# Patient Record
Sex: FEMALE | Race: BLACK OR AFRICAN AMERICAN | NOT HISPANIC OR LATINO | Employment: UNEMPLOYED | URBAN - METROPOLITAN AREA
[De-identification: names, ages, dates, MRNs, and addresses within clinical notes are randomized per-mention and may not be internally consistent; named-entity substitution may affect disease eponyms.]

---

## 2017-01-16 ENCOUNTER — OFFICE VISIT (OUTPATIENT)
Dept: SPORTS MEDICINE | Facility: CLINIC | Age: 47
End: 2017-01-16
Payer: MEDICAID

## 2017-01-16 VITALS
BODY MASS INDEX: 34.83 KG/M2 | WEIGHT: 204 LBS | DIASTOLIC BLOOD PRESSURE: 98 MMHG | HEART RATE: 103 BPM | SYSTOLIC BLOOD PRESSURE: 170 MMHG | HEIGHT: 64 IN

## 2017-01-16 DIAGNOSIS — M17.11 PRIMARY OSTEOARTHRITIS OF RIGHT KNEE: Primary | ICD-10-CM

## 2017-01-16 PROCEDURE — 99213 OFFICE O/P EST LOW 20 MIN: CPT | Mod: PBBFAC,PO | Performed by: PHYSICIAN ASSISTANT

## 2017-01-16 PROCEDURE — 99999 PR PBB SHADOW E&M-EST. PATIENT-LVL III: CPT | Mod: PBBFAC,,, | Performed by: PHYSICIAN ASSISTANT

## 2017-01-16 PROCEDURE — 99214 OFFICE O/P EST MOD 30 MIN: CPT | Mod: S$PBB,,, | Performed by: PHYSICIAN ASSISTANT

## 2017-01-16 NOTE — LETTER
Patient: Brionna Jones   YOB: 1970   Clinic Number: [id number]   Today's Date: January 16, 2017        Certificate to Return to Work     Brionna was seen by Angie Dominguez PA-C on 1/16/2017.      If you have any questions or concerns, please feel free to contact the office at [phone].    Thank you.    Angie Dominguez PA-C        Signature: __________________________________________________

## 2017-01-16 NOTE — PROGRESS NOTES
Subjective:          Chief Complaint: Brionna Jones is a 46 y.o. female who had concerns including Follow-up of the Right Knee.    Knee Pain    Associated symptoms include stiffness. Pertinent negatives include no fever, itching or numbness.   Pain   Associated symptoms include joint swelling. Pertinent negatives include no abdominal pain, chest pain, chills, congestion, coughing, fever, headaches, myalgias, nausea, numbness, rash, sore throat or vomiting.     Patient presents to clinic today for follow up right knee. She had a right knee Synvisc One injection on 10/7/2016 which she reports provided no relief of pain. She has tried PT 1-2x a week for 6 weeks.  She states that she does not feel like she is getting any better.She has had right knee pain x  2 years. She states that pain never quite went away post-operatively. Pain at its worst 8/10.  Pain is located in the knee joint and above the knee and it radiates to her feet. Pain is worse when lying down, standing up from a seated position, and walking up and down stairs. She denies mechanical symptoms. States that her knee occasionally gives out on her. She did her PT after surgery and never returned to her full functioning level after surgery. She has had steroid injections without improvement in her knee pain.     Surgery Date: 11/11/2014      Surgeon(s) and Role:  * Matt Smith MD - Primary     Pre-op Diagnosis: Unspecified internal derangement of knee [717.9]  Tear of medial cartilage or meniscus of knee, current [836.0]     Post-op Diagnosis: Same     Procedure(s) (LRB):  ARTHROSCOPY-KNEE (Right)  MENISCECTOMY (Right)  REMOVAL-FOREIGN BODY / LOOSE BODY (Right)    Review of Systems   Constitution: Negative. Negative for chills, fever, weight gain and weight loss.   HENT: Negative for congestion, headaches and sore throat.    Eyes: Negative for blurred vision and double vision.   Cardiovascular: Negative for chest pain, leg swelling and palpitations.    Respiratory: Negative for cough and shortness of breath.    Hematologic/Lymphatic: Does not bruise/bleed easily.   Skin: Negative for itching, poor wound healing and rash.   Musculoskeletal: Positive for joint pain, joint swelling and stiffness. Negative for back pain, muscle weakness and myalgias.   Gastrointestinal: Negative for abdominal pain, constipation, diarrhea, nausea and vomiting.   Genitourinary: Negative.  Negative for frequency and hematuria.   Neurological: Negative for dizziness, numbness, paresthesias and sensory change.   Psychiatric/Behavioral: Negative for altered mental status and depression. The patient is not nervous/anxious.    Allergic/Immunologic: Negative for hives.       Pain Related Questions  Over the past 3 days, what was your average pain during activity? (I.e. running, jogging, walking, climbing stairs, getting dressed, ect.): 10  Over the past 3 days, what was your highest pain level?: 10  Over the past 3 days, what was your lowest pain level? : 8    Other  How many nights a week are you awakened by your affected body part?: 7  Was the patient's HEIGHT measured or patient reported?: Patient Reported  Was the patient's WEIGHT measured or patient reported?: Measured      Objective:        General: Brionna is well-developed, well-nourished, appears stated age, in no acute distress, alert and oriented to time, place and person.     General    Vitals reviewed.  Constitutional: She is oriented to person, place, and time. She appears well-developed and well-nourished. No distress.   HENT:   Head: Normocephalic and atraumatic.   Eyes: EOM are normal.   Neck: Normal range of motion.   Cardiovascular: Normal rate and regular rhythm.    Pulmonary/Chest: Effort normal. No respiratory distress.   Neurological: She is alert and oriented to person, place, and time. She has normal reflexes. No cranial nerve deficit. Coordination normal.   Psychiatric: She has a normal mood and affect. Her behavior  is normal. Judgment and thought content normal.     General Musculoskeletal Exam   Gait: normal       Right Knee Exam     Inspection   Erythema: absent  Scars: absent  Swelling: absent  Effusion: effusion  Deformity: deformity  Bruising: absent    Tenderness   The patient is tender to palpation of the medial joint line.    Range of Motion   Extension:  0 normal   Flexion:  130 normal     Tests   Meniscus   Zeenat:  Medial - negative Lateral - negative  Ligament Examination Lachman: normal (-1 to 2mm) PCL-Posterior Drawer: normal (0 to 2mm)     MCL - Valgus: normal (0 to 2mm)  LCL - Varus: normalPivot Shift: normal (Equal)Reverse Pivot Shift: normal (Equal)Dial Test at 30 degrees: normal (< 5 degrees)Dial Test at 90 degrees: normal (< 5 degrees)  Posterolateral Corner: unstable (>15 degrees difference)  Patella   Passive Patellar Tilt: neutral  Patellar Glide (quadrants): Lateral - 1   Medial - 2  Patellar Grind: positive    Other   Sensation: normal    Left Knee Exam     Inspection   Erythema: absent  Scars: absent  Swelling: absent  Effusion: absent  Deformity: deformity  Bruising: absent    Tenderness   The patient is experiencing no tenderness.         Range of Motion   Extension:  0 normal   Flexion:  130 normal     Tests   Meniscus   Zeenat:  Medial - negative Lateral - negative  Stability Lachman: normal (-1 to 2mm) PCL-Posterior Drawer: normal (0 to 2mm)  MCL - Valgus: normal (0 to 2mm)  LCL - Varus: normal (0 to 2mm)Pivot Shift: normal (Equal)Reverse Pivot Shift: normal (Equal)Dial Test at 30 degrees: normal (< 5 degrees)Dial Test at 90 degrees: normal (< 5 degrees)  Posterolateral Corner: unstable (>15 degrees difference)  Patella   Passive Patellar Tilt: neutral  Patellar Glide (Quadrants): Lateral - 1 Medial - 2  Patellar Grind: negative    Other   Sensation: normal    Muscle Strength   Right Lower Extremity   Hip Abduction: 5/5   Quadriceps:  5/5   Hamstrin/5   Left Lower Extremity   Hip  Abduction: 5/5   Quadriceps:  5/5   Hamstrin/5     Reflexes     Left Side  Quadriceps:  2+  Achilles:  2+    Right Side   Quadriceps:  2+  Achilles:  2+    Vascular Exam     Right Pulses  Dorsalis Pedis:      2+  Posterior Tibial:      2+        Left Pulses  Dorsalis Pedis:      2+  Posterior Tibial:      2+        RADIOGRAPHS:  4 views:    Right: There is mild-moderate DJD.  No fracture dislocation bone destruction or OCD seen.    Left: There is mild-moderate DJD.  No fracture dislocation bone destruction or OCD seen.        Assessment:       Encounter Diagnosis   Name Primary?    Primary osteoarthritis of right knee Yes          Plan:       1.  Continue Mobic 15 mg.      2.  placed for Synvisc One injection    3. Continue 3D sleeve sleeve    4. IKDC, SF-12 and KOOS was filled out today in clinic.     RTC in 3 weeks with Dr. Matt Smith to discuss treatment options. Patient will fill out IKDC, SF-12 and KOOS on return and bilateral knee series on return.                            Patient questionnaires may have been collected.

## 2017-01-16 NOTE — MR AVS SNAPSHOT
[facility]  [address]  [facility] [address]  Phone: [phone]                  Brionna Jones   2017 8:30 AM   Appointment    Description:  Female : 1970   Provider:  Angie Dominguez PA-C   Department:  [facility]                To Do List           Goals (5 Years of Data)     None      [facility] On [facility]     [facility] On Call Nurse Care Line -  Assistance  Registered nurses in the [facility] On Call Center provide clinical advisement, health education, appointment booking, and other advisory services.  Call for this free service at [phone].             Medications           Message regarding Medications     Verify the changes and/or additions to your medication regime listed below are the same as discussed with your clinician today.  If any of these changes or additions are incorrect, please notify your healthcare provider.             Verify that the below list of medications is an accurate representation of the medications you are currently taking.  If none reported, the list may be blank. If incorrect, please contact your healthcare provider. Carry this list with you in case of emergency.           Current Medications     aspirin (ECOTRIN) 325 MG EC tablet Take 1 tablet (325 mg total) by mouth once daily.    celecoxib (CELEBREX) 200 MG capsule Take 1 capsule (200 mg total) by mouth 2 (two) times daily.    cetirizine (ZYRTEC) 10 MG tablet Take 10 mg by mouth once daily.    meloxicam (MOBIC) 15 MG tablet Take 1 tablet (15 mg total) by mouth once daily.    nadolol (CORGARD) 40 MG tablet Take 1 tablet (40 mg total) by mouth once daily.    oxycodone-acetaminophen (PERCOCET) 5-325 mg per tablet Take 1 tablet by mouth every 6 (six) hours as needed for Pain.    trazodone (DESYREL) 50 MG tablet Take 1 tablet (50 mg total) by mouth every evening.    zolpidem (AMBIEN) 5 MG Tab Take 1 tablet (5 mg total) by mouth nightly as needed.           Clinical  Reference Information           Allergies as of 1/16/2017     Codeine      Immunizations Administered on Date of Encounter - 1/16/2017     None      Smoking Cessation     If you would like to quit smoking:   You may be eligible for free services if you are a [place] resident and started smoking cigarettes before September 1, 1988.  Call the [employer]oking [employer]essation [employer] ([employer]) toll free at (014) 319-1715 or (012) 859-1568.   Call 5-634-QUIT-NOW if you do not meet the above criteria.

## 2017-01-17 ENCOUNTER — TELEPHONE (OUTPATIENT)
Dept: SPORTS MEDICINE | Facility: CLINIC | Age: 47
End: 2017-01-17

## 2017-01-17 NOTE — TELEPHONE ENCOUNTER
----- Message from Aurea Chua sent at 1/16/2017  2:07 PM CST -----  Contact: PT  Excuse to return to work, was incorrect.     Please call: [phone]

## 2017-02-06 ENCOUNTER — HOSPITAL ENCOUNTER (OUTPATIENT)
Dept: RADIOLOGY | Facility: HOSPITAL | Age: 47
Discharge: HOME | End: 2017-02-06
Attending: ORTHOPAEDIC SURGERY
Payer: MEDICAID

## 2017-02-06 ENCOUNTER — OFFICE VISIT (OUTPATIENT)
Dept: SPORTS MEDICINE | Facility: CLINIC | Age: 47
End: 2017-02-06
Payer: MEDICAID

## 2017-02-06 VITALS
HEART RATE: 92 BPM | HEIGHT: 64 IN | SYSTOLIC BLOOD PRESSURE: 173 MMHG | BODY MASS INDEX: 34.83 KG/M2 | DIASTOLIC BLOOD PRESSURE: 99 MMHG | WEIGHT: 204 LBS

## 2017-02-06 DIAGNOSIS — M23.91 INTERNAL DERANGEMENT OF RIGHT KNEE: ICD-10-CM

## 2017-02-06 DIAGNOSIS — E66.01 MORBID OBESITY DUE TO EXCESS CALORIES: ICD-10-CM

## 2017-02-06 DIAGNOSIS — M17.11 PRIMARY OSTEOARTHRITIS OF RIGHT KNEE: ICD-10-CM

## 2017-02-06 DIAGNOSIS — M25.561 RIGHT KNEE PAIN, UNSPECIFIED CHRONICITY: Primary | ICD-10-CM

## 2017-02-06 DIAGNOSIS — M25.561 RIGHT KNEE PAIN, UNSPECIFIED CHRONICITY: ICD-10-CM

## 2017-02-06 PROCEDURE — 73564 X-RAY EXAM KNEE 4 OR MORE: CPT | Mod: TC,50,PO

## 2017-02-06 PROCEDURE — 99214 OFFICE O/P EST MOD 30 MIN: CPT | Mod: S$PBB,,, | Performed by: ORTHOPAEDIC SURGERY

## 2017-02-06 PROCEDURE — 73564 X-RAY EXAM KNEE 4 OR MORE: CPT | Mod: 26,50,, | Performed by: RADIOLOGY

## 2017-02-06 PROCEDURE — 99999 PR PBB SHADOW E&M-EST. PATIENT-LVL III: CPT | Mod: PBBFAC,,, | Performed by: ORTHOPAEDIC SURGERY

## 2017-02-06 NOTE — MR AVS SNAPSHOT
[facility]  [address]  [facility] [address]  Phone: [phone]                  Brionna Jones   2017 11:00 AM   Office Visit    Description:  Female : 1970   Provider:  Matt Smith MD   Department:  [facility]           Reason for Visit     Right Knee - Pain           Diagnoses this Visit        Comments    Right knee pain, unspecified chronicity    -  Primary     Internal derangement of right knee         Primary osteoarthritis of right knee         Morbid obesity due to excess calories                To Do List           Goals (5 Years of Data)     None      Follow-Up and Disposition     Return in about 2 weeks (around 2017), or RTC in 2-3 weeks with Dr. Matt Smith for MRI results review. Patient will fill out IKDC, SF-12 and , for RTC in 2-3 weeks with Dr. Matt Smith for MRI results review. Patient will fill out IKDC, SF-12 and .      [facility] On Call     [facility] On Call Nurse Care Line -  Assistance  Registered nurses in the [facility] On Call Center provide clinical advisement, health education, appointment booking, and other advisory services.  Call for this free service at [phone].             Medications           Message regarding Medications     Verify the changes and/or additions to your medication regime listed below are the same as discussed with your clinician today.  If any of these changes or additions are incorrect, please notify your healthcare provider.             Verify that the below list of medications is an accurate representation of the medications you are currently taking.  If none reported, the list may be blank. If incorrect, please contact your healthcare provider. Carry this list with you in case of emergency.           Current Medications     celecoxib (CELEBREX) 200 MG capsule Take 1 capsule (200 mg total) by mouth 2 (two) times daily.    cetirizine (ZYRTEC) 10 MG tablet Take 10 mg by mouth once  "daily.    meloxicam (MOBIC) 15 MG tablet Take 1 tablet (15 mg total) by mouth once daily.    oxycodone-acetaminophen (PERCOCET) 5-325 mg per tablet Take 1 tablet by mouth every 6 (six) hours as needed for Pain.    zolpidem (AMBIEN) 5 MG Tab Take 1 tablet (5 mg total) by mouth nightly as needed.    aspirin (ECOTRIN) 325 MG EC tablet Take 1 tablet (325 mg total) by mouth once daily.    nadolol (CORGARD) 40 MG tablet Take 1 tablet (40 mg total) by mouth once daily.    trazodone (DESYREL) 50 MG tablet Take 1 tablet (50 mg total) by mouth every evening.           Clinical Reference Information           Your Vitals Were     BP Pulse Height Weight Last Period BMI    173/99 92 5' 4" (1.626 m) 92.5 kg (204 lb) 01/30/2017 35.02 kg/m2      Blood Pressure          Most Recent Value    BP  (!)  173/99      Allergies as of 2/6/2017     Codeine      Immunizations Administered on Date of Encounter - 2/6/2017     None      Orders Placed During Today's Visit     Future Labs/Procedures Expected by Expires    MRI Lower Extremity Joint WO Cont Right  2/6/2017 2/6/2018    X-ray Knee Ortho Bilateral with Flexion  2/6/2017 2/6/2018      Smoking Cessation     If you would like to quit smoking:   You may be eligible for free services if you are a [place] resident and started smoking cigarettes before September 1, 1988.  Call the Smoking Cessation Trust ([facility]) toll free at ([phone] or (722) 550-0303.   Call 1-800-QUIT-NOW if you do not meet the above criteria.            Language Assistance Services     ATTENTION: Language assistance services are available, free of charge. Please call [phone].      ATENCIÓN: Si habla español, tiene a valdez disposición servicios gratuitos de asistencia lingüística. Llame al [phone].     CHÚ Ý: N?u b?n nói Ti?ng Vi?t, có các d?ch v? h? tr? ngôn ng? mi?n phí dành cho b?n. G?i s? [phone].         [facility] complies with applicable Federal civil rights laws and does not " discriminate on the basis of race, color, national origin, age, disability, or sex.

## 2017-02-06 NOTE — PROGRESS NOTES
Subjective:          Chief Complaint: Brionna Jones is a 46 y.o. female who had concerns including Pain of the Right Knee.    HPI Comments: Patient presents to clinic today for follow up right knee. She had a right knee Synvisc One injection on 10/7/2016 which she reports provided no relief of pain. She has tried PT 1-2x a week for 6 weeks.  She states that she does not feel like she is getting any better.She has had right knee pain x  2 years. She states that pain never quite went away post-operatively. Pain at its worst 8/10.  Pain is located in the knee joint and above the knee and it radiates to her feet. Pain is worse when lying down, standing up from a seated position, and walking up and down stairs. She denies mechanical symptoms. States that her knee occasionally gives out on her. She did her PT after surgery and never returned to her full functioning level after surgery. She has had steroid injections without improvement in her knee pain.     Surgery Date: 11/11/2014      Surgeon(s) and Role:  * Matt Smith MD - Primary     Pre-op Diagnosis: Unspecified internal derangement of knee (717.9)  Tear of medial cartilage or meniscus of knee, current (836.0)     Post-op Diagnosis: Same     Procedure(s) (LRB):  ARTHROSCOPY-KNEE (Right)  MENISCECTOMY (Right)  REMOVAL-FOREIGN BODY / LOOSE BODY (Right)    Pain   Associated symptoms include joint swelling. Pertinent negatives include no abdominal pain, chest pain, chills, congestion, coughing, fever, headaches, myalgias, nausea, numbness, rash, sore throat or vomiting.   Knee Pain    Associated symptoms include stiffness. Pertinent negatives include no fever, itching or numbness.         Review of Systems   Constitution: Negative. Negative for chills, fever, weight gain and weight loss.   HENT: Negative for congestion, headaches and sore throat.    Eyes: Negative for blurred vision and double vision.   Cardiovascular: Negative for chest pain, leg swelling and  palpitations.   Respiratory: Negative for cough and shortness of breath.    Hematologic/Lymphatic: Does not bruise/bleed easily.   Skin: Negative for itching, poor wound healing and rash.   Musculoskeletal: Positive for joint pain, joint swelling and stiffness. Negative for back pain, muscle weakness and myalgias.   Gastrointestinal: Negative for abdominal pain, constipation, diarrhea, nausea and vomiting.   Genitourinary: Negative.  Negative for frequency and hematuria.   Neurological: Negative for dizziness, numbness, paresthesias and sensory change.   Psychiatric/Behavioral: Negative for altered mental status and depression. The patient is not nervous/anxious.    Allergic/Immunologic: Negative for hives.       Pain Related Questions  Over the past 3 days, what was your average pain during activity? (I.e. running, jogging, walking, climbing stairs, getting dressed, ect.): 10  Over the past 3 days, what was your highest pain level?: 10  Over the past 3 days, what was your lowest pain level? : 10    Other  How many nights a week are you awakened by your affected body part?: 4  Was the patient's HEIGHT measured or patient reported?: Patient Reported  Was the patient's WEIGHT measured or patient reported?: Patient Reported      Objective:        General: Brionna is well-developed, well-nourished, appears stated age, in no acute distress, alert and oriented to time, place and person.     General    Vitals reviewed.  Constitutional: She is oriented to person, place, and time. She appears well-developed and well-nourished. No distress.   HENT:   Head: Normocephalic and atraumatic.   Eyes: EOM are normal.   Neck: Normal range of motion.   Cardiovascular: Normal rate and regular rhythm.    Pulmonary/Chest: Effort normal. No respiratory distress.   Neurological: She is alert and oriented to person, place, and time. She has normal reflexes. No cranial nerve deficit. Coordination normal.   Psychiatric: She has a normal mood and  affect. Her behavior is normal. Judgment and thought content normal.     General Musculoskeletal Exam   Gait: normal       Right Knee Exam     Inspection   Erythema: absent  Scars: absent  Swelling: absent  Effusion: effusion  Deformity: deformity  Bruising: absent    Tenderness   The patient is tender to palpation of the medial joint line.    Range of Motion   Extension:  0 normal   Flexion:  130 normal     Tests   Meniscus   Zeenat:  Medial - negative Lateral - negative  Ligament Examination Lachman: normal (-1 to 2mm) PCL-Posterior Drawer: normal (0 to 2mm)     MCL - Valgus: normal (0 to 2mm)  LCL - Varus: normalPivot Shift: normal (Equal)Reverse Pivot Shift: normal (Equal)Dial Test at 30 degrees: normal (< 5 degrees)Dial Test at 90 degrees: normal (< 5 degrees)  Posterolateral Corner: unstable (>15 degrees difference)  Patella   Passive Patellar Tilt: neutral  Patellar Glide (quadrants): Lateral - 1   Medial - 2  Patellar Grind: positive    Other   Sensation: normal    Left Knee Exam     Inspection   Erythema: absent  Scars: absent  Swelling: absent  Effusion: absent  Deformity: deformity  Bruising: absent    Tenderness   The patient is experiencing no tenderness.         Range of Motion   Extension:  0 normal   Flexion:  130 normal     Tests   Meniscus   Zeenat:  Medial - negative Lateral - negative  Stability Lachman: normal (-1 to 2mm) PCL-Posterior Drawer: normal (0 to 2mm)  MCL - Valgus: normal (0 to 2mm)  LCL - Varus: normal (0 to 2mm)Pivot Shift: normal (Equal)Reverse Pivot Shift: normal (Equal)Dial Test at 30 degrees: normal (< 5 degrees)Dial Test at 90 degrees: normal (< 5 degrees)  Posterolateral Corner: unstable (>15 degrees difference)  Patella   Passive Patellar Tilt: neutral  Patellar Glide (Quadrants): Lateral - 1 Medial - 2  Patellar Grind: negative    Other   Sensation: normal    Muscle Strength   Right Lower Extremity   Hip Abduction: 5/5   Quadriceps:  5/5   Hamstrin/5   Left Lower  Extremity   Hip Abduction: 5/5   Quadriceps:  5/5   Hamstrin/5     Reflexes     Left Side  Quadriceps:  2+  Achilles:  2+    Right Side   Quadriceps:  2+  Achilles:  2+    Vascular Exam     Right Pulses  Dorsalis Pedis:      2+  Posterior Tibial:      2+        Left Pulses  Dorsalis Pedis:      2+  Posterior Tibial:      2+                Assessment:       Encounter Diagnosis   Name Primary?    Right knee pain, unspecified chronicity Yes          Plan:       1.  Continue Mobic 15 mg.      2.  placed for Synvisc One injection    3. Continue 3D sleeve sleeve    4. IKDC, SF-12 and KOOS was filled out today in clinic.     RTC in 3 weeks with Dr. Matt Smith to discuss treatment options. Patient will fill out IKDC, SF-12 and KOOS on return and bilateral knee series on return.                            Patient questionnaires may have been collected.

## 2017-02-06 NOTE — PROGRESS NOTES
Subjective:          Chief Complaint: Brionna Jones is a 46 y.o. female who had concerns including Pain of the Right Knee.    Pain   Associated symptoms include joint swelling. Pertinent negatives include no abdominal pain, chest pain, chills, congestion, coughing, fever, headaches, myalgias, nausea, numbness, rash, sore throat or vomiting.   Knee Pain    Associated symptoms include stiffness. Pertinent negatives include no fever, itching or numbness.     Patient presents to clinic today for follow up right knee. She had a right knee Synvisc One injection on 10/7/2016 which she reports provided no relief of pain. She has tried PT 1-2x a week for 6 weeks.  She states that she does not feel like she is getting any better.She has had right knee pain x  2 years. She states that pain never quite went away post-operatively. Pain at its worst 8/10.  Pain is located in the knee joint and above the knee and it radiates to her feet. Pain is worse when lying down, standing up from a seated position, and walking up and down stairs. She denies mechanical symptoms. States that her knee occasionally gives out on her. She did her PT after surgery and never returned to her full functioning level after surgery. She has had steroid injections without improvement in her knee pain.     Surgery Date: 11/11/2014      Surgeon(s) and Role:  * Matt Smith MD - Primary     Pre-op Diagnosis: Unspecified internal derangement of knee [717.9]  Tear of medial cartilage or meniscus of knee, current [836.0]     Post-op Diagnosis: Same     Procedure(s) (LRB):  ARTHROSCOPY-KNEE (Right)  MENISCECTOMY (Right)  REMOVAL-FOREIGN BODY / LOOSE BODY (Right)    There was an area of 1 cm x 1 cm area on the lateral tibial plateau, which   showed grade III and grade IV chondral defect.     Review of Systems   Constitution: Negative. Negative for chills, fever, weight gain and weight loss.   HENT: Negative for congestion, headaches and sore throat.    Eyes:  Negative for blurred vision and double vision.   Cardiovascular: Negative for chest pain, leg swelling and palpitations.   Respiratory: Negative for cough and shortness of breath.    Hematologic/Lymphatic: Does not bruise/bleed easily.   Skin: Negative for itching, poor wound healing and rash.   Musculoskeletal: Positive for joint pain, joint swelling and stiffness. Negative for back pain, muscle weakness and myalgias.   Gastrointestinal: Negative for abdominal pain, constipation, diarrhea, nausea and vomiting.   Genitourinary: Negative.  Negative for frequency and hematuria.   Neurological: Negative for dizziness, numbness, paresthesias and sensory change.   Psychiatric/Behavioral: Negative for altered mental status and depression. The patient is not nervous/anxious.    Allergic/Immunologic: Negative for hives.       Pain Related Questions  Over the past 3 days, what was your average pain during activity? (I.e. running, jogging, walking, climbing stairs, getting dressed, ect.): 10  Over the past 3 days, what was your highest pain level?: 10  Over the past 3 days, what was your lowest pain level? : 10    Other  How many nights a week are you awakened by your affected body part?: 4  Was the patient's HEIGHT measured or patient reported?: Patient Reported  Was the patient's WEIGHT measured or patient reported?: Patient Reported      Objective:        General: Brionna is well-developed, well-nourished, appears stated age, in no acute distress, alert and oriented to time, place and person.     General    Vitals reviewed.  Constitutional: She is oriented to person, place, and time. She appears well-developed and well-nourished. No distress.   HENT:   Head: Normocephalic and atraumatic.   Mouth/Throat: No oropharyngeal exudate.   Eyes: EOM are normal. Right eye exhibits no discharge. Left eye exhibits no discharge.   Neck: Normal range of motion.   Cardiovascular: Normal rate and regular rhythm.    Pulmonary/Chest: Effort  normal and breath sounds normal. No respiratory distress.   Neurological: She is alert and oriented to person, place, and time. She has normal reflexes. No cranial nerve deficit. Coordination normal.   Psychiatric: She has a normal mood and affect. Her behavior is normal. Judgment and thought content normal.     General Musculoskeletal Exam   Gait: normal       Right Knee Exam     Inspection   Erythema: absent  Scars: present  Swelling: absent  Effusion: effusion  Deformity: deformity  Bruising: absent    Tenderness   The patient is tender to palpation of the patella.    Range of Motion   Extension:  -5 normal   Flexion:  140 normal     Tests   Meniscus   Zeenat:  Medial - negative Lateral - negative  Ligament Examination Lachman: normal (-1 to 2mm) PCL-Posterior Drawer: normal (0 to 2mm)     MCL - Valgus: normal (0 to 2mm)  LCL - Varus: normalPivot Shift: normal (Equal)Reverse Pivot Shift: normal (Equal)Dial Test at 30 degrees: normal (< 5 degrees)Dial Test at 90 degrees: normal (< 5 degrees)  Posterior Sag Test: negative  Posterolateral Corner: unstable (>15 degrees difference)  Patella   Patellar Apprehension: negative  Passive Patellar Tilt: neutral  Patellar Tracking: normal  Patellar Glide (quadrants): Lateral - 1   Medial - 2  Q-Angle at 90 degrees: normal  Patellar Grind: positive  J-Sign: none    Other   Meniscal Cyst: absent  Popliteal (Baker's) Cyst: absent  Sensation: normal    Left Knee Exam     Inspection   Erythema: absent  Scars: absent  Swelling: absent  Effusion: absent  Deformity: deformity  Bruising: absent    Tenderness   The patient is experiencing no tenderness.         Range of Motion   Extension:  -5 normal   Flexion:  140 normal     Tests   Meniscus   Zeenat:  Medial - negative Lateral - negative  Stability Lachman: normal (-1 to 2mm) PCL-Posterior Drawer: normal (0 to 2mm)  MCL - Valgus: normal (0 to 2mm)  LCL - Varus: normal (0 to 2mm)Pivot Shift: normal (Equal)Reverse Pivot Shift:  normal (Equal)Dial Test at 30 degrees: normal (< 5 degrees)Dial Test at 90 degrees: normal (< 5 degrees)  Posterior Sag Test: negative  Posterolateral Corner: unstable (>15 degrees difference)  Patella   Patellar Apprehension: negative  Passive Patellar Tilt: neutral  Patellar Tracking: normal  Patellar Glide (Quadrants): Lateral - 1 Medial - 2  Q-Angle at 90 degrees: normal  Patellar Grind: positive  J-Sign: J sign absent    Other   Meniscal Cyst: absent  Popliteal (Baker's) Cyst: absent  Sensation: normal    Right Hip Exam     Tests   Aimee: negative  Left Hip Exam     Tests   Aimee: negative          Muscle Strength   Right Lower Extremity   Hip Abduction: 5/5   Quadriceps:  5/5   Hamstrin/5   Left Lower Extremity   Hip Abduction: 5/5   Quadriceps:  5/5   Hamstrin/5     Reflexes     Left Side  Quadriceps:  2+  Achilles:  2+    Right Side   Quadriceps:  2+  Achilles:  2+    Vascular Exam     Right Pulses  Dorsalis Pedis:      2+  Posterior Tibial:      2+        Left Pulses  Dorsalis Pedis:      2+  Posterior Tibial:      2+        RADIOGRAPHS:  4 views:    Right: There is mild-moderate DJD.  No fracture dislocation bone destruction or OCD seen.    Left: There is mild-moderate DJD.  No fracture dislocation bone destruction or OCD seen.    Right knee flexion views demonstrate narrowing lateral in comparison to the left side    MRI:  3/13/14     Narrative   HISTORY: Rule out ligamentous versus cartilaginous injury    COMPARISON: MRI knee 08 (report only)    FINDINGS: There is a synovial sheath ganglion cyst involving the ACL.  This cyst measures 1.9 cm in greatest dimension.  The ACL is otherwise intact and unremarkable.  The PCL, MCL, and LCL are intact and unremarkable.  There is a horizontal undersurface   tear of the posterior horn of the medial meniscus.  The anterior aspect of the medial meniscus is unremarkable.  The lateral meniscus is unremarkable.    There is thinning of the cartilage of the  patellofemoral compartment.  There is a full thickness defect involving the cartilage superficial to the lateral femoral condyle.  This cartilage defect is associated with a 5-mm focus of edema involving the   lateral femoral condyle consistent with a osteochondral defect.    The osseous structures demonstrate red marrow conversion that may be secondary to cigarette smoking.  There is a mild joint effusion.  The quadriceps tendon and patellar ligament are unremarkable.      There is lateral subluxation of the patella.  There is thinning of the patellar articular cartilage.  There is a 1.8-cm Baker's cyst.   Impression       #1. Horizontal undersurface tear of the posterior horn of the medial meniscus.    #2. Full thickness defect of the cartilage superficial to the lateral condyle of the distal right femur with associated small osteochondral defect.    #3. Lateral subluxation of the patella.  This can be seen with patellar tracking disorders.    #4. Synovial sheath ganglion cyst involving the ACL.    #5. Thinning of the patellar articular cartilage.    #6. 1.8-cm Baker's cyst.              Assessment:       Encounter Diagnoses   Name Primary?    Right knee pain, unspecified chronicity Yes    Internal derangement of right knee     Primary osteoarthritis of right knee     Morbid obesity due to excess calories           Plan:       1.  IKDC, SF-12 and KOOS was filled out today in clinic.     RTC in 2-3 weeks with Dr. Matt Smith for MRI results review. Patient will fill out IKDC, SF-12 and KOOS on return.    2. MRI to evaluate the lateral compartment and the patellofemoral region of the right knee    3. Possible WAVE implant at PF region vs. ConforMIS iDUO implant                Patient questionnaires may have been collected.

## 2017-02-15 ENCOUNTER — HOSPITAL ENCOUNTER (OUTPATIENT)
Dept: RADIOLOGY | Facility: HOSPITAL | Age: 47
Discharge: HOME | End: 2017-02-15
Attending: ORTHOPAEDIC SURGERY
Payer: MEDICAID

## 2017-02-15 DIAGNOSIS — M23.91 INTERNAL DERANGEMENT OF RIGHT KNEE: ICD-10-CM

## 2017-02-15 DIAGNOSIS — E66.01 MORBID OBESITY DUE TO EXCESS CALORIES: ICD-10-CM

## 2017-02-15 DIAGNOSIS — M25.561 RIGHT KNEE PAIN, UNSPECIFIED CHRONICITY: ICD-10-CM

## 2017-02-15 DIAGNOSIS — M17.11 PRIMARY OSTEOARTHRITIS OF RIGHT KNEE: ICD-10-CM

## 2017-02-15 PROCEDURE — 73721 MRI JNT OF LWR EXTRE W/O DYE: CPT | Mod: TC,RT

## 2017-02-15 PROCEDURE — 73721 MRI JNT OF LWR EXTRE W/O DYE: CPT | Mod: 26,RT,, | Performed by: RADIOLOGY

## 2017-02-17 DIAGNOSIS — M12.561 TRAUMATIC ARTHRITIS OF RIGHT KNEE: Primary | ICD-10-CM

## 2017-02-22 ENCOUNTER — OFFICE VISIT (OUTPATIENT)
Dept: SPORTS MEDICINE | Facility: CLINIC | Age: 47
End: 2017-02-22
Payer: MEDICAID

## 2017-02-22 VITALS
HEIGHT: 64 IN | SYSTOLIC BLOOD PRESSURE: 147 MMHG | WEIGHT: 204 LBS | HEART RATE: 90 BPM | BODY MASS INDEX: 34.83 KG/M2 | DIASTOLIC BLOOD PRESSURE: 94 MMHG

## 2017-02-22 DIAGNOSIS — M23.91 INTERNAL DERANGEMENT OF RIGHT KNEE: ICD-10-CM

## 2017-02-22 DIAGNOSIS — M94.9 CHONDRAL LESION: Primary | ICD-10-CM

## 2017-02-22 DIAGNOSIS — M17.11 PRIMARY OSTEOARTHRITIS OF RIGHT KNEE: ICD-10-CM

## 2017-02-22 DIAGNOSIS — M25.561 PAIN IN JOINT OF RIGHT KNEE: ICD-10-CM

## 2017-02-22 PROCEDURE — 99214 OFFICE O/P EST MOD 30 MIN: CPT | Mod: S$PBB,,, | Performed by: ORTHOPAEDIC SURGERY

## 2017-02-22 PROCEDURE — 99999 PR PBB SHADOW E&M-EST. PATIENT-LVL IV: CPT | Mod: PBBFAC,,, | Performed by: ORTHOPAEDIC SURGERY

## 2017-02-22 PROCEDURE — 99214 OFFICE O/P EST MOD 30 MIN: CPT | Mod: PBBFAC,PO | Performed by: ORTHOPAEDIC SURGERY

## 2017-02-22 RX ORDER — MELOXICAM 15 MG/1
15 TABLET ORAL DAILY
Qty: 30 TABLET | Refills: 2 | Status: SHIPPED | OUTPATIENT
Start: 2017-02-22 | End: 2017-07-21 | Stop reason: HOSPADM

## 2017-02-22 NOTE — LETTER
February 22, 2017        No Recipients             [facility]  [address]  [facility] [address]  Phone: [phone]   Patient: Brionna Jones   MR Number: [id number]   YOB: 1970   Date of Visit: 2/22/2017       Dear Dr. Solis Recipients:    Thank you for referring Brionna Jones to me for evaluation. Below are the relevant portions of my assessment and plan of care.       Encounter Diagnoses   Name Primary?    Chondral lesion Yes    Primary osteoarthritis of right knee     Internal derangement of right knee            1.  IKDC, SF-12 and KOOS was filled out today in clinic.     RTC in 4 weeks with Mid-level provider for preop. Patient will not fill out IKDC, SF-12 and KOOS on return.    2. We reviewed with Brionna today, the pathology and natural history of her diagnosis. We have discussed a variety of treatment options including medications, physical therapy and other alternative treatments. I also explained the indications, risks and benefits of surgery. After discussion, Brionna decided to proceed with surgery. The decision was made to go forward with    1. Right knee ConforMIS iTotal    The details of the surgical procedure were explained, including the location of probable incisions and a description of likely hardware and/or grafts to be used.  The patient understands the likely convalescence after surgery.  Also, we have thoroughly discussed the risks, benefits and alternatives to surgery, including, but not limited to, the risk of infection, joint stiffness, blood clot (including DVT and/or pulmonary embolus), neurologic and vascular injury.  It was explained that, if tissue has been repaired or reconstructed, there is a chance of failure, which may require further management.    All of the patient's questions were answered and informed consent was obtained. The patient will contact us if they have any questions or concerns in the interim.    3. Rehab-[facility]     4.  Medical clearance: Cardiologist-Delvin Castro, PMD-Dr. Houston    5. Referral to pain clinic-Dr. Andrew Peterson for leg pain      If you have questions, please do not hesitate to call me. I look forward to following Brionna along with you.    Sincerely,      Matt Smith MD           CC  No Recipients

## 2017-02-22 NOTE — LETTER
February 22, 2017        No Recipients             [facility]  [address]  [facility] [address]  Phone: [phone]   Patient: Brionna Jones   MR Number: [id number]   YOB: 1970   Date of Visit: 2/22/2017       Dear Dr. Solis Recipients:    Thank you for referring Brionna Jones to me for evaluation. Below are the relevant portions of my assessment and plan of care.       Encounter Diagnoses   Name Primary?    Chondral lesion Yes    Primary osteoarthritis of right knee     Internal derangement of right knee            1.  IKDC, SF-12 and KOOS was filled out today in clinic.     RTC in 4 weeks with Mid-level provider for preop. Patient will not fill out IKDC, SF-12 and KOOS on return.    2. We reviewed with Brionna today, the pathology and natural history of her diagnosis. We have discussed a variety of treatment options including medications, physical therapy and other alternative treatments. I also explained the indications, risks and benefits of surgery. After discussion, Brionna decided to proceed with surgery. The decision was made to go forward with    1. Right knee ConforMIS iTotal    The details of the surgical procedure were explained, including the location of probable incisions and a description of likely hardware and/or grafts to be used.  The patient understands the likely convalescence after surgery.  Also, we have thoroughly discussed the risks, benefits and alternatives to surgery, including, but not limited to, the risk of infection, joint stiffness, blood clot (including DVT and/or pulmonary embolus), neurologic and vascular injury.  It was explained that, if tissue has been repaired or reconstructed, there is a chance of failure, which may require further management.    All of the patient's questions were answered and informed consent was obtained. The patient will contact us if they have any questions or concerns in the interim.    3. Rehab-[facility]     4.  Medical clearance: Cardiologist-Delvin Castro, PMD-Dr. Houston    5. Referral to pain clinic-Dr. Andrew Peterson for leg pain      If you have questions, please do not hesitate to call me. I look forward to following Brionna along with you.    Sincerely,      Matt Smith MD           CC  No Recipients

## 2017-02-22 NOTE — PATIENT INSTRUCTIONS
Total Knee Replacement  During total knee replacement surgery, your damaged knee joint is replaced with an artificial joint, called a prosthesis. This surgery almost always reduces joint pain and improves your quality of life.     The parts of the prosthesis are secured to the bones of the knee. Together they form the new joint.   Before your surgery  You will most likely arrive at the hospital on the morning of the surgery. Be sure to follow all of your doctors instructions on preparing for surgery:  · Stop eating or drinking 10 hours before surgery.  · If you take a daily medicine, ask if you should still take it the morning of surgery.  · At the hospital, your temperature, pulse, breathing, and blood pressure will be checked.  · An IV (intravenous) line will be started to provide fluids and medicines needed during surgery.  The surgical procedure  When the surgical team is ready, youll be taken to the operating room. There youll be given anesthesia to help you sleep through surgery, or to make you numb from the waist down. Then an incision is made on the front or side of your knee. Any damaged bone is cleaned away, and the new joint components are put into place. The incision is closed with surgical staples or stitches.  After your surgery  After surgery, youll be sent to the recovery room. When you are fully awake, youll be moved to your room. The nurses will give you medicines to ease your pain. You may have a catheter (small tube) in your bladder. A continuous passive motion machine may be used on your knee to keep it from getting stiff. A sequential compression machine may be used to prevent blood clots by gently squeezing then releasing your leg. You may be given medicine to prevent blood clots. Soon, healthcare providers will help you get up and moving.  When to call your doctor  Once at home, call your doctor if you have any of the symptoms below:  · An increase in knee pain  · Pain or swelling in  the calf or leg  · Unusual redness, heat, or drainage at the incision site  · Fever of 100.4°F (38°C) or higher  · Shaking chills  · Trouble breathing or chest pain (call 911)   Date Last Reviewed: 9/20/2015 © 2000-2016 [employer]. [address] [place] [address]. All rights reserved. This information is not intended as a substitute for professional medical care. Always follow your healthcare professional's instructions.        Conditioning Before Knee Replacement  Conditioning your body BEFORE knee replacement can help speed your recovery. Daily exercise helps strengthen muscles that support the knee joint. Aerobic activity (exercise that raises your heart rate) can improve fitness. It can also help you reach or maintain a healthy weight, reducing stress on your knee.  Before beginning any exercise program, talk with your healthcare provider.   Low-impact exercise  Try exercise that doesnt put too much weight on your knee, such as riding a stationary bike or swimming. This helps build strength in your knee with little stress on the joint. Start slowly. Then try to do a little more each day. Stop any exercise that causes increasing pain. You can also ask your healthcare provider or physical therapist about ways to manage pain during exercise.  Pool exercise     Walk in a level area of the pool.      Exercising in a pool is a gentle way to exercise muscles. It can improve balance and coordination. A physical therapist may work with you in a pool therapy program. You can also try pool walking on your own.  waist-deep to chest-deep water with your arms out to the sides. Slowly walk forward. To avoid overdoing, ask your physical therapist or surgeon for guidelines on how long to exercise. He or she can also give you tips on pool safety.  Call your healthcare provider   Contact your healthcare provider if:   · You have questions about the exercises.  · There is increased pain or  swelling in your knee after exercise.  · You need help learning to use your walker or crutches.   Date Last Reviewed: 10/1/2015  © 8138-5154 [employer]. [address] [place] [address]. All rights reserved. This information is not intended as a substitute for professional medical care. Always follow your healthcare professional's instructions.        Exercises Before Knee Replacement: Help with Walker or Crutch Use  Doing exercises before your knee replacement can help speed your recovery. These exercises build upper body strength. This can help you when youre using a walker or crutches to get around after surgery. Your physical therapist (PT) or surgeon may advise you to use weights to make the exercises more effective. Follow your healthcare providers instructions.        Stop any exercise that causes sharp or increased knee pain, dizziness, shortness of breath, or chest pain.         Bicep curls    · Sit up straight. Keep your elbow close to your body and your wrist straight.  · Bend your arm, moving your hand up to your shoulder, then lower slowly.  · Do a set with each arm.           Tricep curls    · Sit, leaning forward from the waist.  · Bend your elbow so that your forearm is parallel to the floor. Then straighten your elbow as you extend your arm behind you.  · Do a set with each arm.        Seated press-ups    · Sit in a sturdy chair with armrests.  · With palms flat on the armrests, press down to lift your buttocks from the chair. Hold for 3 to 5 seconds.  · Bend your elbows to slowly ease back down.  Date Last Reviewed: 10/1/2015  © 5587-0470 [employer]. [address] [place] [address]. All rights reserved. This information is not intended as a substitute for professional medical care. Always follow your healthcare professional's instructions.        Pre-Knee Replacement: Ankle Pumps, Quad Sets, Leg Raises  Doing these exercises BEFORE your knee  replacement can help speed your recovery. Ask your physical therapist (PT) whether you should exercise one or both legs. Unless youre told otherwise, start by doing each exercise five to 10 times (5 to 10 reps) twice a day (2 sets). As you get stronger, slowly increase the number of reps and sets.  Stop any exercise that causes sharp or increased knee pain, dizziness, shortness of breath, or chest pain.   Ankle pumps    Ankle pumps can help prevent circulation problems, such as blood clots. Do ankle pumps by pointing and flexing your feet.  Quadriceps sets    · Lie on your back in bed, legs straight.  · Tighten the muscle at the front of the thigh as you press the back of your knee down toward the bed. Hold for a few seconds. Then relax the leg.  Straight leg raises    · Lie in bed. Bend one leg. Keep your other leg straight on the bed.  · Lift your straight leg as high as you comfortably can, but not higher than 12 inches. Hold for a few seconds. Then slowly lower the leg.  Date Last Reviewed: 10/1/2015  © 6977-8711 [employer]. [address] [place] [address]. All rights reserved. This information is not intended as a substitute for professional medical care. Always follow your healthcare professional's instructions.        Understanding Knee Replacement  The knee is a hingelike joint, formed where the thighbone (femur), shinbone (tibia), and kneecap (patella) meet. It is supported by muscles, tendons, and ligaments and lined with cushioning cartilage. Over time, cartilage can wear away. As it does, the knee becomes stiff and painful. A knee prosthesis (artificial joint) can replace the painful joint and restore movement.    A healthy knee  A healthy knee joint bends easily. Cartilage, a smooth tissue, covers the ends of the thighbone and shinbone and the underside of the kneecap. Healthy cartilage absorbs stress and allows the bones to glide freely over each other. Joint fluid lubricates  the cartilage surfaces, making movement even easier.       A problem knee  A problem knee is stiff or painful. Cartilage cracks or wears away due to usage, inflammation, or injury. Worn, roughened cartilage no longer allows the joint to glide freely, so it feels stiff and painful. As more cartilage wears away, exposed bones rub together when the knee bends, causing pain. With time, bone surfaces also become rough, making pain worse.       A knee prosthesis  A knee prosthesis lets your knee bend easily again. The roughened ends of the thighbone and shinbone and the underside of the kneecap are replaced with metal and strong plastic components. With new smooth surfaces, the bones can once again glide freely jwithout arthritic pain. A knee prosthesis does have limitations. But it can let you walk and move with greater comfort.  Date Last Reviewed: 9/20/2015  © 3379-1055 [employer]. [address] [place] [address], [place] [id number]. All rights reserved. This information is not intended as a substitute for professional medical care. Always follow your healthcare professional's instructions.

## 2017-02-22 NOTE — LETTER
February 22, 2017        Vishal Castro MD  [address]  [facility] [address]             [facility]  [address]  [facility] [address]  Phone: [phone]   Patient: Brionna Jones   MR Number: [id number]   YOB: 1970   Date of Visit: 2/22/2017       Dear Dr. Castro:    Thank you for referring Brionna Jones to me for evaluation. Below are the relevant portions of my assessment and plan of care.       Encounter Diagnoses   Name Primary?    Chondral lesion Yes    Primary osteoarthritis of right knee     Internal derangement of right knee            1.  IKDC, SF-12 and KOOS was filled out today in clinic.     RTC in 4 weeks with Mid-level provider for preop. Patient will not fill out IKDC, SF-12 and KOOS on return.    2. We reviewed with Brionna today, the pathology and natural history of her diagnosis. We have discussed a variety of treatment options including medications, physical therapy and other alternative treatments. I also explained the indications, risks and benefits of surgery. After discussion, Brionna decided to proceed with surgery. The decision was made to go forward with    1. Right knee ConforMIS iTotal    The details of the surgical procedure were explained, including the location of probable incisions and a description of likely hardware and/or grafts to be used.  The patient understands the likely convalescence after surgery.  Also, we have thoroughly discussed the risks, benefits and alternatives to surgery, including, but not limited to, the risk of infection, joint stiffness, blood clot (including DVT and/or pulmonary embolus), neurologic and vascular injury.  It was explained that, if tissue has been repaired or reconstructed, there is a chance of failure, which may require further management.    All of the patient's questions were answered and informed consent was obtained. The patient will contact us if they have any questions or concerns in the  interim.    3. Rehab-[facility] [place]     4. Medical clearance: Cardiologist-Delvin Castro, PMD-Dr. Houston    5. Referral to pain clinic-Dr. Andrew Peterson for leg pain      If you have questions, please do not hesitate to call me. I look forward to following Brionna along with you.    Sincerely,      Matt Smith MD           CC  No Recipients

## 2017-02-22 NOTE — PROGRESS NOTES
Subjective:          Chief Complaint: Brionna Jones is a 46 y.o. female who had concerns including Follow-up of the Right Knee.    Pain   Associated symptoms include joint swelling. Pertinent negatives include no abdominal pain, chest pain, chills, congestion, coughing, fever, headaches, myalgias, nausea, numbness, rash, sore throat or vomiting.   Knee Pain    Associated symptoms include stiffness. Pertinent negatives include no fever, itching or numbness.     Patient presents to clinic today for follow up right knee. She had a right knee Synvisc One injection on 10/7/2016 which she reports provided no relief of pain. She has tried PT 1-2x a week for 6 weeks.  She states that she does not feel like she is getting any better.She has had right knee pain x  2 years. She states that pain never quite went away post-operatively. Pain at its worst 8/10.  Pain is located in the knee joint and above the knee and it radiates to her feet. Pain is worse when lying down, standing up from a seated position, and walking up and down stairs. She denies mechanical symptoms. States that her knee occasionally gives out on her. She did her PT after surgery and never returned to her full functioning level after surgery. She has had steroid injections without improvement in her knee pain.     Surgery Date: 11/11/2014      Surgeon(s) and Role:  * Matt Smith MD - Primary     Pre-op Diagnosis: Unspecified internal derangement of knee [717.9]  Tear of medial cartilage or meniscus of knee, current [836.0]     Post-op Diagnosis: Same     Procedure(s) (LRB):  ARTHROSCOPY-KNEE (Right)  MENISCECTOMY (Right)  REMOVAL-FOREIGN BODY / LOOSE BODY (Right)    There was an area of 1 cm x 1 cm area on the lateral tibial plateau, which   showed grade III and grade IV chondral defect.     Review of Systems   Constitution: Negative. Negative for chills, fever, weight gain and weight loss.   HENT: Negative for congestion, headaches and sore throat.     Eyes: Negative for blurred vision and double vision.   Cardiovascular: Negative for chest pain, leg swelling and palpitations.   Respiratory: Negative for cough and shortness of breath.    Hematologic/Lymphatic: Does not bruise/bleed easily.   Skin: Negative for itching, poor wound healing and rash.   Musculoskeletal: Positive for joint pain, joint swelling and stiffness. Negative for back pain, muscle weakness and myalgias.   Gastrointestinal: Negative for abdominal pain, constipation, diarrhea, nausea and vomiting.   Genitourinary: Negative.  Negative for frequency and hematuria.   Neurological: Negative for dizziness, numbness, paresthesias and sensory change.   Psychiatric/Behavioral: Negative for altered mental status and depression. The patient is not nervous/anxious.    Allergic/Immunologic: Negative for hives.       Pain Related Questions  Over the past 3 days, what was your average pain during activity? (I.e. running, jogging, walking, climbing stairs, getting dressed, ect.): 10  Over the past 3 days, what was your highest pain level?: 10  Over the past 3 days, what was your lowest pain level? : 9    Other  How many nights a week are you awakened by your affected body part?: 7  Was the patient's HEIGHT measured or patient reported?: Patient Reported  Was the patient's WEIGHT measured or patient reported?: Measured      Objective:        General: Brionna is well-developed, well-nourished, appears stated age, in no acute distress, alert and oriented to time, place and person.     General    Vitals reviewed.  Constitutional: She is oriented to person, place, and time. She appears well-developed and well-nourished. No distress.   HENT:   Head: Normocephalic and atraumatic.   Mouth/Throat: No oropharyngeal exudate.   Eyes: EOM are normal. Right eye exhibits no discharge. Left eye exhibits no discharge.   Neck: Normal range of motion.   Cardiovascular: Normal rate and regular rhythm.    Pulmonary/Chest: Effort  normal and breath sounds normal. No respiratory distress.   Neurological: She is alert and oriented to person, place, and time. She has normal reflexes. No cranial nerve deficit. Coordination normal.   Psychiatric: She has a normal mood and affect. Her behavior is normal. Judgment and thought content normal.     General Musculoskeletal Exam   Gait: normal       Right Knee Exam     Inspection   Erythema: absent  Scars: present  Swelling: absent  Effusion: effusion  Deformity: deformity  Bruising: absent    Tenderness   The patient is tender to palpation of the patella, lateral joint line and medial joint line.    Range of Motion   Extension:  0 normal   Flexion:  140 normal     Tests   Meniscus   Zeenat:  Medial - negative Lateral - negative  Ligament Examination Lachman: normal (-1 to 2mm) PCL-Posterior Drawer: normal (0 to 2mm)     MCL - Valgus: normal (0 to 2mm)  LCL - Varus: normalPivot Shift: normal (Equal)Reverse Pivot Shift: normal (Equal)Dial Test at 30 degrees: normal (< 5 degrees)Dial Test at 90 degrees: normal (< 5 degrees)  Posterior Sag Test: negative  Posterolateral Corner: unstable (>15 degrees difference)  Patella   Patellar Apprehension: negative  Passive Patellar Tilt: neutral  Patellar Tracking: normal  Patellar Glide (quadrants): Lateral - 1   Medial - 2  Q-Angle at 90 degrees: normal  Patellar Grind: positive  J-Sign: none    Other   Meniscal Cyst: absent  Popliteal (Baker's) Cyst: absent  Sensation: normal    Left Knee Exam     Inspection   Erythema: absent  Scars: absent  Swelling: absent  Effusion: absent  Deformity: deformity  Bruising: absent    Tenderness   The patient is experiencing no tenderness.         Range of Motion   Extension:  -5 normal   Flexion:  140 normal     Tests   Meniscus   Zeenat:  Medial - negative Lateral - negative  Stability Lachman: normal (-1 to 2mm) PCL-Posterior Drawer: normal (0 to 2mm)  MCL - Valgus: normal (0 to 2mm)  LCL - Varus: normal (0 to 2mm)Pivot  Shift: normal (Equal)Reverse Pivot Shift: normal (Equal)Dial Test at 30 degrees: normal (< 5 degrees)Dial Test at 90 degrees: normal (< 5 degrees)  Posterior Sag Test: negative  Posterolateral Corner: unstable (>15 degrees difference)  Patella   Patellar Apprehension: negative  Passive Patellar Tilt: neutral  Patellar Tracking: normal  Patellar Glide (Quadrants): Lateral - 1 Medial - 2  Q-Angle at 90 degrees: normal  Patellar Grind: positive  J-Sign: J sign absent    Other   Meniscal Cyst: absent  Popliteal (Baker's) Cyst: absent  Sensation: normal    Right Hip Exam     Tests   Aimee: negative  Left Hip Exam     Tests   Aimee: negative          Muscle Strength   Right Lower Extremity   Hip Abduction: 5/5   Quadriceps:  5/5   Hamstrin/5   Left Lower Extremity   Hip Abduction: 5/5   Quadriceps:  5/5   Hamstrin/5     Reflexes     Left Side  Quadriceps:  2+  Achilles:  2+    Right Side   Quadriceps:  2+  Achilles:  2+    Vascular Exam     Right Pulses  Dorsalis Pedis:      2+  Posterior Tibial:      2+        Left Pulses  Dorsalis Pedis:      2+  Posterior Tibial:      2+        RADIOGRAPHS:  4 views:    Right: There is mild-moderate DJD.  No fracture dislocation bone destruction or OCD seen.    Left: There is mild-moderate DJD.  No fracture dislocation bone destruction or OCD seen.    Right knee flexion views demonstrate narrowing lateral in comparison to the left side    MRI:  3/13/14     Narrative   HISTORY: Rule out ligamentous versus cartilaginous injury    COMPARISON: MRI knee 08 (report only)    FINDINGS: There is a synovial sheath ganglion cyst involving the ACL.  This cyst measures 1.9 cm in greatest dimension.  The ACL is otherwise intact and unremarkable.  The PCL, MCL, and LCL are intact and unremarkable.  There is a horizontal undersurface   tear of the posterior horn of the medial meniscus.  The anterior aspect of the medial meniscus is unremarkable.  The lateral meniscus is  unremarkable.    There is thinning of the cartilage of the patellofemoral compartment.  There is a full thickness defect involving the cartilage superficial to the lateral femoral condyle.  This cartilage defect is associated with a 5-mm focus of edema involving the   lateral femoral condyle consistent with a osteochondral defect.    The osseous structures demonstrate red marrow conversion that may be secondary to cigarette smoking.  There is a mild joint effusion.  The quadriceps tendon and patellar ligament are unremarkable.      There is lateral subluxation of the patella.  There is thinning of the patellar articular cartilage.  There is a 1.8-cm Baker's cyst.   Impression       #1. Horizontal undersurface tear of the posterior horn of the medial meniscus.    #2. Full thickness defect of the cartilage superficial to the lateral condyle of the distal right femur with associated small osteochondral defect.    #3. Lateral subluxation of the patella.  This can be seen with patellar tracking disorders.    #4. Synovial sheath ganglion cyst involving the ACL.    #5. Thinning of the patellar articular cartilage.    #6. 1.8-cm Baker's cyst.              Assessment:       Encounter Diagnoses   Name Primary?    Chondral lesion Yes    Primary osteoarthritis of right knee     Internal derangement of right knee           Plan:       1.  IKDC, SF-12 and KOOS was filled out today in clinic.     RTC in 4 weeks with Mid-level provider for preop. Patient will not fill out IKDC, SF-12 and KOOS on return.    2. We reviewed with Brionna today, the pathology and natural history of her diagnosis. We have discussed a variety of treatment options including medications, physical therapy and other alternative treatments. I also explained the indications, risks and benefits of surgery. After discussion, Brionna decided to proceed with surgery. The decision was made to go forward with    1. Right knee ConforMIS iTotal    The details of the  surgical procedure were explained, including the location of probable incisions and a description of likely hardware and/or grafts to be used.  The patient understands the likely convalescence after surgery.  Also, we have thoroughly discussed the risks, benefits and alternatives to surgery, including, but not limited to, the risk of infection, joint stiffness, blood clot (including DVT and/or pulmonary embolus), neurologic and vascular injury.  It was explained that, if tissue has been repaired or reconstructed, there is a chance of failure, which may require further management.    All of the patient's questions were answered and informed consent was obtained. The patient will contact us if they have any questions or concerns in the interim.    3. Rehab-[facility]     4. Medical clearance: Cardiologist-Delvin Castro, PMD-Dr. Houston    5. Referral to pain clinic-Dr. Andrew Peterson for leg pain              Patient questionnaires may have been collected.

## 2017-02-22 NOTE — MR AVS SNAPSHOT
[facility]  [address]  [facility] [address]  Phone: [phone]                  Brionna Jones   2017 9:00 AM   Office Visit    Description:  Female : 1970   Provider:  Matt Smith MD   Department:  [facility]           Reason for Visit     Right Knee - Follow-up           Diagnoses this Visit        Comments    Chondral lesion    -  Primary     Primary osteoarthritis of right knee         Internal derangement of right knee                To Do List           Goals (5 Years of Data)     None      Follow-Up and Disposition     Return in about 4 weeks (around 3/22/2017), or RTC in 4 weeks with Mid-level provider for preop. Patient will not fill out IKDC, SF-12 and KOOS on , for RTC in 4 weeks with Mid-level provider for preop. Patient will not fill out IKDC, SF-12 and KOOS on .      [facility]     [facility] On Call Nurse Care Line -  Assistance  Registered nurses in the [facility] On Call Center provide clinical advisement, health education, appointment booking, and other advisory services.  Call for this free service at [phone].             Medications           Message regarding Medications     Verify the changes and/or additions to your medication regime listed below are the same as discussed with your clinician today.  If any of these changes or additions are incorrect, please notify your healthcare provider.             Verify that the below list of medications is an accurate representation of the medications you are currently taking.  If none reported, the list may be blank. If incorrect, please contact your healthcare provider. Carry this list with you in case of emergency.           Current Medications     celecoxib (CELEBREX) 200 MG capsule Take 1 capsule (200 mg total) by mouth 2 (two) times daily.    cetirizine (ZYRTEC) 10 MG tablet Take 10 mg by mouth once daily.    meloxicam (MOBIC) 15 MG tablet Take 1 tablet (15 mg total)  "by mouth once daily.    aspirin (ECOTRIN) 325 MG EC tablet Take 1 tablet (325 mg total) by mouth once daily.    nadolol (CORGARD) 40 MG tablet Take 1 tablet (40 mg total) by mouth once daily.    oxycodone-acetaminophen (PERCOCET) 5-325 mg per tablet Take 1 tablet by mouth every 6 (six) hours as needed for Pain.    trazodone (DESYREL) 50 MG tablet Take 1 tablet (50 mg total) by mouth every evening.    zolpidem (AMBIEN) 5 MG Tab Take 1 tablet (5 mg total) by mouth nightly as needed.           Clinical Reference Information           Your Vitals Were     BP Pulse Height Weight Last Period BMI    147/94 90 5' 4" (1.626 m) 92.5 kg (204 lb) 01/30/2017 35.02 kg/m2      Blood Pressure          Most Recent Value    BP  (!)  147/94      Allergies as of 2/22/2017     Codeine      Immunizations Administered on Date of Encounter - 2/22/2017     None      Orders Placed During Today's Visit      Normal Orders This Visit    Ambulatory Referral to Pain Clinic     Ambulatory Referral to Physical/Occupational Therapy     Future Labs/Procedures Expected by Expires    CT Lower Extremity Without Cont Right  2/22/2017 2/22/2018      Instructions      Total Knee Replacement  During total knee replacement surgery, your damaged knee joint is replaced with an artificial joint, called a prosthesis. This surgery almost always reduces joint pain and improves your quality of life.     The parts of the prosthesis are secured to the bones of the knee. Together they form the new joint.   Before your surgery  You will most likely arrive at the hospital on the morning of the surgery. Be sure to follow all of your doctors instructions on preparing for surgery:  · Stop eating or drinking 10 hours before surgery.  · If you take a daily medicine, ask if you should still take it the morning of surgery.  · At the hospital, your temperature, pulse, breathing, and blood pressure will be checked.  · An IV (intravenous) line will be started to provide fluids " and medicines needed during surgery.  The surgical procedure  When the surgical team is ready, youll be taken to the operating room. There youll be given anesthesia to help you sleep through surgery, or to make you numb from the waist down. Then an incision is made on the front or side of your knee. Any damaged bone is cleaned away, and the new joint components are put into place. The incision is closed with surgical staples or stitches.  After your surgery  After surgery, youll be sent to the recovery room. When you are fully awake, youll be moved to your room. The nurses will give you medicines to ease your pain. You may have a catheter (small tube) in your bladder. A continuous passive motion machine may be used on your knee to keep it from getting stiff. A sequential compression machine may be used to prevent blood clots by gently squeezing then releasing your leg. You may be given medicine to prevent blood clots. Soon, healthcare providers will help you get up and moving.  When to call your doctor  Once at home, call your doctor if you have any of the symptoms below:  · An increase in knee pain  · Pain or swelling in the calf or leg  · Unusual redness, heat, or drainage at the incision site  · Fever of 100.4°F (38°C) or higher  · Shaking chills  · Trouble breathing or chest pain (call 911)   Date Last Reviewed: 9/20/2015 © 2000-2016 [employer]. [address] [place] [address]. All rights reserved. This information is not intended as a substitute for professional medical care. Always follow your healthcare professional's instructions.        Conditioning Before Knee Replacement  Conditioning your body BEFORE knee replacement can help speed your recovery. Daily exercise helps strengthen muscles that support the knee joint. Aerobic activity (exercise that raises your heart rate) can improve fitness. It can also help you reach or maintain a healthy weight, reducing stress on your  knee.  Before beginning any exercise program, talk with your healthcare provider.   Low-impact exercise  Try exercise that doesnt put too much weight on your knee, such as riding a stationary bike or swimming. This helps build strength in your knee with little stress on the joint. Start slowly. Then try to do a little more each day. Stop any exercise that causes increasing pain. You can also ask your healthcare provider or physical therapist about ways to manage pain during exercise.  Pool exercise     Walk in a level area of the pool.      Exercising in a pool is a gentle way to exercise muscles. It can improve balance and coordination. A physical therapist may work with you in a pool therapy program. You can also try pool walking on your own.  waist-deep to chest-deep water with your arms out to the sides. Slowly walk forward. To avoid overdoing, ask your physical therapist or surgeon for guidelines on how long to exercise. He or she can also give you tips on pool safety.  Call your healthcare provider   Contact your healthcare provider if:   · You have questions about the exercises.  · There is increased pain or swelling in your knee after exercise.  · You need help learning to use your walker or crutches.   Date Last Reviewed: 10/1/2015  © 4947-4867 [employer]. [address] [place] [address], [place] [address]. All rights reserved. This information is not intended as a substitute for professional medical care. Always follow your healthcare professional's instructions.        Exercises Before Knee Replacement: Help with Walker or Crutch Use  Doing exercises before your knee replacement can help speed your recovery. These exercises build upper body strength. This can help you when youre using a walker or crutches to get around after surgery. Your physical therapist (PT) or surgeon may advise you to use weights to make the exercises more effective. Follow your healthcare providers  instructions.        Stop any exercise that causes sharp or increased knee pain, dizziness, shortness of breath, or chest pain.         Bicep curls    · Sit up straight. Keep your elbow close to your body and your wrist straight.  · Bend your arm, moving your hand up to your shoulder, then lower slowly.  · Do a set with each arm.           Tricep curls    · Sit, leaning forward from the waist.  · Bend your elbow so that your forearm is parallel to the floor. Then straighten your elbow as you extend your arm behind you.  · Do a set with each arm.        Seated press-ups    · Sit in a sturdy chair with armrests.  · With palms flat on the armrests, press down to lift your buttocks from the chair. Hold for 3 to 5 seconds.  · Bend your elbows to slowly ease back down.  Date Last Reviewed: 10/1/2015  © 7366-8351 [employer]. [address] [place] [address]. All rights reserved. This information is not intended as a substitute for professional medical care. Always follow your healthcare professional's instructions.        Pre-Knee Replacement: Ankle Pumps, Quad Sets, Leg Raises  Doing these exercises BEFORE your knee replacement can help speed your recovery. Ask your physical therapist (PT) whether you should exercise one or both legs. Unless youre told otherwise, start by doing each exercise five to 10 times (5 to 10 reps) twice a day (2 sets). As you get stronger, slowly increase the number of reps and sets.  Stop any exercise that causes sharp or increased knee pain, dizziness, shortness of breath, or chest pain.   Ankle pumps    Ankle pumps can help prevent circulation problems, such as blood clots. Do ankle pumps by pointing and flexing your feet.  Quadriceps sets    · Lie on your back in bed, legs straight.  · Tighten the muscle at the front of the thigh as you press the back of your knee down toward the bed. Hold for a few seconds. Then relax the leg.  Straight leg raises    · Lie in bed.  Bend one leg. Keep your other leg straight on the bed.  · Lift your straight leg as high as you comfortably can, but not higher than 12 inches. Hold for a few seconds. Then slowly lower the leg.  Date Last Reviewed: 10/1/2015  © 9844-9651 [employer]. [address] [place] [address]. All rights reserved. This information is not intended as a substitute for professional medical care. Always follow your healthcare professional's instructions.        Understanding Knee Replacement  The knee is a hingelike joint, formed where the thighbone (femur), shinbone (tibia), and kneecap (patella) meet. It is supported by muscles, tendons, and ligaments and lined with cushioning cartilage. Over time, cartilage can wear away. As it does, the knee becomes stiff and painful. A knee prosthesis (artificial joint) can replace the painful joint and restore movement.    A healthy knee  A healthy knee joint bends easily. Cartilage, a smooth tissue, covers the ends of the thighbone and shinbone and the underside of the kneecap. Healthy cartilage absorbs stress and allows the bones to glide freely over each other. Joint fluid lubricates the cartilage surfaces, making movement even easier.       A problem knee  A problem knee is stiff or painful. Cartilage cracks or wears away due to usage, inflammation, or injury. Worn, roughened cartilage no longer allows the joint to glide freely, so it feels stiff and painful. As more cartilage wears away, exposed bones rub together when the knee bends, causing pain. With time, bone surfaces also become rough, making pain worse.       A knee prosthesis  A knee prosthesis lets your knee bend easily again. The roughened ends of the thighbone and shinbone and the underside of the kneecap are replaced with metal and strong plastic components. With new smooth surfaces, the bones can once again glide freely jwithout arthritic pain. A knee prosthesis does have limitations. But it can let  you walk and move with greater comfort.  Date Last Reviewed: 9/20/2015  © 5069-1728 [employer]. [address] [place] [address], [place] [address]. All rights reserved. This information is not intended as a substitute for professional medical care. Always follow your healthcare professional's instructions.             Smoking Cessation     If you would like to quit smoking:   You may be eligible for free services if you are a [place] resident and started smoking cigarettes before September 1, 1988.  Call the [employer]oking [employer]essation [employer] (SCT) toll free at (168) 075-7642 or (166) 561-5413.   Call 2-164-QUIT-NOW if you do not meet the above criteria.            Language Assistance Services     ATTENTION: Language assistance services are available, free of charge. Please call [phone].      ATENCIÓN: Si habla español, tiene a valdez disposición servicios gratuitos de asistencia lingüística. Llame al [phone].     CHÚ Ý: N?u b?n nói Ti?ng Vi?t, có các d?ch v? h? tr? ngôn ng? mi?n phí dành cho b?n. G?i s? [phone].         [facility] complies with applicable Federal civil rights laws and does not discriminate on the basis of race, color, national origin, age, disability, or sex.

## 2017-04-24 ENCOUNTER — TELEPHONE (OUTPATIENT)
Dept: SPORTS MEDICINE | Facility: CLINIC | Age: 47
End: 2017-04-24

## 2017-04-24 NOTE — TELEPHONE ENCOUNTER
----- Message from Matt Smith MD sent at 4/23/2017  8:32 AM CDT -----  Perfect; need to examine her and check her again. So pull me into the visit with you to see whether pain at lateral and patellofemoral or global

## 2017-05-15 ENCOUNTER — OFFICE VISIT (OUTPATIENT)
Dept: SPORTS MEDICINE | Facility: CLINIC | Age: 47
End: 2017-05-15
Payer: MEDICAID

## 2017-05-15 VITALS
WEIGHT: 205 LBS | HEART RATE: 91 BPM | SYSTOLIC BLOOD PRESSURE: 153 MMHG | DIASTOLIC BLOOD PRESSURE: 103 MMHG | HEIGHT: 64 IN | BODY MASS INDEX: 35 KG/M2

## 2017-05-15 DIAGNOSIS — M17.11 PRIMARY OSTEOARTHRITIS OF RIGHT KNEE: Primary | ICD-10-CM

## 2017-05-15 DIAGNOSIS — E66.9 OBESITY (BMI 35.0-39.9 WITHOUT COMORBIDITY): ICD-10-CM

## 2017-05-15 DIAGNOSIS — M17.11 PRIMARY LOCALIZED OSTEOARTHROSIS, LOWER LEG, RIGHT: Primary | ICD-10-CM

## 2017-05-15 PROCEDURE — 99214 OFFICE O/P EST MOD 30 MIN: CPT | Mod: PBBFAC,PO | Performed by: ORTHOPAEDIC SURGERY

## 2017-05-15 PROCEDURE — 99999 PR PBB SHADOW E&M-EST. PATIENT-LVL IV: CPT | Mod: PBBFAC,,, | Performed by: ORTHOPAEDIC SURGERY

## 2017-05-15 PROCEDURE — 99214 OFFICE O/P EST MOD 30 MIN: CPT | Mod: S$PBB,,, | Performed by: ORTHOPAEDIC SURGERY

## 2017-05-15 NOTE — MR AVS SNAPSHOT
[facility]  [address]  [facility] [address]  Phone: [phone]                  Brionna Jones   5/15/2017 9:00 AM   Office Visit    Description:  Female : 1970   Provider:  Matt Smith MD   Department:  [facility]           Reason for Visit     Right Knee - Follow-up           Diagnoses this Visit        Comments    Primary osteoarthritis of right knee    -  Primary     Obesity (BMI 35.0-39.9 without comorbidity)                To Do List           Goals (5 Years of Data)     None      Follow-Up and Disposition     Return in about 6 weeks (around 2017), or RTC in 6 weeks with Mid-level provider for preoperative history and physical. Patient will not fill , for RTC in 6 weeks with Mid-level provider for preoperative history and physical. Patient will not fill .      [facility] On Call     [facility] On Call Nurse Care Line -  Assistance  Unless otherwise directed by your provider, please contact [facility], our nurse care line that is available for  assistance.     Registered nurses in the [facility] On Call Center provide: appointment scheduling, clinical advisement, health education, and other advisory services.  Call: [phone] (toll free)               Medications           Message regarding Medications     Verify the changes and/or additions to your medication regime listed below are the same as discussed with your clinician today.  If any of these changes or additions are incorrect, please notify your healthcare provider.             Verify that the below list of medications is an accurate representation of the medications you are currently taking.  If none reported, the list may be blank. If incorrect, please contact your healthcare provider. Carry this list with you in case of emergency.           Current Medications     aspirin (ECOTRIN) 325 MG EC tablet Take 1 tablet (325 mg total) by mouth once daily.    cetirizine (ZYRTEC)  "10 MG tablet Take 10 mg by mouth once daily.    meloxicam (MOBIC) 15 MG tablet Take 1 tablet (15 mg total) by mouth once daily.    zolpidem (AMBIEN) 5 MG Tab Take 1 tablet (5 mg total) by mouth nightly as needed.    celecoxib (CELEBREX) 200 MG capsule Take 1 capsule (200 mg total) by mouth 2 (two) times daily.    nadolol (CORGARD) 40 MG tablet Take 1 tablet (40 mg total) by mouth once daily.    oxycodone-acetaminophen (PERCOCET) 5-325 mg per tablet Take 1 tablet by mouth every 6 (six) hours as needed for Pain.    trazodone (DESYREL) 50 MG tablet Take 1 tablet (50 mg total) by mouth every evening.           Clinical Reference Information           Your Vitals Were     BP Pulse Height Weight Last Period BMI    153/103 91 5' 4" (1.626 m) 93 kg (205 lb) 04/24/2017 35.19 kg/m2      Blood Pressure          Most Recent Value    BP  (!)  153/103      Allergies as of 5/15/2017     Codeine      Immunizations Administered on Date of Encounter - 5/15/2017     None      Orders Placed During Today's Visit      Normal Orders This Visit    Ambulatory Referral to Physical/Occupational Therapy       Instructions      After Knee Replacement: Right After Surgery  Your healthcare team will monitor your progress after your surgery. You may receive general anesthesia (being put to sleep), an epidural nerve block in your spinal canal (numb from the waist down), or a femoral nerve block in your leg (entire leg is numb). Sometimes the femoral nerve block is added to another anesthesia for better pain control. Your team will use support equipment to help you recover. Be sure to let them know how you feel and how well your pain is controlled. You may also receive medicines, such as antibiotics and blood thinners.    Support equipment  Special tubes and machines help you recover after surgery. They may include:  · An intravenous (IV) line to provide needed fluids and medicines.  · A catheter tube to help drain your bladder.  · A drainage tube " in your leg to release excess fluid and reduce swelling.  · An ice machine or ice pack to reduce inflammation. The ice machine tube carries cold water to the knee joint.  · A sequential compression machine (SCM) to prevent blood clots by gently squeezing and then releasing your foot or calf.  · A continual passive motion machine (CPM) to increase flexibility by gently moving your knee.  Managing pain  When pain is controlled, youll walk sooner and recover faster. So be honest about how much pain you feel. And dont be afraid to ask for pain medicine when you need it. Your nurse may give you IV or oral pain medicine. Or, you may have a patient-controlled analgesia (PCA) machine. This machine lets you push a button to give yourself a measured dose of pain medicine. Tell your nurse if the medicines dont reduce pain or if you suddenly feel worse.  Date Last Reviewed: 10/1/2015  © 9914-0733 [employer]. [address] [place] [address]. All rights reserved. This information is not intended as a substitute for professional medical care. Always follow your healthcare professional's instructions.        Home Safety After Joint Surgery  If your movement is limited during recovery, ask a family member or friend to help prepare your living space. This helps make it safer and more comfortable while you heal. Use the tips below as a guide.    Home safety tips  · Stock up on toiletries, foods that are easy to prepare, and other items youll need during recovery.  · Store foods and other supplies between waist and shoulder level. This makes it easier to reach things without straining.  · Buy or borrow a portable telephone so you can keep it within easy reach.  · Ask your doctor whether you need to limit using stairs. If you do, and you normally sleep upstairs, prepare a bedroom on the main living level.  · Make sure rooms are well-lit.  · Keep items you use often in easy reach.  · Move electrical cords out  of the way so they dont trip you.  · Remove throw rugs to prevent slipping or tripping.  · Watch for pets or small objects on the floor.  · Add firm pillows to a low chair to help make getting up easier.  Ask friends and family to help out by checking in with you regularly. They may also help by running simple errands or doing small jobs around the house as you recover.   Date Last Reviewed: 8/28/2015 © 2000-2016 [employer]. [address] [place] [address]. All rights reserved. This information is not intended as a substitute for professional medical care. Always follow your healthcare professional's instructions.        Total Knee Replacement  During total knee replacement surgery, your damaged knee joint is replaced with an artificial joint, called a prosthesis. This surgery almost always reduces joint pain and improves your quality of life.     The parts of the prosthesis are secured to the bones of the knee. Together they form the new joint.   Before your surgery  You will most likely arrive at the hospital on the morning of the surgery. Be sure to follow all of your doctors instructions on preparing for surgery:  · Stop eating or drinking 10 hours before surgery.  · If you take a daily medicine, ask if you should still take it the morning of surgery.  · At the hospital, your temperature, pulse, breathing, and blood pressure will be checked.  · An IV (intravenous) line will be started to provide fluids and medicines needed during surgery.  The surgical procedure  When the surgical team is ready, youll be taken to the operating room. There youll be given anesthesia to help you sleep through surgery, or to make you numb from the waist down. Then an incision is made on the front or side of your knee. Any damaged bone is cleaned away, and the new joint components are put into place. The incision is closed with surgical staples or stitches.  After your surgery  After surgery, youll be  sent to the recovery room. When you are fully awake, youll be moved to your room. The nurses will give you medicines to ease your pain. You may have a catheter (small tube) in your bladder. A continuous passive motion machine may be used on your knee to keep it from getting stiff. A sequential compression machine may be used to prevent blood clots by gently squeezing then releasing your leg. You may be given medicine to prevent blood clots. Soon, healthcare providers will help you get up and moving.  When to call your doctor  Once at home, call your doctor if you have any of the symptoms below:  · An increase in knee pain  · Pain or swelling in the calf or leg  · Unusual redness, heat, or drainage at the incision site  · Fever of 100.4°F (38°C) or higher  · Shaking chills  · Trouble breathing or chest pain (call 911)   Date Last Reviewed: 9/20/2015  © 6601-5614 [employer]. [address] [place] [address]. All rights reserved. This information is not intended as a substitute for professional medical care. Always follow your healthcare professional's instructions.        Understanding Knee Replacement  The knee is a hingelike joint, formed where the thighbone (femur), shinbone (tibia), and kneecap (patella) meet. It is supported by muscles, tendons, and ligaments and lined with cushioning cartilage. Over time, cartilage can wear away. As it does, the knee becomes stiff and painful. A knee prosthesis (artificial joint) can replace the painful joint and restore movement.    A healthy knee  A healthy knee joint bends easily. Cartilage, a smooth tissue, covers the ends of the thighbone and shinbone and the underside of the kneecap. Healthy cartilage absorbs stress and allows the bones to glide freely over each other. Joint fluid lubricates the cartilage surfaces, making movement even easier.       A problem knee  A problem knee is stiff or painful. Cartilage cracks or wears away due to usage,  inflammation, or injury. Worn, roughened cartilage no longer allows the joint to glide freely, so it feels stiff and painful. As more cartilage wears away, exposed bones rub together when the knee bends, causing pain. With time, bone surfaces also become rough, making pain worse.       A knee prosthesis  A knee prosthesis lets your knee bend easily again. The roughened ends of the thighbone and shinbone and the underside of the kneecap are replaced with metal and strong plastic components. With new smooth surfaces, the bones can once again glide freely jwithout arthritic pain. A knee prosthesis does have limitations. But it can let you walk and move with greater comfort.  Date Last Reviewed: 9/20/2015  © 2350-2248 [employer]. [address] [place] [address], [place] [address]. All rights reserved. This information is not intended as a substitute for professional medical care. Always follow your healthcare professional's instructions.             Smoking Cessation     If you would like to quit smoking:   You may be eligible for free services if you are a [place] resident and started smoking cigarettes before September 1, 1988.  Call the Smoking Cessation Trust ([facility]) toll free at (682) 950-0265 or (728) [phone].   Call 1-800-QUIT-NOW if you do not meet the above criteria.   Contact us via email: [email or web address]   View our website for more information: [email or web address]        Language Assistance Services     ATTENTION: Language assistance services are available, free of charge. Please call [phone].      ATENCIÓN: Si habla español, tiene a valdez disposición servicios gratuitos de asistencia lingüística. Llame al [phone].     CHÚ Ý: N?u b?n nói Ti?ng Vi?t, có các d?ch v? h? tr? ngôn ng? mi?n phí dành cho b?n. G?i s? [phone].         [facility] complies with applicable Federal civil rights laws and does not discriminate on the basis of race, color, national  origin, age, disability, or sex.

## 2017-05-15 NOTE — PROGRESS NOTES
Subjective:          Chief Complaint: Brionna Jones is a 47 y.o. female who had concerns including Follow-up of the Right Knee.    HPI Comments: Patient is here for a follow up for her right knee. She is having a lot of pain. She is wearing the 3D knee sleeve but don't think that it helps much. She has pain with walking and any kind of activity.     Surgery Date: 11/11/2014      Surgeon(s) and Role:  * Matt Smith MD - Primary     Pre-op Diagnosis: Unspecified internal derangement of knee (717.9)  Tear of medial cartilage or meniscus of knee, current (836.0)     Post-op Diagnosis: Same     Procedure(s) (LRB):  ARTHROSCOPY-KNEE (Right)  MENISCECTOMY (Right)  REMOVAL-FOREIGN BODY / LOOSE BODY (Right)    There was an area of 1 cm x 1 cm area on the lateral tibial plateau, which   showed grade III and grade IV chondral defect.     Pain   Associated symptoms include joint swelling. Pertinent negatives include no abdominal pain, chest pain, chills, congestion, coughing, fever, headaches, myalgias, nausea, numbness, rash, sore throat or vomiting.   Knee Pain    Associated symptoms include stiffness. Pertinent negatives include no fever, itching or numbness.         Review of Systems   Constitution: Negative. Negative for chills, fever, weight gain and weight loss.   HENT: Negative for congestion, headaches and sore throat.    Eyes: Negative for blurred vision and double vision.   Cardiovascular: Negative for chest pain, leg swelling and palpitations.   Respiratory: Negative for cough and shortness of breath.    Hematologic/Lymphatic: Does not bruise/bleed easily.   Skin: Negative for itching, poor wound healing and rash.   Musculoskeletal: Positive for joint pain, joint swelling and stiffness. Negative for back pain, muscle weakness and myalgias.   Gastrointestinal: Negative for abdominal pain, constipation, diarrhea, nausea and vomiting.   Genitourinary: Negative.  Negative for frequency and hematuria.    Neurological: Negative for dizziness, numbness, paresthesias and sensory change.   Psychiatric/Behavioral: Negative for altered mental status and depression. The patient is not nervous/anxious.    Allergic/Immunologic: Negative for hives.       Pain Related Questions  Over the past 3 days, what was your average pain during activity? (I.e. running, jogging, walking, climbing stairs, getting dressed, ect.): 10  Over the past 3 days, what was your highest pain level?: 10  Over the past 3 days, what was your lowest pain level? : 9    Other  How many nights a week are you awakened by your affected body part?: 7  Was the patient's HEIGHT measured or patient reported?: Patient Reported  Was the patient's WEIGHT measured or patient reported?: Measured      Objective:        General: Brionna is well-developed, well-nourished, appears stated age, in no acute distress, alert and oriented to time, place and person.     General    Vitals reviewed.  Constitutional: She is oriented to person, place, and time. She appears well-developed and well-nourished. No distress.   HENT:   Head: Normocephalic and atraumatic.   Mouth/Throat: No oropharyngeal exudate.   Eyes: EOM are normal. Right eye exhibits no discharge. Left eye exhibits no discharge.   Neck: Normal range of motion.   Cardiovascular: Normal rate and regular rhythm.    Pulmonary/Chest: Effort normal and breath sounds normal. No respiratory distress.   Neurological: She is alert and oriented to person, place, and time. She has normal reflexes. No cranial nerve deficit. Coordination normal.   Psychiatric: She has a normal mood and affect. Her behavior is normal. Judgment and thought content normal.     General Musculoskeletal Exam   Gait: normal       Right Knee Exam     Inspection   Erythema: absent  Scars: present  Swelling: absent  Effusion: effusion  Deformity: deformity  Bruising: absent    Tenderness   The patient is tender to palpation of the patella, lateral joint line  and medial joint line.    Range of Motion   Extension:  0 normal   Flexion:  140 normal     Tests   Meniscus   Zeenat:  Medial - negative Lateral - negative  Ligament Examination Lachman: normal (-1 to 2mm) PCL-Posterior Drawer: normal (0 to 2mm)     MCL - Valgus: normal (0 to 2mm)  LCL - Varus: normalPivot Shift: normal (Equal)Reverse Pivot Shift: normal (Equal)Dial Test at 30 degrees: normal (< 5 degrees)Dial Test at 90 degrees: normal (< 5 degrees)  Posterior Sag Test: negative  Posterolateral Corner: unstable (>15 degrees difference)  Patella   Patellar Apprehension: negative  Passive Patellar Tilt: neutral  Patellar Tracking: normal  Patellar Glide (quadrants): Lateral - 1   Medial - 2  Q-Angle at 90 degrees: normal  Patellar Grind: positive  J-Sign: none    Other   Meniscal Cyst: absent  Popliteal (Baker's) Cyst: absent  Sensation: normal    Left Knee Exam     Inspection   Erythema: absent  Scars: absent  Swelling: absent  Effusion: absent  Deformity: deformity  Bruising: absent    Tenderness   The patient is experiencing no tenderness.         Range of Motion   Extension:  -5 normal   Flexion:  140 normal     Tests   Meniscus   Zeenat:  Medial - negative Lateral - negative  Stability Lachman: normal (-1 to 2mm) PCL-Posterior Drawer: normal (0 to 2mm)  MCL - Valgus: normal (0 to 2mm)  LCL - Varus: normal (0 to 2mm)Pivot Shift: normal (Equal)Reverse Pivot Shift: normal (Equal)Dial Test at 30 degrees: normal (< 5 degrees)Dial Test at 90 degrees: normal (< 5 degrees)  Posterior Sag Test: negative  Posterolateral Corner: unstable (>15 degrees difference)  Patella   Patellar Apprehension: negative  Passive Patellar Tilt: neutral  Patellar Tracking: normal  Patellar Glide (Quadrants): Lateral - 1 Medial - 2  Q-Angle at 90 degrees: normal  Patellar Grind: positive  J-Sign: J sign absent    Other   Meniscal Cyst: absent  Popliteal (Baker's) Cyst: absent  Sensation: normal    Right Hip Exam     Tests   Aimee:  negative  Left Hip Exam     Tests   Aimee: negative          Muscle Strength   Right Lower Extremity   Hip Abduction: 5/5   Quadriceps:  5/5   Hamstrin/5   Left Lower Extremity   Hip Abduction: 5/5   Quadriceps:  5/5   Hamstrin/5     Reflexes     Left Side  Quadriceps:  2+  Achilles:  2+    Right Side   Quadriceps:  2+  Achilles:  2+    Vascular Exam     Right Pulses  Dorsalis Pedis:      2+  Posterior Tibial:      2+        Left Pulses  Dorsalis Pedis:      2+  Posterior Tibial:      2+        Radiographs 2017:   Standing AP knees, standing knees in flexion, lateral view of both knees and sunrise view of both patella.  The maybe some mild narrowing of the lateral aspect of the femoral tibial compartment seen on the standing flexion view.  Otherwise joint spaces are fairly well-maintained.  Osteophytes noted.   Impression    See above       MRI: right knee:  MRI of the right  knee without contrast.    Technique: The following sequences were obtained: Localizer; coronal PD FS and T2 FS; sagittal T1, PD FS, T2 FS; axial T2 FS.  T2 mapping was obtained.    Comparison:  2017    Findings:     Menisci:  Free edge fraying of the body segment of the medial meniscus.  The lateral meniscus is intact.     Ligaments:  ACL, PCL, MCL, and LCL complex are intact.    Tendons:  Extensor mechanism is maintained.    Cartilage:   Patellofemoral: Fibrillation of articular cartilage along the inferior patella and trochlea without full-thickness defect or subchondral edema.  Medial tibiofemoral: Full-thickness fissuring of the articular cartilage at the posterior weightbearing lateral femoral condyle with mild subchondral edema.  Lateral tibiofemoral: Articular cartilage is maintained.    Bone: No fracture or marrow replacing process.    Miscellaneous: There is no joint effusion.  Mild lateral subluxation of the patella.   Impression     1.  Free edge fraying of the body of the medial meniscus.  2.  Full-thickness  fissuring of the articular cartilage of the posterior weightbearing lateral femoral condyle with mild subchondral edema.  3.  Fibrillation of articular cartilage along the inferior patella and trochlea without full-thickness defect or subchondral edema.               Assessment:       Encounter Diagnoses   Name Primary?    Primary osteoarthritis of right knee Yes    Obesity (BMI 35.0-39.9 without comorbidity)           Plan:       1.  IKDC, SF-12 and KOOS was filled out today in clinic.     RTC in 6 weeks with Mid-level provider for preoperative history and physical. Patient will not fill out IKDC, SF-12 and KOOS on return.    2. We reviewed with Brionna today, the pathology and natural history of her diagnosis. We have discussed a variety of treatment options including medications, physical therapy and other alternative treatments. I also explained the indications, risks and benefits of surgery. After discussion, Brionna decided to proceed with surgery. The decision was made to go forward with    1. Right knee ConforMIS iTotal    The details of the surgical procedure were explained, including the location of probable incisions and a description of likely hardware and/or grafts to be used.  The patient understands the likely convalescence after surgery.  Also, we have thoroughly discussed the risks, benefits and alternatives to surgery, including, but not limited to, the risk of infection, joint stiffness, blood clot (including DVT and/or pulmonary embolus), neurologic and vascular injury.  It was explained that, if tissue has been repaired or reconstructed, there is a chance of failure, which may require further management.    All of the patient's questions were answered and informed consent was obtained. The patient will contact us if they have any questions or concerns in the interim.    3. Rehab-[facility]     4. Medical clearance: Cardiologist-Delvin Castro, PMD-Dr. Houston    5. Referral to pain clinic-Dr. Morales  Nicholas for leg pain    6. 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

## 2017-05-15 NOTE — LETTER
May 15, 2017        Michael Shook MD  [address]  [facility] [address]             [facility]  [address]  [facility] [address]  Phone: [phone]   Patient: Brionna Jones   MR Number: [id number]   YOB: 1970   Date of Visit: 5/15/2017       Dear Dr. Shook:    Thank you for referring Brionna Jones to me for evaluation. Below are the relevant portions of my assessment and plan of care. Her blood pressure was high today of note.       Encounter Diagnoses   Name Primary?    Primary osteoarthritis of right knee Yes    Obesity (BMI 35.0-39.9 without comorbidity)            1.  IKDC, SF-12 and KOOS was filled out today in clinic.     RTC in 6 weeks with Mid-level provider for preoperative history and physical. Patient will not fill out IKDC, SF-12 and KOOS on return.    2. We reviewed with Brionna today, the pathology and natural history of her diagnosis. We have discussed a variety of treatment options including medications, physical therapy and other alternative treatments. I also explained the indications, risks and benefits of surgery. After discussion, Brionna decided to proceed with surgery. The decision was made to go forward with    1. Right knee ConforMIS iTotal    The details of the surgical procedure were explained, including the location of probable incisions and a description of likely hardware and/or grafts to be used.  The patient understands the likely convalescence after surgery.  Also, we have thoroughly discussed the risks, benefits and alternatives to surgery, including, but not limited to, the risk of infection, joint stiffness, blood clot (including DVT and/or pulmonary embolus), neurologic and vascular injury.  It was explained that, if tissue has been repaired or reconstructed, there is a chance of failure, which may require further management.    All of the patient's questions were answered and informed consent was obtained. The patient  will contact us if they have any questions or concerns in the interim.    3. Rehab-[facility] [place]     4. Medical clearance: Cardiologist-Delvin Castro, PMD-Dr. Houston    5. Referral to pain clinic-Dr. Andrew Peterson for leg pain    6. 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 I look forward to following Brionna along with you.    Sincerely,      MD PRABHU Ambriz MD Patty Ann Clofer

## 2017-05-15 NOTE — PATIENT INSTRUCTIONS
After Knee Replacement: Right After Surgery  Your healthcare team will monitor your progress after your surgery. You may receive general anesthesia (being put to sleep), an epidural nerve block in your spinal canal (numb from the waist down), or a femoral nerve block in your leg (entire leg is numb). Sometimes the femoral nerve block is added to another anesthesia for better pain control. Your team will use support equipment to help you recover. Be sure to let them know how you feel and how well your pain is controlled. You may also receive medicines, such as antibiotics and blood thinners.    Support equipment  Special tubes and machines help you recover after surgery. They may include:  · An intravenous (IV) line to provide needed fluids and medicines.  · A catheter tube to help drain your bladder.  · A drainage tube in your leg to release excess fluid and reduce swelling.  · An ice machine or ice pack to reduce inflammation. The ice machine tube carries cold water to the knee joint.  · A sequential compression machine (SCM) to prevent blood clots by gently squeezing and then releasing your foot or calf.  · A continual passive motion machine (CPM) to increase flexibility by gently moving your knee.  Managing pain  When pain is controlled, youll walk sooner and recover faster. So be honest about how much pain you feel. And dont be afraid to ask for pain medicine when you need it. Your nurse may give you IV or oral pain medicine. Or, you may have a patient-controlled analgesia (PCA) machine. This machine lets you push a button to give yourself a measured dose of pain medicine. Tell your nurse if the medicines dont reduce pain or if you suddenly feel worse.  Date Last Reviewed: 10/1/2015  © 7399-2110 [employer]. [address] [place] [address]. All rights reserved. This information is not intended as a substitute for professional medical care. Always follow your healthcare professional's  instructions.        Home Safety After Joint Surgery  If your movement is limited during recovery, ask a family member or friend to help prepare your living space. This helps make it safer and more comfortable while you heal. Use the tips below as a guide.    Home safety tips  · Stock up on toiletries, foods that are easy to prepare, and other items youll need during recovery.  · Store foods and other supplies between waist and shoulder level. This makes it easier to reach things without straining.  · Buy or borrow a portable telephone so you can keep it within easy reach.  · Ask your doctor whether you need to limit using stairs. If you do, and you normally sleep upstairs, prepare a bedroom on the main living level.  · Make sure rooms are well-lit.  · Keep items you use often in easy reach.  · Move electrical cords out of the way so they dont trip you.  · Remove throw rugs to prevent slipping or tripping.  · Watch for pets or small objects on the floor.  · Add firm pillows to a low chair to help make getting up easier.  Ask friends and family to help out by checking in with you regularly. They may also help by running simple errands or doing small jobs around the house as you recover.   Date Last Reviewed: 8/28/2015  © 5316-8401 [employer]. [address] [place] [address], [place] [address]. All rights reserved. This information is not intended as a substitute for professional medical care. Always follow your healthcare professional's instructions.        Total Knee Replacement  During total knee replacement surgery, your damaged knee joint is replaced with an artificial joint, called a prosthesis. This surgery almost always reduces joint pain and improves your quality of life.     The parts of the prosthesis are secured to the bones of the knee. Together they form the new joint.   Before your surgery  You will most likely arrive at the hospital on the morning of the surgery. Be sure to follow all of your  doctors instructions on preparing for surgery:  · Stop eating or drinking 10 hours before surgery.  · If you take a daily medicine, ask if you should still take it the morning of surgery.  · At the hospital, your temperature, pulse, breathing, and blood pressure will be checked.  · An IV (intravenous) line will be started to provide fluids and medicines needed during surgery.  The surgical procedure  When the surgical team is ready, youll be taken to the operating room. There youll be given anesthesia to help you sleep through surgery, or to make you numb from the waist down. Then an incision is made on the front or side of your knee. Any damaged bone is cleaned away, and the new joint components are put into place. The incision is closed with surgical staples or stitches.  After your surgery  After surgery, youll be sent to the recovery room. When you are fully awake, youll be moved to your room. The nurses will give you medicines to ease your pain. You may have a catheter (small tube) in your bladder. A continuous passive motion machine may be used on your knee to keep it from getting stiff. A sequential compression machine may be used to prevent blood clots by gently squeezing then releasing your leg. You may be given medicine to prevent blood clots. Soon, healthcare providers will help you get up and moving.  When to call your doctor  Once at home, call your doctor if you have any of the symptoms below:  · An increase in knee pain  · Pain or swelling in the calf or leg  · Unusual redness, heat, or drainage at the incision site  · Fever of 100.4°F (38°C) or higher  · Shaking chills  · Trouble breathing or chest pain (call 911)   Date Last Reviewed: 9/20/2015  © 2237-8649 [employer]. [address] [place] [address], [place] [address]. All rights reserved. This information is not intended as a substitute for professional medical care. Always follow your healthcare professional's  instructions.        Understanding Knee Replacement  The knee is a hingelike joint, formed where the thighbone (femur), shinbone (tibia), and kneecap (patella) meet. It is supported by muscles, tendons, and ligaments and lined with cushioning cartilage. Over time, cartilage can wear away. As it does, the knee becomes stiff and painful. A knee prosthesis (artificial joint) can replace the painful joint and restore movement.    A healthy knee  A healthy knee joint bends easily. Cartilage, a smooth tissue, covers the ends of the thighbone and shinbone and the underside of the kneecap. Healthy cartilage absorbs stress and allows the bones to glide freely over each other. Joint fluid lubricates the cartilage surfaces, making movement even easier.       A problem knee  A problem knee is stiff or painful. Cartilage cracks or wears away due to usage, inflammation, or injury. Worn, roughened cartilage no longer allows the joint to glide freely, so it feels stiff and painful. As more cartilage wears away, exposed bones rub together when the knee bends, causing pain. With time, bone surfaces also become rough, making pain worse.       A knee prosthesis  A knee prosthesis lets your knee bend easily again. The roughened ends of the thighbone and shinbone and the underside of the kneecap are replaced with metal and strong plastic components. With new smooth surfaces, the bones can once again glide freely jwithout arthritic pain. A knee prosthesis does have limitations. But it can let you walk and move with greater comfort.  Date Last Reviewed: 9/20/2015  © 3209-2770 [employer]. [address] [place] [address], [place] [address]. All rights reserved. This information is not intended as a substitute for professional medical care. Always follow your healthcare professional's instructions.

## 2017-05-24 ENCOUNTER — TELEPHONE (OUTPATIENT)
Dept: SPORTS MEDICINE | Facility: CLINIC | Age: 47
End: 2017-05-24

## 2017-05-24 NOTE — TELEPHONE ENCOUNTER
----- Message from Adelaida Mcintyre sent at 5/24/2017 12:56 PM CDT -----  Contact: self@home, [phone]  Pt called because the cardiologist she was referred to does not accept Medicaid and she is not sure where to go. Please call and advise    Thank you

## 2017-06-05 ENCOUNTER — TELEPHONE (OUTPATIENT)
Dept: SPORTS MEDICINE | Facility: CLINIC | Age: 47
End: 2017-06-05

## 2017-06-05 NOTE — TELEPHONE ENCOUNTER
----- Message from Torie Heart sent at 6/5/2017  3:51 PM CDT -----  Contact: self   Pt is requesting a call back regarding her upcoming surgery. Please call pt at [phone]

## 2017-06-05 NOTE — TELEPHONE ENCOUNTER
----- Message from Luis Miguel Isabel sent at 6/5/2017 10:26 AM CDT -----  Contact: self/home   Pt was returning your call.

## 2017-06-06 ENCOUNTER — DOCUMENTATION ONLY (OUTPATIENT)
Dept: REHABILITATION | Facility: HOSPITAL | Age: 47
End: 2017-06-06

## 2017-06-06 NOTE — PROGRESS NOTES
Pt didn't attend scheduled Physical Therapy Initial Evaluation for Prehab TKA on 6/5/17 at [facility], with no prior communication with  or PT.  left detailed voicemail for Ms. Jones to reschedule PT Eval.       Sridhar Pereyra, PT

## 2017-06-16 ENCOUNTER — OFFICE VISIT (OUTPATIENT)
Dept: INTERNAL MEDICINE | Facility: CLINIC | Age: 47
End: 2017-06-16
Attending: FAMILY MEDICINE
Payer: MEDICAID

## 2017-06-16 ENCOUNTER — HOSPITAL ENCOUNTER (OUTPATIENT)
Dept: RADIOLOGY | Facility: HOSPITAL | Age: 47
Discharge: HOME | End: 2017-06-16
Attending: ORTHOPAEDIC SURGERY
Payer: MEDICAID

## 2017-06-16 VITALS
SYSTOLIC BLOOD PRESSURE: 161 MMHG | BODY MASS INDEX: 36.17 KG/M2 | HEIGHT: 64 IN | HEART RATE: 90 BPM | WEIGHT: 211.88 LBS | DIASTOLIC BLOOD PRESSURE: 82 MMHG

## 2017-06-16 DIAGNOSIS — K29.50 CHRONIC GASTRITIS, PRESENCE OF BLEEDING UNSPECIFIED, UNSPECIFIED GASTRITIS TYPE: ICD-10-CM

## 2017-06-16 DIAGNOSIS — M17.11 PRIMARY OSTEOARTHRITIS OF RIGHT KNEE: ICD-10-CM

## 2017-06-16 DIAGNOSIS — Z72.0 TOBACCO ABUSE: ICD-10-CM

## 2017-06-16 DIAGNOSIS — I10 ESSENTIAL HYPERTENSION: Primary | ICD-10-CM

## 2017-06-16 DIAGNOSIS — N92.1 MENORRHAGIA WITH IRREGULAR CYCLE: ICD-10-CM

## 2017-06-16 DIAGNOSIS — M23.91 INTERNAL DERANGEMENT OF RIGHT KNEE: ICD-10-CM

## 2017-06-16 DIAGNOSIS — E66.9 OBESITY (BMI 35.0-39.9 WITHOUT COMORBIDITY): ICD-10-CM

## 2017-06-16 DIAGNOSIS — M94.9 CHONDRAL LESION: ICD-10-CM

## 2017-06-16 LAB
BILIRUB UR QL STRIP: NEGATIVE
CLARITY UR REFRACT.AUTO: CLEAR
COLOR UR AUTO: ABNORMAL
GLUCOSE UR QL STRIP: NEGATIVE
HGB UR QL STRIP: ABNORMAL
KETONES UR QL STRIP: NEGATIVE
LEUKOCYTE ESTERASE UR QL STRIP: NEGATIVE
MICROSCOPIC COMMENT: ABNORMAL
NITRITE UR QL STRIP: NEGATIVE
PH UR STRIP: 5 [PH] (ref 5–8)
PROT UR QL STRIP: NEGATIVE
RBC #/AREA URNS AUTO: 80 /HPF (ref 0–4)
SP GR UR STRIP: 1.01 (ref 1–1.03)
SQUAMOUS #/AREA URNS AUTO: 1 /HPF
URN SPEC COLLECT METH UR: ABNORMAL
UROBILINOGEN UR STRIP-ACNC: NEGATIVE EU/DL
WBC #/AREA URNS AUTO: 0 /HPF (ref 0–5)

## 2017-06-16 PROCEDURE — 99999 PR PBB SHADOW E&M-EST. PATIENT-LVL IV: CPT | Mod: PBBFAC,,, | Performed by: INTERNAL MEDICINE

## 2017-06-16 PROCEDURE — 73700 CT LOWER EXTREMITY W/O DYE: CPT | Mod: 26,RT,, | Performed by: RADIOLOGY

## 2017-06-16 PROCEDURE — 99214 OFFICE O/P EST MOD 30 MIN: CPT | Mod: PBBFAC,25 | Performed by: INTERNAL MEDICINE

## 2017-06-16 PROCEDURE — 81001 URINALYSIS AUTO W/SCOPE: CPT

## 2017-06-16 PROCEDURE — 99213 OFFICE O/P EST LOW 20 MIN: CPT | Mod: S$PBB,,, | Performed by: INTERNAL MEDICINE

## 2017-06-16 RX ORDER — AMLODIPINE BESYLATE 5 MG/1
5 TABLET ORAL DAILY
Qty: 90 TABLET | Refills: 3 | Status: SHIPPED | OUTPATIENT
Start: 2017-06-16 | End: 2018-01-10

## 2017-06-16 NOTE — PROGRESS NOTES
Subjective:       Patient ID: Brionna Jones is a 47 y.o. female.    Chief Complaint: Pre-op Exam    HPI   46 yo female with pmh of osteoarthritis, obesity, and tobacco abuse who presents for pre-op evaluation. She is scheduled for a total right knee replacement in July 2017. Patient states that she has generally been in a good state of state other than right knee pain which she rates as 8/10. For pain control, she has been using mobic and advil 3 times a week for the past 2 years. She does note that does have some stomach pain in the morning after she takes either the mobic or advil. She denies any melena or hematochezia. She has tried percocet in the past but it made her itch.  She has a 40 pack year smoking history and a current smoker but is interested in quitting smoking. She is concurrently seeing a cardiology for pre-op clearance and is scheduled to have an echo done next week. She was given information about smoking cessation by her cardiologist and would like to enroll in a smoking cessation class. She previously has seen EP for prolonged QTc in 2014 and was placed on notalol due to remote history of syncope, but she stopped taking the medication a few years ago.      Review of Systems   Constitutional: Negative for chills, fatigue and fever.   HENT: Negative for trouble swallowing.    Eyes: Negative for visual disturbance.   Respiratory: Negative for shortness of breath.    Cardiovascular: Positive for palpitations. Negative for chest pain.   Gastrointestinal: Positive for abdominal pain. Negative for abdominal distention, anal bleeding and blood in stool.   Genitourinary: Positive for vaginal bleeding. Negative for dysuria and hematuria.   Musculoskeletal: Positive for arthralgias. Negative for gait problem and joint swelling.   Neurological: Negative for dizziness, light-headedness and headaches.       Objective:      Physical Exam   Constitutional: She is oriented to person, place, and time. She  appears well-developed and well-nourished.   HENT:   Head: Normocephalic and atraumatic.   Right Ear: External ear normal.   Left Ear: External ear normal.   Eyes: EOM are normal.   Neck: Normal range of motion. Neck supple.   Cardiovascular: Normal rate, regular rhythm, normal heart sounds and intact distal pulses.    Pulmonary/Chest: Effort normal and breath sounds normal.   Abdominal: Soft. Bowel sounds are normal.   Musculoskeletal: Normal range of motion. She exhibits tenderness (right knee). She exhibits no edema.   Neurological: She is alert and oriented to person, place, and time. She has normal reflexes.   Skin: Skin is warm and dry. Capillary refill takes less than 2 seconds.       Assessment:       1. Essential hypertension    2. Primary osteoarthritis of right knee    3. Obesity (BMI 35.0-39.9 without comorbidity)    4. Tobacco abuse    5. Chronic gastritis, presence of bleeding unspecified, unspecified gastritis type    6. Menorrhagia with irregular cycle        Plan:       Brionna was seen today for pre-op exam.    Diagnoses and all orders for this visit:    Essential hypertension  -     CBC auto differential; Future  -     Comprehensive metabolic panel; Future  -     URINALYSIS    Primary osteoarthritis of right knee    Obesity (BMI 35.0-39.9 without comorbidity)  -     CBC auto differential; Future  -     Comprehensive metabolic panel; Future    Tobacco abuse  -     CBC auto differential; Future  -     Comprehensive metabolic panel; Future    Chronic gastritis, presence of bleeding unspecified, unspecified gastritis type    Menorrhagia with irregular cycle  -     Ambulatory Referral to Obstetrics / Gynecology    Other orders  -     amlodipine (NORVASC) 5 MG tablet; Take 1 tablet (5 mg total) by mouth once daily.  -     ranitidine (HEARTBURN RELIEF, RANITIDINE,) 150 MG tablet; Take 1 tablet (150 mg total) by mouth 2 (two) times daily.    Patient's cardiac risk index score is 0 (assuming normal renal  function), so she would be safe to procedure with the knee surgery.    RTC in 4 weeks to check BP and review lab work. Will need to check status of smoking cessation. Referral made to ObGyn for heavy menstrual bleeding.    Plan discussed with Dr. Hook.    Varinder Oliva DO  PGY1 Internal Medicine  Pager: [phone]

## 2017-06-16 NOTE — PATIENT INSTRUCTIONS
Ask Cardiologist about whether need to be taking nadolol, which was prescribed in 2014 for prolonged QT interval

## 2017-06-21 NOTE — PROGRESS NOTES
I have reviewed the notes, assessments, and/or procedures performed by Dr. Oliva, I concur with her/his documentation of Brionna Jones.

## 2017-07-12 ENCOUNTER — OFFICE VISIT (OUTPATIENT)
Dept: INTERNAL MEDICINE | Facility: CLINIC | Age: 47
End: 2017-07-12
Payer: MEDICAID

## 2017-07-12 VITALS
WEIGHT: 209.38 LBS | HEIGHT: 64 IN | DIASTOLIC BLOOD PRESSURE: 80 MMHG | HEART RATE: 84 BPM | SYSTOLIC BLOOD PRESSURE: 130 MMHG | BODY MASS INDEX: 35.74 KG/M2

## 2017-07-12 DIAGNOSIS — I10 ESSENTIAL HYPERTENSION: Primary | ICD-10-CM

## 2017-07-12 DIAGNOSIS — E66.9 OBESITY (BMI 35.0-39.9 WITHOUT COMORBIDITY): ICD-10-CM

## 2017-07-12 DIAGNOSIS — M17.11 OSTEOARTHRITIS OF RIGHT KNEE, UNSPECIFIED OSTEOARTHRITIS TYPE: ICD-10-CM

## 2017-07-12 DIAGNOSIS — N92.1 MENORRHAGIA WITH IRREGULAR CYCLE: ICD-10-CM

## 2017-07-12 DIAGNOSIS — Z72.0 TOBACCO ABUSE: ICD-10-CM

## 2017-07-12 PROCEDURE — 99999 PR PBB SHADOW E&M-EST. PATIENT-LVL III: CPT | Mod: PBBFAC,,, | Performed by: INTERNAL MEDICINE

## 2017-07-12 PROCEDURE — 99214 OFFICE O/P EST MOD 30 MIN: CPT | Mod: S$PBB,,, | Performed by: INTERNAL MEDICINE

## 2017-07-12 PROCEDURE — 99213 OFFICE O/P EST LOW 20 MIN: CPT | Mod: PBBFAC | Performed by: INTERNAL MEDICINE

## 2017-07-12 NOTE — PROGRESS NOTES
Subjective:       Patient ID: Brionna Jones is a 47 y.o. female.    Chief Complaint: Hypertension    HPI   48 yo female here for follow-up for BP.     She last visited me about 1 month ago for pre-op eval for total right knee replacement. Surgery planned for later this month.     She was found to have elevated BP and was started on Norvasc 5 mg Patient says that she has been checking her BP daily with home cuff. She says that she has been averaging between 140-150 systolic. She says that she is a little lightheaded when getting out of bed int he morning but that is her baseline -- no change in symptoms since starting norvasc. She denies any LE edema, CP, or SOB.     Patient says that she is still smoking about 10 cigarettes per day and has not yet participating in the smoking cessation program.     Patient states that that she did not arrange for an ObGyn appt during the last visit with me. She was diagnosed with uterine fibroids 4 years ago but did not have them removed because they were not big enough. Since then, she continues to have heavy and irregular menstrual cycles.    Review of Systems   Constitutional: Negative for chills, fatigue and fever.   HENT: Negative for trouble swallowing.    Eyes: Negative for visual disturbance.   Respiratory: Negative for shortness of breath.    Cardiovascular: Positive for palpitations. Negative for chest pain.   Gastrointestinal: Positive for abdominal pain. Negative for abdominal distention, anal bleeding and blood in stool.   Genitourinary: Positive for vaginal bleeding. Negative for dysuria and hematuria.   Musculoskeletal: Positive for arthralgias. Negative for gait problem and joint swelling.   Neurological: Negative for dizziness, light-headedness and headaches.       Objective:      Physical Exam   Constitutional: She is oriented to person, place, and time. She appears well-developed and well-nourished.   HENT:   Head: Normocephalic and atraumatic.   Right Ear:  External ear normal.   Left Ear: External ear normal.   Eyes: EOM are normal.   Neck: Normal range of motion. Neck supple.   Cardiovascular: Normal rate, regular rhythm, normal heart sounds and intact distal pulses.    Pulmonary/Chest: Effort normal and breath sounds normal.   Abdominal: Soft. Bowel sounds are normal.   Musculoskeletal: Normal range of motion. She exhibits tenderness (right knee). She exhibits no edema.   Neurological: She is alert and oriented to person, place, and time. She has normal reflexes.   Skin: Skin is warm and dry. Capillary refill takes less than 2 seconds.       Assessment:       1. Essential hypertension    2. Osteoarthritis of right knee, unspecified osteoarthritis type    3. Menorrhagia with irregular cycle    4. Tobacco abuse    5. Obesity (BMI 35.0-39.9 without comorbidity)        Plan:       Brionna was seen today for hypertension.    Diagnoses and all orders for this visit:    Essential hypertension    Osteoarthritis of right knee, unspecified osteoarthritis type    Menorrhagia with irregular cycle    Tobacco abuse  -     Ambulatory referral to Smoking Cessation Program    Obesity (BMI 35.0-39.9 without comorbidity)    BP was 130/80 in clinic, so I will keep Norvasc dose (5 mg daily) as is. Since patient did not arrange for ObGyn appt last visit, we will arrange that today. I encouraged patient to try to reduce cigarette use by half prior to knee surgery and to start thinking about quitting smoking all together. I also encouraged patient to try to lose weight by dietary changes and increased exercise as losing weight will improve BP as well.    RTC in 3 months.    Plan discussed with Dr. Bruce.    Varinder Oliva DO  PGY2 Internal Medicine  Pager: [phone]

## 2017-07-18 NOTE — PROGRESS NOTES
Preceptor note    Patient's history and physical discussed, please refer to resident physician's note for specific details. Pt seen and  with resident physician. Medical record reviewed. I agree with resident's assessment and plan. Patient counseled on low sodium diet, reading nutrition labels and grocery shopping tips. Patient also advised to work on reducing daily cigarette consumption in preparation for surgery and re-enforced importance of smoking cessation. Patient to schedule GYN follow up.

## 2017-07-21 ENCOUNTER — HOSPITAL ENCOUNTER (OUTPATIENT)
Dept: PREADMISSION TESTING | Facility: OTHER | Age: 47
Discharge: HOME | End: 2017-07-21
Attending: ORTHOPAEDIC SURGERY
Payer: MEDICAID

## 2017-07-21 ENCOUNTER — OFFICE VISIT (OUTPATIENT)
Dept: SPORTS MEDICINE | Facility: CLINIC | Age: 47
End: 2017-07-21
Payer: MEDICAID

## 2017-07-21 VITALS
HEART RATE: 100 BPM | HEIGHT: 64 IN | BODY MASS INDEX: 35.68 KG/M2 | SYSTOLIC BLOOD PRESSURE: 150 MMHG | DIASTOLIC BLOOD PRESSURE: 93 MMHG | WEIGHT: 209 LBS

## 2017-07-21 VITALS
HEART RATE: 81 BPM | HEIGHT: 64 IN | OXYGEN SATURATION: 98 % | WEIGHT: 209.19 LBS | SYSTOLIC BLOOD PRESSURE: 142 MMHG | DIASTOLIC BLOOD PRESSURE: 80 MMHG | BODY MASS INDEX: 35.71 KG/M2 | TEMPERATURE: 98 F

## 2017-07-21 DIAGNOSIS — M17.11 PRIMARY LOCALIZED OSTEOARTHROSIS, LOWER LEG, RIGHT: Primary | ICD-10-CM

## 2017-07-21 DIAGNOSIS — M17.11 OSTEOARTHRITIS OF RIGHT KNEE, UNSPECIFIED OSTEOARTHRITIS TYPE: ICD-10-CM

## 2017-07-21 DIAGNOSIS — M17.11 PRIMARY OSTEOARTHRITIS OF RIGHT KNEE: ICD-10-CM

## 2017-07-21 DIAGNOSIS — M17.11 OSTEOARTHRITIS OF RIGHT KNEE, UNSPECIFIED OSTEOARTHRITIS TYPE: Primary | ICD-10-CM

## 2017-07-21 PROCEDURE — 99999 PR PBB SHADOW E&M-EST. PATIENT-LVL III: CPT | Mod: PBBFAC,,, | Performed by: PHYSICIAN ASSISTANT

## 2017-07-21 PROCEDURE — 99499 UNLISTED E&M SERVICE: CPT | Mod: S$PBB,,, | Performed by: PHYSICIAN ASSISTANT

## 2017-07-21 RX ORDER — SODIUM CHLORIDE, SODIUM LACTATE, POTASSIUM CHLORIDE, CALCIUM CHLORIDE 600; 310; 30; 20 MG/100ML; MG/100ML; MG/100ML; MG/100ML
INJECTION, SOLUTION INTRAVENOUS CONTINUOUS
Status: CANCELLED | OUTPATIENT
Start: 2017-07-21

## 2017-07-21 RX ORDER — MUPIROCIN 20 MG/G
1 OINTMENT TOPICAL
Status: CANCELLED | OUTPATIENT
Start: 2017-07-21

## 2017-07-21 RX ORDER — PROMETHAZINE HYDROCHLORIDE 25 MG/1
25 TABLET ORAL EVERY 6 HOURS PRN
Qty: 16 TABLET | Refills: 0 | Status: SHIPPED | OUTPATIENT
Start: 2017-07-21 | End: 2019-05-22 | Stop reason: ALTCHOICE

## 2017-07-21 RX ORDER — FAMOTIDINE 20 MG/1
20 TABLET, FILM COATED ORAL
Status: CANCELLED | OUTPATIENT
Start: 2017-07-21 | End: 2017-07-21

## 2017-07-21 RX ORDER — OXYCODONE AND ACETAMINOPHEN 10; 325 MG/1; MG/1
TABLET ORAL
Qty: 60 TABLET | Refills: 0 | Status: SHIPPED | OUTPATIENT
Start: 2017-07-21 | End: 2018-01-11

## 2017-07-21 RX ORDER — MIDAZOLAM HYDROCHLORIDE 5 MG/ML
5 INJECTION INTRAMUSCULAR; INTRAVENOUS
Status: DISCONTINUED | OUTPATIENT
Start: 2017-07-21 | End: 2017-07-22 | Stop reason: HOSPADM

## 2017-07-21 RX ORDER — CELECOXIB 200 MG/1
200 CAPSULE ORAL 2 TIMES DAILY
Qty: 60 CAPSULE | Refills: 0 | Status: SHIPPED | OUTPATIENT
Start: 2017-07-21 | End: 2018-01-11

## 2017-07-21 NOTE — H&P
Brionna Jones  is here for a completion of her perioperative paperwork. she  Is scheduled to undergo      Right knee conforMIS itotal on 7/27/17.      She is a healthy individual and does need clearance for this procedure which she has received from Dr. Oliva her pcp and from Dr. Estelle Quick (cardiology PA).     Risks, indications and benefits of the surgical procedure were discussed with the patient. All questions with regard to surgery, rehab, expected return to functional activities, activities of daily living and recreational endeavors were answered to her satisfaction.    The patient verbalized that he/she does not have any additional clotting, bleeding, or blood disorders, other than what is list in her chart on today's review.     Once no other questions were asked, a brief history and physical exam was then performed.      PHYSICAL EXAM:  GEN: A&Ox3, WD WN NAD  HEENT: WNL  CHEST: CTAB, no W/R/R  HEART: RRR, no M/R/G  ABD: Soft, NT ND, BS x4 QUADS  MS; See Epic  NEURO: CN II-XII intact       The surgical consent was then reviewed with the patient, who agreed with all the contents of the consent form and it was signed. she was then given the [facility] [place] surgery packet to bring with her to [facility] [place] for the anesthesia portion of her perioperative paperwork.   For all physicians except for Dr. Coates, we will email and possibly fax the consent forms and booking sheets to [facility] pre-admit.    PHYSICAL THERAPY:  She was also instructed regarding physical therapy and will begin on  POD3-5. She was given a copy of the original prescription to schedule. Another copy of this prescription [facility] also [facility].    POST OP CARE:instructions were reviewed including care of the wound and dressing after surgery and when she can shower.     PAIN MANAGEMENT: Brionna Jones was also given her pain management regime, which includes the TENS unit given to her by onel along with the  education required for its use. She was also instructed regarding the Polar ice unit that will be in place after surgery and her postoperative pain medications.     PAIN MEDICATION:  Percocet 10/325mg 1 po q 4-6 hours prn pain  Celebrex 200mg 1 tablet po BID with food.  Do not take other NSAIDs with this medication because  they are similar.   Phenergan 25 mg one p.o. q.4-6 hours p.r.n. nausea and vomiting.    DVT prophylaxis was discussed with the patient today including risk factors for developing DVTs and history of DVTs. The patient was asked if any specific recommendations were given from the doctor/s that did pre-operative surgical clearance.     This along with the Modified Caprini risk assessment model for VTE in general surgical patients was used to determine the patient's DVT risk.     From: Aly WELCH, Joey DA, Katherine ARNOLD, et al. Prevention of VTE in nonorthopedic surgical patients: antithrombotic therapy and prevention of thrombosis, 9th ed: American College of Chest Physicians evidence-based clinical practical guidelines. Chest 2012; 141:e227S. Copyright © 2012. Reproduced with permission from the American College of Chest Physicians.    The below listed DVT prophylaxis regimen along with bilateral ABRAN compression stockings will be used post-op. Length of treatment has been determined to be 10-42 days post-op by the above noted Caprini assessment model.     The patient was instructed to buy and take:  Aspirin 325mg QD x 6 weeks for DVT prophylaxis starting on the evening after surgery.  Patient will also use bilateral TEDs on lower extremities, SCDs during surgery, and early ambulation post-op. If the patient was previously taking 81mg baby aspirin, they were told to not take it will using the above stated aspirin and to restart the 81mg aspirin after completion of the aspirin dose.      Patient was also told to buy over the counter Prilosec medication and take it once daily for GI protection as long as  they are taking NSAIDs or Aspirin.     Published data in Nilesh SOMMER, et al. J Arthroplasty. Oct; 31(10):2237-40, 2016; showed that aspirin use as prophylaxis during revision total joint arthroplasty was more effective than warfarin in preventing symptomatic venous thromboembolic events and was associated with lower complications.  Patients in the study were also treated with intermittent pneumatic compression devices. Compression stockings would be our method of mechanical prophylaxis, which has been shown to be similar to pneumatic compression in the systematic review, Misbah RJ, et al. Lisbeth Surg. Feb; 239(2): 162-171, 2004.     Results showed a significantly higher incidence of symptomatic venous thromboembolic events among patients in the warfarin group vs. the aspirin group (1.75% vs. 0.56%). Researchers also noted a bleeding event rate of 1.5% among patients who received warfarin compared with a rate of 0.4% among patients who received aspirin.  Patient denies history of seizures.     As there were no other questions to be asked, she was given my business card along with Matt Smith MD business card if she has any questions or concerns prior to surgery or in the postop period.

## 2017-07-21 NOTE — DISCHARGE INSTRUCTIONS
PRE-ADMIT TESTING -  [phone]    [address]  [facility]        OUTPATIENT SURGERY UNIT - [phone]    Your surgery has been scheduled at [facility]. We are pleased to have the opportunity to serve you. For Further Information please call [phone].    On the day of surgery please report to the Information Desk on the 1st floor.    · CONTACT YOUR PHYSICIAN'S OFFICE THE DAY PRIOR TO YOUR SURGERY TO OBTAIN YOUR ARRIVAL TIME.     · The evening before surgery do not eat anything after 9 p.m. ( this includes hard candy, chewing gum and mints).  You may only have GATORADE, POWERADE AND WATER  from 9 p.m. until you leave your home.   DO NOT DRINK ANY LIQUIDS ON THE WAY TO THE HOSPITAL.      SPECIAL MEDICATION INSTRUCTIONS: TAKE medications checked off by the Anesthesiologist on your Medication List.    Angiogram Patients: Take medications as instructed by your physician, including aspirin.     Surgery Patients:    If you take ASPIRIN - Your PHYSICIAN/SURGEON will need to inform you IF/OR when you need to stop taking aspirin prior to your surgery.     Do Not take any medications containing IBUPROFEN.  Do Not Wear any make-up or dark nail polish   (especially eye make-up) to surgery. If you come to surgery with makeup on you will be required to remove the makeup or nail polish.    Do not shave your surgical area at least 5 days prior to your surgery. The surgical prep will be performed at the hospital according to Infection Control regulations.    Leave all valuables at home.   Do Not wear any jewelry or watches, including any metal in body piercings.  Contact Lens must be removed before surgery. Either do not wear the contact lens or bring a case and solution for storage.  Please bring a container for eyeglasses or dentures as required.  Bring any paperwork your physician has provided, such as consent forms,  history and physicals, doctor's orders, etc.   Bring comfortable clothes  that are loose fitting to wear upon discharge. Take into consideration the type of surgery being performed.  Maintain your diet as advised per your physician the day prior to surgery.      Adequate rest the night before surgery is advised.   Park in the Parking lot behind the hospital or in the [facility] across the street from the parking lot. Parking is complimentary.  If you will be discharged the same day as your procedure, please arrange for a responsible adult to drive you home or to accompany you if traveling by taxi.   YOU WILL NOT BE PERMITTED TO DRIVE OR TO LEAVE THE HOSPITAL ALONE AFTER SURGERY.   It is strongly recommended that you arrange for someone to remain with you for the first 24 hrs following your surgery.       Thank you for your cooperation.  The Staff of [facility].        Bathing Instructions                                                                 Please shower the evening before and morning of your procedure with    ANTIBACTERIAL SOAP. ( DIAL, etc )  Concentrate on the surgical area   for at least 3 minutes and rinse completely. Dry off as usual.   Do not use any deodorant, powder, body lotions, perfume, after shave or    cologne.

## 2017-08-01 ENCOUNTER — DOCUMENTATION ONLY (OUTPATIENT)
Dept: REHABILITATION | Facility: HOSPITAL | Age: 47
End: 2017-08-01

## 2017-08-01 NOTE — PROGRESS NOTES
Pt didn't attend scheduled Physical Therapy Initial Evaluation on 8/1/17 at [facility], with no prior communication with  or PT. Therapist left detailed voicemail for Ms. Jones to reschedule PT Eval. This is patient's 2nd no show for PT evaluation.      Sridhar Pereyra, PT  08/01/2017

## 2017-11-21 ENCOUNTER — TELEPHONE (OUTPATIENT)
Dept: SPORTS MEDICINE | Facility: CLINIC | Age: 47
End: 2017-11-21

## 2017-11-30 ENCOUNTER — HOSPITAL ENCOUNTER (OUTPATIENT)
Dept: RADIOLOGY | Facility: HOSPITAL | Age: 47
Discharge: HOME | End: 2017-11-30
Attending: PHYSICIAN ASSISTANT
Payer: MEDICAID

## 2017-11-30 ENCOUNTER — OFFICE VISIT (OUTPATIENT)
Dept: SPORTS MEDICINE | Facility: CLINIC | Age: 47
End: 2017-11-30
Payer: MEDICAID

## 2017-11-30 VITALS
WEIGHT: 209 LBS | HEART RATE: 94 BPM | HEIGHT: 64 IN | SYSTOLIC BLOOD PRESSURE: 145 MMHG | DIASTOLIC BLOOD PRESSURE: 100 MMHG | BODY MASS INDEX: 35.68 KG/M2

## 2017-11-30 DIAGNOSIS — M25.561 RIGHT KNEE PAIN, UNSPECIFIED CHRONICITY: ICD-10-CM

## 2017-11-30 DIAGNOSIS — M25.561 RIGHT KNEE PAIN, UNSPECIFIED CHRONICITY: Primary | ICD-10-CM

## 2017-11-30 PROCEDURE — 99999 PR PBB SHADOW E&M-EST. PATIENT-LVL III: CPT | Mod: PBBFAC,,, | Performed by: PHYSICIAN ASSISTANT

## 2017-11-30 PROCEDURE — 73564 X-RAY EXAM KNEE 4 OR MORE: CPT | Mod: TC,50,PO

## 2017-11-30 PROCEDURE — 73564 X-RAY EXAM KNEE 4 OR MORE: CPT | Mod: 26,59,RT, | Performed by: RADIOLOGY

## 2017-11-30 PROCEDURE — 73564 X-RAY EXAM KNEE 4 OR MORE: CPT | Mod: 26,LT,, | Performed by: RADIOLOGY

## 2017-11-30 PROCEDURE — 99499 UNLISTED E&M SERVICE: CPT | Mod: S$PBB,,, | Performed by: PHYSICIAN ASSISTANT

## 2017-11-30 PROCEDURE — 99213 OFFICE O/P EST LOW 20 MIN: CPT | Mod: PBBFAC,25,PO | Performed by: PHYSICIAN ASSISTANT

## 2017-11-30 NOTE — PROGRESS NOTES
Patient was not evaluated today. Radiographs of bilateral knees were taken. She states that she came in to have her social security paperwork filled out, not to have her surgery re-scheduled. It was told to her by myself and Maria C that Dr. Smith has to evaluate her in clinic in order to fill out social security paperwork. She understood and will return to clinic on 12/6/2017 at 2 pm.

## 2017-12-06 ENCOUNTER — OFFICE VISIT (OUTPATIENT)
Dept: SPORTS MEDICINE | Facility: CLINIC | Age: 47
End: 2017-12-06
Payer: MEDICAID

## 2017-12-06 VITALS — SYSTOLIC BLOOD PRESSURE: 146 MMHG | DIASTOLIC BLOOD PRESSURE: 89 MMHG | HEART RATE: 89 BPM

## 2017-12-06 DIAGNOSIS — M23.91 INTERNAL DERANGEMENT OF RIGHT KNEE: ICD-10-CM

## 2017-12-06 DIAGNOSIS — M22.2X1 PATELLOFEMORAL STRESS SYNDROME OF RIGHT KNEE: ICD-10-CM

## 2017-12-06 DIAGNOSIS — M25.561 PAIN IN JOINT OF RIGHT KNEE: ICD-10-CM

## 2017-12-06 DIAGNOSIS — M17.11 PRIMARY OSTEOARTHRITIS OF RIGHT KNEE: Primary | ICD-10-CM

## 2017-12-06 PROCEDURE — 99214 OFFICE O/P EST MOD 30 MIN: CPT | Mod: 57,S$PBB,, | Performed by: ORTHOPAEDIC SURGERY

## 2017-12-06 PROCEDURE — 99213 OFFICE O/P EST LOW 20 MIN: CPT | Mod: PBBFAC,PO | Performed by: ORTHOPAEDIC SURGERY

## 2017-12-06 PROCEDURE — 99999 PR PBB SHADOW E&M-EST. PATIENT-LVL III: CPT | Mod: PBBFAC,,, | Performed by: ORTHOPAEDIC SURGERY

## 2017-12-06 NOTE — PROGRESS NOTES
Subjective:          Chief Complaint: Brionna Jones is a 47 y.o. female who had concerns including Pain of the Right Knee.    Patient is here for a follow up for her right knee. She is having a lot of pain. She is wearing the 3D knee sleeve but don't think that it helps much. She has pain with walking and any kind of activity.     Surgery Date: 11/11/2014      Surgeon(s) and Role:  * Matt Smith MD - Primary     Pre-op Diagnosis: Unspecified internal derangement of knee (717.9)  Tear of medial cartilage or meniscus of knee, current (836.0)     Post-op Diagnosis: Same     Procedure(s) (LRB):  ARTHROSCOPY-KNEE (Right)  MENISCECTOMY (Right)  REMOVAL-FOREIGN BODY / LOOSE BODY (Right)    There was an area of 1 cm x 1 cm area on the lateral tibial plateau, which   showed grade III and grade IV chondral defect.         Pain   Associated symptoms include joint swelling. Pertinent negatives include no abdominal pain, chest pain, chills, congestion, coughing, fever, headaches, myalgias, nausea, numbness, rash, sore throat or vomiting.   Knee Pain    Associated symptoms include stiffness. Pertinent negatives include no fever, itching or numbness.         Review of Systems   Constitution: Negative. Negative for chills, fever, weight gain and weight loss.   HENT: Negative for congestion and sore throat.    Eyes: Negative for blurred vision and double vision.   Cardiovascular: Negative for chest pain, leg swelling and palpitations.   Respiratory: Negative for cough and shortness of breath.    Hematologic/Lymphatic: Does not bruise/bleed easily.   Skin: Negative for itching, poor wound healing and rash.   Musculoskeletal: Positive for joint pain, joint swelling and stiffness. Negative for back pain, muscle weakness and myalgias.   Gastrointestinal: Negative for abdominal pain, constipation, diarrhea, nausea and vomiting.   Genitourinary: Negative.  Negative for frequency and hematuria.   Neurological: Negative for  dizziness, headaches, numbness, paresthesias and sensory change.   Psychiatric/Behavioral: Negative for altered mental status and depression. The patient is not nervous/anxious.    Allergic/Immunologic: Negative for hives.       Pain Related Questions  Over the past 3 days, what was your average pain during activity? (I.e. running, jogging, walking, climbing stairs, getting dressed, ect.): 10  Over the past 3 days, what was your highest pain level?: 10  Over the past 3 days, what was your lowest pain level? : 9    Other  How many nights a week are you awakened by your affected body part?: 7  Was the patient's HEIGHT measured or patient reported?: Patient Reported  Was the patient's WEIGHT measured or patient reported?: Patient Reported      Objective:        General: Brionna is well-developed, well-nourished, appears stated age, in no acute distress, alert and oriented to time, place and person.     General    Vitals reviewed.  Constitutional: She is oriented to person, place, and time. She appears well-developed and well-nourished. No distress.   HENT:   Head: Normocephalic and atraumatic.   Mouth/Throat: No oropharyngeal exudate.   Eyes: EOM are normal. Right eye exhibits no discharge. Left eye exhibits no discharge.   Neck: Normal range of motion.   Cardiovascular: Normal rate and regular rhythm.    Pulmonary/Chest: Effort normal and breath sounds normal. No respiratory distress.   Neurological: She is alert and oriented to person, place, and time. She has normal reflexes. No cranial nerve deficit. Coordination normal.   Psychiatric: She has a normal mood and affect. Her behavior is normal. Judgment and thought content normal.     General Musculoskeletal Exam   Gait: normal       Right Knee Exam     Inspection   Erythema: absent  Scars: present  Swelling: absent  Effusion: effusion  Deformity: deformity  Bruising: absent    Tenderness   The patient is tender to palpation of the patella, lateral joint line and  medial joint line.    Crepitus   The patient has crepitus of the patella.    Range of Motion   Extension:  -5 normal   Flexion:  130 normal     Tests   Meniscus   Zeenat:  Medial - negative Lateral - negative  Ligament Examination Lachman: normal (-1 to 2mm) PCL-Posterior Drawer: normal (0 to 2mm)     MCL - Valgus: normal (0 to 2mm)  LCL - Varus: normalPivot Shift: normal (Equal)Reverse Pivot Shift: normal (Equal)Dial Test at 30 degrees: normal (< 5 degrees)Dial Test at 90 degrees: normal (< 5 degrees)  Posterior Sag Test: negative  Posterolateral Corner: unstable (>15 degrees difference)  Patella   Patellar Apprehension: negative  Passive Patellar Tilt: neutral  Patellar Tracking: normal  Patellar Glide (quadrants): Lateral - 1   Medial - 2  Q-Angle at 90 degrees: normal  Patellar Grind: positive  J-Sign: none    Other   Meniscal Cyst: absent  Popliteal (Baker's) Cyst: absent  Sensation: normal    Left Knee Exam     Inspection   Erythema: absent  Scars: absent  Swelling: absent  Effusion: absent  Deformity: deformity  Bruising: absent    Tenderness   The patient is experiencing no tenderness.         Range of Motion   Extension:  -5 normal   Flexion:  130 normal     Tests   Meniscus   Zeenat:  Medial - negative Lateral - negative  Stability Lachman: normal (-1 to 2mm) PCL-Posterior Drawer: normal (0 to 2mm)  MCL - Valgus: normal (0 to 2mm)  LCL - Varus: normal (0 to 2mm)Pivot Shift: normal (Equal)Reverse Pivot Shift: normal (Equal)Dial Test at 30 degrees: normal (< 5 degrees)Dial Test at 90 degrees: normal (< 5 degrees)  Posterior Sag Test: negative  Posterolateral Corner: unstable (>15 degrees difference)  Patella   Patellar Apprehension: negative  Passive Patellar Tilt: neutral  Patellar Tracking: normal  Patellar Glide (Quadrants): Lateral - 1 Medial - 2  Q-Angle at 90 degrees: normal  Patellar Grind: positive  J-Sign: J sign absent    Other   Meniscal Cyst: absent  Popliteal (Baker's) Cyst:  absent  Sensation: normal    Right Hip Exam     Tests   Aimee: negative  Left Hip Exam     Tests   Aimee: negative          Muscle Strength   Right Lower Extremity   Hip Abduction: 5/5   Quadriceps:  5/5   Hamstrin/5   Left Lower Extremity   Hip Abduction: 5/5   Quadriceps:  5/5   Hamstrin/5     Reflexes     Left Side  Quadriceps:  2+  Achilles:  2+    Right Side   Quadriceps:  2+  Achilles:  2+    Vascular Exam     Right Pulses  Dorsalis Pedis:      2+  Posterior Tibial:      2+        Left Pulses  Dorsalis Pedis:      2+  Posterior Tibial:      2+          RADIOGRAPHS REVIEWED   Degenerative changes are noted of the knees bilaterally, similar to the previous exam.  No acute displaced fracture or dislocation of the knees.  No large knee joint effusions.      Assessment:       Encounter Diagnoses   Name Primary?    Primary osteoarthritis of right knee Yes    Patellofemoral stress syndrome of right knee     Pain in joint of right knee     Internal derangement of right knee           Plan:       1. IKDC, SF-12 and KOOS was filled out today in clinic.     RTC in 6 weeks with Mid-level provider for pre-operative history and physical. Patient will not fill out IKDC, SF-12 and KOOS on return.      2. We reviewed with Brionna today, the pathology and natural history of her diagnosis. We have discussed a variety of treatment options including medications, physical therapy and other alternative treatments. I also explained the indications, risks and benefits of surgery. After discussion, Brionna decided to proceed with surgery. The decision was made to go forward with   1. Right knee total knee replacement (Conformis iTotal)    The details of the surgical procedure were explained, including the location of probable incisions and a description of likely hardware and/or grafts to be used.  The patient understands the likely convalescence after surgery.  Also, we have thoroughly discussed the risks, benefits and  alternatives to surgery, including, but not limited to, the risk of infection, joint stiffness, blood clot (including DVT and/or pulmonary embolus), neurologic and vascular injury.  It was explained that, if tissue has been repaired or reconstructed, there is a chance of failure, which may require further management.      All of the patient's questions were answered and informed consent was obtained. The patient will contact us if they have any questions or concerns in the interim.    3. Physical therapy - [facility] [place] - prehab                Patient questionnaires may have been collected.

## 2017-12-07 DIAGNOSIS — M17.11 PRIMARY LOCALIZED OSTEOARTHROSIS OF RIGHT LOWER LEG: Primary | ICD-10-CM

## 2017-12-15 ENCOUNTER — CLINICAL SUPPORT (OUTPATIENT)
Dept: REHABILITATION | Facility: HOSPITAL | Age: 47
End: 2017-12-15
Attending: ORTHOPAEDIC SURGERY
Payer: MEDICAID

## 2017-12-15 DIAGNOSIS — M17.11 PRIMARY OSTEOARTHRITIS OF RIGHT KNEE: ICD-10-CM

## 2017-12-15 DIAGNOSIS — M25.60 JOINT STIFFNESS: ICD-10-CM

## 2017-12-15 DIAGNOSIS — R29.898 RIGHT LEG WEAKNESS: ICD-10-CM

## 2017-12-15 DIAGNOSIS — M23.91 INTERNAL DERANGEMENT OF RIGHT KNEE: ICD-10-CM

## 2017-12-15 DIAGNOSIS — M25.561 RIGHT KNEE PAIN, UNSPECIFIED CHRONICITY: ICD-10-CM

## 2017-12-15 DIAGNOSIS — R26.2 DIFFICULTY WALKING: Primary | ICD-10-CM

## 2017-12-15 DIAGNOSIS — M22.2X1 PATELLOFEMORAL STRESS SYNDROME OF RIGHT KNEE: ICD-10-CM

## 2017-12-15 PROCEDURE — 97110 THERAPEUTIC EXERCISES: CPT | Mod: PN

## 2017-12-15 PROCEDURE — 97161 PT EVAL LOW COMPLEX 20 MIN: CPT | Mod: PN
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
## 2017-12-20 ENCOUNTER — CLINICAL SUPPORT (OUTPATIENT)
Dept: REHABILITATION | Facility: HOSPITAL | Age: 47
End: 2017-12-20
Attending: ORTHOPAEDIC SURGERY
Payer: MEDICAID

## 2017-12-20 DIAGNOSIS — R26.2 DIFFICULTY WALKING: Primary | ICD-10-CM

## 2017-12-20 DIAGNOSIS — M25.561 RIGHT KNEE PAIN, UNSPECIFIED CHRONICITY: ICD-10-CM

## 2017-12-20 DIAGNOSIS — M25.60 JOINT STIFFNESS: ICD-10-CM

## 2017-12-20 DIAGNOSIS — M17.11 PRIMARY OSTEOARTHRITIS OF RIGHT KNEE: ICD-10-CM

## 2017-12-20 DIAGNOSIS — R29.898 RIGHT LEG WEAKNESS: ICD-10-CM

## 2017-12-20 PROCEDURE — 97110 THERAPEUTIC EXERCISES: CPT | Mod: PN

## 2017-12-20 NOTE — PROGRESS NOTES
Physical Therapy Daily Note     Name: Brionna Jones  [facility] Number: [id number]  Diagnosis:   Encounter Diagnoses   Name Primary?    Difficulty walking Yes    Right knee pain, unspecified chronicity     Right leg weakness     Primary osteoarthritis of right knee      Physician: Matt Smith MD  Precautions: TKA scheduled for 1/23/18  Visit #: 2 of 24  PTA Visit #: 1    Time In: 1405  Time Out: 1450  Total Treatment Time: 45 minutes (1:1 with PTA duration of treatment session)     Subjective     Pt reports: Brionna states her knee hurts today and she knows she won't feel better until she has surgery. Patient reports compliance with HEP so far. Patient states she continues to get muscle spasms in her thigh so she is unsure how long she will be able to exercise.   Pain Scale: Brionna rates pain on a scale of 0-10 to be 9 currently.    Objective     Brionna received individual therapeutic exercises to develop strength, endurance, ROM, flexibility, posture and core stabilization for 45 minutes including:  -Nu step x 7 minutes   -Standing gastroc stretch 20 sec x 3   -Supine hip flexor stretch with strap x 2 minutes x 2   -Quad set 3x10, 3 sec hold  -SLR 3 x 5 (difficult due to spasms)  -SAQ 3x10, 3 sec hold   -Heel slides AA x 2 minutes  -Supine hip adduction ball squeeze x 2 minutes  -Supine hip abduction (GTB) x 2 minutes   -Seated LAQ 2x10, 3 sec hold     Written Home Exercises Provided: Reviewed HEP with patient.   Pt demo good understanding of the education provided. Brionna demonstrated good return demonstration of activities.     Education provided re:Brionna verbalized good understanding of education provided.   No spiritual or educational barriers to learning provided    Assessment     Brionna tolerated treatment well today. Patient demonstrates good quad activation but cues needed to maintain TKE with straight leg raises in order to avoid quad lag. Patient requires  cueing to perform exercises with slow, controlled motion in order to achieve proper muscle activation without cramping. Patient able to complete all exercises without complaints of increased right knee pain.   This is a 47 y.o. female referred to outpatient physical therapy and presents with a medical diagnosis of right knee pain and demonstrates limitations as described in the problem list. Pt prognosis is Good. Pt will continue to benefit from skilled outpatient physical therapy to address the deficits listed in the problem list, provide pt/family education and to maximize pt's level of independence in the home and community environment.     Goals as follows:     Short Term Goals (4 Weeks):      1.Pt to increase strength by a 1/2 grade of muscles test to allow for improvement in functional activities such as performing chores.  2.Pt to improve range of motion by 25% to allow for improved functional mobility to allow for improvement in IADLs.   3.Pt to report compliance with HEP and demonstrate proper exercise technique to PT to show competence with self management of condition.  4.Decrease pain by 25% during functional activities.     Long Term Goals (12 Weeks):      1. Increase ROM to allow improved joint biomechanics during functional activities.   2.Increase trunk and lower extremity strength to within normal limits during functional activities.   3. Independent with home exercise program.   4. Full return to functional activities with manageable complaints.  5. Patient to demonstrate improved posture and body mechanics.  6. Decrease pain by 75% during functional activities  Plan     Continue with established Plan of Care towards PT goals.    Therapist: Nani Callejas, PTA  12/19/2017

## 2017-12-27 ENCOUNTER — TELEPHONE (OUTPATIENT)
Dept: INTERNAL MEDICINE | Facility: CLINIC | Age: 47
End: 2017-12-27

## 2017-12-28 ENCOUNTER — CLINICAL SUPPORT (OUTPATIENT)
Dept: REHABILITATION | Facility: HOSPITAL | Age: 47
End: 2017-12-28
Attending: ORTHOPAEDIC SURGERY
Payer: MEDICAID

## 2017-12-28 DIAGNOSIS — R26.2 DIFFICULTY WALKING: Primary | ICD-10-CM

## 2017-12-28 DIAGNOSIS — M17.11 PRIMARY OSTEOARTHRITIS OF RIGHT KNEE: ICD-10-CM

## 2017-12-28 DIAGNOSIS — R29.898 RIGHT LEG WEAKNESS: ICD-10-CM

## 2017-12-28 DIAGNOSIS — M25.561 RIGHT KNEE PAIN, UNSPECIFIED CHRONICITY: ICD-10-CM

## 2017-12-28 PROCEDURE — 97110 THERAPEUTIC EXERCISES: CPT | Mod: PN

## 2017-12-28 NOTE — PROGRESS NOTES
Physical Therapy Daily Note     Name: Brionna Jones  Clinic Number: [id number]  Diagnosis:   Encounter Diagnoses   Name Primary?    Difficulty walking Yes    Right knee pain, unspecified chronicity     Right leg weakness     Primary osteoarthritis of right knee      Physician: Matt Smith MD  Precautions: TKA scheduled for 1/23/18  Visit #: 3 of 24      Time In: 2:30  Time Out: 3:30  Total Treatment Time: 45 minutes     Subjective     Pt reports improved understanding and compliance with home program. C/o 8/10 pain today.     Objective     Brionna received individual therapeutic exercises to develop strength, endurance, ROM, flexibility, posture and core stabilization for 45 minutes including:  -Nu step x 7 minutes   -Standing gastroc stretch 20 sec x 3   -Supine hip flexor stretch with strap x 2 minutes x 2   -Quad set 3x10, 3 sec hold  -SLR 3 x 5 (difficult due to spasms)  -SAQ 3x10, 3 sec hold   -Heel slides AA x 2 minutes  -Supine hip adduction ball squeeze x 2 minutes  -Supine hip abduction (GTB) x 2 minutes   -Seated LAQ 2x10, 3 sec hold   -shuttle squat 1 strap 3 x 10    Written Home Exercises Provided: Reviewed HEP with patient.   Pt demo good understanding of the education provided. Brionna demonstrated good return demonstration of activities.     Education provided re:Brionna verbalized good understanding of education provided.   No spiritual or educational barriers to learning provided    Assessment     Continued difficulty with [place] strengthening due to increased c/o cramping. Good response to progression of CKC therex. This is a 47 y.o. female referred to outpatient physical therapy and presents with a medical diagnosis of right knee pain and demonstrates limitations as described in the problem list. Pt prognosis is Good. Pt will continue to benefit from skilled outpatient physical therapy to address the deficits listed in the problem list, provide  pt/family education and to maximize pt's level of independence in the home and community environment.     Goals as follows:     Short Term Goals (4 Weeks):      1.Pt to increase strength by a 1/2 grade of muscles test to allow for improvement in functional activities such as performing chores.  2.Pt to improve range of motion by 25% to allow for improved functional mobility to allow for improvement in IADLs.   3.Pt to report compliance with HEP and demonstrate proper exercise technique to PT to show competence with self management of condition.  4.Decrease pain by 25% during functional activities.     Long Term Goals (12 Weeks):      1. Increase ROM to allow improved joint biomechanics during functional activities.   2.Increase trunk and lower extremity strength to within normal limits during functional activities.   3. Independent with home exercise program.   4. Full return to functional activities with manageable complaints.  5. Patient to demonstrate improved posture and body mechanics.  6. Decrease pain by 75% during functional activities      Plan     Continue with established Plan of Care towards PT goals.    Therapist: Yves Joshi, PT  12/28/2017

## 2018-01-10 ENCOUNTER — OFFICE VISIT (OUTPATIENT)
Dept: INTERNAL MEDICINE | Facility: CLINIC | Age: 48
End: 2018-01-10
Attending: FAMILY MEDICINE
Payer: MEDICAID

## 2018-01-10 VITALS
HEIGHT: 64 IN | SYSTOLIC BLOOD PRESSURE: 137 MMHG | WEIGHT: 211.38 LBS | BODY MASS INDEX: 36.09 KG/M2 | DIASTOLIC BLOOD PRESSURE: 86 MMHG | TEMPERATURE: 98 F

## 2018-01-10 DIAGNOSIS — R07.9 CHEST PAIN, UNSPECIFIED TYPE: ICD-10-CM

## 2018-01-10 DIAGNOSIS — I10 HYPERTENSION, ESSENTIAL: ICD-10-CM

## 2018-01-10 DIAGNOSIS — F17.200 SMOKER: ICD-10-CM

## 2018-01-10 DIAGNOSIS — M17.11 PRIMARY OSTEOARTHRITIS OF RIGHT KNEE: ICD-10-CM

## 2018-01-10 DIAGNOSIS — Z01.818 PREOPERATIVE CLEARANCE: Primary | ICD-10-CM

## 2018-01-10 PROBLEM — M25.561 RIGHT KNEE PAIN: Status: RESOLVED | Noted: 2017-02-06 | Resolved: 2018-01-10

## 2018-01-10 PROBLEM — M25.60 JOINT STIFFNESS: Status: RESOLVED | Noted: 2017-12-15 | Resolved: 2018-01-10

## 2018-01-10 PROCEDURE — 99213 OFFICE O/P EST LOW 20 MIN: CPT | Mod: PBBFAC | Performed by: FAMILY MEDICINE

## 2018-01-10 PROCEDURE — 99999 PR PBB SHADOW E&M-EST. PATIENT-LVL III: CPT | Mod: PBBFAC,,, | Performed by: FAMILY MEDICINE

## 2018-01-10 PROCEDURE — 99214 OFFICE O/P EST MOD 30 MIN: CPT | Mod: S$PBB,,, | Performed by: FAMILY MEDICINE

## 2018-01-10 RX ORDER — AMLODIPINE BESYLATE 10 MG/1
10 TABLET ORAL DAILY
COMMUNITY
End: 2019-10-04 | Stop reason: SDUPTHER

## 2018-01-10 NOTE — PROGRESS NOTES
Subjective:       Patient ID: Brionna Jones is a 47 y.o. female.    Chief Complaint: Pre-op Exam    patient referred for a preoperative clearance. No acute complaints today. Refer to EPIC history, meds, allergies and problem list.        Review of Systems   Constitutional: Negative for chills, fatigue and fever.   HENT: Negative for congestion and trouble swallowing.    Eyes: Negative for redness.   Respiratory: Negative for cough, chest tightness and shortness of breath.    Cardiovascular: Positive for chest pain. Negative for palpitations and leg swelling.   Gastrointestinal: Negative for abdominal pain and blood in stool.   Genitourinary: Negative for hematuria and menstrual problem.   Musculoskeletal: Positive for arthralgias, back pain and gait problem. Negative for joint swelling, myalgias and neck pain.   Skin: Negative for color change and rash.   Neurological: Negative for tremors, speech difficulty, weakness, numbness and headaches.   Hematological: Negative for adenopathy. Does not bruise/bleed easily.   Psychiatric/Behavioral: Negative for behavioral problems, confusion and sleep disturbance. The patient is not nervous/anxious.        Objective:      Physical Exam   Constitutional: She is oriented to person, place, and time. She appears well-developed and well-nourished.   Eyes: No scleral icterus.   Neck: Normal range of motion. Neck supple.   Cardiovascular: Normal rate, normal heart sounds and intact distal pulses.  Exam reveals no gallop and no friction rub.    No murmur heard.  Pulmonary/Chest: Effort normal and breath sounds normal. No respiratory distress. She has no wheezes. She has no rales.   Abdominal: Soft. Bowel sounds are normal. She exhibits no abdominal bruit.   Musculoskeletal: She exhibits no edema.   Neurological: She is alert and oriented to person, place, and time. She displays no tremor. No cranial nerve deficit. Coordination and gait normal.   Skin: Skin is warm and dry. No rash  noted. She is not diaphoretic. No erythema.   Psychiatric: She has a normal mood and affect. Her behavior is normal. Judgment and thought content normal.   Nursing note and vitals reviewed.      Assessment:       1. Preoperative clearance    2. Hypertension, essential    3. Chest pain, unspecified type    4. Primary osteoarthritis of right knee    5. Smoker        Plan:   Brionna was seen today for pre-op exam.    Diagnoses and all orders for this visit:    Preoperative clearance  -     Cancel: EKG 12-lead; Future  -     Pharmacological stress echo; Future  -     EKG 12-LEAD - [place]; Future    Hypertension, essential  -     Cancel: EKG 12-lead; Future    Chest pain, unspecified type  -     Pharmacological stress echo; Future  -     EKG 12-LEAD - [place]; Future    Primary osteoarthritis of right knee    Smoker      See meds, orders, follow up, routing and instructions sections of encounter.  A 47-year-old established female patient, preoperative clearance.  She did state   she had some chest pain.  It sounds atypical, nonexertional.  She was evaluated   out of systems.  She states she had some sort of an EKG type test.    I think the likelihood of cardiac abnormality as well.  She does have the   inability to walk secondary to her knee DJD.   I would like to get a dobutamine   echocardiogram given her nonspecific symptoms and after that we can offer   clearance.      STEVE/WINDY  dd: 01/10/2018 14:45:16 (CST)  td: 01/11/2018 00:22:00 (CST)  Doc ID   #[id number]  Job ID #[id number]    CC:     addendum - dobutamine echo non ischemic    See meds, orders, follow up and instructions sections of encounter. Clear for surgery and cc to referring physician.

## 2018-01-11 ENCOUNTER — HOSPITAL ENCOUNTER (OUTPATIENT)
Dept: CARDIOLOGY | Facility: CLINIC | Age: 48
Discharge: HOME | End: 2018-01-11
Attending: FAMILY MEDICINE
Payer: MEDICAID

## 2018-01-11 DIAGNOSIS — R07.9 CHEST PAIN, UNSPECIFIED TYPE: ICD-10-CM

## 2018-01-11 DIAGNOSIS — Z01.818 PREOPERATIVE CLEARANCE: ICD-10-CM

## 2018-01-11 LAB
DIASTOLIC DYSFUNCTION: NO
RETIRED EF AND QEF - SEE NOTES: 63 (ref 55–65)

## 2018-01-11 PROCEDURE — 93005 ELECTROCARDIOGRAM TRACING: CPT | Mod: PBBFAC | Performed by: INTERNAL MEDICINE

## 2018-01-11 PROCEDURE — 93010 ELECTROCARDIOGRAM REPORT: CPT | Mod: S$PBB,,, | Performed by: INTERNAL MEDICINE

## 2018-01-11 PROCEDURE — 93351 STRESS TTE COMPLETE: CPT | Mod: 26,S$PBB,, | Performed by: INTERNAL MEDICINE

## 2018-01-11 PROCEDURE — 93321 DOPPLER ECHO F-UP/LMTD STD: CPT | Mod: 26,S$PBB,, | Performed by: INTERNAL MEDICINE

## 2018-01-18 ENCOUNTER — OFFICE VISIT (OUTPATIENT)
Dept: SPORTS MEDICINE | Facility: CLINIC | Age: 48
End: 2018-01-18
Payer: MEDICAID

## 2018-01-18 ENCOUNTER — TELEPHONE (OUTPATIENT)
Dept: SPORTS MEDICINE | Facility: CLINIC | Age: 48
End: 2018-01-18

## 2018-01-18 ENCOUNTER — HOSPITAL ENCOUNTER (OUTPATIENT)
Dept: PREADMISSION TESTING | Facility: OTHER | Age: 48
Discharge: HOME | End: 2018-01-18
Attending: ORTHOPAEDIC SURGERY
Payer: MEDICAID

## 2018-01-18 ENCOUNTER — ANESTHESIA EVENT (OUTPATIENT)
Dept: SURGERY | Facility: OTHER | Age: 48
DRG: 470 | End: 2018-01-18
Payer: MEDICAID

## 2018-01-18 VITALS
OXYGEN SATURATION: 98 % | HEART RATE: 85 BPM | TEMPERATURE: 98 F | BODY MASS INDEX: 36.02 KG/M2 | DIASTOLIC BLOOD PRESSURE: 89 MMHG | WEIGHT: 211 LBS | HEIGHT: 64 IN | SYSTOLIC BLOOD PRESSURE: 141 MMHG

## 2018-01-18 VITALS
HEIGHT: 64 IN | WEIGHT: 211 LBS | BODY MASS INDEX: 36.02 KG/M2 | DIASTOLIC BLOOD PRESSURE: 106 MMHG | SYSTOLIC BLOOD PRESSURE: 152 MMHG | HEART RATE: 84 BPM

## 2018-01-18 DIAGNOSIS — M12.561 TRAUMATIC ARTHRITIS OF RIGHT KNEE: Primary | ICD-10-CM

## 2018-01-18 DIAGNOSIS — M12.561 TRAUMATIC ARTHRITIS OF RIGHT KNEE: ICD-10-CM

## 2018-01-18 LAB
ABO + RH BLD: NORMAL
BLD GP AB SCN CELLS X3 SERPL QL: NORMAL

## 2018-01-18 PROCEDURE — 99999 PR PBB SHADOW E&M-EST. PATIENT-LVL III: CPT | Mod: PBBFAC,,, | Performed by: PHYSICIAN ASSISTANT

## 2018-01-18 PROCEDURE — 99499 UNLISTED E&M SERVICE: CPT | Mod: S$PBB,,, | Performed by: PHYSICIAN ASSISTANT

## 2018-01-18 PROCEDURE — 86850 RBC ANTIBODY SCREEN: CPT

## 2018-01-18 PROCEDURE — 36415 COLL VENOUS BLD VENIPUNCTURE: CPT

## 2018-01-18 PROCEDURE — 99213 OFFICE O/P EST LOW 20 MIN: CPT | Mod: PBBFAC,PO | Performed by: PHYSICIAN ASSISTANT

## 2018-01-18 RX ORDER — LIDOCAINE HYDROCHLORIDE 10 MG/ML
0.5 INJECTION, SOLUTION EPIDURAL; INFILTRATION; INTRACAUDAL; PERINEURAL ONCE
Status: CANCELLED | OUTPATIENT
Start: 2018-01-18 | End: 2018-01-18

## 2018-01-18 RX ORDER — OXYCODONE AND ACETAMINOPHEN 10; 325 MG/1; MG/1
1 TABLET ORAL EVERY 6 HOURS PRN
Qty: 60 TABLET | Refills: 0 | Status: SHIPPED | OUTPATIENT
Start: 2018-01-18 | End: 2018-02-05 | Stop reason: SDUPTHER

## 2018-01-18 RX ORDER — FAMOTIDINE 20 MG/1
20 TABLET, FILM COATED ORAL
Status: CANCELLED | OUTPATIENT
Start: 2018-01-18 | End: 2018-01-18

## 2018-01-18 RX ORDER — SODIUM CHLORIDE, SODIUM LACTATE, POTASSIUM CHLORIDE, CALCIUM CHLORIDE 600; 310; 30; 20 MG/100ML; MG/100ML; MG/100ML; MG/100ML
INJECTION, SOLUTION INTRAVENOUS CONTINUOUS
Status: CANCELLED | OUTPATIENT
Start: 2018-01-18

## 2018-01-18 RX ORDER — MIDAZOLAM HYDROCHLORIDE 5 MG/ML
4 INJECTION INTRAMUSCULAR; INTRAVENOUS
Status: CANCELLED | OUTPATIENT
Start: 2018-01-18

## 2018-01-18 RX ORDER — ASPIRIN 325 MG
325 TABLET, DELAYED RELEASE (ENTERIC COATED) ORAL DAILY
Qty: 42 TABLET | Refills: 0 | Status: SHIPPED | OUTPATIENT
Start: 2018-01-18 | End: 2018-03-05 | Stop reason: SDUPTHER

## 2018-01-18 RX ORDER — PROMETHAZINE HYDROCHLORIDE 25 MG/1
25 TABLET ORAL EVERY 4 HOURS
Qty: 30 TABLET | Refills: 0 | Status: SHIPPED | OUTPATIENT
Start: 2018-01-18 | End: 2019-05-22 | Stop reason: ALTCHOICE

## 2018-01-18 RX ORDER — MUPIROCIN 20 MG/G
OINTMENT TOPICAL
Status: CANCELLED | OUTPATIENT
Start: 2018-01-18

## 2018-01-18 RX ORDER — OXYCODONE HYDROCHLORIDE 5 MG/1
5 TABLET ORAL ONCE AS NEEDED
Status: CANCELLED | OUTPATIENT
Start: 2018-01-18 | End: 2018-01-18

## 2018-01-18 NOTE — ANESTHESIA PREPROCEDURE EVALUATION
01/18/2018  Brionna Jones is a 47 y.o., female.    Anesthesia Evaluation    I have reviewed the Patient Summary Reports.    I have reviewed the Nursing Notes.   I have reviewed the Medications.     Review of Systems  Anesthesia Hx:  No problems with previous Anesthesia  Denies Family Hx of Anesthesia complications.   Denies Personal Hx of Anesthesia complications.   Social:  Smoker, Social Alcohol Use    Hematology/Oncology:  Hematology Normal   Oncology Normal     EENT/Dental:EENT/Dental Normal   Cardiovascular:   Exercise tolerance: good Hypertension, well controlled    Pulmonary:  Pulmonary Normal    Renal/:  Renal/ Normal     Hepatic/GI:   GERD, well controlled    Musculoskeletal:   Arthritis     Neurological:  Neurology Normal    Endocrine:  Endocrine Normal    Psych:   anxiety depression          Physical Exam  General:  Obesity    Airway/Jaw/Neck:  Airway Findings: Mouth Opening: Normal Tongue: Normal  General Airway Assessment: Adult  Mallampati: II  TM Distance: Normal, at least 6 cm  Jaw/Neck Findings:  Neck ROM: Normal ROM      Dental:  Dental Findings: In tact, upper front capsVery loose left upper molar, told needs extraction before surgery             Anesthesia Plan  Type of Anesthesia, risks & benefits discussed:  Anesthesia Type:  general  Patient's Preference:   Intra-op Monitoring Plan: standard ASA monitors  Intra-op Monitoring Plan Comments:   Post Op Pain Control Plan: peripheral nerve block  Post Op Pain Control Plan Comments:   Induction:   IV  Beta Blocker:         Informed Consent: Patient understands risks and agrees with Anesthesia plan.  Questions answered. Anesthesia consent signed with patient.  ASA Score: 3     Day of Surgery Review of History & Physical:    H&P update referred to the surgeon.     Anesthesia Plan Notes: Dr. Shook cleared.        Ready For Surgery From  Anesthesia Perspective.

## 2018-01-18 NOTE — DISCHARGE INSTRUCTIONS
PRE-ADMIT TESTING -  [phone]    [address]  [facility]          Your surgery has been scheduled at [facility]. We are pleased to have the opportunity to serve you. For Further Information please call [phone].    On the day of surgery please report to the Information Desk on the 1st floor of [facility].    · CONTACT YOUR PHYSICIAN'S OFFICE THE DAY PRIOR TO YOUR SURGERY TO OBTAIN YOUR ARRIVAL TIME.     · The evening before surgery do not eat anything after 9 p.m. ( this includes hard candy, chewing gum and mints).  You may only have GATORADE, POWERADE AND WATER  from 9 p.m. until you leave your home.   DO NOT DRINK ANY LIQUIDS ON THE WAY TO THE HOSPITAL.      SPECIAL MEDICATION INSTRUCTIONS: TAKE medications checked off by the Anesthesiologist on your Medication List.         Surgery Patients:  If you take ASPIRIN - Your PHYSICIAN/SURGEON will need to inform you IF/OR when you need to stop taking aspirin prior to your surgery.     Do Not take any medications containing IBUPROFEN.  Plain Tylenol is OK to take.    Do Not Wear any make-up or dark nail polish   (especially eye make-up) to surgery. If you come to surgery with makeup on you will be required to remove the makeup or nail polish.    Do not shave your surgical area at least 5 days prior to your surgery. The surgical prep will be performed at the hospital according to Infection Control regulations.    Leave all valuables at home.   Do Not wear any jewelry or watches, including any metal in body piercings.  Contact Lens must be removed before surgery. Either do not wear the contact lens or bring a case and solution for storage.  Please bring a container for eyeglasses or dentures as required.  Bring any paperwork your physician has provided, such as consent forms,  history and physicals, doctor's orders, etc.   Bring comfortable clothes that are loose fitting to wear upon discharge. Take into consideration  the type of surgery being performed.  Maintain your diet as advised per your physician the day prior to surgery.      Adequate rest the night before surgery is advised.   Park in the Parking lot behind the hospital or in the [facility] across the street from the parking lot. Parking is complimentary.  If you will be discharged the same day as your procedure, please arrange for a responsible adult to drive you home or to accompany you if traveling by taxi.   YOU WILL NOT BE PERMITTED TO DRIVE OR TO LEAVE THE HOSPITAL ALONE AFTER SURGERY.   It is strongly recommended that you arrange for someone to remain with you for the first 24 hrs following your surgery.       Thank you for your cooperation.  The Staff of [facility].        Bathing Instructions                                                                 Please shower the evening before and morning of your procedure with    ANTIBACTERIAL SOAP. ( DIAL, etc )  Concentrate on the surgical area   for at least 3 minutes and rinse completely. Dry off as usual.   Do not use any deodorant, powder, body lotions, perfume, after shave or    cologne.

## 2018-01-18 NOTE — TELEPHONE ENCOUNTER
----- Message from Kisha Martinez sent at 1/18/2018  3:16 PM CST -----  Contact: Self  Pt is calling to speak with the Staff Nurse regarding something confidential.    She can be reached at [phone].    Thank you.

## 2018-01-18 NOTE — TELEPHONE ENCOUNTER
Patient did not  paperwork today. She would like the paperwork faxed to her tomorrow at Fax: [phone]

## 2018-01-18 NOTE — H&P
Brionna Jones  is here for a completion of her perioperative paperwork. she  Is scheduled to undergo 1. Right knee total knee replacement (Conformis iTotal) on 1/23/2018.  She is a healthy individual and does need clearance for this procedure. Pending clearance by Dr. Shook.    Risks, indications and benefits of the surgical procedure were discussed with the patient. All questions with regard to surgery, rehab, expected return to functional activities, activities of daily living and recreational endeavors were answered to her satisfaction.    Once no other questions were asked, a brief history and physical exam was then performed.      PHYSICAL EXAM:  GEN: A&Ox3, WD WN NAD  HEENT: WNL  CHEST: CTAB, no W/R/R  HEART: RRR, no M/R/G  ABD: Soft, NT ND, BS x4 QUADS  MS; See Epic  NEURO: CN II-XII intact       The surgical consent was then reviewed with the patient, who agreed with all the contents of the consent form and it was signed. she was then given the [facility] surgery packet to bring with her to [facility] for the anesthesia portion of her perioperative paperwork.     PHYSICAL THERAPY:  She was also instructed regarding physical therapy and will begin on  POD #1-3 at [facility].    POST OP CARE:instructions were reviewed including care of the wound and dressing after surgery and when she can shower.     PAIN MANAGEMENT: Brionna Jones was also given her pain management regime, which includes the TENS unit given to her by Tray Domínguez along with the education required for its use. She was also instructed regarding the Polar ice unit that will be in place after surgery and her postoperative pain medications.     PAIN MEDICATION:  Percocet 10/325mg 1 po q 4-6 hours prn pain  Phenergan 25 mg one p.o. q.4-6 hours p.r.n. nausea and vomiting.  ASA 325mg once a day x 6 weeks    After surgery. approximately 2 months, she will be having pain controlled by pain management, Dr. Peterson.    As there were no other  questions to be asked, she was given my business card along with Matt Smith MD business card if she has any questions or concerns prior to surgery or in the postop period.

## 2018-01-19 ENCOUNTER — TELEPHONE (OUTPATIENT)
Dept: SPORTS MEDICINE | Facility: CLINIC | Age: 48
End: 2018-01-19

## 2018-01-19 NOTE — TELEPHONE ENCOUNTER
----- Message from Ponce Dixon sent at 1/19/2018 11:43 AM CST -----  Contact: self  Patient ask for a call on home phone in regards to faxing a form to the  however have not received it as of yet.  fax  please attention to Ms Nila Stone fax  Attention to Ms Marvin

## 2018-01-19 NOTE — TELEPHONE ENCOUNTER
The patient was contacted regarding their call to the office earlier and a voice mail was left to call the office back at their convenience.  Re: faxed paperwork

## 2018-01-22 ENCOUNTER — TELEPHONE (OUTPATIENT)
Dept: SPORTS MEDICINE | Facility: CLINIC | Age: 48
End: 2018-01-22

## 2018-01-23 ENCOUNTER — HOSPITAL ENCOUNTER (INPATIENT)
Facility: OTHER | Age: 48
LOS: 1 days | Discharge: HOME | DRG: 470 | End: 2018-01-24
Attending: ORTHOPAEDIC SURGERY | Admitting: ORTHOPAEDIC SURGERY
Payer: MEDICAID

## 2018-01-23 ENCOUNTER — SURGERY (OUTPATIENT)
Age: 48
End: 2018-01-23

## 2018-01-23 ENCOUNTER — ANESTHESIA (OUTPATIENT)
Dept: SURGERY | Facility: OTHER | Age: 48
DRG: 470 | End: 2018-01-23
Payer: MEDICAID

## 2018-01-23 DIAGNOSIS — M12.561 TRAUMATIC ARTHRITIS OF RIGHT KNEE: ICD-10-CM

## 2018-01-23 DIAGNOSIS — M17.11 PRIMARY OSTEOARTHRITIS OF RIGHT KNEE: Primary | ICD-10-CM

## 2018-01-23 LAB
B-HCG UR QL: NEGATIVE
CTP QC/QA: YES
HCT VFR BLD AUTO: 37.3 %
HGB BLD-MCNC: 12.3 G/DL

## 2018-01-23 PROCEDURE — 63600175 PHARM REV CODE 636 W HCPCS: Performed by: SPECIALIST

## 2018-01-23 PROCEDURE — C1713 ANCHOR/SCREW BN/BN,TIS/BN: HCPCS | Performed by: ORTHOPAEDIC SURGERY

## 2018-01-23 PROCEDURE — 85014 HEMATOCRIT: CPT

## 2018-01-23 PROCEDURE — 37000008 HC ANESTHESIA 1ST 15 MINUTES: Performed by: ORTHOPAEDIC SURGERY

## 2018-01-23 PROCEDURE — 97161 PT EVAL LOW COMPLEX 20 MIN: CPT

## 2018-01-23 PROCEDURE — 27447 TOTAL KNEE ARTHROPLASTY: CPT | Mod: 62,RT,, | Performed by: ORTHOPAEDIC SURGERY

## 2018-01-23 PROCEDURE — 25000003 PHARM REV CODE 250: Performed by: ORTHOPAEDIC SURGERY

## 2018-01-23 PROCEDURE — 63600175 PHARM REV CODE 636 W HCPCS: Performed by: NURSE ANESTHETIST, CERTIFIED REGISTERED

## 2018-01-23 PROCEDURE — 25000003 PHARM REV CODE 250: Performed by: SPECIALIST

## 2018-01-23 PROCEDURE — 71000039 HC RECOVERY, EACH ADD'L HOUR: Performed by: ORTHOPAEDIC SURGERY

## 2018-01-23 PROCEDURE — 27201423 OPTIME MED/SURG SUP & DEVICES STERILE SUPPLY: Performed by: ORTHOPAEDIC SURGERY

## 2018-01-23 PROCEDURE — 63600175 PHARM REV CODE 636 W HCPCS: Performed by: ANESTHESIOLOGY

## 2018-01-23 PROCEDURE — 25000003 PHARM REV CODE 250: Performed by: PHYSICIAN ASSISTANT

## 2018-01-23 PROCEDURE — C9290 INJ, BUPIVACAINE LIPOSOME: HCPCS | Performed by: ORTHOPAEDIC SURGERY

## 2018-01-23 PROCEDURE — 0SRC0J9 REPLACEMENT OF RIGHT KNEE JOINT WITH SYNTHETIC SUBSTITUTE, CEMENTED, OPEN APPROACH: ICD-10-PCS | Performed by: ORTHOPAEDIC SURGERY

## 2018-01-23 PROCEDURE — 85018 HEMOGLOBIN: CPT

## 2018-01-23 PROCEDURE — 63600175 PHARM REV CODE 636 W HCPCS: Performed by: PHYSICIAN ASSISTANT

## 2018-01-23 PROCEDURE — 25000003 PHARM REV CODE 250: Performed by: ANESTHESIOLOGY

## 2018-01-23 PROCEDURE — 63600175 PHARM REV CODE 636 W HCPCS: Performed by: ORTHOPAEDIC SURGERY

## 2018-01-23 PROCEDURE — C1776 JOINT DEVICE (IMPLANTABLE): HCPCS | Performed by: ORTHOPAEDIC SURGERY

## 2018-01-23 PROCEDURE — 11000001 HC ACUTE MED/SURG PRIVATE ROOM

## 2018-01-23 PROCEDURE — 36000711: Performed by: ORTHOPAEDIC SURGERY

## 2018-01-23 PROCEDURE — C1729 CATH, DRAINAGE: HCPCS | Performed by: ORTHOPAEDIC SURGERY

## 2018-01-23 PROCEDURE — 25000003 PHARM REV CODE 250: Performed by: NURSE ANESTHETIST, CERTIFIED REGISTERED

## 2018-01-23 PROCEDURE — 97116 GAIT TRAINING THERAPY: CPT

## 2018-01-23 PROCEDURE — 36000710: Performed by: ORTHOPAEDIC SURGERY

## 2018-01-23 PROCEDURE — 97530 THERAPEUTIC ACTIVITIES: CPT

## 2018-01-23 PROCEDURE — 37000009 HC ANESTHESIA EA ADD 15 MINS: Performed by: ORTHOPAEDIC SURGERY

## 2018-01-23 PROCEDURE — C9290 INJ, BUPIVACAINE LIPOSOME: HCPCS | Performed by: PHYSICIAN ASSISTANT

## 2018-01-23 PROCEDURE — 71000033 HC RECOVERY, INTIAL HOUR: Performed by: ORTHOPAEDIC SURGERY

## 2018-01-23 PROCEDURE — 81025 URINE PREGNANCY TEST: CPT | Performed by: PHYSICIAN ASSISTANT

## 2018-01-23 PROCEDURE — 97110 THERAPEUTIC EXERCISES: CPT

## 2018-01-23 DEVICE — ANCHOR 4.5 HELIX ADV BR 3 SUT: Type: IMPLANTABLE DEVICE | Site: KNEE | Status: FUNCTIONAL

## 2018-01-23 DEVICE — CEMENT BONE IMPLANT: Type: IMPLANTABLE DEVICE | Site: KNEE | Status: FUNCTIONAL

## 2018-01-23 DEVICE — IMPLANTABLE DEVICE: Type: IMPLANTABLE DEVICE | Site: KNEE | Status: FUNCTIONAL

## 2018-01-23 DEVICE — ALLOPATCH HD THCK 4 X 8: Type: IMPLANTABLE DEVICE | Site: KNEE | Status: FUNCTIONAL

## 2018-01-23 RX ORDER — OXYCODONE HYDROCHLORIDE 5 MG/1
5 TABLET ORAL
Status: DISCONTINUED | OUTPATIENT
Start: 2018-01-23 | End: 2018-01-23 | Stop reason: HOSPADM

## 2018-01-23 RX ORDER — LIDOCAINE HYDROCHLORIDE 10 MG/ML
0.5 INJECTION, SOLUTION EPIDURAL; INFILTRATION; INTRACAUDAL; PERINEURAL ONCE
Status: DISCONTINUED | OUTPATIENT
Start: 2018-01-23 | End: 2018-01-23 | Stop reason: HOSPADM

## 2018-01-23 RX ORDER — OXYCODONE AND ACETAMINOPHEN 10; 325 MG/1; MG/1
1 TABLET ORAL EVERY 6 HOURS PRN
Status: DISCONTINUED | OUTPATIENT
Start: 2018-01-23 | End: 2018-01-24 | Stop reason: HOSPADM

## 2018-01-23 RX ORDER — ONDANSETRON 2 MG/ML
4 INJECTION INTRAMUSCULAR; INTRAVENOUS DAILY PRN
Status: DISCONTINUED | OUTPATIENT
Start: 2018-01-23 | End: 2018-01-23 | Stop reason: HOSPADM

## 2018-01-23 RX ORDER — MIDAZOLAM HYDROCHLORIDE 1 MG/ML
2 INJECTION INTRAMUSCULAR; INTRAVENOUS ONCE
Status: COMPLETED | OUTPATIENT
Start: 2018-01-23 | End: 2018-01-23

## 2018-01-23 RX ORDER — MIDAZOLAM HYDROCHLORIDE 5 MG/ML
4 INJECTION INTRAMUSCULAR; INTRAVENOUS
Status: DISCONTINUED | OUTPATIENT
Start: 2018-01-23 | End: 2018-01-23 | Stop reason: HOSPADM

## 2018-01-23 RX ORDER — ROCURONIUM BROMIDE 10 MG/ML
INJECTION, SOLUTION INTRAVENOUS
Status: DISCONTINUED | OUTPATIENT
Start: 2018-01-23 | End: 2018-01-23

## 2018-01-23 RX ORDER — CEFAZOLIN SODIUM 2 G/50ML
2 SOLUTION INTRAVENOUS
Status: DISCONTINUED | OUTPATIENT
Start: 2018-01-23 | End: 2018-01-23 | Stop reason: HOSPADM

## 2018-01-23 RX ORDER — ASPIRIN 325 MG
325 TABLET, DELAYED RELEASE (ENTERIC COATED) ORAL DAILY
Status: DISCONTINUED | OUTPATIENT
Start: 2018-01-23 | End: 2018-01-24 | Stop reason: HOSPADM

## 2018-01-23 RX ORDER — CEFAZOLIN SODIUM 1 G/50ML
2 SOLUTION INTRAVENOUS
Status: COMPLETED | OUTPATIENT
Start: 2018-01-23 | End: 2018-01-24

## 2018-01-23 RX ORDER — ONDANSETRON 8 MG/1
8 TABLET, ORALLY DISINTEGRATING ORAL EVERY 8 HOURS PRN
Status: DISCONTINUED | OUTPATIENT
Start: 2018-01-23 | End: 2018-01-24 | Stop reason: HOSPADM

## 2018-01-23 RX ORDER — OXYCODONE HYDROCHLORIDE 5 MG/1
5 TABLET ORAL ONCE AS NEEDED
Status: DISCONTINUED | OUTPATIENT
Start: 2018-01-23 | End: 2018-01-23 | Stop reason: HOSPADM

## 2018-01-23 RX ORDER — PROPOFOL 10 MG/ML
VIAL (ML) INTRAVENOUS
Status: DISCONTINUED | OUTPATIENT
Start: 2018-01-23 | End: 2018-01-23

## 2018-01-23 RX ORDER — SODIUM CHLORIDE 0.9 % (FLUSH) 0.9 %
3 SYRINGE (ML) INJECTION
Status: DISCONTINUED | OUTPATIENT
Start: 2018-01-23 | End: 2018-01-24 | Stop reason: HOSPADM

## 2018-01-23 RX ORDER — FAMOTIDINE 20 MG/1
20 TABLET, FILM COATED ORAL
Status: COMPLETED | OUTPATIENT
Start: 2018-01-23 | End: 2018-01-23

## 2018-01-23 RX ORDER — BUPIVACAINE HYDROCHLORIDE AND EPINEPHRINE 5; 5 MG/ML; UG/ML
INJECTION, SOLUTION EPIDURAL; INTRACAUDAL; PERINEURAL
Status: DISCONTINUED | OUTPATIENT
Start: 2018-01-23 | End: 2018-01-23 | Stop reason: HOSPADM

## 2018-01-23 RX ORDER — OXYCODONE AND ACETAMINOPHEN 10; 325 MG/1; MG/1
2 TABLET ORAL EVERY 6 HOURS PRN
Status: DISCONTINUED | OUTPATIENT
Start: 2018-01-23 | End: 2018-01-24 | Stop reason: HOSPADM

## 2018-01-23 RX ORDER — BACITRACIN 50000 [IU]/1
INJECTION, POWDER, FOR SOLUTION INTRAMUSCULAR
Status: DISCONTINUED | OUTPATIENT
Start: 2018-01-23 | End: 2018-01-23 | Stop reason: HOSPADM

## 2018-01-23 RX ORDER — DOCUSATE SODIUM 100 MG/1
100 CAPSULE, LIQUID FILLED ORAL 2 TIMES DAILY
Status: DISCONTINUED | OUTPATIENT
Start: 2018-01-23 | End: 2018-01-24 | Stop reason: HOSPADM

## 2018-01-23 RX ORDER — MEPERIDINE HYDROCHLORIDE 50 MG/ML
12.5 INJECTION INTRAMUSCULAR; INTRAVENOUS; SUBCUTANEOUS ONCE AS NEEDED
Status: DISCONTINUED | OUTPATIENT
Start: 2018-01-23 | End: 2018-01-23 | Stop reason: HOSPADM

## 2018-01-23 RX ORDER — MUPIROCIN 20 MG/G
OINTMENT TOPICAL
Status: DISCONTINUED | OUTPATIENT
Start: 2018-01-23 | End: 2018-01-23 | Stop reason: HOSPADM

## 2018-01-23 RX ORDER — AMLODIPINE BESYLATE 5 MG/1
10 TABLET ORAL DAILY
Status: DISCONTINUED | OUTPATIENT
Start: 2018-01-23 | End: 2018-01-24 | Stop reason: HOSPADM

## 2018-01-23 RX ORDER — ACETAMINOPHEN 10 MG/ML
INJECTION, SOLUTION INTRAVENOUS
Status: DISCONTINUED | OUTPATIENT
Start: 2018-01-23 | End: 2018-01-23

## 2018-01-23 RX ORDER — LIDOCAINE HCL/PF 100 MG/5ML
SYRINGE (ML) INTRAVENOUS
Status: DISCONTINUED | OUTPATIENT
Start: 2018-01-23 | End: 2018-01-23

## 2018-01-23 RX ORDER — ROPIVACAINE HYDROCHLORIDE 5 MG/ML
INJECTION, SOLUTION EPIDURAL; INFILTRATION; PERINEURAL
Status: DISCONTINUED | OUTPATIENT
Start: 2018-01-23 | End: 2018-01-23

## 2018-01-23 RX ORDER — TRANEXAMIC ACID 100 MG/ML
1000 INJECTION, SOLUTION INTRAVENOUS ONCE
Status: COMPLETED | OUTPATIENT
Start: 2018-01-23 | End: 2018-01-23

## 2018-01-23 RX ORDER — HYDROMORPHONE HYDROCHLORIDE 1 MG/ML
0.5 INJECTION, SOLUTION INTRAMUSCULAR; INTRAVENOUS; SUBCUTANEOUS EVERY 6 HOURS PRN
Status: DISCONTINUED | OUTPATIENT
Start: 2018-01-23 | End: 2018-01-24 | Stop reason: HOSPADM

## 2018-01-23 RX ORDER — ONDANSETRON 2 MG/ML
INJECTION INTRAMUSCULAR; INTRAVENOUS
Status: DISCONTINUED | OUTPATIENT
Start: 2018-01-23 | End: 2018-01-23

## 2018-01-23 RX ORDER — OXYCODONE HCL 10 MG/1
10 TABLET, FILM COATED, EXTENDED RELEASE ORAL EVERY 12 HOURS
Status: DISCONTINUED | OUTPATIENT
Start: 2018-01-23 | End: 2018-01-24 | Stop reason: HOSPADM

## 2018-01-23 RX ORDER — FENTANYL CITRATE 50 UG/ML
100 INJECTION, SOLUTION INTRAMUSCULAR; INTRAVENOUS EVERY 5 MIN PRN
Status: COMPLETED | OUTPATIENT
Start: 2018-01-23 | End: 2018-01-23

## 2018-01-23 RX ORDER — HYDROMORPHONE HYDROCHLORIDE 2 MG/ML
0.4 INJECTION, SOLUTION INTRAMUSCULAR; INTRAVENOUS; SUBCUTANEOUS EVERY 5 MIN PRN
Status: DISCONTINUED | OUTPATIENT
Start: 2018-01-23 | End: 2018-01-23 | Stop reason: HOSPADM

## 2018-01-23 RX ORDER — NEOSTIGMINE METHYLSULFATE 1 MG/ML
INJECTION, SOLUTION INTRAVENOUS
Status: DISCONTINUED | OUTPATIENT
Start: 2018-01-23 | End: 2018-01-23

## 2018-01-23 RX ORDER — GLYCOPYRROLATE 0.2 MG/ML
INJECTION INTRAMUSCULAR; INTRAVENOUS
Status: DISCONTINUED | OUTPATIENT
Start: 2018-01-23 | End: 2018-01-23

## 2018-01-23 RX ORDER — SODIUM CHLORIDE 9 MG/ML
INJECTION, SOLUTION INTRAVENOUS ONCE
Status: COMPLETED | OUTPATIENT
Start: 2018-01-23 | End: 2018-01-23

## 2018-01-23 RX ORDER — SODIUM CHLORIDE, SODIUM LACTATE, POTASSIUM CHLORIDE, CALCIUM CHLORIDE 600; 310; 30; 20 MG/100ML; MG/100ML; MG/100ML; MG/100ML
INJECTION, SOLUTION INTRAVENOUS CONTINUOUS
Status: DISCONTINUED | OUTPATIENT
Start: 2018-01-23 | End: 2018-01-23

## 2018-01-23 RX ORDER — DEXAMETHASONE SODIUM PHOSPHATE 4 MG/ML
INJECTION, SOLUTION INTRA-ARTICULAR; INTRALESIONAL; INTRAMUSCULAR; INTRAVENOUS; SOFT TISSUE
Status: DISCONTINUED | OUTPATIENT
Start: 2018-01-23 | End: 2018-01-23

## 2018-01-23 RX ORDER — FENTANYL CITRATE 50 UG/ML
25 INJECTION, SOLUTION INTRAMUSCULAR; INTRAVENOUS EVERY 5 MIN PRN
Status: DISCONTINUED | OUTPATIENT
Start: 2018-01-23 | End: 2018-01-23 | Stop reason: HOSPADM

## 2018-01-23 RX ADMIN — DEXAMETHASONE SODIUM PHOSPHATE 8 MG: 4 INJECTION, SOLUTION INTRAMUSCULAR; INTRAVENOUS at 07:01

## 2018-01-23 RX ADMIN — GLYCOPYRROLATE 0.4 MG: 0.2 INJECTION, SOLUTION INTRAMUSCULAR; INTRAVENOUS at 08:01

## 2018-01-23 RX ADMIN — EPHEDRINE SULFATE 10 MG: 50 INJECTION INTRAMUSCULAR; INTRAVENOUS; SUBCUTANEOUS at 09:01

## 2018-01-23 RX ADMIN — EPHEDRINE SULFATE 15 MG: 50 INJECTION INTRAMUSCULAR; INTRAVENOUS; SUBCUTANEOUS at 08:01

## 2018-01-23 RX ADMIN — CEFAZOLIN SODIUM 2 G: 2 SOLUTION INTRAVENOUS at 07:01

## 2018-01-23 RX ADMIN — AMLODIPINE BESYLATE 10 MG: 5 TABLET ORAL at 03:01

## 2018-01-23 RX ADMIN — ROPIVACAINE HYDROCHLORIDE 30 ML: 5 INJECTION, SOLUTION EPIDURAL; INFILTRATION; PERINEURAL at 06:01

## 2018-01-23 RX ADMIN — FENTANYL CITRATE 50 MCG: 50 INJECTION, SOLUTION INTRAMUSCULAR; INTRAVENOUS at 07:01

## 2018-01-23 RX ADMIN — EPHEDRINE SULFATE 5 MG: 50 INJECTION INTRAMUSCULAR; INTRAVENOUS; SUBCUTANEOUS at 08:01

## 2018-01-23 RX ADMIN — NEOSTIGMINE METHYLSULFATE 3 MG: 1 INJECTION INTRAVENOUS at 08:01

## 2018-01-23 RX ADMIN — TRANEXAMIC ACID 1000 MG: 100 INJECTION, SOLUTION INTRAVENOUS at 07:01

## 2018-01-23 RX ADMIN — BUPIVACAINE 20 ML: 13.3 INJECTION, SUSPENSION, LIPOSOMAL INFILTRATION at 08:01

## 2018-01-23 RX ADMIN — FENTANYL CITRATE 25 MCG: 50 INJECTION, SOLUTION INTRAMUSCULAR; INTRAVENOUS at 10:01

## 2018-01-23 RX ADMIN — ROCURONIUM BROMIDE 35 MG: 10 INJECTION, SOLUTION INTRAVENOUS at 07:01

## 2018-01-23 RX ADMIN — FENTANYL CITRATE 50 MCG: 50 INJECTION, SOLUTION INTRAMUSCULAR; INTRAVENOUS at 09:01

## 2018-01-23 RX ADMIN — OXYCODONE HYDROCHLORIDE 10 MG: 10 TABLET, FILM COATED, EXTENDED RELEASE ORAL at 09:01

## 2018-01-23 RX ADMIN — LIDOCAINE HYDROCHLORIDE 75 MG: 20 INJECTION, SOLUTION INTRAVENOUS at 07:01

## 2018-01-23 RX ADMIN — EPHEDRINE SULFATE 5 MG: 50 INJECTION INTRAMUSCULAR; INTRAVENOUS; SUBCUTANEOUS at 09:01

## 2018-01-23 RX ADMIN — PROPOFOL 50 MG: 10 INJECTION, EMULSION INTRAVENOUS at 07:01

## 2018-01-23 RX ADMIN — ONDANSETRON 4 MG: 2 INJECTION INTRAMUSCULAR; INTRAVENOUS at 07:01

## 2018-01-23 RX ADMIN — SODIUM CHLORIDE, SODIUM LACTATE, POTASSIUM CHLORIDE, AND CALCIUM CHLORIDE: 600; 310; 30; 20 INJECTION, SOLUTION INTRAVENOUS at 06:01

## 2018-01-23 RX ADMIN — FAMOTIDINE 20 MG: 20 TABLET, FILM COATED ORAL at 06:01

## 2018-01-23 RX ADMIN — SODIUM CHLORIDE, SODIUM LACTATE, POTASSIUM CHLORIDE, AND CALCIUM CHLORIDE: 600; 310; 30; 20 INJECTION, SOLUTION INTRAVENOUS at 09:01

## 2018-01-23 RX ADMIN — OXYCODONE HYDROCHLORIDE 5 MG: 5 TABLET ORAL at 10:01

## 2018-01-23 RX ADMIN — DOCUSATE SODIUM 100 MG: 100 CAPSULE, LIQUID FILLED ORAL at 09:01

## 2018-01-23 RX ADMIN — BUPIVACAINE 266 MG: 13.3 INJECTION, SUSPENSION, LIPOSOMAL INFILTRATION at 08:01

## 2018-01-23 RX ADMIN — ROCURONIUM BROMIDE 5 MG: 10 INJECTION, SOLUTION INTRAVENOUS at 07:01

## 2018-01-23 RX ADMIN — PROPOFOL 150 MG: 10 INJECTION, EMULSION INTRAVENOUS at 07:01

## 2018-01-23 RX ADMIN — ACETAMINOPHEN 1000 MG: 10 INJECTION, SOLUTION INTRAVENOUS at 07:01

## 2018-01-23 RX ADMIN — SODIUM CHLORIDE: 0.9 INJECTION, SOLUTION INTRAVENOUS at 03:01

## 2018-01-23 RX ADMIN — FENTANYL CITRATE 100 MCG: 50 INJECTION, SOLUTION INTRAMUSCULAR; INTRAVENOUS at 06:01

## 2018-01-23 RX ADMIN — MUPIROCIN: 20 OINTMENT TOPICAL at 06:01

## 2018-01-23 RX ADMIN — CARBOXYMETHYLCELLULOSE SODIUM 2 DROP: 2.5 SOLUTION/ DROPS OPHTHALMIC at 07:01

## 2018-01-23 RX ADMIN — BUPIVACAINE HYDROCHLORIDE AND EPINEPHRINE BITARTRATE 30 ML: 5; .005 INJECTION, SOLUTION EPIDURAL; INTRACAUDAL; PERINEURAL at 08:01

## 2018-01-23 RX ADMIN — MIDAZOLAM HYDROCHLORIDE 2 MG: 1 INJECTION INTRAMUSCULAR; INTRAVENOUS at 06:01

## 2018-01-23 RX ADMIN — ROPIVACAINE HYDROCHLORIDE: 5 INJECTION, SOLUTION EPIDURAL; INFILTRATION; PERINEURAL at 08:01

## 2018-01-23 RX ADMIN — PROPOFOL 50 MG: 10 INJECTION, EMULSION INTRAVENOUS at 09:01

## 2018-01-23 RX ADMIN — FENTANYL CITRATE 100 MCG: 50 INJECTION, SOLUTION INTRAMUSCULAR; INTRAVENOUS at 07:01

## 2018-01-23 RX ADMIN — CEFAZOLIN SODIUM 2 G: 1 SOLUTION INTRAVENOUS at 03:01

## 2018-01-23 RX ADMIN — MIDAZOLAM HYDROCHLORIDE 4 MG: 5 INJECTION, SOLUTION INTRAMUSCULAR; INTRAVENOUS at 06:01

## 2018-01-23 RX ADMIN — ASPIRIN 325 MG: 325 TABLET, DELAYED RELEASE ORAL at 03:01

## 2018-01-23 RX ADMIN — BACITRACIN 50000 UNITS: 50000 INJECTION, POWDER, FOR SOLUTION INTRAMUSCULAR at 07:01

## 2018-01-23 NOTE — OP NOTE
DATE OF PROCEDURE:  1/23/2018    ATTENDING SURGEON: Surgeon(s) and Role:     * Matt Smith MD - Primary     Co-Surgeon:Adán Linda MD     SECOND ASSISTANT: Angie Dominguez PA-C      Co-Surgeon Duties: Due to the complexity of the case and the need for significant intra-operative decision making co-surgeon duties were medically necessary. The chronicity of the disease process and the level of deformity dictated that co-surgeon duties be undertaken. A separate note will be dictated/reported by Dr. Adán Linda stating the specific co-surgeon activities and roles performed during the surgery.          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    COMPLICATIONS: NONE    URINE OUTPUT: 0 mL    ESTIMATED BLOOD LOSS: less than 100 mL    CONDITION:  Good      INDICATIONS FOR OPERATIVE PROCEDURES:  Brionna Jones is a 47 y.o.  female with history of right knee pain and pathology. He noted significant problems in the area of concern with  problems on activities of daily living and aggressive use of the right leg. As a result of these problems and problems with overall   activity level, the patient was deemed to be an appropriate candidate for operative intervention. There was significant genu varus noted and  based on the patient's age and functional level, the patient was deemed to be an appropriate candidate for use of Conformis Implant products.    IMPLANTS UTILIZED:   ConforMIS tibial femoral implant  ConforMIS total tibial tray  ConforMIS 6 mm medial insert  ConforMIS lateral A insert  ConforMIS 35  mm x 7 mm patellar component  Smartset Gentamicin bone Cement    4 x 8 cm Allopatch HD graft        FINDINGS:     ARTICULAR CARTILAGE LESION(S):  Medial Femoral Condyle: ICRS Grade 4A      Size: 3 x 6 cm  Medial tibial plateau: ICRS Grade 4A      Size: 3.5 x 4.0 cm        Lateral Femoral Condyle: ICRS Grade 0      Size: none  Lateral tibial plateau: ICRS Grade 0      Size: none        Patellar surface: ICRS Grade 4A      Size: 2.5 x 3.0 cm  Trochlear groove: ICRS Grade 4A      Size: 2.5 x 2.5 cm    EXAMINATION UNDER ANESTHESIA:   Extension 0 degrees  Flexion 120 degrees  Lachman Maneuver:  Negative  Anterior Drawer: Negative  Posterior Drawer:  Negative    DESCRIPTION OF PROCEDURE:   The patient was brought into the Operating Room and placed in supine position. Upon application of femoral block in the preoperative holding area, the patient underwentGeneral to stabilize the area. The patient was given the appropriate dose of antibiotics based on body weight. Timeout was utilized to verify the RIGHT LEFT BILATERAL:[id number]} side as the operative side. Both upper extremities were placed in comfortable position. The nonoperative leg was carefully padded along the heel and peroneal nerve regions. Kohler catheter was applied. Operative leg was then prepped and draped in a sterile fashion with ChloraPrep material with a bump under the right hip and popliteal post along the right side of the table. Once prepped and draped in sterile fashion, Coban was placed on the knee. A medial based incision was carried down the skin down to fat and fascia. A medial subvastus approach was performed and the patella was subluxed lateral  Flaps were raised superiorly and inferiorly as well as medially and laterally along the region of concern.      Attention was turned to the distal femur and the # 1 preparation device from ConforMIS was used to create the distal lug holes for the implant and # 2 guide. We then   drilled the distal femoral  region as medially and laterally along the region of concern. We placed a # 2 cutting block, which was applied made the distal   pin holes, which were then removed simultaneously and drilled proximal pin holes and pins to stabilize the distal femoral cutting block. This was left in place and the saw blade used to create the distal femoral cut. Bone was removed. We then assessed our cut with a #3 guide from the ConforMIS set. The cut was found to be excellent. As a result, we turned our attention to the proximal tibia.  ACL structure was resected. The PCL structure was recessed. Medial and lateral meniscus remnants were removed with the Bovie cautery. We then applied the # 4   cutting system directly over the area of concern. We used to currettes to remove the cartilage from the proximal tibia down to the subchondral bone level. Extramedullary alignment abena was used to verify appropriate alignment. The cutting guide was pinned into position and an oscillating saw used, we made the proximal cut. We used the # 3 guide to assess our extension gap and the # 6 guide to assess our flexion gap. There gaps were symmetric flexion and extension gaps with appropriate alingment as well.    With this performed, we then visualized the distal femus once again and placed the # 7 femoral cutting guide into position and pinned this with the appropriate distal femoral rotation. The previously placed pin holes along the distal femur were used to position this cutting guide and the oblique pin holes created and the pins placed to hold the guide in the appropriated rotation. The central pins were removed. Anterior and posterior condylar and chamfer cuts were created as well as the distal lug holes for the femoral component. The # 8/9 femoral guide was applied and the final posterior chamfer cuts were created. We placed the trial femoral and tibial components demonstrating great stability in flexion and extension with varus and valgus load  as well as great flexion without shift of the tibial component.    We re-exposed the proximal tibia, placed the preparation tray for the tibial   Region and pinned this into position. The central tower was applied and the proximal tibia drilled centrally followed by application of the trial keel. We then removed the trial keel.We exposed the patella, sized the patella, demonstrating the 35 mm patellar component would most normalize the patient's anatomy. As a result, we removed approximately 8 mm of bone, drilled 3 peg holes and placed the trial patella fit in excellent fashion. The knee was taken through range of motion demonstrating excellent tracking and stability. As a result, trial components were left in place. An Esmarch was then used to exsanguinate the limb. Tourniquet was inflated. Trial components were removed. We exposed the femoral, proximal tibial plateau and patellar regions. Pulsavac was used to remove all blood elements and the areas dried and prepared for cementing. We then mixed cement and placed cement first at the tibial region and placed the tibial component position and extruded cement was removed. We then turned our attention to the femoral and patellar regions. These dried areas were then cemented with extruded cement removed from the femoral component as well as   applied cement to the patellar component. The we then performed trial reduction with the trial inserts applied. Knee was taken to extension demonstrating excellent stability with no signs of hyperextension remaining. The tourniquet was released and all bleeding sites were cauterized. Final pulsavac lavage was performed with application of 3 liters of fluid through the knee. Trial components were removed and the actual 6 mm medial insert was applied along with the size A lateral insert. Prior to placement of these inserts Exparel mixture was applied to the posterior capsular, medial subvastus region and anterior fatpad regions of  the knee with a 20 gauge spinal needle. Further irrigation performed along the area of concern. A drain was taken out of the lateral aspect of the knee. Following placement of the drain, we then closed the synovial layer and parapatellar tendon region with a series of #1 Vicryl sutures placed in figure-of-eight fashion. A running # 1 stratafix was placed but there was a continued area of retinacular weakness noted medially and as a result a small soft tissue flap was created and the area closed with additional # 1 vicryl sutures. A 4 x 8 cm allopatch was applied as well and sewed down with # 1 vicryl sutures. Additionally, a 4.5 mm triple loaded Healix anchor was applied after tap placement and sutures passed in a modified Krackow fashion. We then closed subcutaneous tissues with   3-0 Vicryl sutures placed in inverted fashion. Skin was closed with running 4-0 Monocryl sutures placed in subcuticular fashion along with application of   Dermabond ointment and tape. We did apply intraarticular Exparel for postoperative pain control. We applied Xeroform gauze, ABD pads, cast padding, sterile electrode   pads proximally and distally, a long-leg ABRAN hose stocking and cooling unit. The patient was allowed to recover from the anesthetic. was removed. The   patient was taken to Recovery Room in stable condition. At the completion case, all instrument and sponge counts were correct. Subcutaneous tissues were closed with 3-0 Vicryl sutures placed in inverted fashion. Skin was closed with running 4-0 Monocryl sutures placed in subcuticular fashion along with application of Dermabond ointment and Dermabond tape. We then applied Xeroform gauze, ABD pads, cast padding, a long-leg ABRAN hose stocking and cooling unit. The patient was allowed to recover from the anesthetic. LMA was removed. The patient was taken to Recovery Room in stable condition. At the completion of the case, all instrument and sponge counts were correct.    NOTE:  I was present and scrubbed for the key portions of the procedure.    PHYSICAL THERAPY:  The patient should begin physical therapy on postoperative   day #3 and will be advanced to outpatient therapy as soon as   Possible following discharge.    Weight bearing:as tolerated  right leg  Range of Motion:Full normal motion symmetric to opposite side    Continuous passive motion (CPM) machine start on   postop day #3 at  10 degrees hyperextension to 120 degrees flexion as tolerated for 6-8 hours per day for 4 weeks.     The patient is to be taken out of the continuous passive motion (CPM)  machine as much as possible and begin active assisted range of motion programs.    Immediate specific exercises should include:extension exercises, quadriceps load with a heel roll, prone hang procedures with 5-10 lbs. ankle weights.    Gait program should include: active extension with a heel-to-toe gait working on maintaining extension    Discharge home  Follow-up as scheduled  Condition Stable  Activity as above

## 2018-01-23 NOTE — PLAN OF CARE
SW met with pt at bedside to complete discharge assessment, Todd, [phone] present.  Pt has rolling walker and needs bedside commode and transfer tub bench. Pt will discharge to [facility] for PT, but no date/time scheduled.     01/23/18 4264   Discharge Assessment   Assessment Type Discharge Planning Assessment   Confirmed/corrected address and phone number on facesheet? Yes   Assessment information obtained from? Patient   Communicated expected length of stay with patient/caregiver no   Prior to hospitilization cognitive status: Alert/Oriented   Prior to hospitalization functional status: Independent   Current cognitive status: Alert/Oriented   Current Functional Status: Assistive Equipment;Needs Assistance   Lives With spouse;child(tia), adult  (23 yo son, Jose Miguel)   Able to Return to Prior Arrangements yes   Is patient able to care for self after discharge? Unable to determine at this time (comments)   Who are your caregiver(s) and their phone number(s)? (Jose Miguel, son)   Patient's perception of discharge disposition admitted as an inpatient   Readmission Within The Last 30 Days no previous admission in last 30 days   Patient currently being followed by outpatient case management? No   Patient currently receives any other outside agency services? No   Equipment Currently Used at Home walker, rolling   Do you have any problems affording any of your prescribed medications? No   Is the patient taking medications as prescribed? yes   Does the patient have transportation home? Yes   Transportation Available family or friend will provide   Does the patient receive services at the [facility]? No   Discharge Plan A Home   Patient/Family In Agreement With Plan yes

## 2018-01-23 NOTE — ANESTHESIA POSTPROCEDURE EVALUATION
"Anesthesia Post Evaluation    Patient: Brionna Jones    Procedure(s) Performed: Procedure(s) (LRB):  REPLACEMENT-KNEE-TOTAL (Right)    Final Anesthesia Type: general  Patient location during evaluation: PACU  Patient participation: Yes- Able to Participate  Level of consciousness: awake and alert  Post-procedure vital signs: reviewed and stable  Pain management: adequate  Airway patency: patent  PONV status at discharge: No PONV  Anesthetic complications: no      Cardiovascular status: blood pressure returned to baseline  Respiratory status: unassisted  Hydration status: euvolemic  Follow-up not needed.        Visit Vitals  BP (!) 92/46   Pulse 95   Temp 36.4 °C (97.6 °F) (Oral)   Resp 18   Ht 5' 4" (1.626 m)   Wt 95.7 kg (211 lb)   LMP  (Approximate)   SpO2 99%   Breastfeeding? No   BMI 36.22 kg/m²       Pain/Radha Score: Pain Assessment Performed: Yes (1/23/2018  9:58 AM)  Presence of Pain: denies (1/23/2018  9:58 AM)  Pain Rating Prior to Med Admin: 6 (1/23/2018 10:46 AM)  Radha Score: 8 (1/23/2018  9:58 AM)      "

## 2018-01-23 NOTE — TRANSFER OF CARE
"Anesthesia Transfer of Care Note    Patient: Brionna Jones    Procedure(s) Performed: Procedure(s) (LRB):  REPLACEMENT-KNEE-TOTAL (Right)    Patient location: PACU    Anesthesia Type: general    Transport from OR: Transported from OR on 2-3 L/min O2 by NC with adequate spontaneous ventilation    Post pain: adequate analgesia    Post assessment: no apparent anesthetic complications    Post vital signs: stable    Level of consciousness: awake, alert and oriented    Nausea/Vomiting: no nausea/vomiting    Complications: none    Transfer of care protocol was followed      Last vitals:   Visit Vitals  /80 (BP Location: Right arm, Patient Position: Lying)   Pulse 80   Temp 36.5 °C (97.7 °F) (Oral)   Resp 18   Ht 5' 4" (1.626 m)   Wt 95.7 kg (211 lb)   LMP  (Approximate)   SpO2 99%   Breastfeeding? No   BMI 36.22 kg/m²     "

## 2018-01-23 NOTE — ANESTHESIA PROCEDURE NOTES
Peripheral    Patient location during procedure: holding area   Block not for primary anesthetic.  Reason for block: at surgeon's request and post-op pain management   Post-op Pain Location: DJD knee  Timeout: 1/23/2018 6:50 AM   Staffing  Anesthesiologist: SANJEEV FLOWERS  Preanesthetic Checklist  Completed: patient identified, site marked, surgical consent, pre-op evaluation, timeout performed, IV checked, risks and benefits discussed and monitors and equipment checked  Peripheral Block  Patient position: supine  Prep: ChloraPrep  Patient monitoring: heart rate, continuous pulse ox and frequent blood pressure checks  Block type: adductor canal  Laterality: right  Injection technique: single shot  Needle  Needle gauge: 22 G  Needle length: 3.5 in  Needle localization: nerve stimulator and ultrasound guidance   -ultrasound image captured on disc.  Assessment  Injection assessment: local visualized surrounding nerve, negative parasthesia and negative aspiration  Paresthesia pain: none  Heart rate change: no  Slow fractionated injection: yes  Medications:  Bolus administered: 30 mL of 0.5 ropivacaine  Epinephrine added: none

## 2018-01-23 NOTE — PROGRESS NOTES
BHAVANI called [facility], spoke to [phone]; pt scheduled for 1/26/18 at 11:00am.

## 2018-01-23 NOTE — H&P (VIEW-ONLY)
Brionna Jones  is here for a completion of her perioperative paperwork. she  Is scheduled to undergo 1. Right knee total knee replacement (Conformis iTotal) on 1/23/2018.  She is a healthy individual and does need clearance for this procedure. Pending clearance by Dr. Shook.    Risks, indications and benefits of the surgical procedure were discussed with the patient. All questions with regard to surgery, rehab, expected return to functional activities, activities of daily living and recreational endeavors were answered to her satisfaction.    Once no other questions were asked, a brief history and physical exam was then performed.      PHYSICAL EXAM:  GEN: A&Ox3, WD WN NAD  HEENT: WNL  CHEST: CTAB, no W/R/R  HEART: RRR, no M/R/G  ABD: Soft, NT ND, BS x4 QUADS  MS; See Epic  NEURO: CN II-XII intact       The surgical consent was then reviewed with the patient, who agreed with all the contents of the consent form and it was signed. she was then given the [facility] surgery packet to bring with her to [facility] for the anesthesia portion of her perioperative paperwork.     PHYSICAL THERAPY:  She was also instructed regarding physical therapy and will begin on  POD #1-3 at [facility].    POST OP CARE:instructions were reviewed including care of the wound and dressing after surgery and when she can shower.     PAIN MANAGEMENT: Brionna Jones was also given her pain management regime, which includes the TENS unit given to her by Tray Domínguez along with the education required for its use. She was also instructed regarding the Polar ice unit that will be in place after surgery and her postoperative pain medications.     PAIN MEDICATION:  Percocet 10/325mg 1 po q 4-6 hours prn pain  Phenergan 25 mg one p.o. q.4-6 hours p.r.n. nausea and vomiting.  ASA 325mg once a day x 6 weeks    After surgery. approximately 2 months, she will be having pain controlled by pain management, Dr. Peterson.    As there were no other  questions to be asked, she was given my business card along with Matt Smith MD business card if she has any questions or concerns prior to surgery or in the postop period.

## 2018-01-23 NOTE — DISCHARGE INSTRUCTIONS
[address], [place]                                                                                          [phone]                   Postoperative Instructions for Knee Surgery                 Your Surgery Included:   Open               Arthroscopic   [] Ligament Repair       [] Diagnostic           [] ACL     [] PCL     [] MCL     [] PLLC      [] Synovectomy / Plica Removal [] Meniscal Cartilage Repair / Transplantation      [] Lysis of Adhesions / Manipulation [] Articular Cartilage Repair      [] Interval Release           [] Microfracture       [] OATS   [] ACI      [] Meniscectomy           [] Osteochondral Allograft      [] Meniscal Cartilage Repair  [] Patellar Realignment       [] Debridement / Chondroplasty         [] Lateral Release   [] Ligament Repair      [] Articular Cartilage Repair          [] Extensor Mechanism             []   Microfracture  []  OATS         []  Cartilage Biopsy [] Tendon Repair          [] Ligament Reconstruction          [] Patella                  [] Quadriceps             []   ACL    []   PCL  [] High Tibial Osteotomy       [] PRP Arthrocentesis  [] Joint Replacement         [] Amniox Arthrocentesis           [] Unicompartmental   [] Patellofemoral                    [x] Total Knee                  Call our office ([phone]) immediately if you experience any of the following:       Excessive bleeding or pus like drainage at the incision site       Uncontrollable pain not relieved by pain medication       Excessive swelling or redness at the incision site       Fever above 101.5 degrees not controlled with Tylenol or Motrin       Shortness of Breath       Any foul odor or blistering from the surgery site    FOR EMERGENCIES: If any unusual problems or difficulties occur, call our office at [phone], or page the  at [phone] who will direct your call appropriately    1.   Pain Management: A cold therapy cuff,  pain medications, local injections, and in some cases, regional anesthesia injections are used to manage your post-operative pain. The decision to use each of these options is based on their risks and benefits.     Medications: You were given one or more of the following medication prescriptions before leaving the hospital. Have the prescriptions filled at a pharmacy on your way home and follow the instructions on the bottles. If you need a refill, please call your pharmacy.      Narcotic Medication (usually Vicodin ES, Lortab, Percocet or Nucynta): Begin taking the medication before your knee starts to hurt. Some patients do not like to take any medication, but if you wait until your pain is severe before taking, you will be very uncomfortable for several hours waiting for the narcotic to work. Always take with food.     Nausea / Vomiting: For this issue, we prescribe Phenergan, use this medication as directed.     Cold Therapy: You may have been sent home with a [facility] cold therapy unit and wrap for your knee. Fill with ice and water to the indicated fill line and use throughout the day for the first two days and then as needed to help relieve pain and control swelling.      Regional Anesthesia Injections (Blocks): You may have been given a regional nerve block either before or after surgery. This may make your entire leg numb for 24-36 hours.                            * Proceed with caution when bearing weight on your leg.     2.   Diet: Eat a bland diet for the first day after surgery. Progress your diet as tolerated. Constipation may occur with Narcotic usage, contact our office if you are experiencing constipation.    3.   Activity: Limit your activity during the first 48 hours, keep your leg elevated with pillows under your heel. After the first 48 hours at home, increase your activity level based on your symptoms.    4.   Dressing Change: Remove the dressing on the 3rd day. It is normal for some blood  to be seen on the dressings. It is also normal for you to see apparent bruising on the skin around your knee when you remove the dressing. If present, leave the steri-strip tape across the incisions. If you are concerned by the drainage or the appearance of your knee, please call one of the numbers listed below.    5.   Showering: You may shower on the 3rd day after surgery with waterproof bandages over small incisions. If you have an incision with Prineo (clear mesh), it does not need to be covered. Do not submerge in any water until after your postoperative appointment in clinic.    6.   Your procedure did not require a post-operative brace.    7.   CPM Device: You may have been sent home with a Continuous Passive Motion (CPM) machine. This device helps relieve pain, improve motion, and heal cartilage. Use the machine for 3 weeks (Settings: Extension 0, Flexion 120). Try to use at night if able. Use 6-8 hours per day. Call the phone number on the device for return instructions.    8.   Weight Bearing: You may have been sent home with crutches. If instructed (see below), use these crutches at all times unless at complete rest.      [] Non-weight bearing for     0 weeks (you may touch your toes to the floor)      [] Partial weight bearing for  0 weeks    [] 25% Body Weight   [] 50% Body Weight      [x] Full weight bearing            [x]  NOW    []  after 0 weeks     9.  Knee Exercises: Begin these exercises the first day after surgery in order to help you regain your motion and strength. You may do the following marked exercises:     [x] Quad Sets - Begin activating your quadriceps muscle by driving your          knee downward into full knee extension while seated on a table or bed   with a towel rolled and propped under your heel     [x] Straight Leg Raise (SLR) - While jaclyn your quadriceps muscle, lift     your fully extended leg to the level of your non-operative knee (as shown)     [x] Heel Slides - With  "the knee straight, slide your heel slowly toward your   buttocks, hold at the endpoint for 10-15 seconds, then slowly straighten     [x] Ankle pumps - With your knee straight, move your ankle in a "pumping"    fashion to activate your calf and leg muscles      10.  Physical Therapy: Physical therapy is an essential component to your recovery from surgery. Your physical therapy will start in 1 days.    FIRST POSTOPERATIVE VISIT: As scheduled.       "

## 2018-01-24 ENCOUNTER — TELEPHONE (OUTPATIENT)
Dept: SPORTS MEDICINE | Facility: CLINIC | Age: 48
End: 2018-01-24

## 2018-01-24 VITALS
OXYGEN SATURATION: 95 % | SYSTOLIC BLOOD PRESSURE: 121 MMHG | WEIGHT: 211 LBS | DIASTOLIC BLOOD PRESSURE: 66 MMHG | HEIGHT: 64 IN | RESPIRATION RATE: 18 BRPM | BODY MASS INDEX: 36.02 KG/M2 | HEART RATE: 68 BPM | TEMPERATURE: 98 F

## 2018-01-24 LAB
HCT VFR BLD AUTO: 34.4 %
HGB BLD-MCNC: 11.1 G/DL

## 2018-01-24 PROCEDURE — 97116 GAIT TRAINING THERAPY: CPT

## 2018-01-24 PROCEDURE — 97535 SELF CARE MNGMENT TRAINING: CPT

## 2018-01-24 PROCEDURE — 36415 COLL VENOUS BLD VENIPUNCTURE: CPT

## 2018-01-24 PROCEDURE — 85014 HEMATOCRIT: CPT

## 2018-01-24 PROCEDURE — 97165 OT EVAL LOW COMPLEX 30 MIN: CPT

## 2018-01-24 PROCEDURE — 97110 THERAPEUTIC EXERCISES: CPT

## 2018-01-24 PROCEDURE — 85018 HEMOGLOBIN: CPT

## 2018-01-24 PROCEDURE — 25000003 PHARM REV CODE 250: Performed by: PHYSICIAN ASSISTANT

## 2018-01-24 PROCEDURE — 63600175 PHARM REV CODE 636 W HCPCS: Performed by: PHYSICIAN ASSISTANT

## 2018-01-24 PROCEDURE — 97530 THERAPEUTIC ACTIVITIES: CPT

## 2018-01-24 RX ADMIN — ASPIRIN 325 MG: 325 TABLET, DELAYED RELEASE ORAL at 08:01

## 2018-01-24 RX ADMIN — DOCUSATE SODIUM 100 MG: 100 CAPSULE, LIQUID FILLED ORAL at 08:01

## 2018-01-24 RX ADMIN — OXYCODONE HYDROCHLORIDE AND ACETAMINOPHEN 1 TABLET: 10; 325 TABLET ORAL at 12:01

## 2018-01-24 RX ADMIN — CEFAZOLIN SODIUM 2 G: 1 SOLUTION INTRAVENOUS at 12:01

## 2018-01-24 RX ADMIN — OXYCODONE HYDROCHLORIDE AND ACETAMINOPHEN 1 TABLET: 10; 325 TABLET ORAL at 06:01

## 2018-01-24 NOTE — PT/OT/SLP PROGRESS
Physical Therapy Treatment    Patient Name:  Brionna Jones   MRN:  [id number]    Recommendations:     Discharge Recommendations:  outpatient PT   Discharge Equipment Recommendations: 3-in-1 commode, bath bench   Barriers to discharge: Decreased caregiver support    Assessment:     Brionna Jones is a 47 y.o. female admitted with a medical diagnosis of Pre-op Diagnosis: Primary localized osteoarthrosis of right lower leg [M17.11] s/p Procedure(s):  REPLACEMENT-KNEE-TOTAL   .  She presents with the following impairments/functional limitations:  weakness, impaired endurance, impaired balance, gait instability, impaired functional mobilty, decreased lower extremity function, decreased ROM, pain .  Progressing toward goals.  Still requiring mod cueing for gait sequence.  Still unable to perform smooth heel toe reciprocal gait     Rehab Prognosis:  good; patient would benefit from acute skilled PT services to address these deficits and reach maximum level of function.      Recent Surgery: Procedure(s) (LRB):  REPLACEMENT-KNEE-TOTAL (Right) Day of Surgery    Plan:     During this hospitalization, patient to be seen BID to address the above listed problems via gait training, therapeutic activities, therapeutic exercises  · Plan of Care Expires:  02/22/18   Plan of Care Reviewed with: patient    Subjective     Communicated with nursing prior to session.  Patient found up in chair upon PT entry to room, agreeable to treatment.      Chief Complaint: hungry  Patient comments/goals: need to go to the bathroom  Pain/Comfort:  · Pain Rating 1: 2/10  · Location - Side 1: Right  · Location - Orientation 1: generalized  · Location 1: knee  · Pain Addressed 1: Pre-medicate for activity, Reposition, Distraction  · Pain Rating Post-Intervention 1: 2/10    Patients cultural, spiritual, Mormon conflicts given the current situation: no    Objective:     Patient found with: hemovac, FCD, SCD, peripheral IV, cryotherapy     General  "Precautions: Standard,     Orthopedic Precautions:RLE weight bearing as tolerated   Braces: N/A     Functional Mobility:  · Bed Mobility:     · Sit to supine: leg lift only  · Transfers:     · Sit to Stand:  contact guard assistance with rolling walker, cues for hand placement  · Gait: pt amb 10' to bathroom with RW and CGA,  Then amb 80' with RW and min a for walker management and  moderate cueing for sequence.  pt taking increased step on R, decreased step on L with step to gait. narrow LUZ, turning toes inward.  verbal and visual cueing.  Picking up walker instead of pushing it.    · Balance: fair with AD for dynamic standing balance      AM-PAC 6 CLICK MOBILITY  Turning over in bed (including adjusting bedclothes, sheets and blankets)?: 3  Sitting down on and standing up from a chair with arms (e.g., wheelchair, bedside commode, etc.): 3  Moving from lying on back to sitting on the side of the bed?: 3  Moving to and from a bed to a chair (including a wheelchair)?: 3  Need to walk in hospital room?: 3  Climbing 3-5 steps with a railing?: 2  Total Score: 17       Therapeutic Activities and Exercises:   assist with gown and transfers on and off BSC.  amb as above with repeated cueing.   TKR ex last session  Placed on CPM -5 to 60     Patient left HOB elevated with all lines intact, call button in reach and nurse notified..    GOALS:    Physical Therapy Goals        Problem: Physical Therapy Goal    Goal Priority Disciplines Outcome Goal Variances Interventions   Physical Therapy Goal     PT/OT, PT Ongoing (interventions implemented as appropriate)     Description:  Goals to be met by: 18     Patient will increase functional independence with mobility by performin. Supine to sit with supervision.   2. Sit to supine with supervision.   3. Sit<>stand transfer with supervision using rolling walker.   4. Gait > 150 feet with SBA using rolling walker.   5. Ascend/descend 4" curb step with CGA and RW           "          Time Tracking:     PT Received On: 01/23/18  PT Start Time: 1720     PT Stop Time: 1800  PT Total Time (min): 40 min     Billable Minutes: Gait Training 16, Therapeutic Activity 12 and Therapeutic Exercise 12    Treatment Type: Treatment  PT/PTA: PT           Palak Godfrey, PT  01/23/2018

## 2018-01-24 NOTE — PROGRESS NOTES
SW delivered rolling walker, bedside commode and transfer tub bench (pt agreed to be billed for tub bench, aware insurance doesn't cover) and pt signed for delivery.

## 2018-01-24 NOTE — PLAN OF CARE
"Problem: Physical Therapy Goal  Goal: Physical Therapy Goal  Goals to be met by: 18     Patient will increase functional independence with mobility by performin. Supine to sit with supervision. MET 18  2. Sit to supine with supervision.   3. Sit<>stand transfer with supervision using rolling walker. MET 18  4. Gait > 150 feet with SBA using rolling walker.  MET 18  5. Ascend/descend 4" curb step with CGA and RW  MET 18  Outcome: Ongoing (interventions implemented as appropriate)    Progressing well towards goals      "

## 2018-01-24 NOTE — PT/OT/SLP PROGRESS
"Physical Therapy Treatment    Patient Name:  Brionna Jones   MRN:  [id number]    Recommendations:     Discharge Recommendations:  outpatient PT   Discharge Equipment Recommendations:  (none already delivered )   Barriers to discharge: None    Assessment:     Brionna Jones is a 47 y.o. female admitted with a medical diagnosis of Traumatic arthritis of right knee.  She presents with the following impairments/functional limitations:  weakness, impaired endurance, gait instability, decreased safety awareness, decreased ROM, decreased lower extremity function patient demo poor safety awareness initially then towards end of session patient improved with safety awareness and required SBA/SPV for functional mobility.     Rehab Prognosis:  good; patient would benefit from acute skilled PT services to address these deficits and reach maximum level of function.      Recent Surgery: Procedure(s) (LRB):  REPLACEMENT-KNEE-TOTAL (Right) 1 Day Post-Op    Plan:     During this hospitalization, patient to be seen BID to address the above listed problems via gait training, therapeutic activities, therapeutic exercises  · Plan of Care Expires:  02/22/18   Plan of Care Reviewed with: patient    Subjective     Communicated with nurse prior to session.  Patient found supine in bed upon PT entry to room, agreeable to treatment.      Chief Complaint: patient stated " I was feeling dizzy and messed up from rhe pain medication.   Patient comments/goals: patients goal was to go home today  Pain/Comfort:  · Pain Rating 1: 0/10  · Pain Rating Post-Intervention 1: 0/10    Patients cultural, spiritual, Mosque conflicts given the current situation: no    Objective:     Patient found with: cryotherapy     General Precautions: Standard,     Orthopedic Precautions:RLE weight bearing as tolerated   Braces: N/A     Functional Mobility:  · Supine to sit with Mod I   · Sit to stand from bed with RW with Supervision  · Stand to sit onto bedside " "chair with SBA required verbal cues for hand placement  · Patient gait trained 210 feet with Rolling walker with SBA/CGA for safety patient required verbal cues for heel strike/toe off to improve ROM patient took standing 1 standing rest break. Patient complained of feeling loaded from pain medication. Patient demo reciprocal gait pattern.       AM-PAC 6 CLICK MOBILITY  Turning over in bed (including adjusting bedclothes, sheets and blankets)?: 3  Sitting down on and standing up from a chair with arms (e.g., wheelchair, bedside commode, etc.): 3  Moving from lying on back to sitting on the side of the bed?: 3  Moving to and from a bed to a chair (including a wheelchair)?: 3  Need to walk in hospital room?: 3  Climbing 3-5 steps with a railing?: 3  Total Score: 18       Therapeutic Activities and Exercises:  Ascend/descend 4" curb step with SBA for safety (performed in bathroom in room)   Patient performed therex X 15 reps AROM/AAROM R LE per TKA protocol     Patient left up in chair with all lines intact, call button in reach and nurse notified..    GOALS:    Physical Therapy Goals        Problem: Physical Therapy Goal    Goal Priority Disciplines Outcome Goal Variances Interventions   Physical Therapy Goal     PT/OT, PT Ongoing (interventions implemented as appropriate)     Description:  Goals to be met by: 18     Patient will increase functional independence with mobility by performin. Supine to sit with supervision. MET 18  2. Sit to supine with supervision.   3. Sit<>stand transfer with supervision using rolling walker. MET 18  4. Gait > 150 feet with SBA using rolling walker.  MET 18  5. Ascend/descend 4" curb step with CGA and RW  MET 18                    Time Tracking:     PT Received On: 18  PT Start Time: 0950     PT Stop Time: 1022  PT Total Time (min): 32 min     Billable Minutes: Gait Training 15 and Therapeutic Exercise 17    Treatment Type: Treatment  PT/PTA: PTA  "    PTA Visit Number: 1     Silvia Chávez, KARINA  01/24/2018
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
## 2018-01-24 NOTE — DISCHARGE SUMMARY
".Discharge Note  Short Stay      SUMMARY     Admit Date: 1/23/2018    Attending Physician: Matt Smith MD     Discharge Physician: Matt Smith MD    Final Diagnosis: same    Disposition: Home or Self Care    Patient Instructions:   Current Discharge Medication List      CONTINUE these medications which have NOT CHANGED    Details   amLODIPine (NORVASC) 10 MG tablet Take 10 mg by mouth once daily.      ranitidine (ZANTAC) 75 MG tablet Take 75 mg by mouth as needed for Heartburn.      aspirin (ECOTRIN) 325 MG EC tablet Take 1 tablet (325 mg total) by mouth once daily.  Qty: 42 tablet, Refills: 0    Associated Diagnoses: Traumatic arthritis of right knee      oxyCODONE-acetaminophen (PERCOCET)  mg per tablet Take 1 tablet by mouth every 6 (six) hours as needed for Pain.  Qty: 60 tablet, Refills: 0    Associated Diagnoses: Traumatic arthritis of right knee      !! promethazine (PHENERGAN) 25 MG tablet Take 1 tablet (25 mg total) by mouth every 6 (six) hours as needed for Nausea.  Qty: 16 tablet, Refills: 0    Associated Diagnoses: Osteoarthritis of right knee, unspecified osteoarthritis type      !! promethazine (PHENERGAN) 25 MG tablet Take 1 tablet (25 mg total) by mouth every 4 (four) hours.  Qty: 30 tablet, Refills: 0    Associated Diagnoses: Traumatic arthritis of right knee       !! - Potential duplicate medications found. Please discuss with provider.            Discharge Procedure Orders (must include Diet, Follow-up, Activity)  COMMODE FOR HOME USE   Order Specific Question Answer Comments   Type: Standard    Height: 5' 4" (1.626 m)    Weight: 95.7 kg (211 lb)    Does patient have medical equipment at home? walker, rolling    Length of need (1-99 months): 99      TRANSFER TUB BENCH FOR HOME USE   Order Specific Question Answer Comments   Type of Transfer Tub Bench: Unpadded    Height: 5' 4" (1.626 m)    Weight: 95.7 kg (211 lb)    Does patient have medical equipment at home? walker, rolling  " "  Length of need (1-99 months): 99      WALKER FOR HOME USE   Order Specific Question Answer Comments   Type of Walker: Adult (5'4"-6'6")    With wheels? Yes    Height: 5' 4" (1.626 m)    Weight: 95.7 kg (211 lb)    Length of need (1-99 months): 99    Does patient have medical equipment at home? walker, rolling    Please check all that apply: Patient's condition impairs ambulation.    Please check all that apply: Patient is unable to safely ambulate without equipment.    Please check all that apply: Patient needs help to get in and out of chair.    Please check all that apply: Walker will be used for gait training.      Ice to affected area     Keep surgical extremity elevated     Shower on day dressing removed (No bath)     Activity as tolerated     Weight bearing restrictions (specify)   Order Comments: As tolerated     Notify your health care provider if you experience any of the following:  temperature >100.4     Notify your health care provider if you experience any of the following:  persistent nausea and vomiting or diarrhea     Notify your health care provider if you experience any of the following:  severe uncontrolled pain     Notify your health care provider if you experience any of the following:  redness, tenderness, or signs of infection (pain, swelling, redness, odor or green/yellow discharge around incision site)     Notify your health care provider if you experience any of the following:  difficulty breathing or increased cough     Remove dressing in 72 hours        F/U Dr Smith 2 weeks  "

## 2018-01-24 NOTE — NURSING
No acute events this shift. NADN, pt denies pain, VSS. IV fluids running at prescribed rate to patent Iv. CPM machine in place and cycling with [facility] care applied. Pt worked with PT/OT today. PT is able to ambulate to and from bathroom all intake and output documented. All safety measures in place. Call bell in reach

## 2018-01-24 NOTE — PLAN OF CARE
[facility] and rolling walker, bedside commode and transfer tub bench.     01/24/18 0911   Final Note   Assessment Type Final Discharge Note   Discharge Disposition Home   What phone number can be called within the next 1-3 days to see how you are doing after discharge? ([phone])   Hospital Follow Up  Appt(s) scheduled? No   Discharge plans and expectations educations in teach back method with documentation complete? No

## 2018-01-24 NOTE — PLAN OF CARE
"Problem: Physical Therapy Goal  Goal: Physical Therapy Goal  Goals to be met by: 18     Patient will increase functional independence with mobility by performin. Supine to sit with supervision.   2. Sit to supine with supervision.   3. Sit<>stand transfer with supervision using rolling walker.   4. Gait > 150 feet with SBA using rolling walker.   5. Ascend/descend 4" curb step with CGA and RW  Outcome: Ongoing (interventions implemented as appropriate)  PT eval.  Pt with history of syncope.  assist to EOB, sat for several minutes before getting up to amb with RW and moderate cueing for sequence. instruct in and perform TKR ex.  Benefit from therapy to return to I functional level.  Anticipate dc tomorrow  REC:  Home with OP-set up for Fri  DME:  3 in 1 and TTB      "

## 2018-01-24 NOTE — PT/OT/SLP DISCHARGE
"Physical Therapy Discharge Summary    Name: Brionna Jones  MRN: [id number]   Principal Problem: Traumatic arthritis of right knee     Patient Discharged from acute Physical Therapy on 2018 .  Please refer to prior PT noted date on 2018  for functional status.     Assessment:     Goals partially met.    Objective:     GOALS:    Physical Therapy Goals        Problem: Physical Therapy Goal    Goal Priority Disciplines Outcome Goal Variances Interventions   Physical Therapy Goal     PT/OT, PT Ongoing (interventions implemented as appropriate)     Description:  Goals to be met by: 18     Patient will increase functional independence with mobility by performin. Supine to sit with supervision. MET 18  2. Sit to supine with supervision.   3. Sit<>stand transfer with supervision using rolling walker. MET 18  4. Gait > 150 feet with SBA using rolling walker.  MET 18  5. Ascend/descend 4" curb step with CGA and RW  MET 18                    Reasons for Discontinuation of Therapy Services  Transfer to alternate level of care.      Plan:     Patient Discharged to: Outpatient Therapy Services.    Caroline Wright, PT  2018   PT of record not available for documentation.   "

## 2018-01-24 NOTE — TELEPHONE ENCOUNTER
----- Message from Shante Myles sent at 1/24/2018 12:43 PM CST -----  Contact: self  Pt called stating that [facility] located at [address] [place] [address] in [place] mentioned to her that in order to fill her prescription for Percocet, it needs to be authorized by Alberto espinosa she's in a lot of pain. Pt would like an immediate call back and could be reached at [phone]
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
## 2018-01-26 ENCOUNTER — CLINICAL SUPPORT (OUTPATIENT)
Dept: REHABILITATION | Facility: HOSPITAL | Age: 48
End: 2018-01-26
Attending: ORTHOPAEDIC SURGERY
Payer: MEDICAID

## 2018-01-26 DIAGNOSIS — R29.898 RIGHT LEG WEAKNESS: ICD-10-CM

## 2018-01-26 DIAGNOSIS — M25.60 JOINT STIFFNESS: ICD-10-CM

## 2018-01-26 DIAGNOSIS — R26.2 DIFFICULTY WALKING: Primary | ICD-10-CM

## 2018-01-26 DIAGNOSIS — M25.561 RIGHT KNEE PAIN, UNSPECIFIED CHRONICITY: ICD-10-CM

## 2018-01-26 PROCEDURE — 97161 PT EVAL LOW COMPLEX 20 MIN: CPT | Mod: PN

## 2018-01-26 PROCEDURE — 97110 THERAPEUTIC EXERCISES: CPT | Mod: PN
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
## 2018-01-27 NOTE — OP NOTE
DATE OF PROCEDURE:  1/23/2018     ATTENDING SURGEON: Surgeon(s) and Role:     * Matt Smith MD - Primary     Co-Surgeon:Adán Linda MD      SECOND ASSISTANT: Angie Dominguez PA-C        Co-Surgeon Duties: Due to the complexity of the case and the need for significant intra-operative decision making co-surgeon duties were medically necessary. The chronicity of the disease process and the level of deformity dictated that co-surgeon duties be undertaken. A separate note will be dictated/reported by Dr. Matt Smith stating the specific co-surgeon activities and roles performed during the surgery.              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     COMPLICATIONS: NONE     URINE OUTPUT: 0 mL     ESTIMATED BLOOD LOSS: less than 100 mL     CONDITION:  Good        INDICATIONS FOR OPERATIVE PROCEDURES:  Brionna Jones is a 47 y.o.  female with history of right knee pain and pathology. He noted significant problems in the area of concern with  problems on activities of daily living and aggressive use of the right leg. As a result of these problems and problems with overall   activity level, the patient was deemed to be an appropriate candidate for operative intervention. There was significant genu varus noted and  based on the patient's age and functional level, the patient was deemed to be an appropriate candidate for use of Conformis Implant products.     IMPLANTS UTILIZED:   ConforMIS tibial femoral implant  ConforMIS total tibial tray  ConforMIS 6 mm medial insert  ConforMIS lateral A  insert  ConforMIS 35 mm x 7 mm patellar component  Smartset Gentamicin bone Cement     4 x 8 cm Allopatch HD graft           FINDINGS:      ARTICULAR CARTILAGE LESION(S):  Medial Femoral Condyle: ICRS Grade 4A                 Size: 3 x 6 cm  Medial tibial plateau: ICRS Grade 4A                 Size: 3.5 x 4.0 cm           Lateral Femoral Condyle: ICRS Grade 0                 Size: none  Lateral tibial plateau: ICRS Grade 0                 Size: none           Patellar surface: ICRS Grade 4A                 Size: 2.5 x 3.0 cm  Trochlear groove: ICRS Grade 4A                 Size: 2.5 x 2.5 cm     EXAMINATION UNDER ANESTHESIA:   Extension 0 degrees  Flexion 120 degrees  Lachman Maneuver:  Negative  Anterior Drawer: Negative  Posterior Drawer:  Negative     DESCRIPTION OF PROCEDURE:   The patient was brought into the Operating Room and placed in supine position. Upon application of femoral block in the preoperative holding area, the patient underwentGeneral to stabilize the area. The patient was given the appropriate dose of antibiotics based on body weight. Timeout was utilized to verify the RIGHT LEFT BILATERAL:[id number]} side as the operative side. Both upper extremities were placed in comfortable position. The nonoperative leg was carefully padded along the heel and peroneal nerve regions. Kohler catheter was applied. Operative leg was then prepped and draped in a sterile fashion with ChloraPrep material with a bump under the right hip and popliteal post along the right side of the table. Once prepped and draped in sterile fashion, Coban was placed on the knee. A medial based incision was carried down the skin down to fat and fascia. A medial subvastus approach was performed and the patella was subluxed lateral  Flaps were raised superiorly and inferiorly as well as medially and laterally along the region of concern.      Attention was turned to the distal femur and the # 1 preparation device from ConforMIS was used to  create the distal lug holes for the implant and # 2 guide. We then   drilled the distal femoral region as medially and laterally along the region of concern. We placed a # 2 cutting block, which was applied made the distal   pin holes, which were then removed simultaneously and drilled proximal pin holes and pins to stabilize the distal femoral cutting block. This was left in place and the saw blade used to create the distal femoral cut. Bone was removed. We then assessed our cut with a #3 guide from the ConforMIS set. The cut was found to be excellent. As a result, we turned our attention to the proximal tibia.  ACL structure was resected. The PCL structure was recessed. Medial and lateral meniscus remnants were removed with the Bovie cautery. We then applied the # 4   cutting system directly over the area of concern. We used to currettes to remove the cartilage from the proximal tibia down to the subchondral bone level. Extramedullary alignment abena was used to verify appropriate alignment. The cutting guide was pinned into position and an oscillating saw used, we made the proximal cut. We used the # 3 guide to assess our extension gap and the # 6 guide to assess our flexion gap. There gaps were symmetric flexion and extension gaps with appropriate alingment as well.     With this performed, we then visualized the distal femus once again and placed the # 7 femoral cutting guide into position and pinned this with the appropriate distal femoral rotation. The previously placed pin holes along the distal femur were used to position this cutting guide and the oblique pin holes created and the pins placed to hold the guide in the appropriated rotation. The central pins were removed. Anterior and posterior condylar and chamfer cuts were created as well as the distal lug holes for the femoral component. The # 8/9 femoral guide was applied and the final posterior chamfer cuts were created. We placed the trial femoral and  tibial components demonstrating great stability in flexion and extension with varus and valgus load as well as great flexion without shift of the tibial component.     We re-exposed the proximal tibia, placed the preparation tray for the tibial   Region and pinned this into position. The central tower was applied and the proximal tibia drilled centrally followed by application of the trial keel. We then removed the trial keel.We exposed the patella, sized the patella, demonstrating the 35 mm patellar component would most normalize the patient's anatomy. As a result, we removed approximately 8 mm of bone, drilled 3 peg holes and placed the trial patella fit in excellent fashion. The knee was taken through range of motion demonstrating excellent tracking and stability. As a result, trial components were left in place. An Esmarch was then used to exsanguinate the limb. Tourniquet was inflated. Trial components were removed. We exposed the femoral, proximal tibial plateau and patellar regions. Pulsavac was used to remove all blood elements and the areas dried and prepared for cementing. We then mixed cement and placed cement first at the tibial region and placed the tibial component position and extruded cement was removed. We then turned our attention to the femoral and patellar regions. These dried areas were then cemented with extruded cement removed from the femoral component as well as   applied cement to the patellar component. The we then performed trial reduction with the trial inserts applied. Knee was taken to extension demonstrating excellent stability with no signs of hyperextension remaining. The tourniquet was released and all bleeding sites were cauterized. Final pulsavac lavage was performed with application of 3 liters of fluid through the knee. Trial components were removed and the actual 6 mm medial insert was applied along with the size A lateral insert. Prior to placement of these inserts Exparel  mixture was applied to the posterior capsular, medial subvastus region and anterior fatpad regions of the knee with a 20 gauge spinal needle. Further irrigation performed along the area of concern. A drain was taken out of the lateral aspect of the knee. Following placement of the drain, we then closed the synovial layer and parapatellar tendon region with a series of #1 Vicryl sutures placed in figure-of-eight fashion. A running # 1 stratafix was placed but there was a continued area of retinacular weakness noted medially and as a result a small soft tissue flap was created and the area closed with additional # 1 vicryl sutures. A 4 x 8 cm allopatch was applied as well and sewed down with # 1 vicryl sutures. Additionally, a 4.5 mm triple loaded Healix anchor was applied after tap placement and sutures passed in a modified Krackow fashion. We then closed subcutaneous tissues with   3-0 Vicryl sutures placed in inverted fashion. Skin was closed with running 4-0 Monocryl sutures placed in subcuticular fashion along with application of   Dermabond ointment and tape. We did apply intraarticular Exparel for postoperative pain control. We applied Xeroform gauze, ABD pads, cast padding, sterile electrode   pads proximally and distally, a long-leg ABRAN hose stocking and cooling unit. The patient was allowed to recover from the anesthetic. was removed. The   patient was taken to Recovery Room in stable condition. At the completion case, all instrument and sponge counts were correct. Subcutaneous tissues were closed with 3-0 Vicryl sutures placed in inverted fashion. Skin was closed with running 4-0 Monocryl sutures placed in subcuticular fashion along with application of Dermabond ointment and Dermabond tape. We then applied Xeroform gauze, ABD pads, cast padding, a long-leg ABRAN hose stocking and cooling unit. The patient was allowed to recover from the anesthetic. LMA was removed. The patient was taken to Recovery Room in  stable condition. At the completion of the case, all instrument and sponge counts were correct.     NOTE: I was present and scrubbed for the key portions of the procedure.     PHYSICAL THERAPY:  The patient should begin physical therapy on postoperative   day #3 and will be advanced to outpatient therapy as soon as   Possible following discharge.     Weight bearing:as tolerated  right leg  Range of Motion:Full normal motion symmetric to opposite side     Continuous passive motion (CPM) machine start on   postop day #3 at  10 degrees hyperextension to 120 degrees flexion as tolerated for 6-8 hours per day for 4 weeks.     The patient is to be taken out of the continuous passive motion (CPM)  machine as much as possible and begin active assisted range of motion programs.     Immediate specific exercises should include:extension exercises, quadriceps load with a heel roll, prone hang procedures with 5-10 lbs. ankle weights.     Gait program should include: active extension with a heel-to-toe gait working on maintaining extension     Discharge home  Follow-up as scheduled  Condition Stable  Activity as above

## 2018-01-29 ENCOUNTER — TELEPHONE (OUTPATIENT)
Dept: SPORTS MEDICINE | Facility: CLINIC | Age: 48
End: 2018-01-29

## 2018-01-29 ENCOUNTER — TELEPHONE (OUTPATIENT)
Dept: REHABILITATION | Facility: HOSPITAL | Age: 48
End: 2018-01-29

## 2018-01-29 NOTE — TELEPHONE ENCOUNTER
----- Message from Martin Ruelas sent at 1/29/2018  1:00 PM CST -----  Contact: Self/[phone]  Pt called in requesting to discuss post-op pain (9/10).  Pt can be reached at [phone].

## 2018-01-29 NOTE — TELEPHONE ENCOUNTER
The patient was contacted regarding their call to the office earlier and a voice mail was left to call the office back at their convenience.  Re: returning patient's call

## 2018-01-30 ENCOUNTER — TELEPHONE (OUTPATIENT)
Dept: SPORTS MEDICINE | Facility: CLINIC | Age: 48
End: 2018-01-30

## 2018-01-30 DIAGNOSIS — Z96.651 S/P TOTAL KNEE REPLACEMENT, RIGHT: Primary | ICD-10-CM

## 2018-01-30 RX ORDER — OXYCODONE AND ACETAMINOPHEN 10; 325 MG/1; MG/1
1 TABLET ORAL EVERY 6 HOURS PRN
Qty: 28 TABLET | Refills: 0 | Status: SHIPPED | OUTPATIENT
Start: 2018-01-30 | End: 2018-02-07

## 2018-01-30 NOTE — TELEPHONE ENCOUNTER
Called patient at 3:25pm . She reports significant swelling s/p right knee replacement on 1/23/2018. Recommended she come by clinic and  a ABRAN hose.   Medicaid only allows a 7 day supply of Percocet 10mg.   She is requesting a refill of her medication. Sent to pharmacy.

## 2018-01-31 ENCOUNTER — CLINICAL SUPPORT (OUTPATIENT)
Dept: REHABILITATION | Facility: HOSPITAL | Age: 48
End: 2018-01-31
Attending: ORTHOPAEDIC SURGERY
Payer: MEDICAID

## 2018-01-31 DIAGNOSIS — M25.60 JOINT STIFFNESS: ICD-10-CM

## 2018-01-31 DIAGNOSIS — R29.898 RIGHT LEG WEAKNESS: ICD-10-CM

## 2018-01-31 DIAGNOSIS — R26.2 DIFFICULTY WALKING: Primary | ICD-10-CM

## 2018-01-31 DIAGNOSIS — Z96.651 S/P TOTAL KNEE REPLACEMENT, RIGHT: Primary | ICD-10-CM

## 2018-01-31 DIAGNOSIS — M25.561 RIGHT KNEE PAIN, UNSPECIFIED CHRONICITY: ICD-10-CM

## 2018-01-31 PROCEDURE — 97110 THERAPEUTIC EXERCISES: CPT | Mod: PN

## 2018-01-31 RX ORDER — CELECOXIB 200 MG/1
200 CAPSULE ORAL 2 TIMES DAILY
Qty: 60 CAPSULE | Refills: 2 | Status: SHIPPED | OUTPATIENT
Start: 2018-01-31 | End: 2019-05-22 | Stop reason: ALTCHOICE

## 2018-01-31 NOTE — PROGRESS NOTES
Name: Brionna Bob [facility] Number: [id number]  Date of Treatment: 01/31/2018   Diagnosis:   Encounter Diagnoses   Name Primary?    Difficulty walking Yes    Right knee pain, unspecified chronicity     Right leg weakness     Joint stiffness        Time in: 1130 (pt 30 minutes late to treatment session)  Time Out: 1100  Total Treatment Time: 30      Subjective:    Brionna reports worsening of symptoms.  Patient reports their pain to be 8/10 on a 0-10 scale with 0 being no pain and 10 being the worst pain imaginable. Pt states she is still trying to get therapy to come to her home instead.     Objective    R knee ROM: 0-10-81    Patient received individual therapy to increase strength, endurance, ROM, flexibility, posture and core stabilization with activities as follows:     Brionna received therapeutic exercises to develop strength, endurance, ROM, flexibility, posture and core stabilization for 30 minutes including:   PROM x10 minutes to the R knee (low load long duration knee flexion)  Quad sets with towel under knee x3 mintues  Quad sets with towel under ankle x3 minutes  SL hip abd 2x10  Seated LAQ 2x10 - Min A from PT      Written Home Exercises Provided: reviewed from IE  Pt demo good understanding of the education provided. Brionna demonstrated good return demonstration of activities.     Assessment:   Ms. Staton tolerated treatment session well this morning. Pt with muscle guarding presenting at end range knee flexion, yet with excellent tolerance to LLLD with self progression in AROM ea repetition. Pt educated on correct positioning of the RLE while propped to avoid hip ER and subsequent R knee flexion, pt verbalized understanding and agreement.Fair quad activation demonstrated requiring tactile cueing for correct activation and reduction of compensation from glut max. Plan to continue improving R knee ROM and strength as tolerated in the following sessions.   Pt will continue to benefit from skilled PT  intervention. Medical Necessity is demonstrated by:  Fall Risk, Unable to participate fully in daily activities, Requires skilled supervision to complete and progress HEP and Weakness.    Patient is making good progress towards established goals.        Plan:  Continue with established Plan of Care towards PT goals.     Dominique Lemons, PT

## 2018-02-05 ENCOUNTER — OFFICE VISIT (OUTPATIENT)
Dept: SPORTS MEDICINE | Facility: CLINIC | Age: 48
End: 2018-02-05
Payer: MEDICAID

## 2018-02-05 ENCOUNTER — HOSPITAL ENCOUNTER (OUTPATIENT)
Dept: RADIOLOGY | Facility: HOSPITAL | Age: 48
Discharge: HOME | End: 2018-02-05
Attending: ORTHOPAEDIC SURGERY
Payer: MEDICAID

## 2018-02-05 VITALS
BODY MASS INDEX: 36.02 KG/M2 | HEART RATE: 97 BPM | WEIGHT: 211 LBS | HEIGHT: 64 IN | DIASTOLIC BLOOD PRESSURE: 89 MMHG | SYSTOLIC BLOOD PRESSURE: 143 MMHG

## 2018-02-05 DIAGNOSIS — Z98.890 S/P KNEE SURGERY: ICD-10-CM

## 2018-02-05 DIAGNOSIS — F17.200 SMOKER: ICD-10-CM

## 2018-02-05 DIAGNOSIS — R29.898 RIGHT LEG WEAKNESS: ICD-10-CM

## 2018-02-05 DIAGNOSIS — R26.2 DIFFICULTY WALKING: ICD-10-CM

## 2018-02-05 DIAGNOSIS — M25.561 CHRONIC PAIN OF RIGHT KNEE: Primary | ICD-10-CM

## 2018-02-05 DIAGNOSIS — M25.60 JOINT STIFFNESS: ICD-10-CM

## 2018-02-05 DIAGNOSIS — G89.29 CHRONIC PAIN OF RIGHT KNEE: ICD-10-CM

## 2018-02-05 DIAGNOSIS — M12.561 TRAUMATIC ARTHRITIS OF RIGHT KNEE: ICD-10-CM

## 2018-02-05 DIAGNOSIS — G89.29 CHRONIC PAIN OF RIGHT KNEE: Primary | ICD-10-CM

## 2018-02-05 DIAGNOSIS — M25.561 CHRONIC PAIN OF RIGHT KNEE: ICD-10-CM

## 2018-02-05 PROCEDURE — 99213 OFFICE O/P EST LOW 20 MIN: CPT | Mod: PBBFAC,25,PO | Performed by: ORTHOPAEDIC SURGERY

## 2018-02-05 PROCEDURE — 99999 PR PBB SHADOW E&M-EST. PATIENT-LVL III: CPT | Mod: PBBFAC,,, | Performed by: ORTHOPAEDIC SURGERY

## 2018-02-05 PROCEDURE — 73560 X-RAY EXAM OF KNEE 1 OR 2: CPT | Mod: TC,FY,PO,RT

## 2018-02-05 PROCEDURE — 73560 X-RAY EXAM OF KNEE 1 OR 2: CPT | Mod: 26,RT,, | Performed by: RADIOLOGY

## 2018-02-05 PROCEDURE — 97110 THERAPEUTIC EXERCISES: CPT | Mod: PBBFAC,PO | Performed by: ORTHOPAEDIC SURGERY

## 2018-02-05 PROCEDURE — 99024 POSTOP FOLLOW-UP VISIT: CPT | Mod: ,,, | Performed by: ORTHOPAEDIC SURGERY

## 2018-02-05 RX ORDER — TRAMADOL HYDROCHLORIDE 50 MG/1
50 TABLET ORAL 2 TIMES DAILY PRN
Qty: 60 TABLET | Refills: 0 | Status: SHIPPED | OUTPATIENT
Start: 2018-02-05 | End: 2018-02-15

## 2018-02-05 RX ORDER — OXYCODONE AND ACETAMINOPHEN 10; 325 MG/1; MG/1
1 TABLET ORAL EVERY 12 HOURS PRN
Qty: 60 TABLET | Refills: 0 | Status: SHIPPED | OUTPATIENT
Start: 2018-02-05 | End: 2018-02-07 | Stop reason: SDUPTHER

## 2018-02-05 NOTE — PROGRESS NOTES
Subjective:          Chief Complaint: Brionna Jones is a 47 y.o. female who had concerns including Post-op Evaluation of the Right Knee.    Patient is here for a follow up for her right knee. She is doing PT on the [facility]. She is stiff and having a lot of pain.     DATE OF PROCEDURE:  1/23/2018     ATTENDING SURGEON: Surgeon(s) and Role:     * Matt Smith MD - Primary     Co-Surgeon:Adán Linda MD      SECOND ASSISTANT: Angie Dominguez PA-C        Co-Surgeon Duties: Due to the complexity of the case and the need for significant intra-operative decision making co-surgeon duties were medically necessary. The chronicity of the disease process and the level of deformity dictated that co-surgeon duties be undertaken. A separate note will be dictated/reported by Dr. Matt Smith stating the specific co-surgeon activities and roles performed during the surgery.        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 and Genu Varum (acquired) M21.169     PROCEDURES PERFORMED:  right Replacement, Knee, Medial and Lateral compartment (Total Knee) 16735    Surgery Date: 11/11/2014      Surgeon(s) and Role:  * Matt Smith MD - Primary     Pre-op Diagnosis: Unspecified internal derangement of knee (717.9)  Tear of medial cartilage or meniscus of knee, current (836.0)     Post-op Diagnosis: Same     Procedure(s) (LRB):  ARTHROSCOPY-KNEE (Right)  MENISCECTOMY (Right)  REMOVAL-FOREIGN BODY / LOOSE BODY (Right)    There was an area of 1 cm x 1 cm area on the lateral tibial plateau, which   showed grade III and grade IV chondral defect.         Pain   Associated symptoms include joint swelling. Pertinent negatives include no abdominal pain, chest pain, chills, congestion, coughing, fever, headaches, myalgias, nausea, numbness, rash, sore throat or vomiting.   Knee Pain    Associated  symptoms include stiffness. Pertinent negatives include no fever, itching or numbness.         Review of Systems   Constitution: Negative. Negative for chills, fever, weight gain and weight loss.   HENT: Negative for congestion and sore throat.    Eyes: Negative for blurred vision and double vision.   Cardiovascular: Negative for chest pain, leg swelling and palpitations.   Respiratory: Negative for cough and shortness of breath.    Hematologic/Lymphatic: Does not bruise/bleed easily.   Skin: Negative for itching, poor wound healing and rash.   Musculoskeletal: Positive for joint pain, joint swelling and stiffness. Negative for back pain, muscle weakness and myalgias.   Gastrointestinal: Negative for abdominal pain, constipation, diarrhea, nausea and vomiting.   Genitourinary: Negative.  Negative for frequency and hematuria.   Neurological: Negative for dizziness, headaches, numbness, paresthesias and sensory change.   Psychiatric/Behavioral: Negative for altered mental status and depression. The patient is not nervous/anxious.    Allergic/Immunologic: Negative for hives.       Pain Related Questions  Over the past 3 days, what was your average pain during activity? (I.e. running, jogging, walking, climbing stairs, getting dressed, ect.): 10  Over the past 3 days, what was your highest pain level?: 10  Over the past 3 days, what was your lowest pain level? : 7    Other  How many nights a week are you awakened by your affected body part?: 7  Was the patient's HEIGHT measured or patient reported?: Patient Reported  Was the patient's WEIGHT measured or patient reported?: Measured      Objective:        General: Brionna is well-developed, well-nourished, appears stated age, in no acute distress, alert and oriented to time, place and person.     General    Vitals reviewed.  Constitutional: She is oriented to person, place, and time. She appears well-developed and well-nourished. No distress.   HENT:   Head: Normocephalic  and atraumatic.   Mouth/Throat: No oropharyngeal exudate.   Eyes: EOM are normal. Right eye exhibits no discharge. Left eye exhibits no discharge.   Neck: Normal range of motion.   Cardiovascular: Normal rate and regular rhythm.    Pulmonary/Chest: Effort normal and breath sounds normal. No respiratory distress.   Neurological: She is alert and oriented to person, place, and time. She has normal reflexes. No cranial nerve deficit. Coordination normal.   Psychiatric: She has a normal mood and affect. Her behavior is normal. Judgment and thought content normal.     General Musculoskeletal Exam   Gait: normal       Right Knee Exam     Inspection   Erythema: absent  Scars: present  Swelling: present  Effusion: effusion  Deformity: deformity  Bruising: absent    Tenderness   The patient is tender to palpation of the patella, lateral joint line and medial joint line.    Crepitus   The patient has crepitus of the patella.    Range of Motion   Extension:  0 normal   Flexion:  90 abnormal     Tests   Meniscus   Zeenat:  Medial - negative Lateral - negative  Ligament Examination Lachman: normal (-1 to 2mm) PCL-Posterior Drawer: normal (0 to 2mm)     MCL - Valgus: normal (0 to 2mm)  LCL - Varus: normalPivot Shift: normal (Equal)Reverse Pivot Shift: normal (Equal)Dial Test at 30 degrees: normal (< 5 degrees)Dial Test at 90 degrees: normal (< 5 degrees)  Posterior Sag Test: negative  Posterolateral Corner: unstable (>15 degrees difference)  Patella   Patellar Apprehension: negative  Passive Patellar Tilt: neutral  Patellar Tracking: normal  Patellar Glide (quadrants): Lateral - 1   Medial - 2  Q-Angle at 90 degrees: normal  Patellar Grind: negative  J-Sign: none    Other   Meniscal Cyst: absent  Popliteal (Baker's) Cyst: absent  Sensation: normal    Left Knee Exam     Inspection   Erythema: absent  Scars: absent  Swelling: absent  Effusion: absent  Deformity: deformity  Bruising: absent    Tenderness   The patient is  experiencing no tenderness.         Range of Motion   Extension:  -5 normal   Flexion:  130 normal     Tests   Meniscus   Zeenat:  Medial - negative Lateral - negative  Stability Lachman: normal (-1 to 2mm) PCL-Posterior Drawer: normal (0 to 2mm)  MCL - Valgus: normal (0 to 2mm)  LCL - Varus: normal (0 to 2mm)Pivot Shift: normal (Equal)Reverse Pivot Shift: normal (Equal)Dial Test at 30 degrees: normal (< 5 degrees)Dial Test at 90 degrees: normal (< 5 degrees)  Posterior Sag Test: negative  Posterolateral Corner: unstable (>15 degrees difference)  Patella   Patellar Apprehension: negative  Passive Patellar Tilt: neutral  Patellar Tracking: normal  Patellar Glide (Quadrants): Lateral - 1 Medial - 2  Q-Angle at 90 degrees: normal  Patellar Grind: positive  J-Sign: J sign absent    Other   Meniscal Cyst: absent  Popliteal (Baker's) Cyst: absent  Sensation: normal    Right Hip Exam     Tests   Aimee: negative  Left Hip Exam     Tests   Aimee: negative          Muscle Strength   Right Lower Extremity   Hip Abduction: 4/5   Quadriceps:  4/5   Hamstrin/5   Left Lower Extremity   Hip Abduction: 5/5   Quadriceps:  5/5   Hamstrin/5     Reflexes     Left Side  Quadriceps:  2+  Achilles:  2+    Right Side   Quadriceps:  2+  Achilles:  2+    Vascular Exam     Right Pulses  Dorsalis Pedis:      2+  Posterior Tibial:      2+        Left Pulses  Dorsalis Pedis:      2+  Posterior Tibial:      2+          RADIOGRAPHS TODAY  Radiographs ordered and reviewed today in clinic of the right knee demonstrates Conformis iTotal knee replacement .               Assessment:       Encounter Diagnoses   Name Primary?    Chronic pain of right knee Yes    Traumatic arthritis of right knee     Difficulty walking     Smoker     S/P knee surgery     Right leg weakness     Joint stiffness           Plan:       1. IKDC, SF-12 and KOOS was not filled out today in clinic.     RTC in 4 weeks with Dr. Matt Smith Patient will fill out IKDC,  SF-12 and KOOS and bilateral knee series on return.    2. Continue PT at [facility]    3. Dynasplint ordered with Sudeep today (flexion)    4. HEP 48130 - Matt Smith MD, instructed and demonstrated a AAROM and AROM HEP. The patient then demonstrated understanding of exercises and proper technique. This program was performed for 15 minutes.     5. Celebrex 200mg BID                   Patient questionnaires may have been collected.

## 2018-02-06 ENCOUNTER — NURSE TRIAGE (OUTPATIENT)
Dept: ADMINISTRATIVE | Facility: CLINIC | Age: 48
End: 2018-02-06

## 2018-02-07 ENCOUNTER — CLINICAL SUPPORT (OUTPATIENT)
Dept: REHABILITATION | Facility: HOSPITAL | Age: 48
End: 2018-02-07
Attending: ORTHOPAEDIC SURGERY
Payer: MEDICAID

## 2018-02-07 DIAGNOSIS — M25.561 RIGHT KNEE PAIN, UNSPECIFIED CHRONICITY: ICD-10-CM

## 2018-02-07 DIAGNOSIS — M25.60 JOINT STIFFNESS: ICD-10-CM

## 2018-02-07 DIAGNOSIS — R26.2 DIFFICULTY WALKING: Primary | ICD-10-CM

## 2018-02-07 DIAGNOSIS — R29.898 RIGHT LEG WEAKNESS: ICD-10-CM

## 2018-02-07 DIAGNOSIS — M12.561 TRAUMATIC ARTHRITIS OF RIGHT KNEE: ICD-10-CM

## 2018-02-07 PROCEDURE — 97110 THERAPEUTIC EXERCISES: CPT | Mod: PN

## 2018-02-07 RX ORDER — OXYCODONE AND ACETAMINOPHEN 10; 325 MG/1; MG/1
1 TABLET ORAL EVERY 8 HOURS PRN
Qty: 21 TABLET | Refills: 0 | Status: ON HOLD | OUTPATIENT
Start: 2018-02-07 | End: 2018-02-14

## 2018-02-07 NOTE — PROGRESS NOTES
Name: Brionna Bob [facility] Number: [id number]  Date of Treatment: 02/07/2018   Diagnosis:   Encounter Diagnoses   Name Primary?    Difficulty walking Yes    Right knee pain, unspecified chronicity     Right leg weakness     Joint stiffness        Time in: 1125 (pt 25 minutes late to treatment session)  Time Out: 1105  Total Treatment Time: 40      Subjective:    Brionna reports worsening of symptoms.  Patient reports their pain to be 9/10 on a 0-10 scale with 0 being no pain and 10 being the worst pain imaginable. Pt states the exercises at home wear her out.     Objective    R knee ROM: 0-10-81    Patient received individual therapy to increase strength, endurance, ROM, flexibility, posture and core stabilization with activities as follows:     Brionna received therapeutic exercises to develop strength, endurance, ROM, flexibility, posture and core stabilization for 40 minutes including:   Bike x6 minutes AAROM  PROM x10 minutes to the R knee (low load long duration knee flexion)  Quad sets with towel under knee x3 mintues  Quad sets with towel under ankle x3 minutes  SL hip abd 2x10  Seated LAQ 2x10   Heel prop x2 minutes  SAQ 2x10    Written Home Exercises Provided: reviewed from IE  Pt demo good understanding of the education provided. Brionna demonstrated good return demonstration of activities.     Assessment:   Ms. Staton tolerated treatment session well this morning despite significant reports of pain. Pt continues to require cueing for decreased muscle guarding to achieve improvements in ROM with fair response. Tactile cueing required for correct quadriceps activation.  Plan to continue improving R knee ROM and strength as tolerated in the following sessions.   Pt will continue to benefit from skilled PT intervention. Medical Necessity is demonstrated by:  Fall Risk, Unable to participate fully in daily activities, Requires skilled supervision to complete and progress HEP and Weakness.    Patient is making  good progress towards established goals.    Plan:  Continue with established Plan of Care towards PT goals.     Dominique Lemons, PT

## 2018-02-07 NOTE — TELEPHONE ENCOUNTER
Reason for Disposition   Caller has NON-URGENT medication question about med that PCP prescribed and triager unable to answer question    Protocols used: ST MEDICATION QUESTION CALL-A-AH    Patient states pharmacy needs prior auth on oxycodone and celebrex. She states she needs it sent over as soon as possible because she has therapy tomorrow and only has two pills left. Advised her that triage nurse would route message to staff regarding her concern. Please contact caller with any further care advice.

## 2018-02-09 ENCOUNTER — CLINICAL SUPPORT (OUTPATIENT)
Dept: REHABILITATION | Facility: HOSPITAL | Age: 48
End: 2018-02-09
Attending: ORTHOPAEDIC SURGERY
Payer: MEDICAID

## 2018-02-09 DIAGNOSIS — R29.898 RIGHT LEG WEAKNESS: ICD-10-CM

## 2018-02-09 DIAGNOSIS — M25.60 JOINT STIFFNESS: ICD-10-CM

## 2018-02-09 DIAGNOSIS — M25.561 RIGHT KNEE PAIN, UNSPECIFIED CHRONICITY: Primary | ICD-10-CM

## 2018-02-09 DIAGNOSIS — R26.2 DIFFICULTY WALKING: ICD-10-CM

## 2018-02-09 PROCEDURE — 97110 THERAPEUTIC EXERCISES: CPT | Mod: PN

## 2018-02-09 NOTE — PROGRESS NOTES
Name: Brionna Bob [facility] Number: [id number]  Date of Treatment: 02/09/2018   Diagnosis:   Encounter Diagnoses   Name Primary?    Right knee pain, unspecified chronicity Yes    Difficulty walking     Joint stiffness     Right leg weakness        Time in: 1107  Time Out: 1205  Total Treatment Time: 58'      Subjective:    Brionna reports maintenance of symptoms with significant pain in her R knee.  Patient reports their pain to be 9/10 on a 0-10 scale with 0 being no pain and 10 being the worst pain imaginable. Pt states she has had a little bit of blood on her ace bandage.     Objective    R knee ROM: 0-10-76    Patient received individual therapy to increase strength, endurance, ROM, flexibility, posture and core stabilization with activities as follows:     Brionna received therapeutic exercises to develop strength, endurance, ROM, flexibility, posture and core stabilization for 40 minutes including:   Bike x8 minutes AAROM  Seated knee flexion AAROM using LLE to assist x3 minutes 10 second holds  PROM x10 minutes to the R knee (low load long duration knee flexion)  Quad sets with towel under knee x3 mintues  Quad sets with towel under ankle x3 minutes  SL hip abd 2x10  Seated LAQ 2x10 - PT assist to achieve TKE  Heel prop x4 minutes  SAQ 2x10    Written Home Exercises Provided: reviewed from IE  Pt demo good understanding of the education provided. Brionna demonstrated good return demonstration of activities.     Assessment:   Ms. Staton tolerated treatment session well this morning despite significant reports of pain. Pt with firm end feel and muscle gaurding at end range knee flexion, with good response to diaphragmatic breathing for muscle relaxation. Pt with poor quad recruitment at end range extension requiring heavy tactile cueing for appropriate activation, would like to attempt NMES in the following sessions for greater improvements.  Plan to continue improving R knee ROM and strength as tolerated in the  following sessions.   Pt will continue to benefit from skilled PT intervention. Medical Necessity is demonstrated by:  Fall Risk, Unable to participate fully in daily activities, Requires skilled supervision to complete and progress HEP and Weakness.    Patient is making good progress towards established goals.    Plan:  Continue with established Plan of Care towards PT goals.     Dominique Lemons, PT

## 2018-02-12 ENCOUNTER — ANESTHESIA EVENT (OUTPATIENT)
Dept: SURGERY | Facility: OTHER | Age: 48
DRG: 464 | End: 2018-02-12
Payer: MEDICAID

## 2018-02-12 ENCOUNTER — ANESTHESIA (OUTPATIENT)
Dept: SURGERY | Facility: OTHER | Age: 48
DRG: 464 | End: 2018-02-12
Payer: MEDICAID

## 2018-02-12 ENCOUNTER — OFFICE VISIT (OUTPATIENT)
Dept: SPORTS MEDICINE | Facility: CLINIC | Age: 48
DRG: 464 | End: 2018-02-12
Payer: MEDICAID

## 2018-02-12 ENCOUNTER — HOSPITAL ENCOUNTER (INPATIENT)
Facility: OTHER | Age: 48
LOS: 2 days | Discharge: HOME HEALTH | DRG: 464 | End: 2018-02-15
Attending: ORTHOPAEDIC SURGERY | Admitting: ORTHOPAEDIC SURGERY
Payer: MEDICAID

## 2018-02-12 VITALS
WEIGHT: 211 LBS | BODY MASS INDEX: 36.02 KG/M2 | HEIGHT: 64 IN | SYSTOLIC BLOOD PRESSURE: 135 MMHG | HEART RATE: 101 BPM | DIASTOLIC BLOOD PRESSURE: 78 MMHG

## 2018-02-12 DIAGNOSIS — M12.561 TRAUMATIC ARTHRITIS OF RIGHT KNEE: ICD-10-CM

## 2018-02-12 DIAGNOSIS — Z96.651 S/P TKR (TOTAL KNEE REPLACEMENT), RIGHT: Primary | ICD-10-CM

## 2018-02-12 DIAGNOSIS — Z96.651 S/P TOTAL KNEE REPLACEMENT, RIGHT: ICD-10-CM

## 2018-02-12 DIAGNOSIS — M00.9 INFECTION OF RIGHT KNEE: ICD-10-CM

## 2018-02-12 DIAGNOSIS — T81.30XA WOUND DEHISCENCE: ICD-10-CM

## 2018-02-12 DIAGNOSIS — Z96.651 S/P TOTAL KNEE REPLACEMENT, RIGHT: Primary | ICD-10-CM

## 2018-02-12 PROBLEM — T84.53XA INFECTION AND INFLAMMATORY REACTION DUE TO INTERNAL RIGHT KNEE PROSTHESIS, INITIAL ENCOUNTER: Status: ACTIVE | Noted: 2018-02-12

## 2018-02-12 LAB
B-HCG UR QL: NEGATIVE
BILIRUB SERPL-MCNC: NORMAL MG/DL
BLOOD URINE, POC: NORMAL
COLOR, POC UA: NORMAL
CTP QC/QA: YES
GLUCOSE UR QL STRIP: NORMAL
GRAM STN SPEC: NORMAL
GRAM STN SPEC: NORMAL
HCT VFR BLD AUTO: 34.8 %
HGB BLD-MCNC: 11.4 G/DL
KETONES UR QL STRIP: NORMAL
LEUKOCYTE ESTERASE URINE, POC: NORMAL
NITRITE, POC UA: NORMAL
PH, POC UA: NORMAL
PROTEIN, POC: NORMAL
SPECIFIC GRAVITY, POC UA: NORMAL
UROBILINOGEN, POC UA: NORMAL

## 2018-02-12 PROCEDURE — 36000705 HC OR TIME LEV I EA ADD 15 MIN: Performed by: ORTHOPAEDIC SURGERY

## 2018-02-12 PROCEDURE — 36415 COLL VENOUS BLD VENIPUNCTURE: CPT

## 2018-02-12 PROCEDURE — 25000003 PHARM REV CODE 250: Performed by: ANESTHESIOLOGY

## 2018-02-12 PROCEDURE — 71000033 HC RECOVERY, INTIAL HOUR: Performed by: ORTHOPAEDIC SURGERY

## 2018-02-12 PROCEDURE — 87077 CULTURE AEROBIC IDENTIFY: CPT | Mod: 59

## 2018-02-12 PROCEDURE — 3E1U38Z IRRIGATION OF JOINTS USING IRRIGATING SUBSTANCE, PERCUTANEOUS APPROACH: ICD-10-PCS | Performed by: ORTHOPAEDIC SURGERY

## 2018-02-12 PROCEDURE — 11000001 HC ACUTE MED/SURG PRIVATE ROOM

## 2018-02-12 PROCEDURE — 87075 CULTR BACTERIA EXCEPT BLOOD: CPT | Mod: 59

## 2018-02-12 PROCEDURE — 81025 URINE PREGNANCY TEST: CPT | Performed by: ORTHOPAEDIC SURGERY

## 2018-02-12 PROCEDURE — 63600175 PHARM REV CODE 636 W HCPCS: Performed by: ANESTHESIOLOGY

## 2018-02-12 PROCEDURE — 37000008 HC ANESTHESIA 1ST 15 MINUTES: Performed by: ORTHOPAEDIC SURGERY

## 2018-02-12 PROCEDURE — 0JBN0ZZ EXCISION OF RIGHT LOWER LEG SUBCUTANEOUS TISSUE AND FASCIA, OPEN APPROACH: ICD-10-PCS | Performed by: ORTHOPAEDIC SURGERY

## 2018-02-12 PROCEDURE — 25000003 PHARM REV CODE 250: Performed by: ORTHOPAEDIC SURGERY

## 2018-02-12 PROCEDURE — 0S9C00Z DRAINAGE OF RIGHT KNEE JOINT WITH DRAINAGE DEVICE, OPEN APPROACH: ICD-10-PCS | Performed by: ORTHOPAEDIC SURGERY

## 2018-02-12 PROCEDURE — 87205 SMEAR GRAM STAIN: CPT

## 2018-02-12 PROCEDURE — 85018 HEMOGLOBIN: CPT

## 2018-02-12 PROCEDURE — 87186 SC STD MICRODIL/AGAR DIL: CPT | Mod: 59

## 2018-02-12 PROCEDURE — 87070 CULTURE OTHR SPECIMN AEROBIC: CPT | Mod: 59

## 2018-02-12 PROCEDURE — 36000704 HC OR TIME LEV I 1ST 15 MIN: Performed by: ORTHOPAEDIC SURGERY

## 2018-02-12 PROCEDURE — 13160 SEC CLSR SURG WND/DEHSN XTN: CPT | Mod: 58,,, | Performed by: ORTHOPAEDIC SURGERY

## 2018-02-12 PROCEDURE — 85014 HEMATOCRIT: CPT

## 2018-02-12 PROCEDURE — 63600175 PHARM REV CODE 636 W HCPCS: Performed by: ORTHOPAEDIC SURGERY

## 2018-02-12 PROCEDURE — 37000009 HC ANESTHESIA EA ADD 15 MINS: Performed by: ORTHOPAEDIC SURGERY

## 2018-02-12 PROCEDURE — 87102 FUNGUS ISOLATION CULTURE: CPT

## 2018-02-12 PROCEDURE — 25000003 PHARM REV CODE 250: Performed by: NURSE ANESTHETIST, CERTIFIED REGISTERED

## 2018-02-12 PROCEDURE — 99214 OFFICE O/P EST MOD 30 MIN: CPT | Mod: PBBFAC,PO | Performed by: PHYSICIAN ASSISTANT

## 2018-02-12 PROCEDURE — 99999 PR PBB SHADOW E&M-EST. PATIENT-LVL IV: CPT | Mod: PBBFAC,,, | Performed by: PHYSICIAN ASSISTANT

## 2018-02-12 PROCEDURE — 87077 CULTURE AEROBIC IDENTIFY: CPT

## 2018-02-12 PROCEDURE — 87116 MYCOBACTERIA CULTURE: CPT

## 2018-02-12 PROCEDURE — 87070 CULTURE OTHR SPECIMN AEROBIC: CPT

## 2018-02-12 PROCEDURE — 87116 MYCOBACTERIA CULTURE: CPT | Mod: 59

## 2018-02-12 PROCEDURE — 87205 SMEAR GRAM STAIN: CPT | Mod: 59

## 2018-02-12 PROCEDURE — 87102 FUNGUS ISOLATION CULTURE: CPT | Mod: 59

## 2018-02-12 PROCEDURE — 87015 SPECIMEN INFECT AGNT CONCNTJ: CPT

## 2018-02-12 PROCEDURE — 87075 CULTR BACTERIA EXCEPT BLOOD: CPT

## 2018-02-12 PROCEDURE — 99024 POSTOP FOLLOW-UP VISIT: CPT | Mod: ,,, | Performed by: PHYSICIAN ASSISTANT

## 2018-02-12 PROCEDURE — 71000039 HC RECOVERY, EACH ADD'L HOUR: Performed by: ORTHOPAEDIC SURGERY

## 2018-02-12 PROCEDURE — 63600175 PHARM REV CODE 636 W HCPCS: Performed by: NURSE ANESTHETIST, CERTIFIED REGISTERED

## 2018-02-12 PROCEDURE — C1729 CATH, DRAINAGE: HCPCS | Performed by: ORTHOPAEDIC SURGERY

## 2018-02-12 RX ORDER — AMLODIPINE BESYLATE 5 MG/1
10 TABLET ORAL DAILY
Status: DISCONTINUED | OUTPATIENT
Start: 2018-02-13 | End: 2018-02-15 | Stop reason: HOSPADM

## 2018-02-12 RX ORDER — ONDANSETRON 2 MG/ML
INJECTION INTRAMUSCULAR; INTRAVENOUS
Status: DISCONTINUED | OUTPATIENT
Start: 2018-02-12 | End: 2018-02-12

## 2018-02-12 RX ORDER — MEPERIDINE HYDROCHLORIDE 50 MG/ML
12.5 INJECTION INTRAMUSCULAR; INTRAVENOUS; SUBCUTANEOUS ONCE AS NEEDED
Status: DISCONTINUED | OUTPATIENT
Start: 2018-02-12 | End: 2018-02-12

## 2018-02-12 RX ORDER — DEXAMETHASONE SODIUM PHOSPHATE 4 MG/ML
INJECTION, SOLUTION INTRA-ARTICULAR; INTRALESIONAL; INTRAMUSCULAR; INTRAVENOUS; SOFT TISSUE
Status: DISCONTINUED | OUTPATIENT
Start: 2018-02-12 | End: 2018-02-12

## 2018-02-12 RX ORDER — FENTANYL CITRATE 50 UG/ML
25 INJECTION, SOLUTION INTRAMUSCULAR; INTRAVENOUS EVERY 5 MIN PRN
Status: COMPLETED | OUTPATIENT
Start: 2018-02-12 | End: 2018-02-12

## 2018-02-12 RX ORDER — FENTANYL CITRATE 50 UG/ML
INJECTION, SOLUTION INTRAMUSCULAR; INTRAVENOUS
Status: DISCONTINUED | OUTPATIENT
Start: 2018-02-12 | End: 2018-02-12

## 2018-02-12 RX ORDER — GLYCOPYRROLATE 0.2 MG/ML
INJECTION INTRAMUSCULAR; INTRAVENOUS
Status: DISCONTINUED | OUTPATIENT
Start: 2018-02-12 | End: 2018-02-12

## 2018-02-12 RX ORDER — ACETAMINOPHEN 10 MG/ML
INJECTION, SOLUTION INTRAVENOUS
Status: DISCONTINUED | OUTPATIENT
Start: 2018-02-12 | End: 2018-02-12

## 2018-02-12 RX ORDER — PROPOFOL 10 MG/ML
VIAL (ML) INTRAVENOUS
Status: DISCONTINUED | OUTPATIENT
Start: 2018-02-12 | End: 2018-02-12

## 2018-02-12 RX ORDER — MUPIROCIN 20 MG/G
1 OINTMENT TOPICAL 2 TIMES DAILY
Status: DISCONTINUED | OUTPATIENT
Start: 2018-02-12 | End: 2018-02-15 | Stop reason: HOSPADM

## 2018-02-12 RX ORDER — OXYCODONE HYDROCHLORIDE 5 MG/1
5 TABLET ORAL
Status: DISCONTINUED | OUTPATIENT
Start: 2018-02-12 | End: 2018-02-12

## 2018-02-12 RX ORDER — MORPHINE SULFATE 10 MG/ML
2 INJECTION INTRAMUSCULAR; INTRAVENOUS; SUBCUTANEOUS
Status: DISCONTINUED | OUTPATIENT
Start: 2018-02-12 | End: 2018-02-15 | Stop reason: HOSPADM

## 2018-02-12 RX ORDER — OXYCODONE HCL 10 MG/1
10 TABLET, FILM COATED, EXTENDED RELEASE ORAL EVERY 12 HOURS
Status: COMPLETED | OUTPATIENT
Start: 2018-02-12 | End: 2018-02-14

## 2018-02-12 RX ORDER — LIDOCAINE HCL/PF 100 MG/5ML
SYRINGE (ML) INTRAVENOUS
Status: DISCONTINUED | OUTPATIENT
Start: 2018-02-12 | End: 2018-02-12

## 2018-02-12 RX ORDER — HYDROMORPHONE HYDROCHLORIDE 2 MG/ML
0.4 INJECTION, SOLUTION INTRAMUSCULAR; INTRAVENOUS; SUBCUTANEOUS EVERY 5 MIN PRN
Status: DISCONTINUED | OUTPATIENT
Start: 2018-02-12 | End: 2018-02-12 | Stop reason: HOSPADM

## 2018-02-12 RX ORDER — ONDANSETRON 2 MG/ML
4 INJECTION INTRAMUSCULAR; INTRAVENOUS DAILY PRN
Status: DISCONTINUED | OUTPATIENT
Start: 2018-02-12 | End: 2018-02-12

## 2018-02-12 RX ORDER — SODIUM CHLORIDE 0.9 % (FLUSH) 0.9 %
3 SYRINGE (ML) INJECTION
Status: DISCONTINUED | OUTPATIENT
Start: 2018-02-12 | End: 2018-02-15 | Stop reason: HOSPADM

## 2018-02-12 RX ORDER — KETOROLAC TROMETHAMINE 30 MG/ML
INJECTION, SOLUTION INTRAMUSCULAR; INTRAVENOUS
Status: DISCONTINUED | OUTPATIENT
Start: 2018-02-12 | End: 2018-02-12

## 2018-02-12 RX ORDER — ASPIRIN 325 MG
325 TABLET, DELAYED RELEASE (ENTERIC COATED) ORAL DAILY
Status: DISCONTINUED | OUTPATIENT
Start: 2018-02-13 | End: 2018-02-15 | Stop reason: HOSPADM

## 2018-02-12 RX ORDER — OXYCODONE HYDROCHLORIDE 5 MG/1
10 TABLET ORAL
Status: DISCONTINUED | OUTPATIENT
Start: 2018-02-12 | End: 2018-02-15 | Stop reason: HOSPADM

## 2018-02-12 RX ORDER — OXYCODONE HYDROCHLORIDE 5 MG/1
5 TABLET ORAL ONCE AS NEEDED
Status: COMPLETED | OUTPATIENT
Start: 2018-02-12 | End: 2018-02-12

## 2018-02-12 RX ORDER — TRANEXAMIC ACID 100 MG/ML
1000 INJECTION, SOLUTION INTRAVENOUS
Status: DISCONTINUED | OUTPATIENT
Start: 2018-02-12 | End: 2018-02-12

## 2018-02-12 RX ORDER — SODIUM CHLORIDE, SODIUM LACTATE, POTASSIUM CHLORIDE, CALCIUM CHLORIDE 600; 310; 30; 20 MG/100ML; MG/100ML; MG/100ML; MG/100ML
INJECTION, SOLUTION INTRAVENOUS CONTINUOUS PRN
Status: DISCONTINUED | OUTPATIENT
Start: 2018-02-12 | End: 2018-02-12

## 2018-02-12 RX ORDER — PROMETHAZINE HYDROCHLORIDE 25 MG/1
25 TABLET ORAL EVERY 4 HOURS
Status: DISCONTINUED | OUTPATIENT
Start: 2018-02-12 | End: 2018-02-15 | Stop reason: HOSPADM

## 2018-02-12 RX ORDER — RAMELTEON 8 MG/1
8 TABLET ORAL NIGHTLY PRN
Status: DISCONTINUED | OUTPATIENT
Start: 2018-02-13 | End: 2018-02-15 | Stop reason: HOSPADM

## 2018-02-12 RX ADMIN — LIDOCAINE HYDROCHLORIDE 100 MG: 20 INJECTION, SOLUTION INTRAVENOUS at 03:02

## 2018-02-12 RX ADMIN — FENTANYL CITRATE 25 MCG: 50 INJECTION, SOLUTION INTRAMUSCULAR; INTRAVENOUS at 06:02

## 2018-02-12 RX ADMIN — PROPOFOL 250 MG: 10 INJECTION, EMULSION INTRAVENOUS at 03:02

## 2018-02-12 RX ADMIN — FENTANYL CITRATE 50 MCG: 50 INJECTION, SOLUTION INTRAMUSCULAR; INTRAVENOUS at 03:02

## 2018-02-12 RX ADMIN — HYDROMORPHONE HYDROCHLORIDE 0.4 MG: 2 INJECTION INTRAMUSCULAR; INTRAVENOUS; SUBCUTANEOUS at 05:02

## 2018-02-12 RX ADMIN — FENTANYL CITRATE 25 MCG: 50 INJECTION, SOLUTION INTRAMUSCULAR; INTRAVENOUS at 05:02

## 2018-02-12 RX ADMIN — OXYCODONE HYDROCHLORIDE 5 MG: 5 TABLET ORAL at 06:02

## 2018-02-12 RX ADMIN — OXYCODONE HYDROCHLORIDE 5 MG: 5 TABLET ORAL at 05:02

## 2018-02-12 RX ADMIN — FENTANYL CITRATE 100 MCG: 50 INJECTION, SOLUTION INTRAMUSCULAR; INTRAVENOUS at 03:02

## 2018-02-12 RX ADMIN — FENTANYL CITRATE 100 MCG: 50 INJECTION, SOLUTION INTRAMUSCULAR; INTRAVENOUS at 04:02

## 2018-02-12 RX ADMIN — MUPIROCIN 1 G: 20 OINTMENT TOPICAL at 08:02

## 2018-02-12 RX ADMIN — SODIUM CHLORIDE, SODIUM LACTATE, POTASSIUM CHLORIDE, AND CALCIUM CHLORIDE: 600; 310; 30; 20 INJECTION, SOLUTION INTRAVENOUS at 04:02

## 2018-02-12 RX ADMIN — VANCOMYCIN HYDROCHLORIDE 1 G: 1 INJECTION, POWDER, LYOPHILIZED, FOR SOLUTION INTRAVENOUS at 03:02

## 2018-02-12 RX ADMIN — SODIUM CHLORIDE, SODIUM LACTATE, POTASSIUM CHLORIDE, AND CALCIUM CHLORIDE: 600; 310; 30; 20 INJECTION, SOLUTION INTRAVENOUS at 03:02

## 2018-02-12 RX ADMIN — OXYCODONE HYDROCHLORIDE 10 MG: 10 TABLET, FILM COATED, EXTENDED RELEASE ORAL at 08:02

## 2018-02-12 RX ADMIN — RAMELTEON 8 MG: 8 TABLET, FILM COATED ORAL at 11:02

## 2018-02-12 RX ADMIN — CARBOXYMETHYLCELLULOSE SODIUM 4 DROP: 2.5 SOLUTION/ DROPS OPHTHALMIC at 03:02

## 2018-02-12 RX ADMIN — DEXTROSE 2 G: 50 INJECTION, SOLUTION INTRAVENOUS at 03:02

## 2018-02-12 RX ADMIN — LIGHT MINERAL OIL, WHITE PETROLATUM 1 TUBE: 150; 850 OINTMENT OPHTHALMIC at 03:02

## 2018-02-12 RX ADMIN — OXYCODONE HYDROCHLORIDE 10 MG: 5 TABLET ORAL at 10:02

## 2018-02-12 RX ADMIN — ONDANSETRON 4 MG: 2 INJECTION INTRAMUSCULAR; INTRAVENOUS at 03:02

## 2018-02-12 RX ADMIN — PROMETHAZINE HYDROCHLORIDE 25 MG: 25 TABLET ORAL at 10:02

## 2018-02-12 RX ADMIN — DEXAMETHASONE SODIUM PHOSPHATE 8 MG: 4 INJECTION, SOLUTION INTRAMUSCULAR; INTRAVENOUS at 03:02

## 2018-02-12 RX ADMIN — GLYCOPYRROLATE 0.2 MG: 0.2 INJECTION, SOLUTION INTRAMUSCULAR; INTRAVENOUS at 03:02

## 2018-02-12 RX ADMIN — ACETAMINOPHEN 1000 MG: 10 INJECTION, SOLUTION INTRAVENOUS at 03:02

## 2018-02-12 RX ADMIN — KETOROLAC TROMETHAMINE 30 MG: 30 INJECTION, SOLUTION INTRAMUSCULAR; INTRAVENOUS at 04:02

## 2018-02-12 NOTE — PROGRESS NOTES
Subjective:          Chief Complaint: Brionna Jones is a 47 y.o. female who had concerns including Post-op Evaluation of the Right Knee.    Patient is here for a follow up for her right knee. She is here today complaining of drainage from her incision. She reports that it began last night and thinks it may be from too many showers . She is doing PT on the [facility]. She is stiff and having a lot of pain.     DATE OF PROCEDURE:  1/23/2018     ATTENDING SURGEON: Surgeon(s) and Role:     * Matt Smith MD - Primary     Co-Surgeon:Adán Linda MD      SECOND ASSISTANT: Angie Dominguez PA-C        Co-Surgeon Duties: Due to the complexity of the case and the need for significant intra-operative decision making co-surgeon duties were medically necessary. The chronicity of the disease process and the level of deformity dictated that co-surgeon duties be undertaken. A separate note will be dictated/reported by Dr. Matt Smith stating the specific co-surgeon activities and roles performed during the surgery.        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 and Genu Varum (acquired) M21.169     PROCEDURES PERFORMED:  right Replacement, Knee, Medial and Lateral compartment (Total Knee) 80468    Surgery Date: 11/11/2014      Surgeon(s) and Role:  * Matt Smith MD - Primary     Pre-op Diagnosis: Unspecified internal derangement of knee (717.9)  Tear of medial cartilage or meniscus of knee, current (836.0)     Post-op Diagnosis: Same     Procedure(s) (LRB):  ARTHROSCOPY-KNEE (Right)  MENISCECTOMY (Right)  REMOVAL-FOREIGN BODY / LOOSE BODY (Right)    There was an area of 1 cm x 1 cm area on the lateral tibial plateau, which   showed grade III and grade IV chondral defect.         Pain   Associated symptoms include joint swelling. Pertinent negatives include no abdominal pain,  chest pain, chills, congestion, coughing, fever, headaches, myalgias, nausea, numbness, rash, sore throat or vomiting.   Knee Pain    Associated symptoms include stiffness. Pertinent negatives include no fever, itching or numbness.         Review of Systems   Constitution: Negative. Negative for chills, fever, weight gain and weight loss.   HENT: Negative for congestion and sore throat.    Eyes: Negative for blurred vision and double vision.   Cardiovascular: Negative for chest pain, leg swelling and palpitations.   Respiratory: Negative for cough and shortness of breath.    Hematologic/Lymphatic: Does not bruise/bleed easily.   Skin: Negative for itching, poor wound healing and rash.   Musculoskeletal: Positive for joint pain, joint swelling and stiffness. Negative for back pain, muscle weakness and myalgias.   Gastrointestinal: Negative for abdominal pain, constipation, diarrhea, nausea and vomiting.   Genitourinary: Negative.  Negative for frequency and hematuria.   Neurological: Negative for dizziness, headaches, numbness, paresthesias and sensory change.   Psychiatric/Behavioral: Negative for altered mental status and depression. The patient is not nervous/anxious.    Allergic/Immunologic: Negative for hives.       Pain Related Questions  Over the past 3 days, what was your average pain during activity? (I.e. running, jogging, walking, climbing stairs, getting dressed, ect.): 10  Over the past 3 days, what was your highest pain level?: 10  Over the past 3 days, what was your lowest pain level? : 8    Other  How many nights a week are you awakened by your affected body part?: 5  Was the patient's HEIGHT measured or patient reported?: Patient Reported  Was the patient's WEIGHT measured or patient reported?: Measured      Objective:        General: Brionna is well-developed, well-nourished, appears stated age, in no acute distress, alert and oriented to time, place and person.     General    Vitals  reviewed.  Constitutional: She is oriented to person, place, and time. She appears well-developed and well-nourished. No distress.   HENT:   Head: Normocephalic and atraumatic.   Mouth/Throat: No oropharyngeal exudate.   Eyes: EOM are normal. Right eye exhibits no discharge. Left eye exhibits no discharge.   Neck: Normal range of motion.   Cardiovascular: Normal rate and regular rhythm.    Pulmonary/Chest: Effort normal and breath sounds normal. No respiratory distress.   Neurological: She is alert and oriented to person, place, and time. She has normal reflexes. No cranial nerve deficit. Coordination normal.   Psychiatric: She has a normal mood and affect. Her behavior is normal. Judgment and thought content normal.     General Musculoskeletal Exam   Gait: normal       Right Knee Exam     Inspection   Erythema: absent  Scars: present  Swelling: present  Effusion: effusion  Deformity: deformity  Bruising: absent    Tenderness   The patient is tender to palpation of the patella, lateral joint line and medial joint line.    Crepitus   The patient has crepitus of the patella.    Range of Motion   Extension:  0 normal   Flexion:  90 abnormal     Tests   Meniscus   Zeenat:  Medial - negative Lateral - negative  Ligament Examination Lachman: normal (-1 to 2mm) PCL-Posterior Drawer: normal (0 to 2mm)     MCL - Valgus: normal (0 to 2mm)  LCL - Varus: normalPivot Shift: normal (Equal)Reverse Pivot Shift: normal (Equal)Dial Test at 30 degrees: normal (< 5 degrees)Dial Test at 90 degrees: normal (< 5 degrees)  Posterior Sag Test: negative  Posterolateral Corner: unstable (>15 degrees difference)  Patella   Patellar Apprehension: negative  Passive Patellar Tilt: neutral  Patellar Tracking: normal  Patellar Glide (quadrants): Lateral - 1   Medial - 2  Q-Angle at 90 degrees: normal  Patellar Grind: negative  J-Sign: none    Other   Meniscal Cyst: absent  Popliteal (Baker's) Cyst: absent  Sensation: normal    Comments:  Incision  dehiscence is mid incision. Serous joint fluid expressed when probed. Slight erythema and warmth throughout knee. NVI.    Left Knee Exam     Inspection   Erythema: absent  Scars: absent  Swelling: absent  Effusion: absent  Deformity: deformity  Bruising: absent    Tenderness   The patient is experiencing no tenderness.         Range of Motion   Extension:  -5 normal   Flexion:  130 normal     Tests   Meniscus   Zeenat:  Medial - negative Lateral - negative  Stability Lachman: normal (-1 to 2mm) PCL-Posterior Drawer: normal (0 to 2mm)  MCL - Valgus: normal (0 to 2mm)  LCL - Varus: normal (0 to 2mm)Pivot Shift: normal (Equal)Reverse Pivot Shift: normal (Equal)Dial Test at 30 degrees: normal (< 5 degrees)Dial Test at 90 degrees: normal (< 5 degrees)  Posterior Sag Test: negative  Posterolateral Corner: unstable (>15 degrees difference)  Patella   Patellar Apprehension: negative  Passive Patellar Tilt: neutral  Patellar Tracking: normal  Patellar Glide (Quadrants): Lateral - 1 Medial - 2  Q-Angle at 90 degrees: normal  Patellar Grind: positive  J-Sign: J sign absent    Other   Meniscal Cyst: absent  Popliteal (Baker's) Cyst: absent  Sensation: normal    Right Hip Exam     Tests   Aimee: negative  Left Hip Exam     Tests   Aimee: negative          Muscle Strength   Right Lower Extremity   Hip Abduction: 4/5   Quadriceps:  4/5   Hamstrin/5   Left Lower Extremity   Hip Abduction: 5/5   Quadriceps:  5/5   Hamstrin/5     Reflexes     Left Side  Quadriceps:  2+  Achilles:  2+    Right Side   Quadriceps:  2+  Achilles:  2+    Vascular Exam     Right Pulses  Dorsalis Pedis:      2+  Posterior Tibial:      2+        Left Pulses  Dorsalis Pedis:      2+  Posterior Tibial:      2+          RADIOGRAPHS TODAY  Radiographs ordered and reviewed today in clinic of the right knee demonstrates Conformis iTotal knee replacement .           Assessment:       Encounter Diagnoses   Name Primary?    S/P total knee replacement,  right Yes    Traumatic arthritis of right knee     Wound dehiscence           Plan:       1. IKDC, SF-12 and KOOS was not filled out today in clinic.     RTC in 1 weeks with Dr. Matt Smith. Patient will fill out IKDC, SF-12 and KOOS.    2. Continue PT at [facility]    3. Cultures taken today from joint fluid mid incision    4. We reviewed with Brionna today, the pathology and natural history of her diagnosis. We have discussed a variety of treatment options including medications, physical therapy and other alternative treatments. I also explained the indications, risks and benefits of surgery. After discussion, Brionna decided to proceed with surgery. The decision was made to go forward with:     1. Right knee incision and drainage      2. Right knee possible polyethylene exchange   3. Right knee possible wound vac application today    Patient has been NPO since 9pm 2/11/2018      The details of the surgical procedure were explained, including the location of probable incisions and a description of likely hardware and/or grafts to be used.  The patient understands the likely convalescence after surgery.  Also, we have thoroughly discussed the risks, benefits and alternatives to surgery, including, but not limited to, the risk of infection, joint stiffness, blood clot (including DVT and/or pulmonary embolus), neurologic and vascular injury.  It was explained that, if tissue has been repaired or reconstructed, there is a chance of failure, which may require further management.      All of the patient's questions were answered and informed consent was obtained. The patient will contact us if they have any questions or concerns in the interim.    5. Celebrex 200mg BID                   Patient questionnaires may have been collected.

## 2018-02-12 NOTE — H&P
Brionna Jones  is here for a completion of her perioperative paperwork. she  Is scheduled to undergo Right knee incision and drainage, Right knee possible polyethylene exchange, right knee possible wound vac application on 2/12/2018. She is a healthy individual and does not need clearance for this procedure.     Risks, indications and benefits of the surgical procedure were discussed with the patient. All questions with regard to surgery, rehab, expected return to functional activities, activities of daily living and recreational endeavors were answered to her satisfaction.    Patient was informed and understands the risks of surgery are greater for patients with a current condition or hx of heart disease, obesity, clotting disorders, recurrent infections, steroid use, current or past smoking, and factors such as sedentary lifestyle and noncompliance with medications, therapy or f/u. The degree of the increased risk is hard to estimate w/ any degree of precision.    Once no other questions were asked, a brief history and physical exam was then performed.      PHYSICAL EXAM:  GEN: A&Ox3, WD WN NAD  HEENT: WNL  CHEST: CTAB, no W/R/R  HEART: RRR, no M/R/G   ABD: Soft, NT ND, BS x4 QUADS  MS: Refer to previous note for detailed MS exam  NEURO: CN II-XII intact       The surgical consent was then reviewed with the patient, who agreed with all the contents of the consent form and it was signed.     PHYSICAL THERAPY:  She was also instructed regarding physical therapy.    POST OP CARE: Instructions were reviewed including care of the wound and dressing after surgery and when she can shower.     PAIN MANAGEMENT: Brionna Jones was instructed regarding the Polar ice unit that will be in place after surgery and her postoperative pain medications.     MEDICATION:  Patient will be given Rx while at hospital.    Patient was instructed to purchase and take Colace to counter possible GI side effects of taking opiates.     DVT  prophylaxis was discussed with the patient today including risk factors for developing DVTs and history of DVTs. The patient was asked if any specific recommendations were given from the doctor/s that did pre-operative surgical clearance.      If the patient was previously taking 81mg baby aspirin, they were told to not take it will using the above stated aspirin and to restart the 81mg aspirin after completion of the aspirin dose.      Patient was also told to buy over the counter Prilosec medication and take it once daily for GI protection as long as they are taking NSAIDs or Aspirin.     The patient was told that narcotic pain medications may make them drowsy and instructions were given to not sign legal documents, drive or operate heavy machinery, cars, or equipment while under the influence of narcotic medications.     Dr. Snow was present in clinic and directly involved in the additional informed consent process with signing of paperwork and pre-op evaluation. The patient had the opportunity to ask Dr. Snow any additional questions and all questions were answered.    As there were no other questions to be asked, she was given my business card along with Dr. Snow's business card if she has any questions or concerns prior to surgery or in the postop period.

## 2018-02-12 NOTE — ANESTHESIA POSTPROCEDURE EVALUATION
"Anesthesia Post Evaluation    Patient: Brionna Jones    Procedure(s) Performed: Procedure(s) (LRB):  INCISION AND DRAINAGE (I & D) (Right)    [facility] Anesthesia Post Op Evaluation    Visit Vitals  BP (!) 151/96 (BP Location: Right arm, Patient Position: Lying)   Pulse (!) 119   Temp 36.3 °C (97.3 °F) (Oral)   Resp 16   Ht 5' 4" (1.626 m)   Wt 95.7 kg (211 lb)   SpO2 99%   BMI 36.22 kg/m²       Pain/Radha Score: Pain Assessment Performed: Yes (2/12/2018  5:17 PM)  Presence of Pain: complains of pain/discomfort (2/12/2018  5:17 PM)  Pain Rating Prior to Med Admin: 10 (2/12/2018  5:25 PM)  Radha Score: 8 (2/12/2018  5:17 PM)  Modified Radha Score: 16 (2/12/2018  5:17 PM)      "

## 2018-02-12 NOTE — H&P
I have reviewed CRISS Dominguez's H&P note on Brionna Bob. Agree with history, exam, assessment and plan.    This is a patient of Dr. Smith. 3 weeks s/p right TKA. Presents today with wound dehiscence and surrounding cellulitic changes. Wound appears to communicate deep. Patient indicated for immediate washout, poly exchange, application of incisional wound vac, and empiric IV abx treatment.     Details of such reviewed at length. Patient understands there are inherent risks which include infection necessitating additonal surgery, failure of salvage attempt resulting in continued or recurrent infection, need for additional surgery to include possible removal of prosthesis and 2 stage revision, stiffness, pain, DVT/PE. In some cases, infection can persist to the point that chronic suppression is required or even amputation in some cases.     Patient understands and wishes to proceed. Dr. Smith has asked me to assist in the case as he is currently out of town. We have spoken with the [employer] rep. There are no replacement poly components apparently available at this time or in the near future this week, A/B, 6/7. Plan for washout without the poly exchange for now.     Patient understands. All questions answered. She wishes to proceed.    Informed Consent:    The details of the surgical procedure were explained, including the location of probable incisions and a description of possible hardware and/or grafts to be used. Alternatives to both operative and non-operative options with associated risks and benefits were discussed. The patient understands the likely convalescence after surgery and, in particular, the expected postop rehab and recovery course. The outlined risks and potential complications of the proposed procedure include but are not limited to: infection, poor wound healing, scarring, deformity, stiffness, swelling, continued or recurrent pain, instability, hardware or prosthetic failure if implanted, symptomatic  hardware requiring removal, weakness, neurovascular injury, numbness, chronic regional pain disorder, tissue nonhealing/irreparability/retear, subsequent contralateral limb injury or pathology, amputation, fracture, blood clot formation, inability to return to previous level of activity, anesthetic or regional block complication up to death, need for additional procedure as indicated intraoperatively, and potential need for further surgery.    The patient was also informed and understands that the risks of surgery are greater for patients with a current condition or history of heart disease, obesity, clotting disorders, recurrent infections, steroid use, current or past smoking, and factors such as sedentary lifestyle and noncompliance with medications, therapy or follow-up. The degree of the increased risk is hard to estimate with any degree of precision. If applicable, smoking cessation was discussed.     All questions were answered. The patient has verbalized understanding of these issues and wishes to proceed with the surgery as discussed.

## 2018-02-12 NOTE — ANESTHESIA POSTPROCEDURE EVALUATION
"Anesthesia Post Evaluation    Patient: Brionna Jones    Procedure(s) Performed: Procedure(s) (LRB):  INCISION AND DRAINAGE (I & D) (Right)    Final Anesthesia Type: general  Patient location during evaluation: PACU  Patient participation: Yes- Able to Participate  Level of consciousness: awake and alert  Post-procedure vital signs: reviewed and stable  Pain management: adequate  Airway patency: patent  PONV status at discharge: No PONV  Anesthetic complications: no      Cardiovascular status: blood pressure returned to baseline  Respiratory status: unassisted, spontaneous ventilation and room air  Hydration status: euvolemic  Follow-up not needed.        Visit Vitals  BP (!) 151/96 (BP Location: Right arm, Patient Position: Lying)   Pulse (!) 119   Temp 36.3 °C (97.3 °F) (Oral)   Resp 16   Ht 5' 4" (1.626 m)   Wt 95.7 kg (211 lb)   SpO2 99%   BMI 36.22 kg/m²       Pain/Radha Score: Pain Assessment Performed: Yes (2/12/2018  5:17 PM)  Presence of Pain: complains of pain/discomfort (2/12/2018  5:17 PM)  Pain Rating Prior to Med Admin: 10 (2/12/2018  5:25 PM)  Radha Score: 8 (2/12/2018  5:17 PM)  Modified Radha Score: 16 (2/12/2018  5:17 PM)      "

## 2018-02-12 NOTE — ANESTHESIA PREPROCEDURE EVALUATION
02/12/2018  Brionna Jones is a 47 y.o., female.    Anesthesia Evaluation    I have reviewed the Patient Summary Reports.    I have reviewed the Nursing Notes.   I have reviewed the Medications.     Review of Systems  Anesthesia Hx:  No problems with previous Anesthesia  History of prior surgery of interest to airway management or planning: Previous anesthesia: General General Jan 23 2018 Dr anderson Total Knee with general anesthesia.  Procedure performed at an [facility]. Denies Family Hx of Anesthesia complications.   Denies Personal Hx of Anesthesia complications.   Social:  Smoker, Social Alcohol Use    Hematology/Oncology:  Hematology Normal   Oncology Normal     EENT/Dental:EENT/Dental Normal   Cardiovascular:   Exercise tolerance: good Hypertension, well controlled Hx of prolonged qt INTERVAL   Pulmonary:  Pulmonary Normal    Renal/:  Renal/ Normal     Hepatic/GI:   GERD, well controlled    Musculoskeletal:   Arthritis     Neurological:  Neurology Normal    Endocrine:  Endocrine Normal    Psych:   anxiety depression          Physical Exam  General:  Morbid Obesity    Airway/Jaw/Neck:  Airway Findings: Mouth Opening: Normal Tongue: Normal  General Airway Assessment: Adult  Mallampati: II  TM Distance: Normal, at least 6 cm      Dental:  Dental Findings: Periodontal disease, Mild, molar caps, upper front caps        Mental Status:  Mental Status Findings:  Cooperative, Alert and Oriented         Anesthesia Plan  Type of Anesthesia, risks & benefits discussed:  Anesthesia Type:  general  Patient's Preference:   Intra-op Monitoring Plan:   Intra-op Monitoring Plan Comments:   Post Op Pain Control Plan: multimodal analgesia  Post Op Pain Control Plan Comments:   Induction:   IV  Beta Blocker:         Informed Consent: Patient understands risks and agrees with Anesthesia plan.  Questions answered.  Anesthesia consent signed with patient.  ASA Score: 3     Day of Surgery Review of History & Physical:    H&P update referred to the surgeon.     Anesthesia Plan Notes: Plan GA for I and D and washout of infected total knee done 3 weeks ago at [facility]        Ready For Surgery From Anesthesia Perspective.

## 2018-02-12 NOTE — TRANSFER OF CARE
"Anesthesia Transfer of Care Note    Patient: Brionna Jones    Procedure(s) Performed: Procedure(s) (LRB):  INCISION AND DRAINAGE (I & D) (Right)    Patient location: PACU    Anesthesia Type: general    Transport from OR: Transported from OR on 2-3 L/min O2 by NC with adequate spontaneous ventilation    Post pain: adequate analgesia    Post assessment: no apparent anesthetic complications and tolerated procedure well    Post vital signs: stable    Level of consciousness: awake    Nausea/Vomiting: no nausea/vomiting    Complications: none    Transfer of care protocol was followed      Last vitals:   Visit Vitals  /77 (BP Location: Right arm, Patient Position: Lying)   Pulse 87   Temp 36.8 °C (98.3 °F) (Oral)   Resp 18   Ht 5' 4" (1.626 m)   Wt 95.7 kg (211 lb)   SpO2 96%   BMI 36.22 kg/m²     "

## 2018-02-12 NOTE — BRIEF OP NOTE
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]    Post-op Diagnosis:  Post-Op Diagnosis Codes:     * S/P TKR (total knee replacement), right [Z96.651]     * Infection of right knee [M00.9]    Procedure: Procedure(s) (LRB):  INCISION AND DRAINAGE (I & D) (Right)    Anesthesia: General    Description of the findings of the procedure: See Dictation    Findings/Key Components: see op note    Estimated Blood Loss: * No values recorded between 2/12/2018  3:36 PM and 2/12/2018  5:18 PM *         Specimens:   Specimen (12h ago through future)    None          Disposition: Patient tolerated the procedure well and was transferred to PACU in stable condition.

## 2018-02-13 LAB
ANION GAP SERPL CALC-SCNC: 9 MMOL/L
BASOPHILS # BLD AUTO: 0.01 K/UL
BASOPHILS NFR BLD: 0.1 %
BUN SERPL-MCNC: 15 MG/DL
CALCIUM SERPL-MCNC: 9 MG/DL
CHLORIDE SERPL-SCNC: 107 MMOL/L
CO2 SERPL-SCNC: 24 MMOL/L
CREAT SERPL-MCNC: 0.8 MG/DL
DIFFERENTIAL METHOD: ABNORMAL
EOSINOPHIL # BLD AUTO: 0 K/UL
EOSINOPHIL NFR BLD: 0.4 %
ERYTHROCYTE [DISTWIDTH] IN BLOOD BY AUTOMATED COUNT: 14.2 %
EST. GFR  (AFRICAN AMERICAN): >60 ML/MIN/1.73 M^2
EST. GFR  (NON AFRICAN AMERICAN): >60 ML/MIN/1.73 M^2
GLUCOSE SERPL-MCNC: 123 MG/DL
GRAM STN SPEC: NORMAL
GRAM STN SPEC: NORMAL
HCT VFR BLD AUTO: 31.4 %
HCT VFR BLD AUTO: 33.2 %
HGB BLD-MCNC: 10.5 G/DL
HGB BLD-MCNC: 9.9 G/DL
LYMPHOCYTES # BLD AUTO: 1.4 K/UL
LYMPHOCYTES NFR BLD: 13.3 %
MAGNESIUM SERPL-MCNC: 1.9 MG/DL
MCH RBC QN AUTO: 25.8 PG
MCHC RBC AUTO-ENTMCNC: 31.5 G/DL
MCV RBC AUTO: 82 FL
MONOCYTES # BLD AUTO: 1.2 K/UL
MONOCYTES NFR BLD: 11.2 %
NEUTROPHILS # BLD AUTO: 7.8 K/UL
NEUTROPHILS NFR BLD: 74.6 %
PHOSPHATE SERPL-MCNC: 3.4 MG/DL
PLATELET # BLD AUTO: 334 K/UL
PMV BLD AUTO: 11.7 FL
POTASSIUM SERPL-SCNC: 4.2 MMOL/L
RBC # BLD AUTO: 3.83 M/UL
SODIUM SERPL-SCNC: 140 MMOL/L
WBC # BLD AUTO: 10.5 K/UL

## 2018-02-13 PROCEDURE — G8979 MOBILITY GOAL STATUS: HCPCS | Mod: CI

## 2018-02-13 PROCEDURE — 36415 COLL VENOUS BLD VENIPUNCTURE: CPT

## 2018-02-13 PROCEDURE — 83735 ASSAY OF MAGNESIUM: CPT

## 2018-02-13 PROCEDURE — 25000003 PHARM REV CODE 250: Performed by: ORTHOPAEDIC SURGERY

## 2018-02-13 PROCEDURE — G8987 SELF CARE CURRENT STATUS: HCPCS | Mod: CJ

## 2018-02-13 PROCEDURE — 94799 UNLISTED PULMONARY SVC/PX: CPT

## 2018-02-13 PROCEDURE — 99900035 HC TECH TIME PER 15 MIN (STAT)

## 2018-02-13 PROCEDURE — 11000001 HC ACUTE MED/SURG PRIVATE ROOM

## 2018-02-13 PROCEDURE — 97162 PT EVAL MOD COMPLEX 30 MIN: CPT

## 2018-02-13 PROCEDURE — 97530 THERAPEUTIC ACTIVITIES: CPT

## 2018-02-13 PROCEDURE — 97535 SELF CARE MNGMENT TRAINING: CPT

## 2018-02-13 PROCEDURE — 94761 N-INVAS EAR/PLS OXIMETRY MLT: CPT

## 2018-02-13 PROCEDURE — 63600175 PHARM REV CODE 636 W HCPCS: Performed by: HOSPITALIST

## 2018-02-13 PROCEDURE — 84100 ASSAY OF PHOSPHORUS: CPT

## 2018-02-13 PROCEDURE — 85014 HEMATOCRIT: CPT

## 2018-02-13 PROCEDURE — G8978 MOBILITY CURRENT STATUS: HCPCS | Mod: CK

## 2018-02-13 PROCEDURE — G8988 SELF CARE GOAL STATUS: HCPCS | Mod: CI

## 2018-02-13 PROCEDURE — 97165 OT EVAL LOW COMPLEX 30 MIN: CPT

## 2018-02-13 PROCEDURE — 85025 COMPLETE CBC W/AUTO DIFF WBC: CPT

## 2018-02-13 PROCEDURE — 25000003 PHARM REV CODE 250: Performed by: PHYSICIAN ASSISTANT

## 2018-02-13 PROCEDURE — 85018 HEMOGLOBIN: CPT

## 2018-02-13 PROCEDURE — 80048 BASIC METABOLIC PNL TOTAL CA: CPT

## 2018-02-13 PROCEDURE — 63600175 PHARM REV CODE 636 W HCPCS: Performed by: ORTHOPAEDIC SURGERY

## 2018-02-13 RX ORDER — DIPHENHYDRAMINE HCL 25 MG
25 CAPSULE ORAL EVERY 6 HOURS PRN
Status: DISCONTINUED | OUTPATIENT
Start: 2018-02-13 | End: 2018-02-15 | Stop reason: HOSPADM

## 2018-02-13 RX ORDER — SULFAMETHOXAZOLE AND TRIMETHOPRIM 800; 160 MG/1; MG/1
1 TABLET ORAL 2 TIMES DAILY
Status: DISCONTINUED | OUTPATIENT
Start: 2018-02-13 | End: 2018-02-13

## 2018-02-13 RX ADMIN — PROMETHAZINE HYDROCHLORIDE 25 MG: 25 TABLET ORAL at 01:02

## 2018-02-13 RX ADMIN — CEFTRIAXONE 2 G: 2 INJECTION, SOLUTION INTRAVENOUS at 10:02

## 2018-02-13 RX ADMIN — AMLODIPINE BESYLATE 10 MG: 5 TABLET ORAL at 09:02

## 2018-02-13 RX ADMIN — OXYCODONE HYDROCHLORIDE 10 MG: 10 TABLET, FILM COATED, EXTENDED RELEASE ORAL at 09:02

## 2018-02-13 RX ADMIN — MUPIROCIN 1 G: 20 OINTMENT TOPICAL at 09:02

## 2018-02-13 RX ADMIN — PROMETHAZINE HYDROCHLORIDE 25 MG: 25 TABLET ORAL at 06:02

## 2018-02-13 RX ADMIN — PROMETHAZINE HYDROCHLORIDE 25 MG: 25 TABLET ORAL at 02:02

## 2018-02-13 RX ADMIN — PROMETHAZINE HYDROCHLORIDE 25 MG: 25 TABLET ORAL at 05:02

## 2018-02-13 RX ADMIN — VANCOMYCIN HYDROCHLORIDE 1000 MG: 1 INJECTION, POWDER, LYOPHILIZED, FOR SOLUTION INTRAVENOUS at 12:02

## 2018-02-13 RX ADMIN — PROMETHAZINE HYDROCHLORIDE 25 MG: 25 TABLET ORAL at 09:02

## 2018-02-13 RX ADMIN — MORPHINE SULFATE 2 MG: 10 INJECTION INTRAVENOUS at 06:02

## 2018-02-13 RX ADMIN — ASPIRIN 325 MG: 325 TABLET, DELAYED RELEASE ORAL at 09:02

## 2018-02-13 RX ADMIN — OXYCODONE HYDROCHLORIDE 10 MG: 5 TABLET ORAL at 02:02

## 2018-02-13 RX ADMIN — Medication 1000 MG: at 03:02

## 2018-02-13 RX ADMIN — MORPHINE SULFATE 2 MG: 10 INJECTION INTRAVENOUS at 01:02

## 2018-02-13 RX ADMIN — OXYCODONE HYDROCHLORIDE 10 MG: 5 TABLET ORAL at 10:02

## 2018-02-13 RX ADMIN — OXYCODONE HYDROCHLORIDE 10 MG: 5 TABLET ORAL at 04:02

## 2018-02-13 RX ADMIN — DIPHENHYDRAMINE HYDROCHLORIDE 25 MG: 25 CAPSULE ORAL at 01:02

## 2018-02-13 NOTE — PLAN OF CARE
Problem: Occupational Therapy Goal  Goal: Occupational Therapy Goal  Goals to be met by: 3/13/18     Patient will increase functional independence with ADLs by performing:    LE Dressing with Minimal Assistance.  Grooming while standing at sink with Contact Guard Assistance.  Toileting from toilet /BSC over toilet with Supervision for hygiene and clothing management.   Supine to sit with Supervision.  Stand pivot transfers with Stand-by Assistance.    Outcome: Ongoing (interventions implemented as appropriate)  OT evaluation completed and treatment initiated.  Pt would benefit from skilled occupational therapy intervention for increased activity tolerance and independence in ADL's.

## 2018-02-13 NOTE — NURSING
Notified Dr. Pederson  that ID not available until tomorrow. Rec'd order to discontinue the ID consult.
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
## 2018-02-13 NOTE — HPI
Ms. Brionna Jones is a 47 y.o. female, with PMH of HTN, OA of the knee, depression anxiety & hypochondriasis, who is consulted upon for medical management s/p arthroscopy of the knee. The patient is 3 weeks s/p right TKA, and noted to have wound dehiscence, with surrounding cellulitis. She was started on empiric vancomycin, and had surgical washout, poly exchange, application of incisional wound vac. She states he has been having intermittent fevers & chills at home until the procedure. She additionally notes itching after she takes her pain medications. She denies any fever, chills, sweats, chest pain, SOB, abdominal pain, N/V, diarrhea since her procedure.
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
## 2018-02-13 NOTE — NURSING
Attempted to call consult to Dr. Scruggs,.  After hours  states no one on call for [facility].  Will call office in the morning.     stated that she only takes consults for [facility] and cannot get a message to the [facility] physicians

## 2018-02-13 NOTE — PLAN OF CARE
Problem: Physical Therapy Goal  Goal: Physical Therapy Goal  Goals to be met by: 18     Patient will increase functional independence with mobility by performin. Supine to sit with supervision.   2. Sit to supine with supervision.   3. Sit<>stand transfer with supervision using rolling walker.   4. Gait > 150 feet with SBA using rolling walker.   5. Ascend/descend curb step stairs with CGA and rolling walker.    Outcome: Ongoing (interventions implemented as appropriate)  PT evaluation completed. Awaiting WB status orders, until then will maintain NWB on R LE with brace locked in extension at all times. Pt requires min A for R LE management during bed mobility and CGA for transfers and gait with rolling walker x 3 ft, limited by difficulty maintaining NWB. Will continue to follow and progress as tolerated. Please see progress note for detailed plan of care and recommendations (home health PT).

## 2018-02-13 NOTE — CARE UPDATE
Pt resting quietly. Denies any c/o discomfort @ this time. Immobilizer in place to Rt knee, dsg CDI. Hemovac and wound vac in place. Minimal sanganeous drainage noted to hemovac. Pt free from falls or injury. Purposeful rounding done. VSS. Safety maintained. Call light in reach.

## 2018-02-13 NOTE — OP NOTE
?[facility]   OPERATIVE REPORT   ORTHOPAEDIC SURGERY   PROVIDER: DR. TESS SUH    PATIENT INFORMATION   Brionna Jones 47 y.o. female 1970   MRN: [id number]   LOCATION: [facility]     DATE OF PROCEDURE: 2/12/2018     PREOPERATIVE DIAGNOSES:   1. Right total knee wound dehiscence  2. Right periprosthetic acute infection, suspected     POSTOPERATIVE DIAGNOSES:   1. Right total knee wound dehiscence   2. Right periprosthetic acute infection, suspected     PROCEDURES PERFORMED:   1. Irrigation and excisional debridement of right total knee  2. Revision complex wound closure of right total knee  3. Application of incisional negative pressure dressing (<50cm2) of right total knee    Surgeon(s) and Role:     * FREDO Suh MD - Primary          Co-Surgeon: Wilder Pederson MD    Co-Surgeon Duties: Due to the complexity of the case and the need for significant intra-operative decision making co-surgeon duties were medically necessary. The chronicity of the disease process and the level of deformity dictated that co-surgeon duties be undertaken. A separate note will be dictated/reported by Dr. TESS Suh stating the specific co-surgeon activities and roles performed during the surgery.    ANESTHESIA: General    ESTIMATED BLOOD LOSS: 200 mL    IMPLANTS: * No implants in log *     FINDINGS: Dehiscence of anterior knee wound with marginal edge necrosis.     COMPLICATIONS: None.     INTRAOPERATIVE COUNTS: Correct.     PROPHYLACTIC IV ANTIBIOTICS: Given per [facility] Protocol.    INDICATIONS FOR PROCEDURE: Brionna Bob is a 47 year old patient approximately 3 weeks status post total knee arthroplasty with Dr. Matt Smith. She presented to the clinic with dehiscence of her anterior knee wound and apparent drainage of synovial fluid through this wound.  The patient was indicated for immediate single stage surgical irrigation and debridement of the prosthetic knee with planned polyethylene  component exchange and revision wound closure. This is a [employer]form[employer] knee and per the company representative no same or similar size poly components are available at this time. Plan for initial washout treatment and subsquent washout with component exchange when available. Case discussed with my colleague Dr. Smith who has requested my assistance in his absence.  I have reviewed the case in detail with the patient and discussed associated risks and benefits of surgery.  She understands the inherent risk for continued or recurrent infection requiring additional surgery up to and including 2 stage revision of her total knee, infection requiring chronic suppression, and amputation in rare circumstances.  Stiffness, pain, and loss of function are also issues.  Plan is to aggressively treat this now the hopes of salvaging her prosthesis. All questions answered.    DETAILS OF THE PROCEDURE: The patient was met in the preoperative holding area.  The operative site was identified and marked.  All final questions were answered.  Full informed consent was obtained both in clinic and also in the preoperative staging area.  The patient was brought back to the operating room and placed supine.  Gen. anesthesia was administered.  A nonsterile tourniquet was placed high in the patient's right thigh was well-padded.  The right lower extremity was then prepped and draped in usual sterile fashion for extremity surgery.    A verbal timeout was performed.    No tourniquet was used during the procedure.  The patient's dehisced wound was carefully inspected.  There was notable wound edge epidermal and dermal necrosis with otherwise no overt signs of infection.  Both Vicryl and barbed suture material were encountered and completely removed from the wound.  The patient also had a dermal allograft patch from her prior surgery which was removed.  No evidence of significant failure of prior arthrotomy subvastus closure.  The patient did have  some leakage of deeper synovial fluid into the subcutaneous tissue layer indicating some degree of capsular attenuation.  The previous deeper arthrotomy subvastus approach was reopened. Superficial and deep fluid cultures were taken prior to antiobiotic administration (Ancef and Vancomycin). Tissue samples to include a portion of the dermal patch were also sent for culture.  No gracie pus or purulence was encountered.  The deeper tissue appeared quite healthy.  A curet and scalpel were used for both the abrasive and sharp excisional debridement of nonviable and necrotic tissue primarily at the subcutaneous and dermal layers. A curette was used to remove Healix anchor and suture material from the anterior tibia.  Following adequate debridement, cystoscopy tubing was used to irrigate the knee with 6 L of normal saline.  Hibiclens scrub brushes were also used to clean the prosthesis prior to final irrigation.     A hemovac drain was placed in the knee. The deep capsular and retinacular tissue layers were closed with #1 PDS in interrupted fashion. 0 PDS was used to close the deep dermal layer.  3-0 PDS was used to close the superficial dermal layer in deep inverted fashion.  Prior to final skin closure, a scalpel was used to once again excise nonviable epidermal and dermal tissue. Skin was closed using 3-0 nylon suture with trauma stitch configuration. An incisional wound vac was applied over the wound closure.     At the conclusion of the procedure, the patient had soft compartments and good perfusion distally.  Sterile dressings were applied.  The patient was placed in a hinged knee brace locked in full extension.    The patient was then transferred to the postanesthesia care unit in stable condition.    POSTOPERATIVE PLAN OF CARE: The patient will be admitted for postoperative care to include IV antibiotic treatment.  We will follow-up on culture results.  She will follow up with Dr. Smith for discussion regarding  likely additional washout and component exchange.
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
## 2018-02-13 NOTE — PLAN OF CARE
Problem: Patient Care Overview  Goal: Plan of Care Review  Outcome: Ongoing (interventions implemented as appropriate)  PLAN OF CARE REVIEWED WITH PT AND PT'S FAMILY.  PT'S SPOUSE AT BEDSIDE.  PT AA+OX4.  ABLE TO MAKE NEEDS KNOWN.  DOES NOT APPEAR TO BE IN ANY DISTRESS.  C/O PAIN.  PAIN MEDICATION GIVEN AS ORDERED.  REQUIRES MODERATE ASSIST WITH ADLS.  CONT. OF B/B.  JAC CARE PROVIDED WHEN NEEDED.  PT REMAINS FREE FROM FALLS, INJURY, AND TRAUMA.  FALL PRECAUTIONS IN PLACE.  BED IN LOWEST POSITION WITH WHEELS LOCKED.  CALL LIGHT WITHIN REACH.  WILL CONTINUE TO MONITOR.
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
## 2018-02-13 NOTE — PROGRESS NOTES
"Pt POD 1 I&D R TKA    Doing well overnight. Pain controlled. WILLIAN.    Vitals:    02/12/18 2055 02/12/18 2228 02/13/18 0022 02/13/18 0430   BP: (!) 110/57  118/68 118/63   BP Location: Left arm  Left arm Right arm   Patient Position: Lying  Sitting Sitting   Pulse: 84  78 80   Resp: 18 18 18   Temp: 98.7 °F (37.1 °C)  98.6 °F (37 °C) 98.5 °F (36.9 °C)   TempSrc: Oral  Oral Oral   SpO2: 96%  98% (!) 94%   Weight:  107 kg (236 lb)     Height:  5' 4" (1.626 m)         RLE- dressing c/d/i   Wound vac with good seal   drain in place.   SILT L4-S1   +TA/GSC/EHL   BCR x5    Gram stain (bedside culture)   Few gram neg rods    Pt s/p above   Cont abx   Plan for PICC line and home IV abx   Cont wound vac and drain   Mobilize with therapy- leg to remain locked in extension   Pain control   DVT ppx   IM for medical management- appreciate recs    "

## 2018-02-13 NOTE — PLAN OF CARE
Problem: Patient Care Overview  Goal: Plan of Care Review  Outcome: Ongoing (interventions implemented as appropriate)  Patient received on RA IS done with patient reaching 1500 mL. Will continue to monitor.

## 2018-02-14 DIAGNOSIS — T84.53XA INFECTION OF TOTAL RIGHT KNEE REPLACEMENT, INITIAL ENCOUNTER: ICD-10-CM

## 2018-02-14 DIAGNOSIS — M00.9: Primary | ICD-10-CM

## 2018-02-14 LAB
ANION GAP SERPL CALC-SCNC: 8 MMOL/L
BASOPHILS # BLD AUTO: 0.02 K/UL
BASOPHILS NFR BLD: 0.3 %
BUN SERPL-MCNC: 15 MG/DL
CALCIUM SERPL-MCNC: 8.4 MG/DL
CHLORIDE SERPL-SCNC: 110 MMOL/L
CO2 SERPL-SCNC: 24 MMOL/L
CREAT SERPL-MCNC: 0.8 MG/DL
DIFFERENTIAL METHOD: ABNORMAL
EOSINOPHIL # BLD AUTO: 0.5 K/UL
EOSINOPHIL NFR BLD: 6.9 %
ERYTHROCYTE [DISTWIDTH] IN BLOOD BY AUTOMATED COUNT: 14.4 %
EST. GFR  (AFRICAN AMERICAN): >60 ML/MIN/1.73 M^2
EST. GFR  (NON AFRICAN AMERICAN): >60 ML/MIN/1.73 M^2
GLUCOSE SERPL-MCNC: 99 MG/DL
HCT VFR BLD AUTO: 32.7 %
HGB BLD-MCNC: 10 G/DL
LYMPHOCYTES # BLD AUTO: 2.4 K/UL
LYMPHOCYTES NFR BLD: 33.1 %
MAGNESIUM SERPL-MCNC: 1.9 MG/DL
MCH RBC QN AUTO: 25.8 PG
MCHC RBC AUTO-ENTMCNC: 30.6 G/DL
MCV RBC AUTO: 85 FL
MONOCYTES # BLD AUTO: 0.7 K/UL
MONOCYTES NFR BLD: 9.2 %
NEUTROPHILS # BLD AUTO: 3.6 K/UL
NEUTROPHILS NFR BLD: 50.1 %
PHOSPHATE SERPL-MCNC: 3.4 MG/DL
PLATELET # BLD AUTO: 326 K/UL
PMV BLD AUTO: 12.2 FL
POTASSIUM SERPL-SCNC: 4 MMOL/L
RBC # BLD AUTO: 3.87 M/UL
SODIUM SERPL-SCNC: 142 MMOL/L
WBC # BLD AUTO: 7.2 K/UL

## 2018-02-14 PROCEDURE — 36415 COLL VENOUS BLD VENIPUNCTURE: CPT

## 2018-02-14 PROCEDURE — 94799 UNLISTED PULMONARY SVC/PX: CPT

## 2018-02-14 PROCEDURE — 11000001 HC ACUTE MED/SURG PRIVATE ROOM

## 2018-02-14 PROCEDURE — 76937 US GUIDE VASCULAR ACCESS: CPT

## 2018-02-14 PROCEDURE — 99233 SBSQ HOSP IP/OBS HIGH 50: CPT | Mod: ,,, | Performed by: HOSPITALIST

## 2018-02-14 PROCEDURE — 85025 COMPLETE CBC W/AUTO DIFF WBC: CPT

## 2018-02-14 PROCEDURE — 84100 ASSAY OF PHOSPHORUS: CPT

## 2018-02-14 PROCEDURE — C1751 CATH, INF, PER/CENT/MIDLINE: HCPCS

## 2018-02-14 PROCEDURE — 94761 N-INVAS EAR/PLS OXIMETRY MLT: CPT

## 2018-02-14 PROCEDURE — 63600175 PHARM REV CODE 636 W HCPCS: Performed by: ORTHOPAEDIC SURGERY

## 2018-02-14 PROCEDURE — 36569 INSJ PICC 5 YR+ W/O IMAGING: CPT

## 2018-02-14 PROCEDURE — 80048 BASIC METABOLIC PNL TOTAL CA: CPT

## 2018-02-14 PROCEDURE — 83735 ASSAY OF MAGNESIUM: CPT

## 2018-02-14 PROCEDURE — 02HV33Z INSERTION OF INFUSION DEVICE INTO SUPERIOR VENA CAVA, PERCUTANEOUS APPROACH: ICD-10-PCS | Performed by: ORTHOPAEDIC SURGERY

## 2018-02-14 PROCEDURE — 25000003 PHARM REV CODE 250: Performed by: ORTHOPAEDIC SURGERY

## 2018-02-14 PROCEDURE — 97530 THERAPEUTIC ACTIVITIES: CPT

## 2018-02-14 PROCEDURE — 63600175 PHARM REV CODE 636 W HCPCS: Performed by: HOSPITALIST

## 2018-02-14 PROCEDURE — 97116 GAIT TRAINING THERAPY: CPT

## 2018-02-14 RX ORDER — MUPIROCIN 20 MG/G
1 OINTMENT TOPICAL 2 TIMES DAILY
Status: CANCELLED | OUTPATIENT
Start: 2018-02-14 | End: 2018-02-19

## 2018-02-14 RX ORDER — OXYCODONE AND ACETAMINOPHEN 10; 325 MG/1; MG/1
1 TABLET ORAL EVERY 6 HOURS PRN
Qty: 40 TABLET | Refills: 0 | Status: SHIPPED | OUTPATIENT
Start: 2018-02-14 | End: 2018-02-21

## 2018-02-14 RX ADMIN — MUPIROCIN 1 G: 20 OINTMENT TOPICAL at 09:02

## 2018-02-14 RX ADMIN — OXYCODONE HYDROCHLORIDE 10 MG: 5 TABLET ORAL at 10:02

## 2018-02-14 RX ADMIN — VANCOMYCIN HYDROCHLORIDE 1000 MG: 1 INJECTION, POWDER, LYOPHILIZED, FOR SOLUTION INTRAVENOUS at 03:02

## 2018-02-14 RX ADMIN — AMLODIPINE BESYLATE 10 MG: 5 TABLET ORAL at 09:02

## 2018-02-14 RX ADMIN — MORPHINE SULFATE 2 MG: 10 INJECTION INTRAVENOUS at 10:02

## 2018-02-14 RX ADMIN — PROMETHAZINE HYDROCHLORIDE 25 MG: 25 TABLET ORAL at 06:02

## 2018-02-14 RX ADMIN — VANCOMYCIN HYDROCHLORIDE 1000 MG: 1 INJECTION, POWDER, LYOPHILIZED, FOR SOLUTION INTRAVENOUS at 02:02

## 2018-02-14 RX ADMIN — PROMETHAZINE HYDROCHLORIDE 25 MG: 25 TABLET ORAL at 01:02

## 2018-02-14 RX ADMIN — OXYCODONE HYDROCHLORIDE 10 MG: 5 TABLET ORAL at 01:02

## 2018-02-14 RX ADMIN — CEFTRIAXONE 2 G: 2 INJECTION, SOLUTION INTRAVENOUS at 09:02

## 2018-02-14 RX ADMIN — MUPIROCIN 1 G: 20 OINTMENT TOPICAL at 10:02

## 2018-02-14 RX ADMIN — OXYCODONE HYDROCHLORIDE 10 MG: 10 TABLET, FILM COATED, EXTENDED RELEASE ORAL at 10:02

## 2018-02-14 RX ADMIN — ASPIRIN 325 MG: 325 TABLET, DELAYED RELEASE ORAL at 09:02

## 2018-02-14 RX ADMIN — OXYCODONE HYDROCHLORIDE 10 MG: 5 TABLET ORAL at 06:02

## 2018-02-14 NOTE — CONSULTS
Consulted to switch incisional vac from hospital vac machine to home machine.  Await discharge orders; patient to be seen by infectious disease.

## 2018-02-14 NOTE — PROGRESS NOTES
[facility] Hospital Medicine  Progress Note    Patient Name: Brionna Jones  MRN: [id number]  Patient Class: IP- Inpatient   Admission Date: 2/12/2018  Length of Stay: 1 days  Attending Physician: FREDO Snow MD  Primary Care Provider: Michael Houston MD        Subjective:     Principal Problem:S/P total knee replacement, right    HPI:  Ms. Brionna Jones is a 47 y.o. female, with PMH of HTN, OA of the knee, depression anxiety & hypochondriasis, who is consulted upon for medical management s/p arthroscopy of the knee. The patient is 3 weeks s/p right TKA, and noted to have wound dehiscence, with surrounding cellulitis. She was started on empiric vancomycin, and had surgical washout, poly exchange, application of incisional wound vac. She states he has been having intermittent fevers & chills at home until the procedure. She additionally notes itching after she takes her pain medications. She denies any fever, chills, sweats, chest pain, SOB, abdominal pain, N/V, diarrhea since her procedure.     Hospital Course:  Patient is a 47 year-old woman status post right knee replacement on 1/23/2018 complicated by wound dehiscence and infection status post incision and drainage on 2/12/2018 with wound VAC placement.    Interval History: No acute events overnight.    Review of Systems   Constitutional: Negative for chills and fever.   Respiratory: Negative for shortness of breath and wheezing.    Cardiovascular: Negative for chest pain.   Gastrointestinal: Negative for abdominal distention, abdominal pain, constipation, diarrhea, nausea and vomiting.   Genitourinary: Negative for dysuria and frequency.   Musculoskeletal: Negative for arthralgias and myalgias.   Neurological: Negative for light-headedness.   Psychiatric/Behavioral: Negative for agitation and confusion.     Objective:     Vital Signs (Most Recent):  Temp: 98.5 °F (36.9 °C) (02/14/18 0720)  Pulse: 85 (02/14/18 0720)  Resp: 16  (02/14/18 0720)  BP: 124/65 (02/14/18 0720)  SpO2: 95 % (02/14/18 0720) Vital Signs (24h Range):  Temp:  [97.9 °F (36.6 °C)-98.5 °F (36.9 °C)] 98.5 °F (36.9 °C)  Pulse:  [84-95] 85  Resp:  [16-20] 16  SpO2:  [93 %-97 %] 95 %  BP: (110-133)/(58-81) 124/65     Weight: 107 kg (236 lb)  Body mass index is 40.51 kg/m².    Intake/Output Summary (Last 24 hours) at 02/14/18 1525  Last data filed at 02/14/18 0630   Gross per 24 hour   Intake             1330 ml   Output              375 ml   Net              955 ml      Physical Exam   Constitutional: She is oriented to person, place, and time. She appears well-developed and well-nourished. No distress.   HENT:   Head: Atraumatic.   Eyes: Conjunctivae are normal.   Neck: Neck supple.   Cardiovascular: Normal rate, regular rhythm and normal heart sounds.    No murmur heard.  Pulmonary/Chest: Effort normal and breath sounds normal. She has no wheezes.   Abdominal: Soft. Bowel sounds are normal. She exhibits no distension. There is no tenderness.   Musculoskeletal:   Right knee in a brace with wound VAC in place.   Neurological: She is alert and oriented to person, place, and time.       Significant Labs: All pertinent labs within the past 24 hours have been reviewed.    Significant Imaging: I have reviewed all pertinent imaging results/findings within the past 24 hours.    Assessment/Plan:      * Infection and inflammatory reaction due to internal right knee prosthesis, initial encounter    Status post incision and drainage on 2/12/2018 with wound VAC placement.  Cultures from right joint and from the incision site positive for Proteus mirabilis.  I recommend to continue with ceftriaxone 2 grams intravenously daily for now.  Patient will likely need 4 weeks of intravenous therapy with weekly complete metabolic panel, complete blood cell count, erythrocyte sedimentation rate, and C-reactive protein.  Culture results not final.  I also recommend consultation with infectious  disease service during the hospitalization to establish care with the infectious disease service for evaluation and recommendations now and also in order for the patient to follow-up with infectious disease who can follow-up on culture results, weekly laboratory studies, and in order for further adjustments in antibiotic treatment as indicated.        Hypertension, essential    Reasonably controlled with current regimen.  Will continue with current regimen.        Tobacco use    Patient counseled on the importance of smoking cessation.          VTE Risk Mitigation         Ordered     Medium Risk of VTE  Once      02/12/18 1907     Place ABRAN hose  Until discontinued      02/12/18 1907     Place sequential compression device  Until discontinued       Recommend subcutaneous enoxaparin 40 mg daily for VTE prophylaxis until patient fully ambulatory. 02/12/18 1907              Swapnil Navarrete MD  Department of Hospital Medicine   [facility]

## 2018-02-14 NOTE — HOSPITAL COURSE
Patient is a 47 year-old woman status post right knee replacement on 1/23/2018 complicated by wound dehiscence and infection status post incision and drainage on 2/12/2018 with wound VAC placement.

## 2018-02-14 NOTE — ASSESSMENT & PLAN NOTE
Status post incision and drainage on 2/12/2018 with wound VAC placement.  Cultures from right joint and from the incision site positive for Proteus mirabilis.  I recommend to continue with ceftriaxone 2 grams intravenously daily for now.  Patient will likely need 4 weeks of intravenous therapy with weekly complete metabolic panel, complete blood cell count, erythrocyte sedimentation rate, and C-reactive protein.  Culture results not final.  I also recommend consultation with infectious disease service during the hospitalization to establish care with the infectious disease service for evaluation and recommendations now and also in order for the patient to follow-up with infectious disease who can follow-up on culture results, weekly laboratory studies, and in order for further adjustments in antibiotic treatment as indicated.

## 2018-02-14 NOTE — PROGRESS NOTES
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   BHAVANI called Dr. Davis to inform and okay with tomorrow dc; pt definitely needs to the teach.   Pt assigned to [facility].

## 2018-02-14 NOTE — OP NOTE
DATE OF PROCEDURE: 2/12/2018      PREOPERATIVE DIAGNOSES:   1. Right total knee wound dehiscence  2. Right periprosthetic acute infection, suspected      POSTOPERATIVE DIAGNOSES:   1. Right total knee wound dehiscence   2. Right periprosthetic acute infection, suspected      PROCEDURES PERFORMED:   1. Irrigation and excisional debridement of right total knee  2. Revision complex wound closure of right total knee  3. Application of incisional negative pressure dressing (<50cm2) of right total knee     Surgeon(s) and Role:     * FREDO Snow MD - Primary          Co-Surgeon: Wilder Pederson MD     Co-Surgeon Duties:   I was available for all portions of the case including approach, decision making, and closure as outlined below. Due to the complexity of the case and the need for significant intra-operative decision making co-surgeon duties were medically necessary. The chronicity of the disease process and the level of deformity dictated that co-surgeon duties be undertaken.    ANESTHESIA: General     ESTIMATED BLOOD LOSS: 200 mL     IMPLANTS: * No implants in log *      FINDINGS: Dehiscence of anterior knee wound with marginal edge necrosis.      COMPLICATIONS: None.      INTRAOPERATIVE COUNTS: Correct.      PROPHYLACTIC IV ANTIBIOTICS: Given per [facility] Protocol.     INDICATIONS FOR PROCEDURE: Brionna Bob is a 47 year old patient approximately 3 weeks status post total knee arthroplasty with Dr. Matt Smith. She presented to the clinic with dehiscence of her anterior knee wound and apparent drainage of synovial fluid through this wound.  The patient was indicated for immediate single stage surgical irrigation and debridement of the prosthetic knee with planned polyethylene component exchange and revision wound closure. This is a [employer] knee and per the company representative no same or similar size poly components are available at this time. Plan for initial washout treatment and subsquent washout with component  exchange when available. Case discussed with my colleague Dr. Smith who has requested my assistance in his absence.  I have reviewed the case in detail with the patient and discussed associated risks and benefits of surgery.  She understands the inherent risk for continued or recurrent infection requiring additional surgery up to and including 2 stage revision of her total knee, infection requiring chronic suppression, and amputation in rare circumstances.  Stiffness, pain, and loss of function are also issues.  Plan is to aggressively treat this now the hopes of salvaging her prosthesis. All questions answered.     DETAILS OF THE PROCEDURE: The patient was met in the preoperative holding area.  The operative site was identified and marked.  All final questions were answered.  Full informed consent was obtained both in clinic and also in the preoperative staging area.  The patient was brought back to the operating room and placed supine.  Gen. anesthesia was administered.  A nonsterile tourniquet was placed high in the patient's right thigh was well-padded.  The right lower extremity was then prepped and draped in usual sterile fashion for extremity surgery.     A verbal timeout was performed.     No tourniquet was used during the procedure.  The patient's dehisced wound was carefully inspected.  There was notable wound edge epidermal and dermal necrosis with otherwise no overt signs of infection.  Both Vicryl and barbed suture material were encountered and completely removed from the wound.  The patient also had a dermal allograft patch from her prior surgery which was removed.  No evidence of significant failure of prior arthrotomy subvastus closure.  The patient did have some leakage of deeper synovial fluid into the subcutaneous tissue layer indicating some degree of capsular attenuation.  The previous deeper arthrotomy subvastus approach was reopened. Superficial and deep fluid cultures were taken prior to  antiobiotic administration (Ancef and Vancomycin). Tissue samples to include a portion of the dermal patch were also sent for culture.  No gracie pus or purulence was encountered.  The deeper tissue appeared quite healthy.  A curet and scalpel were used for both the abrasive and sharp excisional debridement of nonviable and necrotic tissue primarily at the subcutaneous and dermal layers. A curette was used to remove Healix anchor and suture material from the anterior tibia.  Following adequate debridement, cystoscopy tubing was used to irrigate the knee with 6 L of normal saline.  Hibiclens scrub brushes were also used to clean the prosthesis prior to final irrigation.      A hemovac drain was placed in the knee. The deep capsular and retinacular tissue layers were closed with #1 PDS in interrupted fashion. 0 PDS was used to close the deep dermal layer.  3-0 PDS was used to close the superficial dermal layer in deep inverted fashion.  Prior to final skin closure, a scalpel was used to once again excise nonviable epidermal and dermal tissue. Skin was closed using 3-0 nylon suture with trauma stitch configuration. An incisional wound vac was applied over the wound closure.      At the conclusion of the procedure, the patient had soft compartments and good perfusion distally.  Sterile dressings were applied.  The patient was placed in a hinged knee brace locked in full extension.

## 2018-02-14 NOTE — PLAN OF CARE
Problem: Patient Care Overview  Goal: Plan of Care Review  Outcome: Ongoing (interventions implemented as appropriate)  Patient in no apparent distress. Sat's 96  % on room air. IS done . Will continue to monitor.

## 2018-02-14 NOTE — SUBJECTIVE & OBJECTIVE
Interval History: No acute events overnight.    Review of Systems   Constitutional: Negative for chills and fever.   Respiratory: Negative for shortness of breath and wheezing.    Cardiovascular: Negative for chest pain.   Gastrointestinal: Negative for abdominal distention, abdominal pain, constipation, diarrhea, nausea and vomiting.   Genitourinary: Negative for dysuria and frequency.   Musculoskeletal: Negative for arthralgias and myalgias.   Neurological: Negative for light-headedness.   Psychiatric/Behavioral: Negative for agitation and confusion.     Objective:     Vital Signs (Most Recent):  Temp: 98.5 °F (36.9 °C) (02/14/18 0720)  Pulse: 85 (02/14/18 0720)  Resp: 16 (02/14/18 0720)  BP: 124/65 (02/14/18 0720)  SpO2: 95 % (02/14/18 0720) Vital Signs (24h Range):  Temp:  [97.9 °F (36.6 °C)-98.5 °F (36.9 °C)] 98.5 °F (36.9 °C)  Pulse:  [84-95] 85  Resp:  [16-20] 16  SpO2:  [93 %-97 %] 95 %  BP: (110-133)/(58-81) 124/65     Weight: 107 kg (236 lb)  Body mass index is 40.51 kg/m².    Intake/Output Summary (Last 24 hours) at 02/14/18 1525  Last data filed at 02/14/18 0630   Gross per 24 hour   Intake             1330 ml   Output              375 ml   Net              955 ml      Physical Exam   Constitutional: She is oriented to person, place, and time. She appears well-developed and well-nourished. No distress.   HENT:   Head: Atraumatic.   Eyes: Conjunctivae are normal.   Neck: Neck supple.   Cardiovascular: Normal rate, regular rhythm and normal heart sounds.    No murmur heard.  Pulmonary/Chest: Effort normal and breath sounds normal. She has no wheezes.   Abdominal: Soft. Bowel sounds are normal. She exhibits no distension. There is no tenderness.   Musculoskeletal:   Right knee in a brace with wound VAC in place.   Neurological: She is alert and oriented to person, place, and time.       Significant Labs: All pertinent labs within the past 24 hours have been reviewed.    Significant Imaging: I have reviewed  all pertinent imaging results/findings within the past 24 hours.

## 2018-02-14 NOTE — PT/OT/SLP PROGRESS
Occupational Therapy      Patient Name:  Brionna Jones   MRN:  [id number]    Patient not seen today secondary to pt in radiology/x-ray  . Will follow-up when approrpaite.    Steve Patterson OT  2/14/2018

## 2018-02-14 NOTE — PLAN OF CARE
BHAVANI met with pt at bedside to complete discharge assessment.  Pt lives with sonJose Miguel [phone] and cousinIveth [phone] assist pt as needed.  Pt has rolling walker, bedside commode, shower chair, CPM and ice machine.  Pt will discharge with HH and IV antibiotics.     02/14/18 1015   Discharge Assessment   Assessment Type Discharge Planning Assessment   Confirmed/corrected address and phone number on facesheet? Yes   Assessment information obtained from? Patient   Communicated expected length of stay with patient/caregiver no   Prior to hospitilization cognitive status: Alert/Oriented   Prior to hospitalization functional status: Assistive Equipment;Needs Assistance   Current cognitive status: Alert/Oriented   Current Functional Status: Assistive Equipment;Needs Assistance   Lives With child(tia), adult  (Jose Miguel, son 23 yo)   Able to Return to Prior Arrangements yes   Is patient able to care for self after discharge? Unable to determine at this time (comments)   Who are your caregiver(s) and their phone number(s)? (damon Lazarosin, [phone])   Patient's perception of discharge disposition home health   Readmission Within The Last 30 Days no previous admission in last 30 days   Patient currently being followed by outpatient case management? No   Patient currently receives any other outside agency services? No   Equipment Currently Used at Home walker, rolling;bedside commode;shower chair  (CPM, Ice machine)   Do you have any problems affording any of your prescribed medications? No   Is the patient taking medications as prescribed? yes   Does the patient have transportation home? Yes   Transportation Available family or friend will provide   Does the patient receive services at the Coumadin Clinic? No   Discharge Plan A Home Health   Patient/Family In Agreement With Plan yes

## 2018-02-14 NOTE — PROCEDURES
"Brionna Jones is a 47 y.o. female patient.    Temp: 98.5 °F (36.9 °C) (02/14/18 0720)  Pulse: 85 (02/14/18 0720)  Resp: 16 (02/14/18 0720)  BP: 124/65 (02/14/18 0720)  SpO2: 95 % (02/14/18 0720)  Weight: 107 kg (236 lb) (02/12/18 2228)  Height: 5' 4" (162.6 cm) (02/12/18 2228)    PICC  Date/Time: 2/14/2018 10:20 AM  Location procedure was performed: [facility] PICC LINE PLACEMENT  Performed by: ARPITA ANTHONY  Consent Done: Yes  Time out: Immediately prior to procedure a time out was called to verify the correct patient, procedure, equipment, support staff and site/side marked as required  Indications: med administration and vascular access  Anesthesia: local infiltration  Local anesthetic: lidocaine 1% without epinephrine  Anesthetic Total (mL): 1  Preparation: skin prepped with ChloraPrep  Skin prep agent dried: skin prep agent completely dried prior to procedure  Sterile barriers: all five maximum sterile barriers used - cap, mask, sterile gown, sterile gloves, and large sterile sheet  Hand hygiene: hand hygiene performed prior to central venous catheter insertion  Location details: left cephalic  Catheter type: double lumen  Catheter size: 5 Fr  Catheter Length: 47cm    Ultrasound guidance: yes  Vessel Caliber: medium and small and patent, compressibility normal  Vascular Doppler: not done  Needle advanced into vessel with real time Ultrasound guidance.  Guidewire confirmed in vessel.  Image recorded and saved.  Sterile sheath used.  no esophageal manometryNumber of attempts: 1  Post-procedure: blood return through all ports, chlorhexidine patch and sterile dressing applied  Technical procedures used: microintroducer with ultrasound  Estimated blood loss (mL): 0  Specimens: No  Implants: No        Arpita Anthony  2/14/2018    "

## 2018-02-14 NOTE — PLAN OF CARE
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       Future Appointments  Date Time Provider Department Center   3/7/2018 10:45 AM Matt Smith MD [facility] [place]           I have seen and examined this patient face to face today. My clinical findings that support the need for the home health skilled services and home bound status are the following:  S/p I&D of right TKA. Remains with impaired mobility requiring assistive devices which make it taxing to leave home    Allergies:  Review of patient's allergies indicates:   Allergen Reactions    Codeine Itching       Diet: regular    Activities: partial weight bearing RLE    Nursing:   SN to complete comprehensive assessment including routine vital signs. Instruct on disease process and s/s of complications to report to MD. Review/verify medication list sent home with the patient at time of discharge  and instruct patient/caregiver as needed. Frequency may be adjusted depending on start of care date.    Notify MD if SBP > 160 or < 90; DBP > 90 or < 50; HR > 120 or < 50; Temp > 101;     SN to draw weekly CBC, BMP, & vanc trough levels & send results to Dr Matt Smith office. SN to follow up on home Vancomycin administration on routine visits    Medications: Review discharge medications with  patient and family and provide education.      Current Discharge Medication List      START taking these medications    Details   cefTRIAXone 2 g in dextrose 5 % 50 mL (ROCEPHIN) 2 g/50 mL PgBk IVPB Inject 50 mLs (2 g total) into the vein once daily.      VANCOMYCIN HCL (VANCOMYCIN 1 G/250 ML D5W, READY TO MIX SYSTEM,) Inject 250 mLs (1,000 mg total) into the vein every 12 (twelve) hours.         CONTINUE these medications which have CHANGED    Details   oxyCODONE-acetaminophen (PERCOCET)  mg per tablet Take 1 tablet by mouth every 6 (six) hours as needed for Pain.  Qty: 40 tablet, Refills: 0    Associated Diagnoses: Traumatic arthritis of right knee         CONTINUE these medications which have NOT CHANGED    Details   amLODIPine (NORVASC) 10 MG tablet Take 10 mg by mouth once daily.      aspirin (ECOTRIN) 325 MG EC tablet Take 1 tablet (325 mg total) by mouth once daily.  Qty: 42 tablet, Refills: 0    Associated Diagnoses: Traumatic arthritis of right knee      ranitidine (ZANTAC) 75 MG tablet Take 75 mg by mouth as needed for Heartburn.      traMADol (ULTRAM) 50 mg tablet Take 1 tablet (50 mg total) by mouth 2 (two) times daily as needed.  Qty: 60 tablet, Refills: 0    Associated Diagnoses: Chronic pain of right knee; Traumatic arthritis of right knee; Difficulty walking; Smoker; S/P knee surgery; Right leg weakness; Joint stiffness      celecoxib (CELEBREX) 200 MG capsule Take 1 capsule (200 mg total) by mouth 2 (two) times daily.  Qty: 60 capsule, Refills: 2    Associated Diagnoses: S/P total knee replacement, right      !! promethazine (PHENERGAN) 25 MG tablet Take 1 tablet (25 mg total) by mouth every 6 (six) hours as needed for Nausea.  Qty: 16 tablet, Refills: 0    Associated Diagnoses: Osteoarthritis of right knee, unspecified osteoarthritis type      !! promethazine (PHENERGAN) 25 MG tablet Take 1 tablet (25 mg total) by mouth every 4 (four) hours.  Qty: 30 tablet, Refills: 0    Associated  Diagnoses: Traumatic arthritis of right knee       !! - Potential duplicate medications found. Please discuss with provider.          I certify that this patient is confined to her home and needs nursing services

## 2018-02-14 NOTE — PROGRESS NOTES
BHAVANI informed that hospitalilst, Dr. William will be consulting ID for pt.  BHAVANI texted Dr. William regarding Dr. Davis's order of vanc 1g IV Q12H and respond to text stated not what I would recommend.     BHAVANI called Dr. Davis [phone] and informed of Dr. William consulting ID and not what he recommends.  Dr. Davis stated vanc IV med, no change and pt doesn't need incisional wound vac change, but no other changes in SW consult.      BHAVANI received call from [facility], doesn't accept pt's insurance.  BHAVANI called IV services [phone], spoke to Betsy, accepts Medicaid [employer]; BHAVANI faxed order and clinicals to [phone].

## 2018-02-14 NOTE — PROGRESS NOTES
Pt POD 2 I&D R TKA     Doing well overnight. Pain controlled. WILLIAN.     Vitals:    02/13/18 2152 02/14/18 0045 02/14/18 0349 02/14/18 0720   BP: 118/63 (!) 118/58 133/81 124/65   BP Location: Left arm Left arm Left arm Left arm   Patient Position: Sitting Sitting Lying Lying   Pulse: 95 87 85 85   Resp: 18 20 20 16   Temp: 98.1 °F (36.7 °C) 98 °F (36.7 °C) 98.1 °F (36.7 °C) 98.5 °F (36.9 °C)   TempSrc: Oral Oral Oral Oral   SpO2: 97% (!) 93% (!) 94% 95%   Weight:       Height:              RLE- dressing c/d/i   Wound vac with good seal   drain dc'd   SILT L4-S1   +TA/GSC/EHL   BCR x5     Gram stain (bedside culture)   Few gram neg rods     Pt s/p above   Cont abx   Plan for PICC line and home IV abx   Cont incisional wound vac   Mobilize with therapy- leg to remain locked in extension   Pain control   DVT ppx   IM for medical management- appreciate recs  -----------------------------------------------------------------------    Ortho Staff Note    Patient seen and evaluated by me. Reviewed Fellow history, exam, assessment, and plan and agree. PICC in place. Cxs with proteus species. Sens pending. On Vanc and Rocephin. Discussed plan - f/u with Dr. Smith next Mon and likely additional washout with poly exchange next week. Keep incisional vac in place until then. Stay in brace locked in extension. All questions answered. 15 mins spent in face to face counseling.     TESS Snow MD  [facility]

## 2018-02-14 NOTE — CARE UPDATE
Pt sitting in recliner chair. Denies any c/o discomfort @ this time. Wound vac in place with no drainage noted. Hemovac in place with minimal sanganeous drainage noted. Dsg to Rt knee CDI. REAGAN PICC patent,dsg CDI. VSS. Safety maintained. Call light in reach.Brace in place to Rt leg. Pt free from falls or injury. Purposeful rounding done.

## 2018-02-14 NOTE — PROGRESS NOTES
Instructed RN how to switch over vac to home vac.  Discharge pending home antibiotic therapy set up.

## 2018-02-15 VITALS
HEIGHT: 64 IN | TEMPERATURE: 98 F | SYSTOLIC BLOOD PRESSURE: 132 MMHG | DIASTOLIC BLOOD PRESSURE: 80 MMHG | HEART RATE: 84 BPM | RESPIRATION RATE: 18 BRPM | OXYGEN SATURATION: 98 % | WEIGHT: 236 LBS | BODY MASS INDEX: 40.29 KG/M2

## 2018-02-15 LAB
ANION GAP SERPL CALC-SCNC: 8 MMOL/L
BACTERIA FLD AEROBE CULT: NORMAL
BACTERIA SPEC AEROBE CULT: NORMAL
BASOPHILS # BLD AUTO: 0.02 K/UL
BASOPHILS NFR BLD: 0.3 %
BUN SERPL-MCNC: 13 MG/DL
CALCIUM SERPL-MCNC: 8.9 MG/DL
CHLORIDE SERPL-SCNC: 107 MMOL/L
CO2 SERPL-SCNC: 26 MMOL/L
CREAT SERPL-MCNC: 0.7 MG/DL
DIFFERENTIAL METHOD: ABNORMAL
EOSINOPHIL # BLD AUTO: 0.5 K/UL
EOSINOPHIL NFR BLD: 8.9 %
ERYTHROCYTE [DISTWIDTH] IN BLOOD BY AUTOMATED COUNT: 14.2 %
EST. GFR  (AFRICAN AMERICAN): >60 ML/MIN/1.73 M^2
EST. GFR  (NON AFRICAN AMERICAN): >60 ML/MIN/1.73 M^2
GLUCOSE SERPL-MCNC: 97 MG/DL
GRAM STN SPEC: NORMAL
GRAM STN SPEC: NORMAL
HCT VFR BLD AUTO: 31.8 %
HGB BLD-MCNC: 10.5 G/DL
LYMPHOCYTES # BLD AUTO: 1.9 K/UL
LYMPHOCYTES NFR BLD: 31.5 %
MAGNESIUM SERPL-MCNC: 1.9 MG/DL
MCH RBC QN AUTO: 26.5 PG
MCHC RBC AUTO-ENTMCNC: 33 G/DL
MCV RBC AUTO: 80 FL
MONOCYTES # BLD AUTO: 0.6 K/UL
MONOCYTES NFR BLD: 10.1 %
NEUTROPHILS # BLD AUTO: 2.9 K/UL
NEUTROPHILS NFR BLD: 48.7 %
PHOSPHATE SERPL-MCNC: 4.6 MG/DL
PLATELET # BLD AUTO: 343 K/UL
PMV BLD AUTO: 11.6 FL
POTASSIUM SERPL-SCNC: 3.9 MMOL/L
RBC # BLD AUTO: 3.96 M/UL
SODIUM SERPL-SCNC: 141 MMOL/L
VANCOMYCIN TROUGH SERPL-MCNC: 10.2 UG/ML
WBC # BLD AUTO: 5.97 K/UL

## 2018-02-15 PROCEDURE — 63600175 PHARM REV CODE 636 W HCPCS: Performed by: ORTHOPAEDIC SURGERY

## 2018-02-15 PROCEDURE — 80202 ASSAY OF VANCOMYCIN: CPT

## 2018-02-15 PROCEDURE — G8989 SELF CARE D/C STATUS: HCPCS | Mod: CJ

## 2018-02-15 PROCEDURE — 83735 ASSAY OF MAGNESIUM: CPT

## 2018-02-15 PROCEDURE — 36415 COLL VENOUS BLD VENIPUNCTURE: CPT

## 2018-02-15 PROCEDURE — 80048 BASIC METABOLIC PNL TOTAL CA: CPT

## 2018-02-15 PROCEDURE — 85025 COMPLETE CBC W/AUTO DIFF WBC: CPT

## 2018-02-15 PROCEDURE — G8988 SELF CARE GOAL STATUS: HCPCS | Mod: CI

## 2018-02-15 PROCEDURE — 63600175 PHARM REV CODE 636 W HCPCS: Performed by: HOSPITALIST

## 2018-02-15 PROCEDURE — 25000003 PHARM REV CODE 250: Performed by: ORTHOPAEDIC SURGERY

## 2018-02-15 PROCEDURE — 84100 ASSAY OF PHOSPHORUS: CPT

## 2018-02-15 RX ADMIN — OXYCODONE HYDROCHLORIDE 10 MG: 5 TABLET ORAL at 08:02

## 2018-02-15 RX ADMIN — VANCOMYCIN HYDROCHLORIDE 1000 MG: 1 INJECTION, POWDER, LYOPHILIZED, FOR SOLUTION INTRAVENOUS at 03:02

## 2018-02-15 RX ADMIN — AMLODIPINE BESYLATE 10 MG: 5 TABLET ORAL at 08:02

## 2018-02-15 RX ADMIN — OXYCODONE HYDROCHLORIDE 10 MG: 5 TABLET ORAL at 02:02

## 2018-02-15 RX ADMIN — MUPIROCIN 1 G: 20 OINTMENT TOPICAL at 08:02

## 2018-02-15 RX ADMIN — CEFTRIAXONE 2 G: 2 INJECTION, SOLUTION INTRAVENOUS at 10:02

## 2018-02-15 RX ADMIN — ASPIRIN 325 MG: 325 TABLET, DELAYED RELEASE ORAL at 08:02

## 2018-02-15 NOTE — PLAN OF CARE
Problem: Physical Therapy Goal  Goal: Physical Therapy Goal  Goals to be met by: 18     Patient will increase functional independence with mobility by performin. Supine to sit with supervision.   2. Sit to supine with supervision.   3. Sit<>stand transfer with supervision using rolling walker.   4. Gait > 150 feet with SBA using rolling walker.   5. Ascend/descend curb step stairs with CGA and rolling walker.     Outcome: Ongoing (interventions implemented as appropriate)  New orders for PWB.  Instructed pt in use of RW and BSC.  Readjusted brace.  REC:  Home with HH  Has no DME needs

## 2018-02-15 NOTE — PROGRESS NOTES
Pt POD 3 I&D R TKA     Doing well overnight. Pain controlled. WILLIAN.     Vitals:    02/14/18 1948 02/14/18 2130 02/15/18 0009 02/15/18 0535   BP:  (!) 141/86 111/62 100/63   BP Location:  Right arm Right arm Left arm   Patient Position:  Sitting Sitting Lying   Pulse: 86 86 82 87   Resp: 18 18 20 20   Temp:  98.5 °F (36.9 °C) 98.6 °F (37 °C) 98.9 °F (37.2 °C)   TempSrc:  Oral Oral Oral   SpO2: 96% 98% 97% 96%   Weight:       Height:               RLE- dressing c/d/i   Wound vac with good seal   SILT L4-S1   +TA/GSC/EHL   BCR x5     Microbiology Results (last 7 days)     Procedure Component Value Units Date/Time    AFB Culture & Smear [[id number]] Collected:  02/12/18 1542    Order Status:  Completed Specimen:  Incision site from Knee, Right Updated:  02/14/18 1338     AFB Culture & Smear Culture in progress     AFB CULTURE STAIN No acid fast bacilli seen.    AFB Culture & Smear [[id number]] Collected:  02/12/18 1552    Order Status:  Completed Specimen:  Bone from Knee, Right Updated:  02/14/18 1338     AFB Culture & Smear Culture in progress     AFB CULTURE STAIN No acid fast bacilli seen.    Aerobic culture [[id number]] Collected:  02/12/18 1542    Order Status:  Completed Specimen:  Incision site from Knee, Right Updated:  02/14/18 1322     Aerobic Bacterial Culture --     PROTEUS MIRABILIS  Many  Susceptibility pending       Aerobic Bacterial Culture --     GRAM NEGATIVE SHASHANK, LACTOSE   Moderate  Identification and susceptibility pending       Aerobic Bacterial Culture --     PRESUMPTIVE PROTEUS SPECIES  Many  Identification and susceptibility pending      Culture, Anaerobic [[id number]] Collected:  02/12/18 1552    Order Status:  Completed Specimen:  Bone from Knee, Right Updated:  02/14/18 1258     Anaerobic Culture Culture in progress    Culture, Anaerobic [[id number]] Collected:  02/12/18 1542    Order Status:  Completed Specimen:  Incision site from Knee, Right Updated:  02/14/18 1258     Anaerobic Culture  Culture in progress    Culture, Body Fluid (Aerobic) w/ GS [[id number]] Collected:  02/12/18 1553    Order Status:  Completed Specimen:  Joint Fluid from Knee, Right Updated:  02/14/18 0950     AEROBIC CULTURE - FLUID --     PRESUMPTIVE PROTEUS SPECIES  Few  Identification and susceptibility pending       Gram Stain Result No WBC's      No organisms seen    Tissue culture [[id number]] Collected:  02/12/18 1545    Order Status:  Completed Specimen:  Tissue from Knee, Right Updated:  02/14/18 0943     Aerobic Culture - Tissue --     PRESUMPTIVE PROTEUS SPECIES  Many  Identification and susceptibility pending       Gram Stain Result No WBC's      No organisms seen    Gram stain [[id number]] Collected:  02/12/18 1542    Order Status:  Completed Specimen:  Incision site from Knee, Right Updated:  02/13/18 0455     Gram Stain Result No WBC's      No organisms seen    Fungus culture [[id number]] Collected:  02/12/18 1542    Order Status:  Sent Specimen:  Wound from Knee, Right Updated:  02/13/18 0145    Aerobic culture [[id number]] Collected:  02/12/18 1552    Order Status:  Canceled Specimen:  Bone from Knee, Right Updated:  02/13/18 0145    Fungus culture [[id number]] Collected:  02/12/18 1552    Order Status:  Sent Specimen:  Bone from Knee, Right Updated:  02/13/18 0145    AFB Culture & Smear [[id number]]     Order Status:  Canceled Specimen:  Incision site from Kidney, Right              Pt s/p above   Cont abx   Plan for home IV abx teaching today   Cont incisional wound vac   Mobilize with therapy- leg to remain locked in extension   Pain control   DVT ppx   IM for medical management- appreciate recs

## 2018-02-15 NOTE — PLAN OF CARE
Problem: Patient Care Overview  Goal: Plan of Care Review  Outcome: Ongoing (interventions implemented as appropriate)  Patient remains free from injury or falls.  Vital signs stable throughout night on room air.  Positions self independently.  Voiding adequately through shift.  Frequent checks on pt performed at appropriate intervals, monitor/manage analgesia as needed throughout shift.  Bed in low locked position and call light within reach.  Will continue to monitor.

## 2018-02-15 NOTE — PT/OT/SLP PROGRESS
"Physical Therapy Treatment    Patient Name:  Brionna Jones   MRN:  [id number]    Recommendations:     Discharge Recommendations:    home with HH  Discharge Equipment Recommendations:   none   Barriers to discharge: Decreased caregiver support    Assessment:     Brionna Jones is a 47 y.o. 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  She presents with the following impairments/functional limitations:    weakness, impaired functional mobilty, gait instability, impaired balance, impaired skin, orthopedic precautions, edema, decreased ROM, decreased lower extremity function, impaired self care skills, pain.  Wt bearing status clarified and pt verbalizes understanding of PWB plus positioning of brace     Rehab Prognosis:  good; patient would benefit from acute skilled PT services to address these deficits and reach maximum level of function.      Recent Surgery: Procedure(s) (LRB):  INCISION AND DRAINAGE-KNEE (Right) 2 Days Post-Op    Plan:     During this hospitalization, patient to be seen daily to address the above listed problems via gait training, therapeutic activities, therapeutic exercises, neuromuscular re-education  · Plan of Care Expires:  03/15/18   Plan of Care Reviewed with: patient    Subjective     Communicated with nursing prior to session.  Patient found up in chair upon PT entry to room, agreeable to treatment.      Chief Complaint: knee pain  Patient comments/goals: "This is not a good day"  Pain/Comfort:  · Pain Rating 1: 7/10  · Location - Side 1: Right  · Location 1: knee  · Pain Addressed 1: Pre-medicate for activity, Distraction, Reposition, Cessation of Activity  · Pain Rating Post-Intervention 1: 7/10    Patients cultural, spiritual, Lutheran conflicts given the current situation: none    Objective:     Patient found with: peripheral IV, PICC line, wound vac     General Precautions: Standard, fall "   Orthopedic Precautions:RLE partial weight bearing   Braces: Hinged knee brace     Functional Mobility:  · Bed Mobility: not assessed up in chair  · Transfers:     · Sit to Stand:  stand by assistance with rolling walker  · Gait: amb 30' with RW and SBA with instruction on PWB.   · Balance: F+ standing balance      AM-PAC 6 CLICK MOBILITY          Therapeutic Activities and Exercises:   brace readjusted on leg as it has rotated and slid down pt's leg.  Reviewed correct positioning in brace. Discussed safety at home.  Answered questions related to PT-defer other questions that were non therapy to MD or nursing for dc instructions, meds     Patient left up in chair with all lines intact, call button in reach and nurse notified..    GOALS:    Physical Therapy Goals        Problem: Physical Therapy Goal    Goal Priority Disciplines Outcome Goal Variances Interventions   Physical Therapy Goal     PT/OT, PT Ongoing (interventions implemented as appropriate)     Description:  Goals to be met by: 18     Patient will increase functional independence with mobility by performin. Supine to sit with supervision.   2. Sit to supine with supervision.   3. Sit<>stand transfer with supervision using rolling walker.   4. Gait > 150 feet with SBA using rolling walker.   5. Ascend/descend curb step stairs with CGA and rolling walker.                      Time Tracking:     PT Received On: 18  PT Start Time: 1430     PT Stop Time: 1505  PT Total Time (min): 35 min     Billable Minutes: Gait Training 13 and Therapeutic Activity 22    Treatment Type: Treatment  PT/PTA: PT     PTA Visit Number: 0     Palak Godfrey, PT  2018

## 2018-02-15 NOTE — PT/OT/SLP DISCHARGE
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  Please refer to prior OT note dated 2/13/18 for functional status.    Assessment:      Patient appropriate for care in another setting.    Objective:     GOALS:    Occupational Therapy Goals        Problem: Occupational Therapy Goal    Goal Priority Disciplines Outcome Interventions   Occupational Therapy Goal     OT, PT/OT Ongoing (interventions implemented as appropriate)    Description:  Goals to be met by: 3/13/18     Patient will increase functional independence with ADLs by performing:    LE Dressing with Minimal Assistance.  Grooming while standing at sink with Contact Guard Assistance.  Toileting from toilet /BSC over toilet with Supervision for hygiene and clothing management.   Supine to sit with Supervision.  Stand pivot transfers with Stand-by Assistance.                      Reasons for Discontinuation of Therapy Services  Transfer to alternate level of care.      Plan:     Patient Discharged to: Home with Home Health Service    Steve Patterson OT  2/15/2018

## 2018-02-15 NOTE — PT/OT/SLP DISCHARGE
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     Assessment:     Patient appropriate for care in another setting.    Objective:     GOALS:    Physical Therapy Goals        Problem: Physical Therapy Goal    Goal Priority Disciplines Outcome Goal Variances Interventions   Physical Therapy Goal     PT/OT, PT Ongoing (interventions implemented as appropriate)     Description:  Goals to be met by: 18     Patient will increase functional independence with mobility by performin. Supine to sit with supervision.   2. Sit to supine with supervision.   3. Sit<>stand transfer with supervision using rolling walker.   4. Gait > 150 feet with SBA using rolling walker.   5. Ascend/descend curb step stairs with CGA and rolling walker.                      Reasons for Discontinuation of Therapy Services  Transfer to alternate level of care.      Plan:     Patient Discharged to: Home with Home Health Service.    Palak Godfrey, PT  2/15/2018     PT of record not available for this documentation

## 2018-02-15 NOTE — PROGRESS NOTES
8:32am - SW f/u with IV Services, [phone], spoke to Frances, stated she is currently mixing the medications and will be out to hospital to teach pt before noon.

## 2018-02-15 NOTE — PROGRESS NOTES
Patient negative pressure dressing switched to home vac.  Teaching with family re use of disposable 7 day vac, to notify physician if sponge becomes puffy as that is an indicator of suction, and alarms on vac.  Patient verbalizes understanding.  Negative pressure achieved at 125 mmhg.

## 2018-02-16 ENCOUNTER — ANESTHESIA EVENT (OUTPATIENT)
Dept: SURGERY | Facility: OTHER | Age: 48
End: 2018-02-16

## 2018-02-16 LAB
BACTERIA THROAT CULT: NORMAL
BACTERIA THROAT CULT: NORMAL
GRAM STN SPEC: NORMAL
GRAM STN SPEC: NORMAL

## 2018-02-19 ENCOUNTER — PATIENT MESSAGE (OUTPATIENT)
Dept: SPORTS MEDICINE | Facility: CLINIC | Age: 48
End: 2018-02-19

## 2018-02-19 ENCOUNTER — OFFICE VISIT (OUTPATIENT)
Dept: SPORTS MEDICINE | Facility: CLINIC | Age: 48
DRG: 486 | End: 2018-02-19
Payer: MEDICAID

## 2018-02-19 ENCOUNTER — TELEPHONE (OUTPATIENT)
Dept: SPORTS MEDICINE | Facility: CLINIC | Age: 48
End: 2018-02-19

## 2018-02-19 VITALS
SYSTOLIC BLOOD PRESSURE: 126 MMHG | WEIGHT: 236 LBS | HEART RATE: 88 BPM | DIASTOLIC BLOOD PRESSURE: 81 MMHG | BODY MASS INDEX: 40.29 KG/M2 | HEIGHT: 64 IN

## 2018-02-19 DIAGNOSIS — T84.53XA INFECTION AND INFLAMMATORY REACTION DUE TO INTERNAL RIGHT KNEE PROSTHESIS, INITIAL ENCOUNTER: ICD-10-CM

## 2018-02-19 DIAGNOSIS — T84.53XA INFECTION OF TOTAL RIGHT KNEE REPLACEMENT, INITIAL ENCOUNTER: ICD-10-CM

## 2018-02-19 DIAGNOSIS — M17.11 PRIMARY OSTEOARTHRITIS OF RIGHT KNEE: ICD-10-CM

## 2018-02-19 DIAGNOSIS — M00.869 BACTERIAL INFECTION OF KNEE JOINT: Primary | ICD-10-CM

## 2018-02-19 DIAGNOSIS — Z98.890 S/P KNEE SURGERY: Primary | ICD-10-CM

## 2018-02-19 LAB
APPEARANCE FLD: NORMAL
BACTERIA SPEC ANAEROBE CULT: NORMAL
BODY FLD TYPE: NORMAL
COLOR FLD: NORMAL
GRAM STN SPEC: NORMAL
GRAM STN SPEC: NORMAL
LYMPHOCYTES NFR FLD MANUAL: 2 %
MONOS+MACROS NFR FLD MANUAL: 6 %
NEUTROPHILS NFR FLD MANUAL: 92 %
WBC # FLD: NORMAL /CU MM

## 2018-02-19 PROCEDURE — 87186 SC STD MICRODIL/AGAR DIL: CPT

## 2018-02-19 PROCEDURE — 87075 CULTR BACTERIA EXCEPT BLOOD: CPT

## 2018-02-19 PROCEDURE — 99999 PR PBB SHADOW E&M-EST. PATIENT-LVL III: CPT | Mod: PBBFAC,,, | Performed by: ORTHOPAEDIC SURGERY

## 2018-02-19 PROCEDURE — 87205 SMEAR GRAM STAIN: CPT

## 2018-02-19 PROCEDURE — 99213 OFFICE O/P EST LOW 20 MIN: CPT | Mod: PBBFAC,PO | Performed by: ORTHOPAEDIC SURGERY

## 2018-02-19 PROCEDURE — 87206 SMEAR FLUORESCENT/ACID STAI: CPT

## 2018-02-19 PROCEDURE — 87116 MYCOBACTERIA CULTURE: CPT

## 2018-02-19 PROCEDURE — 87102 FUNGUS ISOLATION CULTURE: CPT

## 2018-02-19 PROCEDURE — 89051 BODY FLUID CELL COUNT: CPT

## 2018-02-19 PROCEDURE — 87070 CULTURE OTHR SPECIMN AEROBIC: CPT

## 2018-02-19 PROCEDURE — 87077 CULTURE AEROBIC IDENTIFY: CPT

## 2018-02-19 PROCEDURE — 99024 POSTOP FOLLOW-UP VISIT: CPT | Mod: ,,, | Performed by: ORTHOPAEDIC SURGERY

## 2018-02-19 RX ORDER — HYDROXYZINE PAMOATE 50 MG/1
50 CAPSULE ORAL EVERY 6 HOURS PRN
Qty: 30 CAPSULE | Refills: 0 | Status: SHIPPED | OUTPATIENT
Start: 2018-02-19 | End: 2019-05-22 | Stop reason: ALTCHOICE

## 2018-02-19 RX ORDER — SODIUM CHLORIDE 9 MG/ML
INJECTION, SOLUTION INTRAVENOUS CONTINUOUS
Status: CANCELLED | OUTPATIENT
Start: 2018-02-19

## 2018-02-19 NOTE — DISCHARGE SUMMARY
[facility]    Discharge Note    SUMMARY     Admit Date: 2/12/2018    Discharge Date and Time:  2/15/2018  4:00 PM    Pre-op Diagnosis:  S/P TKR (total knee replacement), right [Z96.651]  Infection of right knee [M00.9]    Post-op Diagnosis:  Post-Op Diagnosis Codes:     * S/P TKR (total knee replacement), right [Z96.651]     * Infection of right knee [M00.9]    Procedure: Procedure(s) (LRB):  INCISION AND DRAINAGE-KNEE (Right)    Hospital Course (synopsis of major diagnoses, care, treatment, and services provided during the course of the hospital stay): Patient underwent knee surgery and was transferred to PACU in stable condition.  In PACU, patient received appropriate post-operative care and was transferred to medical floor for neurovascular monitoring and pain control.  Cultures were followed post op. The pt was started on IV antibiotics and these were arranged for home. There patient progressed appropriately. Once patient was ready, female was discharged home with plans for physical therapy and follow-up with the operative surgeon.    Diet: Regular       Final Diagnosis: Post-Op Diagnosis Codes:     * S/P TKR (total knee replacement), right [Z96.651]     * Infection of right knee [M00.9]    Disposition: Home or Self Care    Follow Up/Patient Instructions:     Medications:  Reconciled Home Medications:   Discharge Medication List as of 2/15/2018 12:48 PM      START taking these medications    Details   cefTRIAXone 2 g in dextrose 5 % 50 mL (ROCEPHIN) 2 g/50 mL PgBk IVPB Inject 50 mLs (2 g total) into the vein once daily., Starting Wed 2/14/2018, Until Mon 3/26/2018, No Print      VANCOMYCIN HCL (VANCOMYCIN 1 G/250 ML D5W, READY TO MIX SYSTEM,) Inject 250 mLs (1,000 mg total) into the vein every 12 (twelve) hours., Starting Wed 2/14/2018, No Print         CONTINUE these medications which have CHANGED    Details   oxyCODONE-acetaminophen (PERCOCET)  mg per tablet Take 1 tablet by mouth every 6  (six) hours as needed for Pain., Starting Wed 2/14/2018, Until Wed 2/21/2018, Normal         CONTINUE these medications which have NOT CHANGED    Details   amLODIPine (NORVASC) 10 MG tablet Take 10 mg by mouth once daily., Historical Med      aspirin (ECOTRIN) 325 MG EC tablet Take 1 tablet (325 mg total) by mouth once daily., Starting Thu 1/18/2018, Until Mon 2/12/2018, Normal      ranitidine (ZANTAC) 75 MG tablet Take 75 mg by mouth as needed for Heartburn., Historical Med      traMADol (ULTRAM) 50 mg tablet Take 1 tablet (50 mg total) by mouth 2 (two) times daily as needed., Starting Mon 2/5/2018, Until Thu 2/15/2018, Normal      celecoxib (CELEBREX) 200 MG capsule Take 1 capsule (200 mg total) by mouth 2 (two) times daily., Starting Wed 1/31/2018, Normal      !! promethazine (PHENERGAN) 25 MG tablet Take 1 tablet (25 mg total) by mouth every 6 (six) hours as needed for Nausea., Starting Fri 7/21/2017, Normal      !! promethazine (PHENERGAN) 25 MG tablet Take 1 tablet (25 mg total) by mouth every 4 (four) hours., Starting Thu 1/18/2018, Normal       !! - Potential duplicate medications found. Please discuss with provider.          Discharge Procedure Orders  Diet general     Call MD for:  temperature >100.4     Call MD for:  persistent nausea and vomiting     Call MD for:  severe uncontrolled pain     Call MD for:  difficulty breathing, headache or visual disturbances     Call MD for:  redness, tenderness, or signs of infection (pain, swelling, redness, odor or green/yellow discharge around incision site)     Call MD for:  hives     Call MD for:  persistent dizziness or light-headedness     Call MD for:  extreme fatigue     Weight bearing as tolerated   Order Comments: WBAT with RLE in extension at all times.     Leave dressing on - Keep it clean, dry, and intact until clinic visit       Follow-up Information     Matt Smith MD On 2/19/2018.    Specialties:  Sports Medicine, Orthopedic Surgery  Why:  For wound  re-check  Contact information:  [address] [place] [address]  [phone]             [facility].    Specialty:  Home Health Services  Why:  Home Health  Contact information:  [address] [place] [id number] [phone]             [facility].    Specialties:  Pharmacist, IV Infusion  Why:  Infusion  Contact information:  [address] [place] [id number] [phone]

## 2018-02-19 NOTE — H&P
Brionna Jones  is here for a completion of her perioperative paperwork. she  Is scheduled to undergo   1. Right knee incision and drainage                           2. Right knee drain application   on 2/20/18.  She is a healthy individual and does not need clearance for this procedure.     Risks, indications and benefits of the surgical procedure were discussed with the patient. All questions with regard to surgery, rehab, expected return to functional activities, activities of daily living and recreational endeavors were answered to her satisfaction.    Once no other questions were asked, a brief history and physical exam was then performed.      PHYSICAL EXAM:  GEN: A&Ox3, WD WN NAD  HEENT: WNL  CHEST: CTAB, no W/R/R  HEART: RRR, no M/R/G  ABD: Soft, NT ND, BS x4 QUADS  MS; See Epic  NEURO: CN II-XII intact       The surgical consent was then reviewed with the patient, who agreed with all the contents of the consent form and it was signed. she was then given the [facility] surgery packet to bring with her to [facility] for the anesthesia portion of her perioperative paperwork.     PHYSICAL THERAPY:  She will hold physical therapy at this time    POST OP CARE:instructions were reviewed including care of the wound and dressing after surgery and when she can shower.     PAIN MANAGEMENT: Brionna Jones was also given  pain management regime.  PAIN MEDICATION:  Percocet 10/325mg 1 po q 4-6 hours prn pain  Ultram 50 mg one p.o. q.4-6 hours p.r.n. breakthrough pain,   Phenergan 25 mg one p.o. q.4-6 hours p.r.n. nausea and vomiting.    As there were no other questions to be asked, she was given my business card along with Matt Smith MD business card if she has any questions or concerns prior to surgery or in the postop period.

## 2018-02-19 NOTE — PROGRESS NOTES
Subjective:          Chief Complaint: Brionna Jones is a 47 y.o. female who had concerns including Pain of the Right Knee.    Patient is here for a follow up for her right knee.     DATE OF PROCEDURE: 2/12/2018      PREOPERATIVE DIAGNOSES:   1. Right total knee wound dehiscence  2. Right periprosthetic acute infection, suspected      POSTOPERATIVE DIAGNOSES:   1. Right total knee wound dehiscence   2. Right periprosthetic acute infection, suspected      PROCEDURES PERFORMED:   1. Irrigation and excisional debridement of right total knee  2. Revision complex wound closure of right total knee  3. Application of incisional negative pressure dressing (<50cm2) of right total knee    DATE OF PROCEDURE:  1/23/2018     ATTENDING SURGEON: Surgeon(s) and Role:     * Matt Smith MD - Primary     Co-Surgeon:Adán Linda MD      SECOND ASSISTANT: Angie Dominguez PA-C        Co-Surgeon Duties: Due to the complexity of the case and the need for significant intra-operative decision making co-surgeon duties were medically necessary. The chronicity of the disease process and the level of deformity dictated that co-surgeon duties be undertaken. A separate note will be dictated/reported by Dr. Matt Smith stating the specific co-surgeon activities and roles performed during the surgery.        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 and Genu Varum (acquired) M21.169     PROCEDURES PERFORMED:  right Replacement, Knee, Medial and Lateral compartment (Total Knee) 38786    Surgery Date: 11/11/2014      Surgeon(s) and Role:  * Matt Smith MD - Primary     Pre-op Diagnosis: Unspecified internal derangement of knee (717.9)  Tear of medial cartilage or meniscus of knee, current (836.0)     Post-op Diagnosis: Same     Procedure(s) (LRB):  ARTHROSCOPY-KNEE (Right)  MENISCECTOMY  (Right)  REMOVAL-FOREIGN BODY / LOOSE BODY (Right)    There was an area of 1 cm x 1 cm area on the lateral tibial plateau, which   showed grade III and grade IV chondral defect.         Pain   Associated symptoms include joint swelling. Pertinent negatives include no abdominal pain, chest pain, chills, congestion, coughing, fever, headaches, myalgias, nausea, numbness, rash, sore throat or vomiting.   Knee Pain    Associated symptoms include stiffness. Pertinent negatives include no fever, itching or numbness.         Review of Systems   Constitution: Negative. Negative for chills, fever, weight gain and weight loss.   HENT: Negative for congestion and sore throat.    Eyes: Negative for blurred vision and double vision.   Cardiovascular: Negative for chest pain, leg swelling and palpitations.   Respiratory: Negative for cough and shortness of breath.    Hematologic/Lymphatic: Does not bruise/bleed easily.   Skin: Negative for itching, poor wound healing and rash.   Musculoskeletal: Positive for joint pain, joint swelling and stiffness. Negative for back pain, muscle weakness and myalgias.   Gastrointestinal: Negative for abdominal pain, constipation, diarrhea, nausea and vomiting.   Genitourinary: Negative.  Negative for frequency and hematuria.   Neurological: Negative for dizziness, headaches, numbness, paresthesias and sensory change.   Psychiatric/Behavioral: Negative for altered mental status and depression. The patient is not nervous/anxious.    Allergic/Immunologic: Negative for hives.       Pain Related Questions  Over the past 3 days, what was your average pain during activity? (I.e. running, jogging, walking, climbing stairs, getting dressed, ect.): 10  Over the past 3 days, what was your highest pain level?: 10  Over the past 3 days, what was your lowest pain level? : 7    Other  How many nights a week are you awakened by your affected body part?: 0  Was the patient's HEIGHT measured or patient reported?:  Patient Reported  Was the patient's WEIGHT measured or patient reported?: Measured      Objective:        General: Brionna is well-developed, well-nourished, appears stated age, in no acute distress, alert and oriented to time, place and person.     General    Vitals reviewed.  Constitutional: She is oriented to person, place, and time. She appears well-developed and well-nourished. No distress.   HENT:   Head: Normocephalic and atraumatic.   Mouth/Throat: No oropharyngeal exudate.   Eyes: EOM are normal. Right eye exhibits no discharge. Left eye exhibits no discharge.   Neck: Normal range of motion.   Cardiovascular: Normal rate and regular rhythm.    Pulmonary/Chest: Effort normal and breath sounds normal. No respiratory distress.   Neurological: She is alert and oriented to person, place, and time. She has normal reflexes. No cranial nerve deficit. Coordination normal.   Psychiatric: She has a normal mood and affect. Her behavior is normal. Judgment and thought content normal.     General Musculoskeletal Exam   Gait: normal       Right Knee Exam     Inspection   Erythema: absent  Scars: present  Swelling: present  Effusion: present  Deformity: deformity  Bruising: absent    Tenderness   The patient is tender to palpation of the patella, lateral joint line and medial joint line.    Crepitus   The patient has crepitus of the patella.    Range of Motion   Extension:  0 normal   Flexion:  90 abnormal     Tests   Meniscus   Zeenat:  Medial - negative Lateral - negative  Ligament Examination Lachman: normal (-1 to 2mm) PCL-Posterior Drawer: normal (0 to 2mm)     MCL - Valgus: normal (0 to 2mm)  LCL - Varus: normalPivot Shift: normal (Equal)Reverse Pivot Shift: normal (Equal)Dial Test at 30 degrees: normal (< 5 degrees)Dial Test at 90 degrees: normal (< 5 degrees)  Posterior Sag Test: negative  Posterolateral Corner: unstable (>15 degrees difference)  Patella   Patellar Apprehension: negative  Passive Patellar Tilt:  neutral  Patellar Tracking: normal  Patellar Glide (quadrants): Lateral - 1   Medial - 2  Q-Angle at 90 degrees: normal  Patellar Grind: negative  J-Sign: none    Other   Meniscal Cyst: absent  Popliteal (Baker's) Cyst: absent  Sensation: normal    Comments:  Incision intact with mild maceration of skin edges. Serous joint fluid expressed. Slight erythema and warmth throughout knee. NVI.    Left Knee Exam     Inspection   Erythema: absent  Scars: absent  Swelling: absent  Effusion: absent  Deformity: deformity  Bruising: absent    Tenderness   The patient is experiencing no tenderness.         Range of Motion   Extension:  -5 normal   Flexion:  130 normal     Tests   Meniscus   Zeenat:  Medial - negative Lateral - negative  Stability Lachman: normal (-1 to 2mm) PCL-Posterior Drawer: normal (0 to 2mm)  MCL - Valgus: normal (0 to 2mm)  LCL - Varus: normal (0 to 2mm)Pivot Shift: normal (Equal)Reverse Pivot Shift: normal (Equal)Dial Test at 30 degrees: normal (< 5 degrees)Dial Test at 90 degrees: normal (< 5 degrees)  Posterior Sag Test: negative  Posterolateral Corner: unstable (>15 degrees difference)  Patella   Patellar Apprehension: negative  Passive Patellar Tilt: neutral  Patellar Tracking: normal  Patellar Glide (Quadrants): Lateral - 1 Medial - 2  Q-Angle at 90 degrees: normal  Patellar Grind: positive  J-Sign: J sign absent    Other   Meniscal Cyst: absent  Popliteal (Baker's) Cyst: absent  Sensation: normal    Right Hip Exam     Tests   Aimee: negative  Left Hip Exam     Tests   Aimee: negative          Muscle Strength   Right Lower Extremity   Hip Abduction: 4/5   Quadriceps:  4/5   Hamstrin/5   Left Lower Extremity   Hip Abduction: 5/5   Quadriceps:  5/5   Hamstrin/5     Reflexes     Left Side  Quadriceps:  2+  Achilles:  2+    Right Side   Quadriceps:  2+  Achilles:  2+    Vascular Exam     Right Pulses  Dorsalis Pedis:      2+  Posterior Tibial:      2+        Left Pulses  Dorsalis Pedis:       "2+  Posterior Tibial:      2+          Proteus mirabilis       CULTURE, TISSUE     Amox/K Clav'ate <=8/4 "><=8/4  Sensitive     Amp/Sulbactam <=8/4 "><=8/4  Sensitive     Ampicillin <=8 "><=8  Sensitive     Cefazolin <=8 "><=8  Sensitive     Cefepime <=8 "><=8  Sensitive     Ceftriaxone <=8 "><=8  Sensitive     Ciprofloxacin <=1 "><=1  Sensitive     Ertapenem <=2 "><=2  Sensitive     Gentamicin <=4 "><=4  Sensitive     Meropenem <=4 "><=4  Sensitive     Piperacillin/Tazo <=16 "><=16  Sensitive     Tobramycin <=4 "><=4  Sensitive     Trimeth/Sulfa <=2/38 "><=2/38  Sensitive           Susceptibility      Escherichia coli     CULTURE, TISSUE     Amox/K Clav'ate <=8/4 "><=8/4  Sensitive     Amp/Sulbactam >16/8  Resistant     Ampicillin >16  Resistant     Cefazolin <=8 "><=8  Sensitive     Cefepime <=8 "><=8  Sensitive     Ceftriaxone <=8 "><=8  Sensitive     Ciprofloxacin <=1 "><=1  Sensitive     Ertapenem <=2 "><=2  Sensitive     Gentamicin <=4 "><=4  Sensitive     Meropenem <=4 "><=4  Sensitive     Piperacillin/Tazo <=16 "><=16  Sensitive     Tetracycline <=4 "><=4  Sensitive     Tobramycin <=4 "><=4  Sensitive     Trimeth/Sulfa <=2/38 "><=2/38  Sensitive          RADIOGRAPHS TODAY  Radiographs ordered and reviewed today in clinic of the right knee demonstrates Conformis iTotal knee replacement .           Assessment:       Encounter Diagnoses   Name Primary?    S/P knee surgery Yes    Primary osteoarthritis of right knee     Infection and inflammatory reaction due to internal right knee prosthesis, initial encounter           Plan:       1. IKDC, SF-12 and KOOS was not filled out today in clinic.     RTC in 1 weeks with Dr. Matt Smith Patient will not fill out IKDC, SF-12 and KOOS on return.    2. We reviewed with Brionna today, the pathology and natural history of her diagnosis. We have discussed a variety of treatment options including medications, physical therapy and other alternative treatments. I also " explained the indications, risks and benefits of surgery. After discussion, Brionna decided to proceed with surgery. The decision was made to go forward with:     1. Right knee incision and drainage     2. Right knee drain application    The details of the surgical procedure were explained, including the location of probable incisions and a description of likely hardware and/or grafts to be used.  The patient understands the likely convalescence after surgery.  Also, we have thoroughly discussed the risks, benefits and alternatives to surgery, including, but not limited to, the risk of infection, joint stiffness, blood clot (including DVT and/or pulmonary embolus), neurologic and vascular injury.  It was explained that, if tissue has been repaired or reconstructed, there is a chance of failure, which may require further management.      All of the patient's questions were answered and informed consent was obtained. The patient will contact us if they have any questions or concerns in the interim.    Aspiration Procedure    After time out was performed, including verification of patient ID, procedure, site and side, availability of information and equipment, review of safety issues, and agreement with consent, the procedure site was marked and the patient was prepped aseptically. 30cc's of serosanguineous fluid was aspirated from the right medial knee using an 21.5g x 1.5 needle in sterile fashion without complication. Bandage was applied. Patient was reminded to rest with RICE for 48 hours post injection and to call the clinic immediately for any adverse side effects as explained in clinic today.                    Patient questionnaires may have been collected.

## 2018-02-20 ENCOUNTER — ANESTHESIA EVENT (OUTPATIENT)
Dept: SURGERY | Facility: OTHER | Age: 48
DRG: 486 | End: 2018-02-20
Payer: MEDICAID

## 2018-02-20 ENCOUNTER — ANESTHESIA (OUTPATIENT)
Dept: SURGERY | Facility: OTHER | Age: 48
DRG: 486 | End: 2018-02-20
Payer: MEDICAID

## 2018-02-20 ENCOUNTER — ANESTHESIA (OUTPATIENT)
Dept: SURGERY | Facility: OTHER | Age: 48
End: 2018-02-20

## 2018-02-20 ENCOUNTER — HOSPITAL ENCOUNTER (INPATIENT)
Facility: OTHER | Age: 48
LOS: 1 days | Discharge: HOME HEALTH | DRG: 486 | End: 2018-02-21
Attending: ORTHOPAEDIC SURGERY | Admitting: ORTHOPAEDIC SURGERY
Payer: MEDICAID

## 2018-02-20 ENCOUNTER — SURGERY (OUTPATIENT)
Age: 48
End: 2018-02-20

## 2018-02-20 DIAGNOSIS — T84.53XA INFECTION AND INFLAMMATORY REACTION DUE TO INTERNAL RIGHT KNEE PROSTHESIS, INITIAL ENCOUNTER: ICD-10-CM

## 2018-02-20 DIAGNOSIS — Z98.890 S/P KNEE SURGERY: ICD-10-CM

## 2018-02-20 LAB
B-HCG UR QL: NEGATIVE
CTP QC/QA: YES

## 2018-02-20 PROCEDURE — V2790 AMNIOTIC MEMBRANE: HCPCS | Performed by: ORTHOPAEDIC SURGERY

## 2018-02-20 PROCEDURE — 71000039 HC RECOVERY, EACH ADD'L HOUR: Performed by: ORTHOPAEDIC SURGERY

## 2018-02-20 PROCEDURE — 63600175 PHARM REV CODE 636 W HCPCS: Performed by: SPECIALIST

## 2018-02-20 PROCEDURE — 81025 URINE PREGNANCY TEST: CPT | Performed by: SPECIALIST

## 2018-02-20 PROCEDURE — 37000008 HC ANESTHESIA 1ST 15 MINUTES: Performed by: ORTHOPAEDIC SURGERY

## 2018-02-20 PROCEDURE — 63600175 PHARM REV CODE 636 W HCPCS: Performed by: PHYSICIAN ASSISTANT

## 2018-02-20 PROCEDURE — 87075 CULTR BACTERIA EXCEPT BLOOD: CPT

## 2018-02-20 PROCEDURE — 25000003 PHARM REV CODE 250: Performed by: SPECIALIST

## 2018-02-20 PROCEDURE — 71000033 HC RECOVERY, INTIAL HOUR: Performed by: ORTHOPAEDIC SURGERY

## 2018-02-20 PROCEDURE — 0LUQ0KZ SUPPLEMENT RIGHT KNEE TENDON WITH NONAUTOLOGOUS TISSUE SUBSTITUTE, OPEN APPROACH: ICD-10-PCS | Performed by: ORTHOPAEDIC SURGERY

## 2018-02-20 PROCEDURE — 0SBC0ZZ EXCISION OF RIGHT KNEE JOINT, OPEN APPROACH: ICD-10-PCS | Performed by: ORTHOPAEDIC SURGERY

## 2018-02-20 PROCEDURE — 36000705 HC OR TIME LEV I EA ADD 15 MIN: Performed by: ORTHOPAEDIC SURGERY

## 2018-02-20 PROCEDURE — 63600175 PHARM REV CODE 636 W HCPCS: Performed by: ORTHOPAEDIC SURGERY

## 2018-02-20 PROCEDURE — 27301 DRAIN THIGH/KNEE LESION: CPT | Mod: 58,RT,, | Performed by: ORTHOPAEDIC SURGERY

## 2018-02-20 PROCEDURE — 99900035 HC TECH TIME PER 15 MIN (STAT)

## 2018-02-20 PROCEDURE — 63600175 PHARM REV CODE 636 W HCPCS: Performed by: NURSE ANESTHETIST, CERTIFIED REGISTERED

## 2018-02-20 PROCEDURE — 27599 UNLISTED PX FEMUR/KNEE: CPT | Mod: ,,, | Performed by: ORTHOPAEDIC SURGERY

## 2018-02-20 PROCEDURE — 25000003 PHARM REV CODE 250: Performed by: NURSE ANESTHETIST, CERTIFIED REGISTERED

## 2018-02-20 PROCEDURE — 87077 CULTURE AEROBIC IDENTIFY: CPT

## 2018-02-20 PROCEDURE — 87070 CULTURE OTHR SPECIMN AEROBIC: CPT

## 2018-02-20 PROCEDURE — 25000003 PHARM REV CODE 250: Performed by: ORTHOPAEDIC SURGERY

## 2018-02-20 PROCEDURE — 37000009 HC ANESTHESIA EA ADD 15 MINS: Performed by: ORTHOPAEDIC SURGERY

## 2018-02-20 PROCEDURE — C1729 CATH, DRAINAGE: HCPCS | Performed by: ORTHOPAEDIC SURGERY

## 2018-02-20 PROCEDURE — 94761 N-INVAS EAR/PLS OXIMETRY MLT: CPT

## 2018-02-20 PROCEDURE — 87186 SC STD MICRODIL/AGAR DIL: CPT

## 2018-02-20 PROCEDURE — 0S9C00Z DRAINAGE OF RIGHT KNEE JOINT WITH DRAINAGE DEVICE, OPEN APPROACH: ICD-10-PCS | Performed by: ORTHOPAEDIC SURGERY

## 2018-02-20 PROCEDURE — 36000704 HC OR TIME LEV I 1ST 15 MIN: Performed by: ORTHOPAEDIC SURGERY

## 2018-02-20 PROCEDURE — 25000003 PHARM REV CODE 250

## 2018-02-20 PROCEDURE — 25000003 PHARM REV CODE 250: Performed by: PHYSICIAN ASSISTANT

## 2018-02-20 DEVICE — MATRIX REGENERATIVE CLARIX FLO: Type: IMPLANTABLE DEVICE | Site: KNEE | Status: FUNCTIONAL

## 2018-02-20 DEVICE — ALLOPATCH HD THCK 4 X 8: Type: IMPLANTABLE DEVICE | Site: KNEE | Status: FUNCTIONAL

## 2018-02-20 RX ORDER — AMLODIPINE BESYLATE 5 MG/1
10 TABLET ORAL DAILY
Status: DISCONTINUED | OUTPATIENT
Start: 2018-02-21 | End: 2018-02-23 | Stop reason: HOSPADM

## 2018-02-20 RX ORDER — PROPOFOL 10 MG/ML
VIAL (ML) INTRAVENOUS
Status: DISCONTINUED | OUTPATIENT
Start: 2018-02-20 | End: 2018-02-20

## 2018-02-20 RX ORDER — PHENYLEPHRINE HYDROCHLORIDE 10 MG/ML
INJECTION INTRAVENOUS
Status: DISCONTINUED | OUTPATIENT
Start: 2018-02-20 | End: 2018-02-20

## 2018-02-20 RX ORDER — GLYCOPYRROLATE 0.2 MG/ML
INJECTION INTRAMUSCULAR; INTRAVENOUS
Status: DISCONTINUED | OUTPATIENT
Start: 2018-02-20 | End: 2018-02-20

## 2018-02-20 RX ORDER — LABETALOL HYDROCHLORIDE 5 MG/ML
INJECTION, SOLUTION INTRAVENOUS
Status: COMPLETED
Start: 2018-02-20 | End: 2018-02-20

## 2018-02-20 RX ORDER — DIPHENHYDRAMINE HYDROCHLORIDE 50 MG/ML
25 INJECTION INTRAMUSCULAR; INTRAVENOUS EVERY 6 HOURS PRN
Status: DISCONTINUED | OUTPATIENT
Start: 2018-02-20 | End: 2018-02-20

## 2018-02-20 RX ORDER — MEPERIDINE HYDROCHLORIDE 50 MG/ML
12.5 INJECTION INTRAMUSCULAR; INTRAVENOUS; SUBCUTANEOUS ONCE AS NEEDED
Status: DISCONTINUED | OUTPATIENT
Start: 2018-02-20 | End: 2018-02-20

## 2018-02-20 RX ORDER — LABETALOL HYDROCHLORIDE 5 MG/ML
10 INJECTION, SOLUTION INTRAVENOUS
Status: COMPLETED | OUTPATIENT
Start: 2018-02-20 | End: 2018-02-20

## 2018-02-20 RX ORDER — OXYCODONE HCL 10 MG/1
10 TABLET, FILM COATED, EXTENDED RELEASE ORAL EVERY 12 HOURS
Status: DISPENSED | OUTPATIENT
Start: 2018-02-20 | End: 2018-02-22

## 2018-02-20 RX ORDER — MIDAZOLAM HYDROCHLORIDE 1 MG/ML
INJECTION INTRAMUSCULAR; INTRAVENOUS
Status: DISCONTINUED | OUTPATIENT
Start: 2018-02-20 | End: 2018-02-20

## 2018-02-20 RX ORDER — SODIUM CHLORIDE 9 MG/ML
INJECTION, SOLUTION INTRAVENOUS CONTINUOUS
Status: DISCONTINUED | OUTPATIENT
Start: 2018-02-20 | End: 2018-02-20

## 2018-02-20 RX ORDER — HYDROMORPHONE HYDROCHLORIDE 2 MG/ML
INJECTION, SOLUTION INTRAMUSCULAR; INTRAVENOUS; SUBCUTANEOUS
Status: DISCONTINUED | OUTPATIENT
Start: 2018-02-20 | End: 2018-02-20

## 2018-02-20 RX ORDER — HYDROMORPHONE HYDROCHLORIDE 1 MG/ML
0.5 INJECTION, SOLUTION INTRAMUSCULAR; INTRAVENOUS; SUBCUTANEOUS EVERY 6 HOURS PRN
Status: DISCONTINUED | OUTPATIENT
Start: 2018-02-20 | End: 2018-02-23 | Stop reason: HOSPADM

## 2018-02-20 RX ORDER — OXYCODONE HYDROCHLORIDE 5 MG/1
10 TABLET ORAL EVERY 4 HOURS PRN
Status: DISCONTINUED | OUTPATIENT
Start: 2018-02-20 | End: 2018-02-20

## 2018-02-20 RX ORDER — DEXAMETHASONE SODIUM PHOSPHATE 4 MG/ML
INJECTION, SOLUTION INTRA-ARTICULAR; INTRALESIONAL; INTRAMUSCULAR; INTRAVENOUS; SOFT TISSUE
Status: DISCONTINUED | OUTPATIENT
Start: 2018-02-20 | End: 2018-02-20

## 2018-02-20 RX ORDER — ONDANSETRON 8 MG/1
8 TABLET, ORALLY DISINTEGRATING ORAL EVERY 8 HOURS PRN
Status: DISCONTINUED | OUTPATIENT
Start: 2018-02-20 | End: 2018-02-23 | Stop reason: HOSPADM

## 2018-02-20 RX ORDER — SODIUM CHLORIDE 0.9 % (FLUSH) 0.9 %
3 SYRINGE (ML) INJECTION
Status: DISCONTINUED | OUTPATIENT
Start: 2018-02-20 | End: 2018-02-20

## 2018-02-20 RX ORDER — LIDOCAINE HCL/PF 100 MG/5ML
SYRINGE (ML) INTRAVENOUS
Status: DISCONTINUED | OUTPATIENT
Start: 2018-02-20 | End: 2018-02-20

## 2018-02-20 RX ORDER — OXYCODONE AND ACETAMINOPHEN 10; 325 MG/1; MG/1
2 TABLET ORAL EVERY 6 HOURS PRN
Status: DISCONTINUED | OUTPATIENT
Start: 2018-02-20 | End: 2018-02-23 | Stop reason: HOSPADM

## 2018-02-20 RX ORDER — FENTANYL CITRATE 50 UG/ML
INJECTION, SOLUTION INTRAMUSCULAR; INTRAVENOUS
Status: DISCONTINUED | OUTPATIENT
Start: 2018-02-20 | End: 2018-02-20

## 2018-02-20 RX ORDER — CEFAZOLIN SODIUM 2 G/50ML
2 SOLUTION INTRAVENOUS
Status: COMPLETED | OUTPATIENT
Start: 2018-02-20 | End: 2018-02-20

## 2018-02-20 RX ORDER — HYDROMORPHONE HYDROCHLORIDE 2 MG/ML
0.4 INJECTION, SOLUTION INTRAMUSCULAR; INTRAVENOUS; SUBCUTANEOUS EVERY 5 MIN PRN
Status: DISCONTINUED | OUTPATIENT
Start: 2018-02-20 | End: 2018-02-20

## 2018-02-20 RX ORDER — PROMETHAZINE HYDROCHLORIDE 25 MG/1
25 TABLET ORAL EVERY 6 HOURS PRN
Status: DISCONTINUED | OUTPATIENT
Start: 2018-02-20 | End: 2018-02-20

## 2018-02-20 RX ORDER — SODIUM CHLORIDE, SODIUM LACTATE, POTASSIUM CHLORIDE, CALCIUM CHLORIDE 600; 310; 30; 20 MG/100ML; MG/100ML; MG/100ML; MG/100ML
INJECTION, SOLUTION INTRAVENOUS CONTINUOUS PRN
Status: DISCONTINUED | OUTPATIENT
Start: 2018-02-20 | End: 2018-02-20

## 2018-02-20 RX ORDER — ONDANSETRON 2 MG/ML
4 INJECTION INTRAMUSCULAR; INTRAVENOUS EVERY 12 HOURS PRN
Status: DISCONTINUED | OUTPATIENT
Start: 2018-02-20 | End: 2018-02-20

## 2018-02-20 RX ORDER — KETAMINE HYDROCHLORIDE 50 MG/ML
INJECTION, SOLUTION INTRAMUSCULAR; INTRAVENOUS
Status: DISCONTINUED | OUTPATIENT
Start: 2018-02-20 | End: 2018-02-20

## 2018-02-20 RX ORDER — MORPHINE SULFATE 10 MG/ML
2 INJECTION INTRAMUSCULAR; INTRAVENOUS; SUBCUTANEOUS EVERY 4 HOURS PRN
Status: DISCONTINUED | OUTPATIENT
Start: 2018-02-20 | End: 2018-02-20

## 2018-02-20 RX ORDER — FENTANYL CITRATE 50 UG/ML
25 INJECTION, SOLUTION INTRAMUSCULAR; INTRAVENOUS EVERY 5 MIN PRN
Status: DISCONTINUED | OUTPATIENT
Start: 2018-02-20 | End: 2018-02-20

## 2018-02-20 RX ORDER — OXYCODONE AND ACETAMINOPHEN 10; 325 MG/1; MG/1
1 TABLET ORAL EVERY 6 HOURS PRN
Status: DISCONTINUED | OUTPATIENT
Start: 2018-02-20 | End: 2018-02-23 | Stop reason: HOSPADM

## 2018-02-20 RX ORDER — ACETAMINOPHEN 10 MG/ML
INJECTION, SOLUTION INTRAVENOUS
Status: DISCONTINUED | OUTPATIENT
Start: 2018-02-20 | End: 2018-02-20

## 2018-02-20 RX ORDER — DOCUSATE SODIUM 100 MG/1
100 CAPSULE, LIQUID FILLED ORAL 2 TIMES DAILY
Status: DISCONTINUED | OUTPATIENT
Start: 2018-02-20 | End: 2018-02-23 | Stop reason: HOSPADM

## 2018-02-20 RX ORDER — ONDANSETRON 2 MG/ML
INJECTION INTRAMUSCULAR; INTRAVENOUS
Status: DISCONTINUED | OUTPATIENT
Start: 2018-02-20 | End: 2018-02-20

## 2018-02-20 RX ORDER — BACITRACIN 50000 [IU]/1
INJECTION, POWDER, FOR SOLUTION INTRAMUSCULAR
Status: DISCONTINUED | OUTPATIENT
Start: 2018-02-20 | End: 2018-02-20 | Stop reason: HOSPADM

## 2018-02-20 RX ORDER — ONDANSETRON 2 MG/ML
4 INJECTION INTRAMUSCULAR; INTRAVENOUS DAILY PRN
Status: DISCONTINUED | OUTPATIENT
Start: 2018-02-20 | End: 2018-02-20

## 2018-02-20 RX ORDER — OXYCODONE HYDROCHLORIDE 5 MG/1
5 TABLET ORAL
Status: DISCONTINUED | OUTPATIENT
Start: 2018-02-20 | End: 2018-02-20

## 2018-02-20 RX ORDER — LABETALOL HYDROCHLORIDE 5 MG/ML
INJECTION, SOLUTION INTRAVENOUS
Status: DISCONTINUED | OUTPATIENT
Start: 2018-02-20 | End: 2018-02-20

## 2018-02-20 RX ADMIN — ONDANSETRON 4 MG: 2 INJECTION INTRAMUSCULAR; INTRAVENOUS at 03:02

## 2018-02-20 RX ADMIN — GLYCOPYRROLATE 0.2 MG: 0.2 INJECTION, SOLUTION INTRAMUSCULAR; INTRAVENOUS at 03:02

## 2018-02-20 RX ADMIN — PROPOFOL 200 MG: 10 INJECTION, EMULSION INTRAVENOUS at 02:02

## 2018-02-20 RX ADMIN — ACETAMINOPHEN 1000 MG: 10 INJECTION, SOLUTION INTRAVENOUS at 03:02

## 2018-02-20 RX ADMIN — SODIUM CHLORIDE, SODIUM LACTATE, POTASSIUM CHLORIDE, AND CALCIUM CHLORIDE: 600; 310; 30; 20 INJECTION, SOLUTION INTRAVENOUS at 02:02

## 2018-02-20 RX ADMIN — HYDROMORPHONE HYDROCHLORIDE 0.5 MG: 2 INJECTION INTRAMUSCULAR; INTRAVENOUS; SUBCUTANEOUS at 04:02

## 2018-02-20 RX ADMIN — LABETALOL HYDROCHLORIDE 10 MG: 5 INJECTION, SOLUTION INTRAVENOUS at 06:02

## 2018-02-20 RX ADMIN — HYDROMORPHONE HYDROCHLORIDE 0.5 MG: 2 INJECTION INTRAMUSCULAR; INTRAVENOUS; SUBCUTANEOUS at 03:02

## 2018-02-20 RX ADMIN — LIDOCAINE HYDROCHLORIDE 100 MG: 20 INJECTION, SOLUTION INTRAVENOUS at 02:02

## 2018-02-20 RX ADMIN — DIPHENHYDRAMINE HYDROCHLORIDE 25 MG: 50 INJECTION, SOLUTION INTRAMUSCULAR; INTRAVENOUS at 05:02

## 2018-02-20 RX ADMIN — DOCUSATE SODIUM 100 MG: 100 CAPSULE, LIQUID FILLED ORAL at 09:02

## 2018-02-20 RX ADMIN — OXYCODONE HYDROCHLORIDE 10 MG: 5 TABLET ORAL at 05:02

## 2018-02-20 RX ADMIN — FENTANYL CITRATE 50 MCG: 50 INJECTION, SOLUTION INTRAMUSCULAR; INTRAVENOUS at 03:02

## 2018-02-20 RX ADMIN — HYDROMORPHONE HYDROCHLORIDE 1 MG: 2 INJECTION INTRAMUSCULAR; INTRAVENOUS; SUBCUTANEOUS at 05:02

## 2018-02-20 RX ADMIN — OXYCODONE HYDROCHLORIDE 10 MG: 10 TABLET, FILM COATED, EXTENDED RELEASE ORAL at 09:02

## 2018-02-20 RX ADMIN — BACITRACIN 150000 UNITS: 50000 INJECTION, POWDER, FOR SOLUTION INTRAMUSCULAR at 03:02

## 2018-02-20 RX ADMIN — KETAMINE HYDROCHLORIDE 20 MG: 50 INJECTION, SOLUTION INTRAMUSCULAR; INTRAVENOUS at 04:02

## 2018-02-20 RX ADMIN — VANCOMYCIN HYDROCHLORIDE 1 G: 1 INJECTION, POWDER, LYOPHILIZED, FOR SOLUTION INTRAVENOUS at 09:02

## 2018-02-20 RX ADMIN — LABETALOL HYDROCHLORIDE 10 MG: 5 INJECTION, SOLUTION INTRAVENOUS at 04:02

## 2018-02-20 RX ADMIN — PHENYLEPHRINE HYDROCHLORIDE 100 MCG: 10 INJECTION INTRAVENOUS at 03:02

## 2018-02-20 RX ADMIN — DEXAMETHASONE SODIUM PHOSPHATE 8 MG: 4 INJECTION, SOLUTION INTRAMUSCULAR; INTRAVENOUS at 03:02

## 2018-02-20 RX ADMIN — LABETALOL HYDROCHLORIDE 10 MG: 5 INJECTION, SOLUTION INTRAVENOUS at 05:02

## 2018-02-20 RX ADMIN — SODIUM CHLORIDE, SODIUM LACTATE, POTASSIUM CHLORIDE, AND CALCIUM CHLORIDE: 600; 310; 30; 20 INJECTION, SOLUTION INTRAVENOUS at 04:02

## 2018-02-20 RX ADMIN — CEFAZOLIN SODIUM 2 G: 2 SOLUTION INTRAVENOUS at 03:02

## 2018-02-20 RX ADMIN — FENTANYL CITRATE 100 MCG: 50 INJECTION, SOLUTION INTRAMUSCULAR; INTRAVENOUS at 02:02

## 2018-02-20 RX ADMIN — MIDAZOLAM HYDROCHLORIDE 2 MG: 1 INJECTION, SOLUTION INTRAMUSCULAR; INTRAVENOUS at 02:02

## 2018-02-20 NOTE — BRIEF OP NOTE
[facility]    Brief Operative Note     SUMMARY     Surgery Date: 2/20/2018     Surgeon(s) and Role:     * Matt Smith MD - Primary    Assisting Surgeon: None    Pre-op Diagnosis:  Bacterial infection of knee joint [M00.9]  Infection of total right knee replacement, initial encounter [T84.53XA, Z96.651]    Post-op Diagnosis:  Post-Op Diagnosis Codes:     * Bacterial infection of knee joint [M00.9]     * Infection of total right knee replacement, initial encounter [T84.53XA, Z96.651]    Procedure: Procedure(s) (LRB):  INCISION AND DRAINAGE (I & D) (Right)  DRAIN APPLICATION (Right)    Anesthesia: General    Description of the findings of the procedure: See Dictation    Findings/Key Components: see op note    Estimated Blood Loss: * No values recorded between 2/20/2018  3:31 PM and 2/20/2018  4:56 PM *         Specimens:   Specimen (12h ago through future)    None          Disposition: Patient tolerated the procedure well and was transferred to PACU in stable condition.      Discharge Note    SUMMARY     Admit Date: 2/20/2018    Discharge Date and Time:   02/20/2018 4:56 PM    Pre-op Diagnosis:  Bacterial infection of knee joint [M00.9]  Infection of total right knee replacement, initial encounter [T84.53XA, Z96.651]    Post-op Diagnosis:  Post-Op Diagnosis Codes:     * Bacterial infection of knee joint [M00.9]     * Infection of total right knee replacement, initial encounter [T84.53XA, Z96.651]    Procedure: Procedure(s) (LRB):  INCISION AND DRAINAGE (I & D) (Right)  DRAIN APPLICATION (Right)    Hospital Course (synopsis of major diagnoses, care, treatment, and services provided during the course of the hospital stay): Patient underwent outpatient knee surgery and was transferred to PACU in stable condition.  In PACU, patient received appropriate post-operative care and discharged home with plans for physical therapy and follow-up with the operative surgeon.    Diet: Regular       Final Diagnosis:  Post-Op Diagnosis Codes:     * Bacterial infection of knee joint [M00.9]     * Infection of total right knee replacement, initial encounter [T84.53XA, Z96.651]    Disposition: Home or Self Care    Follow Up/Patient Instructions:     Medications:  Reconciled Home Medications:   Current Discharge Medication List      CONTINUE these medications which have NOT CHANGED    Details   amLODIPine (NORVASC) 10 MG tablet Take 10 mg by mouth once daily.      aspirin (ECOTRIN) 325 MG EC tablet Take 1 tablet (325 mg total) by mouth once daily.  Qty: 42 tablet, Refills: 0    Associated Diagnoses: Traumatic arthritis of right knee      cefTRIAXone 2 g in dextrose 5 % 50 mL (ROCEPHIN) 2 g/50 mL PgBk IVPB Inject 50 mLs (2 g total) into the vein once daily.      oxyCODONE-acetaminophen (PERCOCET)  mg per tablet Take 1 tablet by mouth every 6 (six) hours as needed for Pain.  Qty: 40 tablet, Refills: 0    Associated Diagnoses: Traumatic arthritis of right knee      !! promethazine (PHENERGAN) 25 MG tablet Take 1 tablet (25 mg total) by mouth every 6 (six) hours as needed for Nausea.  Qty: 16 tablet, Refills: 0    Associated Diagnoses: Osteoarthritis of right knee, unspecified osteoarthritis type      !! promethazine (PHENERGAN) 25 MG tablet Take 1 tablet (25 mg total) by mouth every 4 (four) hours.  Qty: 30 tablet, Refills: 0    Associated Diagnoses: Traumatic arthritis of right knee      VANCOMYCIN HCL (VANCOMYCIN 1 G/250 ML D5W, READY TO MIX SYSTEM,) Inject 250 mLs (1,000 mg total) into the vein every 12 (twelve) hours.      celecoxib (CELEBREX) 200 MG capsule Take 1 capsule (200 mg total) by mouth 2 (two) times daily.  Qty: 60 capsule, Refills: 2    Associated Diagnoses: S/P total knee replacement, right      hydrOXYzine pamoate (VISTARIL) 50 MG Cap Take 1 capsule (50 mg total) by mouth every 6 (six) hours as needed (itching).  Qty: 30 capsule, Refills: 0      ranitidine (ZANTAC) 75 MG tablet Take 75 mg by mouth as needed for  "Heartburn.       !! - Potential duplicate medications found. Please discuss with provider.          Discharge Procedure Orders  CRUTCHES FOR HOME USE   Order Comments: Provide if needed   Order Specific Question Answer Comments   Type: Axillary    Height: 5' 4" (1.626 m)    Weight: 107 kg (236 lb)    Does patient have medical equipment at home? none    Length of need (1-99 months): 24      Diet general     Call MD for:  temperature >100.4     Call MD for:  persistent nausea and vomiting     Call MD for:  severe uncontrolled pain     Call MD for:  difficulty breathing, headache or visual disturbances     Call MD for:  redness, tenderness, or signs of infection (pain, swelling, redness, odor or green/yellow discharge around incision site)     Call MD for:  hives     Call MD for:  persistent dizziness or light-headedness     Weight bearing as tolerated   Order Comments: Knee locked in extension at all times     Remove dressing in 72 hours       Follow-up Information     Matt Smith MD On 2/28/2018.    Specialties:  Sports Medicine, Orthopedic Surgery  Contact information:  [address]  [facility] [address]  [phone]                   "

## 2018-02-20 NOTE — INTERVAL H&P NOTE
The patient has been examined and the H&P has been reviewed:    I concur with the findings and no changes have occurred since H&P was written.    Anesthesia/Surgery risks, benefits and alternative options discussed and understood by patient/family.          Active Hospital Problems    Diagnosis  POA    S/P knee surgery [Z98.890]  Not Applicable      Resolved Hospital Problems    Diagnosis Date Resolved POA   No resolved problems to display.

## 2018-02-20 NOTE — DISCHARGE INSTRUCTIONS
[address], [place]                                                                                          [phone]                   Postoperative Instructions for Knee Surgery                 Your Surgery Included:   Open               Arthroscopic   [x] Incision and drainage       [] Diagnostic           [] ACL     [] PCL     [] MCL     [] PLLC      [] Synovectomy / Plica Removal [] Meniscal Cartilage Repair / Transplantation      [] Lysis of Adhesions / Manipulation [] Articular Cartilage Repair      [] Interval Release           [] Microfracture       [] OATS   [] ACI      [] Meniscectomy           [] Osteochondral Allograft      [] Meniscal Cartilage Repair  [] Patellar Realignment       [] Debridement / Chondroplasty         [] Lateral Release   [] Ligament Repair      [] Articular Cartilage Repair          [] Extensor Mechanism             []   Microfracture  []  OATS         []  Cartilage Biopsy [] Tendon Repair          [] Ligament Reconstruction          [] Patella                  [] Quadriceps             []   ACL    []   PCL  [] High Tibial Osteotomy       [] PRP Arthrocentesis  [] Joint Replacement         [x] Amniox Arthrocentesis           [] Unicompartmental   [] Patellofemoral                    [] Total Knee                  Call our office ([phone]) immediately if you experience any of the following:       Excessive bleeding or pus like drainage at the incision site       Uncontrollable pain not relieved by pain medication       Excessive swelling or redness at the incision site       Fever above 101.5 degrees not controlled with Tylenol or Motrin       Shortness of Breath       Any foul odor or blistering from the surgery site    FOR EMERGENCIES: If any unusual problems or difficulties occur, call our office at [phone], or page the  at [phone] who will direct your call appropriately    1.   Pain Management: A cold therapy  cuff, pain medications, local injections, and in some cases, regional anesthesia injections are used to manage your post-operative pain. The decision to use each of these options is based on their risks and benefits.     Medications: You were given one or more of the following medication prescriptions before leaving the hospital. Have the prescriptions filled at a pharmacy on your way home and follow the instructions on the bottles. If you need a refill, please call your pharmacy.      Narcotic Medication (usually Vicodin ES, Lortab, Percocet or Nucynta): Begin taking the medication before your knee starts to hurt. Some patients do not like to take any medication, but if you wait until your pain is severe before taking, you will be very uncomfortable for several hours waiting for the narcotic to work. Always take with food.     Nausea / Vomiting: For this issue, we prescribe Phenergan, use this medication as directed.     Cold Therapy: You may have been sent home with a [facility] cold therapy unit and wrap for your knee. Fill with ice and water to the indicated fill line and use throughout the day for the first two days and then as needed to help relieve pain and control swelling.      Regional Anesthesia Injections (Blocks): You may have been given a regional nerve block either before or after surgery. This may make your entire leg numb for 24-36 hours.                            * Proceed with caution when bearing weight on your leg.     2.   Diet: Eat a bland diet for the first day after surgery. Progress your diet as tolerated. Constipation may occur with Narcotic usage, contact our office if you are experiencing constipation.    3.   Activity: Limit your activity during the first 48 hours, keep your leg elevated with pillows under your heel. After the first 48 hours at home, increase your activity level based on your symptoms.    4.   Dressing Change: Remove the dressing on the 3rd day. It is normal for some  blood to be seen on the dressings. It is also normal for you to see apparent bruising on the skin around your knee when you remove the dressing. If present, leave the steri-strip tape across the incisions. If you are concerned by the drainage or the appearance of your knee, please call one of the numbers listed below.    5.   Showering: You may shower on the 3rd day after surgery with waterproof bandages over small incisions. If you have an incision with Prineo (clear mesh), it does not need to be covered. Do not submerge in any water until after your postoperative appointment in clinic.    6.   Knee Brace: You may have been sent home in a hinged knee brace. Your brace is set at 0 to 0 degrees of motion. Wear the brace for 4 weeks, LOCKED in full extension when walking, you will need to wear this brace at all time unless instructed otherwise. You may unlock at rest or for exercises.    7.   Your procedure did not require a Continuous Passive Motion (CPM) device.    8.   Weight Bearing: You may have been sent home with crutches. If instructed (see below), use these crutches at all times unless at complete rest.      [] Non-weight bearing for      weeks (you may touch your toes to the floor)      [] Partial weight bearing for   weeks    [] 25% Body Weight   [] 50% Body Weight      [x] Full weight bearing            [x]  NOW    []  after  weeks     9.  Knee Exercises: Begin these exercises the first day after surgery in order to help you regain your motion and strength. You may do the following marked exercises:     [x] Quad Sets - Begin activating your quadriceps muscle by driving your          knee downward into full knee extension while seated on a table or bed   with a towel rolled and propped under your heel     [x] Straight Leg Raise (SLR) - While jaclyn your quadriceps muscle, lift     your fully extended leg to the level of your non-operative knee (as shown)     [] Heel Slides - With the knee straight,  "slide your heel slowly toward your   buttocks, hold at the endpoint for 10-15 seconds, then slowly straighten     [x] Ankle pumps - With your knee straight, move your ankle in a "pumping"    fashion to activate your calf and leg muscles      10.  Physical Therapy: Physical therapy is an essential component to your recovery from surgery. Your physical therapy will start in      FIRST POSTOPERATIVE VISIT: As scheduled       .Discharge Instructions: Caring for Your Parth-Barboza Drainage Tube  Your doctor discharges you with a Parth-Barboza drainage tube. Doctors commonly leave this drain within the abdominal cavity after surgery. It helps drain and collect blood and body fluid after surgery. This can prevent swelling and reduces the risk for infection. The tube is held in place by a few stitches. It is covered with a bandage. Your doctor will remove the drain when he or she determines you no longer need it.  Home care  · Dont sleep on the same side as the tube.  · Secure the tube and bag inside your clothing with a safety pin. This helps keep the tube from being pulled out.  · Empty your drain at least twice a day. Empty it more often if the drain is full. Wash  and dry your hands before emptying the drain.  ? Lift the opening on the drain.  ? Drain the fluid into a measuring cup.  ? Record the amount of fluid each time you empty the drain. Include the date and time it was emptied. Share this information with your doctor on your next visit.  ? Squeeze the bulb with your hands until you hear air coming out of the bulb if your doctor has instructed you to do so (sometimes the bulb is used as a reservoir without suction). Check with your doctor about specific drain instructions.  ? Close the opening.  · Change the dressing around the tube every day.  ? Wash your hands.  ? Remove the old bandage.  ? Wash your hands again.  ? Wet a cotton swab and clean the skin around the incision and tube site. Use normal saline " solution (salt and water). Or, you can use warm, soapy water.  ? Put a new bandage on the incision and tube site. Make the bandage large enough to cover the whole incision area.  ? Tape the bandage in place.  · Keep the bandage and tube site dry when you shower. Ask your healthcare provider about the best way to do this.  · Stripping the tube helps keep blood clots from blocking the tube. Ask your nurse how often you should strip the tube. Stripping may not be needed, depending on where and why your doctor placed the tube. It may even be dangerous in some cases.   ? Hold the tubing where it leaves the skin, with one hand. This keeps it from pulling on the skin.  ? Pinch the tubing with the thumb and first finger of your other hand.  ? Slowly and firmly pull your thumb and first finger down the tubing. You may find it helpful to hold an alcohol swab between your fingers and the tube to lubricate the tubing.  ? If the pulling hurts or feels like its coming out of the skin, stop. Begin again more gently.  Follow-up care  Make a follow-up appointment as directed by our staff.     When to seek medical care  Call your healthcare provider right away if you have any of the following:  · New or increased pain around the tube  · Redness, swelling, or warmth around the incision or tube  · Drainage that is foul-smelling  · Vomiting  · Fever of 100.4°F (38°C)  · Fluid leaking around the tube  · Incision seems not to be healing  · Stitches become loose  · Tube falls out or breaks  · Drainage that changes from light pink to dark red  · Blood clots in the drainage bulb  · A sudden increase or decrease in the amount of drainage (over 30 mL)   Date Last Reviewed: 2/1/2017  © 0873-8674 [employer]. [address] [place] [address], [place] [address]. All rights reserved. This information is not intended as a substitute for professional medical care. Always follow your healthcare professional's instructions.

## 2018-02-20 NOTE — PROGRESS NOTES
"Assessed left arm picc line. Both lumens flush easily but no blood return noted. Patient states that she accidentally "ripped out line". Picc noted to be intact to left arm with new biopatch and dressing  States that her friend is infusing and flushing her picc but she doesn't know what she is doing. I encouraged pt to have her friend taught proper technique and would call the IV company. States that she will consider this.  "

## 2018-02-20 NOTE — H&P (VIEW-ONLY)
Brionna Jones  is here for a completion of her perioperative paperwork. she  Is scheduled to undergo   1. Right knee incision and drainage                           2. Right knee drain application   on 2/20/18.  She is a healthy individual and does not need clearance for this procedure.     Risks, indications and benefits of the surgical procedure were discussed with the patient. All questions with regard to surgery, rehab, expected return to functional activities, activities of daily living and recreational endeavors were answered to her satisfaction.    Once no other questions were asked, a brief history and physical exam was then performed.      PHYSICAL EXAM:  GEN: A&Ox3, WD WN NAD  HEENT: WNL  CHEST: CTAB, no W/R/R  HEART: RRR, no M/R/G  ABD: Soft, NT ND, BS x4 QUADS  MS; See Epic  NEURO: CN II-XII intact       The surgical consent was then reviewed with the patient, who agreed with all the contents of the consent form and it was signed. she was then given the [facility] surgery packet to bring with her to [facility] for the anesthesia portion of her perioperative paperwork.     PHYSICAL THERAPY:  She will hold physical therapy at this time    POST OP CARE:instructions were reviewed including care of the wound and dressing after surgery and when she can shower.     PAIN MANAGEMENT: Brionna Jones was also given  pain management regime.  PAIN MEDICATION:  Percocet 10/325mg 1 po q 4-6 hours prn pain  Ultram 50 mg one p.o. q.4-6 hours p.r.n. breakthrough pain,   Phenergan 25 mg one p.o. q.4-6 hours p.r.n. nausea and vomiting.    As there were no other questions to be asked, she was given my business card along with Matt Smith MD business card if she has any questions or concerns prior to surgery or in the postop period.

## 2018-02-20 NOTE — PLAN OF CARE
Dressing change performed on patient's PICC line.  Patient's injection port fell off.  Replaced with leur lock available.  After dressing change, patient's ports flushes easily, but no blood return obtained.

## 2018-02-20 NOTE — PROGRESS NOTES
"Assessd left arm picc line. New dressing and biopatch in place. Report given to me by ambulatory care nurse is that picc was leaking blood through one lumen without injection cap. Nurse applied new injection cap and changed dressing, no biopatch noted when old dressing removed.    Both lumens flush well but no blood return. Patient verbalized to me that her friend is caring for her PICC line and that she does not know what she is doing. Patient also states that she "ripped picc line out" earlier today. I told her that line was intact. I noted that they were two injection caps on red lumen. Patient did not know why.  Chest Xray was done to determine tip placement. Tip in SVC.    I called and spoke to Frances at [facility]. [phone])  Discussed that patient and friend needed more teaching. Frances stated that she would call the home health company and call patients , as he was the one who was first taught. She will follow-up.      "

## 2018-02-20 NOTE — TRANSFER OF CARE
"Anesthesia Transfer of Care Note    Patient: Brionna Jones    Procedure(s) Performed: Procedure(s) (LRB):  INCISION AND DRAINAGE (I & D) (Right)  DRAIN APPLICATION (Right)    Patient location: PACU    Anesthesia Type: general    Transport from OR: Transported from OR on 2-3 L/min O2 by NC with adequate spontaneous ventilation    Post pain: adequate analgesia    Post assessment: no apparent anesthetic complications    Post vital signs: stable    Level of consciousness: awake and alert    Nausea/Vomiting: no nausea/vomiting    Complications: none    Transfer of care protocol was followed      Last vitals:   Visit Vitals  /69 (BP Location: Right arm, Patient Position: Lying)   Pulse 89   Temp 36.9 °C (98.4 °F) (Oral)   Resp 16   Ht 5' 4" (1.626 m)   Wt 107 kg (236 lb)   LMP 06/20/2017   SpO2 96%   Breastfeeding? No   BMI 40.51 kg/m²     "

## 2018-02-20 NOTE — ANESTHESIA PREPROCEDURE EVALUATION
02/20/2018  Brionna Jones is a 47 y.o., female.    Pre-op Assessment    I have reviewed the Patient Summary Reports.     I have reviewed the Nursing Notes.   I have reviewed the Medications.     Review of Systems  Anesthesia Hx:  No problems with previous Anesthesia  History of prior surgery of interest to airway management or planning: Previous anesthesia: General General Jan 23 2018 Dr anderson Total Knee with general anesthesia.  Procedure performed at an [facility]. Denies Family Hx of Anesthesia complications.   Denies Personal Hx of Anesthesia complications.   Social:  Smoker, Social Alcohol Use    Hematology/Oncology:  Hematology Normal   Oncology Normal     EENT/Dental:EENT/Dental Normal   Cardiovascular:   Exercise tolerance: good Hypertension, well controlled Hx of prolonged qt INTERVAL   Pulmonary:  Pulmonary Normal    Renal/:  Renal/ Normal     Hepatic/GI:   GERD, well controlled    Musculoskeletal:   Arthritis     Neurological:  Neurology Normal    Endocrine:  Endocrine Normal    Psych:   anxiety depression          Physical Exam  General:  Morbid Obesity    Airway/Jaw/Neck:  Airway Findings: Mouth Opening: Normal Tongue: Normal  General Airway Assessment: Adult  Mallampati: II  TM Distance: Normal, at least 6 cm      Dental:  Dental Findings: Periodontal disease, Mild, molar caps, upper front caps        Mental Status:  Mental Status Findings:  Cooperative, Alert and Oriented         Anesthesia Plan  Type of Anesthesia, risks & benefits discussed:  Anesthesia Type:  general  Patient's Preference:   Intra-op Monitoring Plan: standard ASA monitors  Intra-op Monitoring Plan Comments:   Post Op Pain Control Plan: multimodal analgesia  Post Op Pain Control Plan Comments:   Induction:   IV  Beta Blocker:         Informed Consent: Patient understands risks and agrees with Anesthesia plan.   Questions answered. Anesthesia consent signed with patient.  ASA Score: 3     Day of Surgery Review of History & Physical:    H&P update referred to the surgeon.     Anesthesia Plan Notes: Plan GA for I and D and washout of infected total knee done 3 weeks ago at [facility]        Ready For Surgery From Anesthesia Perspective.

## 2018-02-21 PROCEDURE — 25000003 PHARM REV CODE 250: Performed by: PHYSICIAN ASSISTANT

## 2018-02-21 PROCEDURE — 11000001 HC ACUTE MED/SURG PRIVATE ROOM

## 2018-02-21 PROCEDURE — 25000003 PHARM REV CODE 250: Performed by: ORTHOPAEDIC SURGERY

## 2018-02-21 PROCEDURE — 94761 N-INVAS EAR/PLS OXIMETRY MLT: CPT

## 2018-02-21 PROCEDURE — 63600175 PHARM REV CODE 636 W HCPCS: Performed by: PHYSICIAN ASSISTANT

## 2018-02-21 RX ORDER — MUPIROCIN 20 MG/G
1 OINTMENT TOPICAL 2 TIMES DAILY
Status: DISCONTINUED | OUTPATIENT
Start: 2018-02-21 | End: 2018-02-23 | Stop reason: HOSPADM

## 2018-02-21 RX ADMIN — DOCUSATE SODIUM 100 MG: 100 CAPSULE, LIQUID FILLED ORAL at 07:02

## 2018-02-21 RX ADMIN — OXYCODONE HYDROCHLORIDE 10 MG: 10 TABLET, FILM COATED, EXTENDED RELEASE ORAL at 09:02

## 2018-02-21 RX ADMIN — OXYCODONE HYDROCHLORIDE 10 MG: 10 TABLET, FILM COATED, EXTENDED RELEASE ORAL at 08:02

## 2018-02-21 RX ADMIN — OXYCODONE HYDROCHLORIDE AND ACETAMINOPHEN 1 TABLET: 10; 325 TABLET ORAL at 11:02

## 2018-02-21 RX ADMIN — OXYCODONE HYDROCHLORIDE AND ACETAMINOPHEN 1 TABLET: 10; 325 TABLET ORAL at 02:02

## 2018-02-21 RX ADMIN — DOCUSATE SODIUM 100 MG: 100 CAPSULE, LIQUID FILLED ORAL at 09:02

## 2018-02-21 RX ADMIN — VANCOMYCIN HYDROCHLORIDE 1 G: 1 INJECTION, POWDER, LYOPHILIZED, FOR SOLUTION INTRAVENOUS at 09:02

## 2018-02-21 RX ADMIN — MUPIROCIN 1 G: 20 OINTMENT TOPICAL at 09:02

## 2018-02-21 RX ADMIN — AMLODIPINE BESYLATE 10 MG: 5 TABLET ORAL at 09:02

## 2018-02-21 RX ADMIN — MUPIROCIN 1 G: 20 OINTMENT TOPICAL at 07:02

## 2018-02-21 RX ADMIN — CEFTRIAXONE 2 G: 2 INJECTION, SOLUTION INTRAVENOUS at 09:02

## 2018-02-21 NOTE — NURSING
Spoke with Dr. Pederson regarding pt discharge.  Pt asking to stay another night.  No indication for extra night stay.  Pt should discharge later today per MD.

## 2018-02-21 NOTE — DISCHARGE SUMMARY
[facility]    Discharge Note    SUMMARY     Admit Date: 2/20/2018    Discharge Date and Time:   02/21/2018 7:13 AM    Pre-op Diagnosis:  Bacterial infection of knee joint [M00.9]  Infection of total right knee replacement, initial encounter [T84.53XA, Z96.651]    Post-op Diagnosis:  Post-Op Diagnosis Codes:     * Bacterial infection of knee joint [M00.9]     * Infection of total right knee replacement, initial encounter [T84.53XA, Z96.651]    Procedure: Procedure(s) (LRB):  INCISION AND DRAINAGE (I & D) (Right)  DRAIN APPLICATION (Right)    Hospital Course (synopsis of major diagnoses, care, treatment, and services provided during the course of the hospital stay): Patient underwent knee surgery and was transferred to PACU in stable condition.  In PACU, patient received appropriate post-operative care and was transferred to medical floor for neurovascular monitoring and pain control.  There patient progressed appropriately. Once patient was ready, female was discharged home with plans for physical therapy and follow-up with the operative surgeon.    Diet: Regular       Final Diagnosis: Post-Op Diagnosis Codes:     * Bacterial infection of knee joint [M00.9]     * Infection of total right knee replacement, initial encounter [T84.53XA, Z96.651]    Disposition: Home or Self Care    Follow Up/Patient Instructions:     Medications:  Reconciled Home Medications:   Current Discharge Medication List      CONTINUE these medications which have NOT CHANGED    Details   amLODIPine (NORVASC) 10 MG tablet Take 10 mg by mouth once daily.      aspirin (ECOTRIN) 325 MG EC tablet Take 1 tablet (325 mg total) by mouth once daily.  Qty: 42 tablet, Refills: 0    Associated Diagnoses: Traumatic arthritis of right knee      cefTRIAXone 2 g in dextrose 5 % 50 mL (ROCEPHIN) 2 g/50 mL PgBk IVPB Inject 50 mLs (2 g total) into the vein once daily.      oxyCODONE-acetaminophen (PERCOCET)  mg per tablet Take 1 tablet by mouth  "every 6 (six) hours as needed for Pain.  Qty: 40 tablet, Refills: 0    Associated Diagnoses: Traumatic arthritis of right knee      !! promethazine (PHENERGAN) 25 MG tablet Take 1 tablet (25 mg total) by mouth every 6 (six) hours as needed for Nausea.  Qty: 16 tablet, Refills: 0    Associated Diagnoses: Osteoarthritis of right knee, unspecified osteoarthritis type      !! promethazine (PHENERGAN) 25 MG tablet Take 1 tablet (25 mg total) by mouth every 4 (four) hours.  Qty: 30 tablet, Refills: 0    Associated Diagnoses: Traumatic arthritis of right knee      VANCOMYCIN HCL (VANCOMYCIN 1 G/250 ML D5W, READY TO MIX SYSTEM,) Inject 250 mLs (1,000 mg total) into the vein every 12 (twelve) hours.      celecoxib (CELEBREX) 200 MG capsule Take 1 capsule (200 mg total) by mouth 2 (two) times daily.  Qty: 60 capsule, Refills: 2    Associated Diagnoses: S/P total knee replacement, right      hydrOXYzine pamoate (VISTARIL) 50 MG Cap Take 1 capsule (50 mg total) by mouth every 6 (six) hours as needed (itching).  Qty: 30 capsule, Refills: 0      ranitidine (ZANTAC) 75 MG tablet Take 75 mg by mouth as needed for Heartburn.       !! - Potential duplicate medications found. Please discuss with provider.          Discharge Procedure Orders  CRUTCHES FOR HOME USE   Order Comments: Provide if needed   Order Specific Question Answer Comments   Type: Axillary    Height: 5' 4" (1.626 m)    Weight: 107 kg (236 lb)    Does patient have medical equipment at home? none    Length of need (1-99 months): 24      Diet general     Diet general     Call MD for:  temperature >100.4     Call MD for:  persistent nausea and vomiting     Call MD for:  severe uncontrolled pain     Call MD for:  difficulty breathing, headache or visual disturbances     Call MD for:  redness, tenderness, or signs of infection (pain, swelling, redness, odor or green/yellow discharge around incision site)     Call MD for:  hives     Call MD for:  persistent dizziness or " light-headedness     Weight bearing as tolerated   Order Comments: Knee locked in extension at all times     Remove dressing in 72 hours     Call MD for:  temperature >100.4     Call MD for:  persistent nausea and vomiting     Call MD for:  severe uncontrolled pain     Call MD for:  difficulty breathing, headache or visual disturbances     Call MD for:  redness, tenderness, or signs of infection (pain, swelling, redness, odor or green/yellow discharge around incision site)     Call MD for:  hives     Call MD for:  persistent dizziness or light-headedness     Call MD for:  extreme fatigue     Remove dressing in 72 hours       Follow-up Information     Matt Smith MD On 2/28/2018.    Specialties:  Sports Medicine, Orthopedic Surgery  Contact information:  [address] [place] [address]  [phone]             Matt Smith MD.    Specialties:  Sports Medicine, Orthopedic Surgery  Why:  as scheduled pre op  Contact information:  [address] [place] [address]  [phone]

## 2018-02-21 NOTE — ANESTHESIA POSTPROCEDURE EVALUATION
"Anesthesia Post Evaluation    Patient: Brionna Jones    Procedure(s) Performed: Procedure(s) (LRB):  INCISION AND DRAINAGE (I & D) (Right)  DRAIN APPLICATION (Right)    Final Anesthesia Type: general  Patient location during evaluation: PACU  Patient participation: Yes- Able to Participate  Level of consciousness: awake and alert and oriented  Post-procedure vital signs: reviewed and stable  Pain management: adequate  Airway patency: patent  PONV status at discharge: No PONV  Anesthetic complications: no      Cardiovascular status: blood pressure returned to baseline and hemodynamically stable  Respiratory status: unassisted, spontaneous ventilation and room air  Hydration status: euvolemic  Follow-up not needed.        Visit Vitals  BP (!) 153/81   Pulse 91   Temp 36.8 °C (98.3 °F) (Oral)   Resp 16   Ht 5' 4" (1.626 m)   Wt 107 kg (236 lb)   LMP 06/20/2017   SpO2 100%   Breastfeeding? No   BMI 40.51 kg/m²       Pain/Radha Score: Pain Assessment Performed: Yes (2/20/2018  6:00 PM)  Presence of Pain: complains of pain/discomfort (2/20/2018  6:00 PM)  Pain Rating Prior to Med Admin: 9 (2/20/2018  5:30 PM)  Pain Rating Post Med Admin: 9 (2/20/2018  6:00 PM)  Radha Score: 9 (2/20/2018  6:00 PM)  Modified Radha Score: 16 (2/20/2018  5:02 PM)      "

## 2018-02-21 NOTE — OP NOTE
DATE OF PROCEDURE:  02/20/2018    ATTENDING SURGEON:  Matt Smith M.D.    CO-SURGEON:  Julio C Pederson MD.    FIRST ASSISTANT:  Annalee Dominguez PA-C.    PREOPERATIVE DIAGNOSIS:  Right knee medial retinacular tear.    POSTOPERATIVE DIAGNOSES:  1.  Right knee medial retinacular tear.  2.  Right knee seroma.    PROCEDURES PERFORMED:  1.  Right knee Allopatch HD graft application to 15 cm area intraarticularly.  2.  Right knee medial retinacular repair.  3.  Right knee deep complex incision and drainage.  4.  Right knee Parth-Barboza drain application.  5.  Right knee amniotic arthrocentesis.    ANESTHESIA:  General with LMA.    FLUIDS IN THE CASE:  1 L.    COMPLICATIONS:  None.    CONDITION ON RETURN TO RECOVERY ROOM:  Stable.    IMPLANTS UTILIZED:  [employer] allograft HD thick 4   cm x 8 cm graft, Clarix JACKIE 100 mg injectable amniotic fluid.    INDICATIONS FOR OPERATIVE PROCEDURE:  Brionna Jones is a 47-year-old female   status post previous right total knee replacement surgery.  She was noted to   have a dehiscence of the wound and was treated by Dr. Casey Snow and Dr. Julio C Pederson last week with incision and drainage and closure of the medial   fascial region.  The patient was seen in the office yesterday where she was   noted to have recurrent seroma anteriorly findings were consistent with a repeat   tear of the medial retinacular region the patient was taken back to the   Operating Room for definitive evaluation and treatment based on the need to   protect and maintain total knee replacement.  Note aspiration of seroma was   performed in the office yesterday with 35 mL of serosanguineous removed.  This   was sent for evaluation with no organisms noted and cultures, which were   pending.  A Gram stain showed a low white cell count from this fluid.  The   patient was taken to Operating Room, and was defined.    Co-surgeon duties were medically necessary result of the  complexity of case and   complex intraoperative decision making and previous interventions by Dr. Pederson previously this patient's treatment and care.    PROCEDURE IN DETAIL:  The patient was brought into the Operating Room, placed in   supine position.  Upon application of general anesthetic as well as placement   of LMA to stabilize the airway, the patient was given the appropriate dose of   antibiotics based on body weight.  Time-out was utilized for right side as the   operative site.  Both upper extremities were placed in comfortable position.    Left leg was carefully padded along the heel and peroneal nerve regions.  Right   leg was then prepped and draped in a sterile fashion with ChloraPrep material.    The previous sutures were removed prior to prepping and draping.  Following   timeout, the incision was made using the previous incision extending slightly   proximal and distally by 1 cm.  There was found to be serosanguineous fluid   within the area of concern which was sent for culture analysis.  There are no   signs of seropurulent material.  There was a small opening noted distally along   the anterior tibial tubercle region and medial retinacular regions consistent   with an opening towards the knee region.  This is where the fluid was emanating   from.  There was no further tear of the fascial region, which had been repaired   proximally.  Irrigation performed copiously with a total of 3 L of antibiotic   solution, placed in the region of concern previous PDS sutures were removed to   further expose the area.  Full debridement was performed of the area of concern.    We then placed two Parth-Barboza drain deep into the knee, taking out of the   superolateral aspect of the knee.  We then visualized the retinacular tear,   which was noted medially along the anterior tubercle area, a small rent was   noted just distal to the vastus medialis obliquus region.  This area was exposed   was then closed  with a series of #1 PDS sutures placed in figure-of-eight   fashion to close the deep fascial regions and retinacular regions.  We then   oversewed this area with additional placement of a #1 running Stratafix suture,   which was placed in a running fashion once again.  Following complete closure,   we then assessed for range of motion demonstrating some limitations in motion,   but complete closure of the knee and control of the intra-articular portion.  At   this point, we then took the Allopatch HD graft out of the sterile package and   applied this directly in the area of concern and sewed it with a watertight seal   applied with #1 PDS sutures in figure-of-eight fashion peripherally along the   Allopatch HD graft.  Range of motion was assessed demonstrating full extension   and flexion at 30 degrees with good stability of the graft retinacular regions.    The patellofemoral joint was found to be stable tracking.  Final irrigation   performed.  We then placed an additional Parth-Barboza drain superficial to flex   HD graft.  This was taken medially out of the knee region.  We then closed the   pocket, which had been created through dissection using 2-0 PDS sutures placed   in inverted fashion.  We then closed the subcutaneous tissues with 3-0 PDS   sutures placed in inverted fashion.  Skin was closed with 2-0 nylon sutures   placed in far-near and near-far fashion.  We sewed the drains into place.  We   applied Xeroform gauze, ABD pads, cast padding, long-leg ABRAN hose stocking and   cooling unit.   The patient's knee was placed back into a hinged knee   immobilizer, which was locked in extension with gait and was locked in extension   on awakening.  The immobilizer set so it would not to flex.  The patient was   allowed to recover from the anesthetic.  The LMA was removed.  The patient was   taken to Recovery Room in stable condition.  At the completion of the case, all   instrument and sponge counts were  correct.    NOTE:  Dr. Matt Smith was present for the key portions of the procedure and did   perform the key parts of the procedure..    PHYSICAL THERAPY:  The patient should held on physical therapy for the next 2   weeks following the surgery to allow for healing of the right knee soft tissue   regions.    IMMOBILIZER:  Immobilizer should be locked in extension with gait and at all   times for the first 2 weeks following the surgery.    RANGE OF MOTION:  No flexion of the knee for the first 2 weeks following the   surgery to allow for healing of the incisional regions.    WEIGHTBEARING STATUS:  Full weightbearing as tolerated with an immobilizer   locked in extension with gait.      MARKO/IN  dd: 02/20/2018 19:52:01 (CST)  td: 02/20/2018 23:34:58 (CST)  Doc ID   #[id number]  Job ID #[id number]    CC: [facility]

## 2018-02-21 NOTE — PROGRESS NOTES
BHAVANI spoke to Dr. Davis regarding discharge plan.  Resume HH, no wound care need and prior HH orders, no change and continue IV antibiotics, no changes.    BHAVANI sent HH order and medical records to [facility] via [facility].

## 2018-02-21 NOTE — PROGRESS NOTES
"Pt POD 1INCISION AND DRAINAGE (I & D) right knee  DRAIN APPLICATION (right knee)    Pt doing well. Pain moderately controlled. No other complaints this Am.    Vitals:    02/20/18 1905 02/20/18 2158 02/21/18 0026 02/21/18 0439   BP: (!) 144/86  133/81 (!) 146/80   BP Location: Right arm  Right arm Right arm   Patient Position: Lying  Lying Sitting   Pulse: 85 92 (!) 114 108   Resp: 16 18 18 18   Temp: 97 °F (36.1 °C)  98.9 °F (37.2 °C) 98 °F (36.7 °C)   TempSrc: Oral  Oral Oral   SpO2: 95% 97% 97% 96%   Weight: 108 kg (238 lb)      Height: 5' 4" (1.626 m)          RLE- dressing c/d/i   drains in place   SILT L4-S1   +TA/GSC/EHL   BCR x5    Pt s/p above   Pain control   cont abx   mobilize   DVT ppx    "

## 2018-02-21 NOTE — PLAN OF CARE
Pt lives with sonJose Miguel [phone].  Pt has rolling walker, bedside commode and shower chair, CPM and ice machine.  Pt has [facility].     02/21/18 0820   Discharge Assessment   Assessment Type Discharge Planning Assessment   Confirmed/corrected address and phone number on facesheet? Yes   Assessment information obtained from? Patient   Communicated expected length of stay with patient/caregiver no   Prior to hospitilization cognitive status: Alert/Oriented   Prior to hospitalization functional status: Assistive Equipment;Needs Assistance   Current cognitive status: Alert/Oriented   Current Functional Status: Assistive Equipment;Needs Assistance   Lives With child(tia), adult   Able to Return to Prior Arrangements yes   Who are your caregiver(s) and their phone number(s)? (Iveth, cousin, [phone])   Patient's perception of discharge disposition home health   Patient currently being followed by outpatient case management? No   Patient currently receives any other outside agency services? Yes  ([facility]   Is it the patient/care giver preference to resume care with the current outside agency? Yes   Equipment Currently Used at Home walker, rolling;bedside commode;shower chair   Do you have any problems affording any of your prescribed medications? No   Is the patient taking medications as prescribed? yes   Does the patient have transportation home? Yes   Transportation Available family or friend will provide   Does the patient receive services at the [facility]? No   Discharge Plan A Home Health   Patient/Family In Agreement With Plan yes

## 2018-02-21 NOTE — PLAN OF CARE
[facility]    HOME HEALTH ORDERS  FACE TO FACE ENCOUNTER    Patient Name: Brionna Jones  YOB: 1970    PCP: Michael Houston MD   PCP Address: [address] [place] [place] [address]  PCP Phone Number: [phone]  PCP Fax: [phone]    Encounter Date: 02/21/2018    Admit to Home Health    Diagnoses:  Active Hospital Problems    Diagnosis  POA    *S/P knee surgery [Z98.890]  Not Applicable      Resolved Hospital Problems    Diagnosis Date Resolved POA   No resolved problems to display.       Future Appointments  Date Time Provider Department Center   2/26/2018 1:45 PM Matt Smith MD [facility] [place]   3/7/2018 10:45 AM Matt Smith MD [facility]     Follow-up Information     Matt Smith MD On 2/28/2018.    Specialties:  Sports Medicine, Orthopedic Surgery  Contact information:  [address]  [place] [address]  [phone]             Matt Smith MD.    Specialties:  Sports Medicine, Orthopedic Surgery  Why:  as scheduled pre op  Contact information:  [address]  [place] [address]  [phone]             [facility].    Specialty:  Home Health Services  Why:  Home Health  Contact information:  [address] [facility]  [phone]             [facility]    Specialties:  Pharmacist, IV Infusion  Why:  Infusion  Contact information:  [address] [place] [address]  [phone]                     I have seen and examined this patient face to face today. My clinical findings that support the need for the home health skilled services and home bound status are the following:  S/p I&D of Right TKA. Remains with impaired mobility requiring assistive devices which make it taxing to leave home     Allergies:  Review of patient's allergies indicates:   Allergen Reactions    Codeine Itching       Diet: regular    Activities: as tolerated     Nursing:   SN to complete comprehensive assessment including routine  vital signs. Instruct on disease process and s/s of complications to report to MD. Review/verify medication list sent home with the patient at time of discharge  and instruct patient/caregiver as needed. Frequency may be adjusted depending on start of care date.    Notify MD if SBP > 160 or < 90; DBP > 90 or < 50; HR > 120 or < 50; Temp > 101; Other:    SN to draw weekly CBC, BMP, & vanc trough levels & send results to Dr Matt Smith office. SN to follow up on home vancomycin administration on routine visits        Medications: Review discharge medications with patient and family and provide education.      Current Discharge Medication List      CONTINUE these medications which have NOT CHANGED    Details   amLODIPine (NORVASC) 10 MG tablet Take 10 mg by mouth once daily.      aspirin (ECOTRIN) 325 MG EC tablet Take 1 tablet (325 mg total) by mouth once daily.  Qty: 42 tablet, Refills: 0    Associated Diagnoses: Traumatic arthritis of right knee      cefTRIAXone 2 g in dextrose 5 % 50 mL (ROCEPHIN) 2 g/50 mL PgBk IVPB Inject 50 mLs (2 g total) into the vein once daily.      oxyCODONE-acetaminophen (PERCOCET)  mg per tablet Take 1 tablet by mouth every 6 (six) hours as needed for Pain.  Qty: 40 tablet, Refills: 0    Associated Diagnoses: Traumatic arthritis of right knee      !! promethazine (PHENERGAN) 25 MG tablet Take 1 tablet (25 mg total) by mouth every 6 (six) hours as needed for Nausea.  Qty: 16 tablet, Refills: 0    Associated Diagnoses: Osteoarthritis of right knee, unspecified osteoarthritis type      !! promethazine (PHENERGAN) 25 MG tablet Take 1 tablet (25 mg total) by mouth every 4 (four) hours.  Qty: 30 tablet, Refills: 0    Associated Diagnoses: Traumatic arthritis of right knee      VANCOMYCIN HCL (VANCOMYCIN 1 G/250 ML D5W, READY TO MIX SYSTEM,) Inject 250 mLs (1,000 mg total) into the vein every 12 (twelve) hours.      celecoxib (CELEBREX) 200 MG capsule Take 1 capsule (200 mg total) by mouth  2 (two) times daily.  Qty: 60 capsule, Refills: 2    Associated Diagnoses: S/P total knee replacement, right      hydrOXYzine pamoate (VISTARIL) 50 MG Cap Take 1 capsule (50 mg total) by mouth every 6 (six) hours as needed (itching).  Qty: 30 capsule, Refills: 0      ranitidine (ZANTAC) 75 MG tablet Take 75 mg by mouth as needed for Heartburn.       !! - Potential duplicate medications found. Please discuss with provider.          I certify that this patient is confined to her home and needs nursing services       _________________________  Dr Julio C Pederson MD  2/21/18

## 2018-02-21 NOTE — PLAN OF CARE
Problem: Patient Care Overview  Goal: Plan of Care Review  Outcome: Unable to achieve outcome(s) by discharge  Pt remained free from injury and falls this shift.  Ambulates with walker in room and to bedside commode.  Pain controlled with longacting and breakthrough pain pills.  Immobilizer in locked extension for ambulation.  No temp today.  Post op teaching continues.

## 2018-02-22 LAB — BACTERIA SPEC AEROBE CULT: NORMAL

## 2018-02-22 PROCEDURE — 11000001 HC ACUTE MED/SURG PRIVATE ROOM

## 2018-02-22 PROCEDURE — 94761 N-INVAS EAR/PLS OXIMETRY MLT: CPT

## 2018-02-22 RX ORDER — CIPROFLOXACIN 750 MG/1
750 TABLET, FILM COATED ORAL EVERY 12 HOURS
Qty: 60 TABLET | Refills: 0 | Status: SHIPPED | OUTPATIENT
Start: 2018-02-22 | End: 2018-03-26 | Stop reason: SDUPTHER

## 2018-02-22 NOTE — PLAN OF CARE
Problem: Patient Care Overview  Goal: Plan of Care Review  Outcome: Outcome(s) achieved Date Met: 02/22/18  Pt remains on RA. No distress noted.  Chart error. Void note.

## 2018-02-22 NOTE — NURSING
Discharge teaching completed with pt.  Voiced understanding.  Instructed on emptying and care of SKYLAR drains.  Education pamphlet and drain record forms provided for home.  Awaiting daughter's arrival for ride home.

## 2018-02-22 NOTE — PLAN OF CARE
02/21/18 2300   Final Note   Assessment Type Final Discharge Note   Discharge Disposition Home-Health   What phone number can be called within the next 1-3 days to see how you are doing after discharge? [phone]   Right Care Referral Info   Post Acute Recommendation Home-care   Facility Name [facility]

## 2018-02-22 NOTE — NURSING
Patient ready for discharge home  All items gathered including drain and wound care items, walker  all questions, and concerns answered  nothing further at this time    Brace and dressing in place, SKYLAR drains x2 drained and intact  Pain medication given with moderate to full relief    L PICC site WNL with no compliations   R hand IV d/c with no complications   RA  VSS    Family downstairs awaiting d/c   All paperwork give, verbalized understanding   Patient taken by wheelchair with PCT to family car

## 2018-02-22 NOTE — CHAPLAIN
cultivated a relationship of care and support with pt and provided chaplaincy education.  provided reflective listening as pt debriefed and verbally processed emotions. Prayer provided.  [facility] will continue to follow this pt.     Sheryl giles  [phone]

## 2018-02-23 VITALS
BODY MASS INDEX: 40.63 KG/M2 | RESPIRATION RATE: 18 BRPM | OXYGEN SATURATION: 93 % | WEIGHT: 238 LBS | TEMPERATURE: 99 F | HEIGHT: 64 IN | SYSTOLIC BLOOD PRESSURE: 157 MMHG | DIASTOLIC BLOOD PRESSURE: 92 MMHG | HEART RATE: 99 BPM

## 2018-02-23 LAB — BACTERIA SPEC AEROBE CULT: NORMAL

## 2018-02-23 NOTE — PHYSICIAN QUERY
"PT Name: Brionna Jones  MR #: [id number]    Physician Query Form - Procedure Clarification     CDS/: Evelyn Norman               Contact information:[phone]  This form is a permanent document in the medical record.     Query Date: February 23, 2018  By submitting this query, we are merely seeking further clarification of documentation. Please utilize your independent clinical judgment when addressing the question(s) below.    The Medical record contains the following:     Indicators       Supporting Clinical Findings   Location in Medical Record    x Documentation of "Debridement"    Full debridement was performed of the area of concern   Op Note     x Documentation of "I & D" Right knee deep complex incision and drainage     Irrigation performed copiously with a total of 3 L of antibiotic   solution, placed in the region of concern previous PDS sutures were removed to   further expose the area.   Op Note 2/22/2018    EBL =      x Other:   PROCEDURES PERFORMED:  1.  Right knee Allopatch HD graft application to 15 cm area intraarticularly.  2.  Right knee medial retinacular repair.  3.  Right knee deep complex incision and drainage.  4.  Right knee Parth-Barboza drain application.  5.  Right knee amniotic arthrocentesis.     Op Note 2/22/2018     Excisional debridement is a surgical removal of  nonvitalized tissue, necrosis or slough. The use of a sharp instrument does not always indicate that an excisional debridement was performed.  Non excisional debridement is the scraping away or removal of loose tissue fragments.    Provider, please specify type of procedure(s) performed:    [  xx] Excisional Debridement (Specify site, type, depth of tissue removal ,instrument, size of wound & EBL)   * Site: (Specify)_____knee________________________________   * Depth of tissue excised:    [  ] Skin/Subcutaneous [xx ] Soft tissue [ ] Fascia [ ] Muscle [ ] Bone   * Instrument used:    [  ] Scalpel [ xx] Scissors [ ] " Curette [ ] Other (Specify) ____________________   * Size of wound after debridement: (Specify) ___knee region__________________________   * EBL (Specify) ____________________limited less than 50 cc______________    [  ] Non Excisional Debridement   * Depth of tissue debrided:    [  ] Skin/Subcutaneous [ ] Soft tissue [ ] Fascia [ ] Muscle [ ] Bone    [  ] Incision and Drainage    [  ] Other Procedure (Specify) ________________________________    [  ] Clinically Undetermined

## 2018-02-26 ENCOUNTER — OFFICE VISIT (OUTPATIENT)
Dept: SPORTS MEDICINE | Facility: CLINIC | Age: 48
End: 2018-02-26
Payer: MEDICAID

## 2018-02-26 VITALS
HEART RATE: 98 BPM | BODY MASS INDEX: 40.63 KG/M2 | WEIGHT: 238 LBS | SYSTOLIC BLOOD PRESSURE: 150 MMHG | DIASTOLIC BLOOD PRESSURE: 104 MMHG | HEIGHT: 64 IN

## 2018-02-26 DIAGNOSIS — T84.53XA INFECTION AND INFLAMMATORY REACTION DUE TO INTERNAL RIGHT KNEE PROSTHESIS, INITIAL ENCOUNTER: ICD-10-CM

## 2018-02-26 DIAGNOSIS — M25.60 JOINT STIFFNESS: ICD-10-CM

## 2018-02-26 DIAGNOSIS — Z98.890 S/P KNEE SURGERY: Primary | ICD-10-CM

## 2018-02-26 DIAGNOSIS — R29.898 RIGHT LEG WEAKNESS: ICD-10-CM

## 2018-02-26 DIAGNOSIS — M25.561 RIGHT KNEE PAIN, UNSPECIFIED CHRONICITY: ICD-10-CM

## 2018-02-26 LAB — BACTERIA SPEC ANAEROBE CULT: NORMAL

## 2018-02-26 PROCEDURE — 99213 OFFICE O/P EST LOW 20 MIN: CPT | Mod: PBBFAC,PO | Performed by: ORTHOPAEDIC SURGERY

## 2018-02-26 PROCEDURE — 99999 PR PBB SHADOW E&M-EST. PATIENT-LVL III: CPT | Mod: PBBFAC,,, | Performed by: ORTHOPAEDIC SURGERY

## 2018-02-26 PROCEDURE — 99024 POSTOP FOLLOW-UP VISIT: CPT | Mod: ,,, | Performed by: ORTHOPAEDIC SURGERY

## 2018-02-26 RX ORDER — TRAMADOL HYDROCHLORIDE 50 MG/1
50 TABLET ORAL
COMMUNITY
End: 2019-05-22 | Stop reason: ALTCHOICE

## 2018-02-26 RX ORDER — OXYCODONE AND ACETAMINOPHEN 10; 325 MG/1; MG/1
1 TABLET ORAL
COMMUNITY
End: 2018-03-05 | Stop reason: SDUPTHER

## 2018-02-26 NOTE — PROGRESS NOTES
Subjective:          Chief Complaint: Brionna Jones is a 47 y.o. female who had concerns including Post-op Evaluation of the Right Knee.    Patient is here for a follow up for her right knee.     DATE OF PROCEDURE:  02/20/2018     ATTENDING SURGEON:  Matt Smith M.D.     CO-SURGEON:  Julio C Pederson MD.     FIRST ASSISTANT:  Annalee Dominguez PA-C.     PREOPERATIVE DIAGNOSIS:  Right knee medial retinacular tear.     POSTOPERATIVE DIAGNOSES:  1.  Right knee medial retinacular tear.  2.  Right knee seroma.     PROCEDURES PERFORMED:  1.  Right knee Allopatch HD graft application to 15 cm area intraarticularly.  2.  Right knee medial retinacular repair.  3.  Right knee deep complex incision and drainage.  4.  Right knee Parth-Barboza drain application.  5.  Right knee amniotic arthrocentesis.    DATE OF PROCEDURE: 2/12/2018      PREOPERATIVE DIAGNOSES:   1. Right total knee wound dehiscence  2. Right periprosthetic acute infection, suspected      POSTOPERATIVE DIAGNOSES:   1. Right total knee wound dehiscence   2. Right periprosthetic acute infection, suspected      PROCEDURES PERFORMED:   1. Irrigation and excisional debridement of right total knee  2. Revision complex wound closure of right total knee  3. Application of incisional negative pressure dressing (<50cm2) of right total knee    DATE OF PROCEDURE:  1/23/2018     ATTENDING SURGEON: Surgeon(s) and Role:     * Matt Smith MD - Primary     Co-Surgeon:Adán Linda MD      SECOND ASSISTANT: Angie Dominguez PA-C        Co-Surgeon Duties: Due to the complexity of the case and the need for significant intra-operative decision making co-surgeon duties were medically necessary. The chronicity of the disease process and the level of deformity dictated that co-surgeon duties be undertaken. A separate note will be dictated/reported by Dr. Matt Smith stating the specific co-surgeon activities and roles performed during the surgery.        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         Pain   Associated symptoms include joint swelling. Pertinent negatives include no abdominal pain, chest pain, chills, congestion, coughing, fever, headaches, myalgias, nausea, numbness, rash, sore throat or vomiting.   Knee Pain    Associated symptoms include stiffness. Pertinent negatives include no fever, itching or numbness.         Review of Systems   Constitution: Negative. Negative for chills, fever, weight gain and weight loss.   HENT: Negative for congestion and sore throat.    Eyes: Negative for blurred vision and double vision.   Cardiovascular: Negative for chest pain, leg swelling and palpitations.   Respiratory: Negative for cough and shortness of breath.    Hematologic/Lymphatic: Does not bruise/bleed easily.   Skin: Negative for itching, poor wound healing and rash.   Musculoskeletal: Positive for joint pain, joint swelling and stiffness. Negative for back pain, muscle weakness and myalgias.   Gastrointestinal: Negative for abdominal pain, constipation, diarrhea, nausea and vomiting.   Genitourinary: Negative.  Negative for frequency and hematuria.   Neurological: Negative for dizziness, headaches, numbness,  paresthesias and sensory change.   Psychiatric/Behavioral: Negative for altered mental status and depression. The patient is not nervous/anxious.    Allergic/Immunologic: Negative for hives.       Pain Related Questions  Over the past 3 days, what was your average pain during activity? (I.e. running, jogging, walking, climbing stairs, getting dressed, ect.): 10  Over the past 3 days, what was your highest pain level?: 10  Over the past 3 days, what was your lowest pain level? : 8    Other  How many nights a week are you awakened by your affected body part?: 7  Was the patient's HEIGHT measured or patient reported?: Patient Reported  Was the patient's WEIGHT measured or patient reported?: Measured      Objective:        General: Brionna is well-developed, well-nourished, appears stated age, in no acute distress, alert and oriented to time, place and person.     General    Vitals reviewed.  Constitutional: She is oriented to person, place, and time. She appears well-developed and well-nourished. No distress.   HENT:   Head: Normocephalic and atraumatic.   Mouth/Throat: No oropharyngeal exudate.   Eyes: EOM are normal. Right eye exhibits no discharge. Left eye exhibits no discharge.   Neck: Normal range of motion.   Cardiovascular: Normal rate and regular rhythm.    Pulmonary/Chest: Effort normal and breath sounds normal. No respiratory distress.   Neurological: She is alert and oriented to person, place, and time. She has normal reflexes. No cranial nerve deficit. Coordination normal.   Psychiatric: She has a normal mood and affect. Her behavior is normal. Judgment and thought content normal.     General Musculoskeletal Exam   Gait: normal       Right Knee Exam     Inspection   Erythema: absent  Scars: present  Swelling: present  Effusion: present  Deformity: deformity  Bruising: absent    Tenderness   The patient is tender to palpation of the patella, lateral joint line and medial joint line.    Crepitus   The patient  has crepitus of the patella.    Range of Motion   Extension:  0 normal   Flexion:  90 abnormal     Tests   Meniscus   Zeenat:  Medial - negative Lateral - negative  Ligament Examination   MCL - Valgus: normal (0 to 2mm)  LCL - Varus: normalDial Test at 30 degrees: normal (< 5 degrees)Dial Test at 90 degrees: normal (< 5 degrees)  Posterior Sag Test: negative  Posterolateral Corner: unstable (>15 degrees difference)  Patella   Patellar Apprehension: negative  Passive Patellar Tilt: neutral  Patellar Tracking: normal  Patellar Glide (quadrants): Lateral - 1   Medial - 2  Q-Angle at 90 degrees: normal  Patellar Grind: negative  J-Sign: none    Other   Meniscal Cyst: absent  Popliteal (Baker's) Cyst: absent  Sensation: normal    Comments:  Incision clean and dry with nylon sutures in place. SKYLAR drains x2 in place.    Left Knee Exam     Inspection   Erythema: absent  Scars: absent  Swelling: absent  Effusion: absent  Deformity: deformity  Bruising: absent    Tenderness   The patient is experiencing no tenderness.         Range of Motion   Extension:  -5 normal   Flexion:  130 normal     Tests   Meniscus   Zeenat:  Medial - negative Lateral - negative  Stability Lachman: normal (-1 to 2mm) PCL-Posterior Drawer: normal (0 to 2mm)  MCL - Valgus: normal (0 to 2mm)  LCL - Varus: normal (0 to 2mm)Pivot Shift: normal (Equal)Reverse Pivot Shift: normal (Equal)Dial Test at 30 degrees: normal (< 5 degrees)Dial Test at 90 degrees: normal (< 5 degrees)  Posterior Sag Test: negative  Posterolateral Corner: unstable (>15 degrees difference)  Patella   Patellar Apprehension: negative  Passive Patellar Tilt: neutral  Patellar Tracking: normal  Patellar Glide (Quadrants): Lateral - 1 Medial - 2  Q-Angle at 90 degrees: normal  Patellar Grind: positive  J-Sign: J sign absent    Other   Meniscal Cyst: absent  Popliteal (Baker's) Cyst: absent  Sensation: normal    Right Hip Exam     Tests   Aimee: negative  Left Hip Exam     Tests   Aimee:  "negative          Muscle Strength   Right Lower Extremity   Hip Abduction: 4/5   Quadriceps:  4/5   Hamstrin/5   Left Lower Extremity   Hip Abduction: 5/5   Quadriceps:  5/5   Hamstrin/5     Reflexes     Left Side  Quadriceps:  2+  Achilles:  2+    Right Side   Quadriceps:  2+  Achilles:  2+    Vascular Exam     Right Pulses  Dorsalis Pedis:      2+  Posterior Tibial:      2+        Left Pulses  Dorsalis Pedis:      2+  Posterior Tibial:      2+          Proteus mirabilis       CULTURE, TISSUE     Amox/K Clav'ate <=8/4 "><=8/4  Sensitive     Amp/Sulbactam <=8/4 "><=8/4  Sensitive     Ampicillin <=8 "><=8  Sensitive     Cefazolin <=8 "><=8  Sensitive     Cefepime <=8 "><=8  Sensitive     Ceftriaxone <=8 "><=8  Sensitive     Ciprofloxacin <=1 "><=1  Sensitive     Ertapenem <=2 "><=2  Sensitive     Gentamicin <=4 "><=4  Sensitive     Meropenem <=4 "><=4  Sensitive     Piperacillin/Tazo <=16 "><=16  Sensitive     Tobramycin <=4 "><=4  Sensitive     Trimeth/Sulfa <=2/38 "><=2/38  Sensitive           Susceptibility      Escherichia coli     CULTURE, TISSUE     Amox/K Clav'ate <=8/4 "><=8/4  Sensitive     Amp/Sulbactam >16/8  Resistant     Ampicillin >16  Resistant     Cefazolin <=8 "><=8  Sensitive     Cefepime <=8 "><=8  Sensitive     Ceftriaxone <=8 "><=8  Sensitive     Ciprofloxacin <=1 "><=1  Sensitive     Ertapenem <=2 "><=2  Sensitive     Gentamicin <=4 "><=4  Sensitive     Meropenem <=4 "><=4  Sensitive     Piperacillin/Tazo <=16 "><=16  Sensitive     Tetracycline <=4 "><=4  Sensitive     Tobramycin <=4 "><=4  Sensitive     Trimeth/Sulfa <=2/38 "><=2/38  Sensitive      Aerobic culture   Order: [id number]   Status:  Final result   Visible to patient:  Yes (Patient Portal)   Next appt:  2018 at 10:45 AM in Sports Medicine (Matt Smith MD)    6d ago   Aerobic Bacterial Culture PSEUDOMONAS AERUGINOSA   Rare        Resulting Agency [facility]   Susceptibility      Pseudomonas aeruginosa     CULTURE, AEROBIC " " (SPECIFY SOURCE)     Amikacin <=16 mcg/mL"><=16 mcg/mL Sensitive     Cefepime <=8 mcg/mL"><=8 mcg/mL Sensitive     Ciprofloxacin <=1 mcg/mL"><=1 mcg/mL Sensitive     Gentamicin <=4 mcg/mL"><=4 mcg/mL Sensitive     Meropenem <=4 mcg/mL"><=4 mcg/mL Sensitive     Piperacillin/Tazo <=16 mcg/mL"><=16 mcg/mL Sensitive     Tobramycin <=4 mcg/mL"><=4 mcg/mL Sensitive                         Assessment:       Encounter Diagnoses   Name Primary?    S/P knee surgery Yes    Right leg weakness     Right knee pain, unspecified chronicity     Joint stiffness     Infection and inflammatory reaction due to internal right knee prosthesis, initial encounter           Plan:       1. IKDC, SF-12 and KOOS was not filled out today in clinic.     RTC in 1 weeks with Dr. Matt Smith for drain and suture removal and Lab assessment. Patient will not fill out IKDC, SF-12 and KOOS on return.    2. Discontinue one drain at lateral knee today and every other suture today    3. ESR, CRP and CBC with differential today               Patient questionnaires may have been collected.  "

## 2018-03-02 ENCOUNTER — HOSPITAL ENCOUNTER (EMERGENCY)
Facility: HOSPITAL | Age: 48
Discharge: HOME | End: 2018-03-03
Attending: EMERGENCY MEDICINE
Payer: MEDICAID

## 2018-03-02 ENCOUNTER — NURSE TRIAGE (OUTPATIENT)
Dept: ADMINISTRATIVE | Facility: CLINIC | Age: 48
End: 2018-03-02

## 2018-03-02 DIAGNOSIS — T82.9XXA CENTRAL LINE COMPLICATION, INITIAL ENCOUNTER: Primary | ICD-10-CM

## 2018-03-02 LAB — BACTERIA SPEC ANAEROBE CULT: NORMAL

## 2018-03-02 PROCEDURE — 36569 INSJ PICC 5 YR+ W/O IMAGING: CPT

## 2018-03-02 PROCEDURE — 99284 EMERGENCY DEPT VISIT MOD MDM: CPT | Mod: 25

## 2018-03-03 ENCOUNTER — TELEPHONE (OUTPATIENT)
Dept: ORTHOPEDICS | Facility: HOSPITAL | Age: 48
End: 2018-03-03

## 2018-03-03 VITALS
RESPIRATION RATE: 20 BRPM | HEIGHT: 64 IN | SYSTOLIC BLOOD PRESSURE: 160 MMHG | HEART RATE: 96 BPM | WEIGHT: 210 LBS | TEMPERATURE: 99 F | DIASTOLIC BLOOD PRESSURE: 90 MMHG | BODY MASS INDEX: 35.85 KG/M2 | OXYGEN SATURATION: 100 %
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
## 2018-03-03 NOTE — ED NOTES
PICC line in removed by Nurse Yifan Gongora   Measured 49 cm in legnth.   Tip intact.  Pressure held.  Replaced a new line in the lt. Ac per orders.  Pt. tolorated well.  Pt. Signed the consent.

## 2018-03-03 NOTE — ED TRIAGE NOTES
Presented to ed with a pic line in lt. Ac  Line started coming out earlier today about 3pm.    Patient is taking iv abx for  Her knee infection.

## 2018-03-03 NOTE — TELEPHONE ENCOUNTER
"  Reason for Disposition   Patient sounds very sick or weak to the triager    Answer Assessment - Initial Assessment Questions  1. SYMPTOM:  "What's the main symptom you're concerned about?" (e.g., pain, redness, swelling, pus)      PICC line is coming out  2. ONSET: "When did the ________  start?"      tonight  3. IV WHAT: "What kind of IV line do you have?" (e.g., central line, PICC, peripheral IV)      PICC  4. IV WHERE: "Where does the IV enter your body?"      arm  5. IV WHEN: "When was this IV put in?"      3 weeks ago  6. IV WHY: "Why do you have this IV line?"      antibiotics  7. IV RUNNING OK: "Is the IV running OK?" (e.g., running OK, running slowly, not running, unable to flush)       no  8. PAIN: "Is there any pain?" If so, ask: "How bad is the pain?"   (e.g., scale 1-10; or mild, moderate, severe)      no  9. OTHER SYMPTOMS: "Do you have any other symptoms?" (e.g., fever, shaking chills, new weakness, pus)      no  10. VISITING NURSE: "Do you have a visiting nurse?" (e.g., home health nurse, IV infusion nurse)        n/a    Protocols used: ST IV SITE (SKIN) SYMPTOMS-A-    Patient and her  called to report her PICC line is almost to her hand, site is not bleeding and patient does not report any symptoms at this time. She was taught how to administer antibiotics at home. Patient verbalized understanding of care advice and will go to ED now.   "

## 2018-03-03 NOTE — ED NOTES
Yifan Gongora came in to the ED evaluated the picc line.  Removed the line and held pressure for the patient   Assessed for a new line.

## 2018-03-03 NOTE — TELEPHONE ENCOUNTER
Reason for Disposition   Caller has already spoken with another triager and has no further questions.     This is a duplicate call; Tia took her original call of the [facility] board.  Brionna Bob states she is on the way to the hospital ED now.  No additional needs. Message to Michael Houston , pcp, and Matt Smith MD ortho. Please contact caller directly with any additional care advice.    Protocols used: ST NO CONTACT OR DUPLICATE CONTACT CALL-A-AH

## 2018-03-03 NOTE — ED NOTES
Dr Jh ARREGUIN Visited with the patient.   Informed the patient that the PIc line  People are coming in to replace the ling.

## 2018-03-03 NOTE — PROCEDURES
"Brionna Jones is a 47 y.o. female patient.    Temp: 98.8 °F (37.1 °C) (03/02/18 2203)  Pulse: (!) 117 (03/02/18 2203)  Resp: 16 (03/02/18 2203)  BP: 130/88 (03/02/18 2203)  SpO2: 97 % (03/02/18 2203)  Weight: 95.3 kg (210 lb) (03/02/18 2203)  Height: 5' 4" (162.6 cm) (03/02/18 2203)    PICC  Date/Time: 3/2/2018 11:48 PM  Performed by: YIFAN GONGORA  Consent Done: Yes  Time out: Immediately prior to procedure a time out was called to verify the correct patient, procedure, equipment, support staff and site/side marked as required  Indications: med administration and vascular access  Anesthesia: local infiltration  Local anesthetic: lidocaine 1% without epinephrine  Anesthetic Total (mL): 3  Preparation: skin prepped with ChloraPrep  Skin prep agent dried: skin prep agent completely dried prior to procedure  Sterile barriers: all five maximum sterile barriers used - cap, mask, sterile gown, sterile gloves, and large sterile sheet  Hand hygiene: hand hygiene performed prior to central venous catheter insertion  Location details: left brachial  Catheter type: double lumen  Catheter size: 5 Fr  Catheter Length: 46cm    Ultrasound guidance: yes  Vessel Caliber: medium and patent, compressibility normal  Needle advanced into vessel with real time Ultrasound guidance.  Guidewire confirmed in vessel.  Number of attempts: 1  Post-procedure: blood return through all ports and sterile dressing applied  Estimated blood loss (mL): 1          Yifan Gongora  3/2/2018  "

## 2018-03-03 NOTE — TELEPHONE ENCOUNTER
Called by patient for PICC line, which had backed out. Recommend patient be evaluated for removal and reinsertion. Patient stated she would present to closest ER. Denies new knee pain, fevers, chills, sweats, or wound changes.

## 2018-03-05 DIAGNOSIS — M12.561 TRAUMATIC ARTHRITIS OF RIGHT KNEE: ICD-10-CM

## 2018-03-05 RX ORDER — ASPIRIN 325 MG
325 TABLET, DELAYED RELEASE (ENTERIC COATED) ORAL DAILY
Qty: 42 TABLET | Refills: 0 | Status: SHIPPED | OUTPATIENT
Start: 2018-03-05 | End: 2019-05-29

## 2018-03-05 RX ORDER — OXYCODONE AND ACETAMINOPHEN 10; 325 MG/1; MG/1
1 TABLET ORAL EVERY 12 HOURS PRN
Qty: 28 TABLET | Refills: 0 | Status: SHIPPED | OUTPATIENT
Start: 2018-03-05 | End: 2018-03-20 | Stop reason: SDUPTHER

## 2018-03-05 NOTE — TELEPHONE ENCOUNTER
----- Message from Ponce Dixon sent at 3/5/2018  3:06 PM CST -----  Contact: self  Patient ask for a call in regards to needing a refill on her oxyCODONE-acetaminophen (PERCOCET)  mg per tablet and aspirin (ECOTRIN) 325 MG EC tablet Patient can be reached at

## 2018-03-07 ENCOUNTER — OFFICE VISIT (OUTPATIENT)
Dept: SPORTS MEDICINE | Facility: CLINIC | Age: 48
End: 2018-03-07
Payer: MEDICAID

## 2018-03-07 VITALS
HEART RATE: 101 BPM | SYSTOLIC BLOOD PRESSURE: 133 MMHG | BODY MASS INDEX: 35.85 KG/M2 | DIASTOLIC BLOOD PRESSURE: 89 MMHG | WEIGHT: 210 LBS | HEIGHT: 64 IN

## 2018-03-07 DIAGNOSIS — Z98.890 S/P KNEE SURGERY: Primary | ICD-10-CM

## 2018-03-07 DIAGNOSIS — R26.2 DIFFICULTY WALKING: ICD-10-CM

## 2018-03-07 DIAGNOSIS — T84.53XA INFECTION AND INFLAMMATORY REACTION DUE TO INTERNAL RIGHT KNEE PROSTHESIS, INITIAL ENCOUNTER: ICD-10-CM

## 2018-03-07 DIAGNOSIS — M25.60 JOINT STIFFNESS: ICD-10-CM

## 2018-03-07 DIAGNOSIS — E66.9 OBESITY (BMI 35.0-39.9 WITHOUT COMORBIDITY): ICD-10-CM

## 2018-03-07 PROBLEM — M17.11 PRIMARY OSTEOARTHRITIS OF RIGHT KNEE: Status: RESOLVED | Noted: 2017-05-15 | Resolved: 2018-03-07

## 2018-03-07 PROCEDURE — 99024 POSTOP FOLLOW-UP VISIT: CPT | Mod: ,,, | Performed by: ORTHOPAEDIC SURGERY

## 2018-03-07 PROCEDURE — 99999 PR PBB SHADOW E&M-EST. PATIENT-LVL III: CPT | Mod: PBBFAC,,, | Performed by: ORTHOPAEDIC SURGERY

## 2018-03-07 PROCEDURE — 99213 OFFICE O/P EST LOW 20 MIN: CPT | Mod: PBBFAC,PO | Performed by: ORTHOPAEDIC SURGERY

## 2018-03-07 NOTE — PROGRESS NOTES
Subjective:          Chief Complaint: Brionna Jones is a 47 y.o. female who had concerns including Post-op Evaluation of the Right Knee.    Patient is here for a follow up for her right knee. Her drain is putting out about 25cc every 2-3 days. She is still wearing the immobilizer.     DATE OF PROCEDURE:  02/20/2018     ATTENDING SURGEON:  Matt Smith M.D.     CO-SURGEON:  Julio C Pederson MD.     FIRST ASSISTANT:  Annalee Dominguez PA-C.     PREOPERATIVE DIAGNOSIS:  Right knee medial retinacular tear.     POSTOPERATIVE DIAGNOSES:  1.  Right knee medial retinacular tear.  2.  Right knee seroma.     PROCEDURES PERFORMED:  1.  Right knee Allopatch HD graft application to 15 cm area intraarticularly.  2.  Right knee medial retinacular repair.  3.  Right knee deep complex incision and drainage.  4.  Right knee Parth-Barboza drain application.  5.  Right knee amniotic arthrocentesis.    DATE OF PROCEDURE: 2/12/2018      PREOPERATIVE DIAGNOSES:   1. Right total knee wound dehiscence  2. Right periprosthetic acute infection, suspected      POSTOPERATIVE DIAGNOSES:   1. Right total knee wound dehiscence   2. Right periprosthetic acute infection, suspected      PROCEDURES PERFORMED:   1. Irrigation and excisional debridement of right total knee  2. Revision complex wound closure of right total knee  3. Application of incisional negative pressure dressing (<50cm2) of right total knee    DATE OF PROCEDURE:  1/23/2018     ATTENDING SURGEON: Surgeon(s) and Role:     * Matt Smith MD - Primary     Co-Surgeon:Adán Linda MD      SECOND ASSISTANT: Angie Dominguez PA-C        Co-Surgeon Duties: Due to the complexity of the case and the need for significant intra-operative decision making co-surgeon duties were medically necessary. The chronicity of the disease process and the level of deformity dictated that co-surgeon duties be undertaken. A separate note will be dictated/reported by Dr. Matt Smith stating the specific  co-surgeon activities and roles performed during the surgery.        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         Pain   Associated symptoms include joint swelling. Pertinent negatives include no abdominal pain, chest pain, chills, congestion, coughing, fever, headaches, myalgias, nausea, numbness, rash, sore throat or vomiting.   Knee Pain    Associated symptoms include stiffness. Pertinent negatives include no fever, itching or numbness.         Review of Systems   Constitution: Negative. Negative for chills, fever, weight gain and weight loss.   HENT: Negative for congestion and sore throat.    Eyes: Negative for blurred vision and double vision.   Cardiovascular: Negative for chest pain, leg swelling and palpitations.   Respiratory: Negative for cough and shortness of breath.    Hematologic/Lymphatic: Does not bruise/bleed easily.   Skin: Negative for itching, poor wound healing and rash.   Musculoskeletal: Positive for joint pain, joint swelling and stiffness. Negative for back pain, muscle weakness and myalgias.   Gastrointestinal: Negative for abdominal pain, constipation, diarrhea, nausea and vomiting.   Genitourinary: Negative.  Negative for  frequency and hematuria.   Neurological: Negative for dizziness, headaches, numbness, paresthesias and sensory change.   Psychiatric/Behavioral: Negative for altered mental status and depression. The patient is not nervous/anxious.    Allergic/Immunologic: Negative for hives.       Pain Related Questions  Over the past 3 days, what was your average pain during activity? (I.e. running, jogging, walking, climbing stairs, getting dressed, ect.): 10  Over the past 3 days, what was your highest pain level?: 10  Over the past 3 days, what was your lowest pain level? : 7    Other  How many nights a week are you awakened by your affected body part?: 7  Was the patient's HEIGHT measured or patient reported?: Patient Reported  Was the patient's WEIGHT measured or patient reported?: Measured      Objective:        General: Brionna is well-developed, well-nourished, appears stated age, in no acute distress, alert and oriented to time, place and person.     General    Vitals reviewed.  Constitutional: She is oriented to person, place, and time. She appears well-developed and well-nourished. No distress.   HENT:   Head: Normocephalic and atraumatic.   Mouth/Throat: No oropharyngeal exudate.   Eyes: EOM are normal. Right eye exhibits no discharge. Left eye exhibits no discharge.   Neck: Normal range of motion.   Cardiovascular: Normal rate and regular rhythm.    Pulmonary/Chest: Effort normal and breath sounds normal. No respiratory distress.   Neurological: She is alert and oriented to person, place, and time. She has normal reflexes. No cranial nerve deficit. Coordination normal.   Psychiatric: She has a normal mood and affect. Her behavior is normal. Judgment and thought content normal.     General Musculoskeletal Exam   Gait: normal       Right Knee Exam     Inspection   Erythema: absent  Scars: present  Swelling: present  Effusion: present  Deformity: deformity  Bruising: absent    Tenderness   The patient is tender to  palpation of the patella, lateral joint line and medial joint line.    Crepitus   The patient has crepitus of the patella.    Range of Motion   Extension:  0 normal   Flexion:  90 abnormal     Tests   Meniscus   Zeenat:  Medial - negative Lateral - negative  Ligament Examination   MCL - Valgus: normal (0 to 2mm)  LCL - Varus: normalDial Test at 30 degrees: normal (< 5 degrees)Dial Test at 90 degrees: normal (< 5 degrees)  Posterior Sag Test: negative  Posterolateral Corner: unstable (>15 degrees difference)  Patella   Patellar Apprehension: negative  Passive Patellar Tilt: neutral  Patellar Tracking: normal  Patellar Glide (quadrants): Lateral - 1   Medial - 2  Q-Angle at 90 degrees: normal  Patellar Grind: negative  J-Sign: none    Other   Meniscal Cyst: absent  Popliteal (Baker's) Cyst: absent  Sensation: normal    Comments:  Incision clean and dry with nylon sutures in place. SKYLAR drains x2 in place.    Left Knee Exam     Inspection   Erythema: absent  Scars: absent  Swelling: absent  Effusion: absent  Deformity: deformity  Bruising: absent    Tenderness   The patient is experiencing no tenderness.         Range of Motion   Extension:  -5 normal   Flexion:  130 normal     Tests   Meniscus   Zeenat:  Medial - negative Lateral - negative  Stability Lachman: normal (-1 to 2mm) PCL-Posterior Drawer: normal (0 to 2mm)  MCL - Valgus: normal (0 to 2mm)  LCL - Varus: normal (0 to 2mm)Pivot Shift: normal (Equal)Reverse Pivot Shift: normal (Equal)Dial Test at 30 degrees: normal (< 5 degrees)Dial Test at 90 degrees: normal (< 5 degrees)  Posterior Sag Test: negative  Posterolateral Corner: unstable (>15 degrees difference)  Patella   Patellar Apprehension: negative  Passive Patellar Tilt: neutral  Patellar Tracking: normal  Patellar Glide (Quadrants): Lateral - 1 Medial - 2  Q-Angle at 90 degrees: normal  Patellar Grind: positive  J-Sign: J sign absent    Other   Meniscal Cyst: absent  Popliteal (Baker's) Cyst:  "absent  Sensation: normal    Right Hip Exam     Tests   Aimee: negative  Left Hip Exam     Tests   Aimee: negative          Muscle Strength   Right Lower Extremity   Hip Abduction: 4/5   Quadriceps:  4/5   Hamstrin/5   Left Lower Extremity   Hip Abduction: 5/5   Quadriceps:  5/5   Hamstrin/5     Reflexes     Left Side  Quadriceps:  2+  Achilles:  2+    Right Side   Quadriceps:  2+  Achilles:  2+    Vascular Exam     Right Pulses  Dorsalis Pedis:      2+  Posterior Tibial:      2+        Left Pulses  Dorsalis Pedis:      2+  Posterior Tibial:      2+          Proteus mirabilis       CULTURE, TISSUE     Amox/K Clav'ate <=8/4 "><=8/4  Sensitive     Amp/Sulbactam <=8/4 "><=8/4  Sensitive     Ampicillin <=8 "><=8  Sensitive     Cefazolin <=8 "><=8  Sensitive     Cefepime <=8 "><=8  Sensitive     Ceftriaxone <=8 "><=8  Sensitive     Ciprofloxacin <=1 "><=1  Sensitive     Ertapenem <=2 "><=2  Sensitive     Gentamicin <=4 "><=4  Sensitive     Meropenem <=4 "><=4  Sensitive     Piperacillin/Tazo <=16 "><=16  Sensitive     Tobramycin <=4 "><=4  Sensitive     Trimeth/Sulfa <=2/38 "><=2/38  Sensitive           Susceptibility      Escherichia coli     CULTURE, TISSUE     Amox/K Clav'ate <=8/4 "><=8/4  Sensitive     Amp/Sulbactam >16/8  Resistant     Ampicillin >16  Resistant     Cefazolin <=8 "><=8  Sensitive     Cefepime <=8 "><=8  Sensitive     Ceftriaxone <=8 "><=8  Sensitive     Ciprofloxacin <=1 "><=1  Sensitive     Ertapenem <=2 "><=2  Sensitive     Gentamicin <=4 "><=4  Sensitive     Meropenem <=4 "><=4  Sensitive     Piperacillin/Tazo <=16 "><=16  Sensitive     Tetracycline <=4 "><=4  Sensitive     Tobramycin <=4 "><=4  Sensitive     Trimeth/Sulfa <=2/38 "><=2/38  Sensitive      Aerobic culture   Order: [id number]   Status:  Final result   Visible to patient:  Yes (Patient Portal)   Next appt:  2018 at 10:45 AM in Sports Medicine (Matt Smith MD)    6d ago   Aerobic Bacterial Culture PSEUDOMONAS " "AERUGINOSA   Rare        Resulting Agency [facility]   Susceptibility      Pseudomonas aeruginosa     CULTURE, AEROBIC  (SPECIFY SOURCE)     Amikacin <=16 mcg/mL"><=16 mcg/mL Sensitive     Cefepime <=8 mcg/mL"><=8 mcg/mL Sensitive     Ciprofloxacin <=1 mcg/mL"><=1 mcg/mL Sensitive     Gentamicin <=4 mcg/mL"><=4 mcg/mL Sensitive     Meropenem <=4 mcg/mL"><=4 mcg/mL Sensitive     Piperacillin/Tazo <=16 mcg/mL"><=16 mcg/mL Sensitive     Tobramycin <=4 mcg/mL"><=4 mcg/mL Sensitive                         Assessment:       Encounter Diagnoses   Name Primary?    S/P knee surgery Yes    Joint stiffness     Infection and inflammatory reaction due to internal right knee prosthesis, initial encounter     Difficulty walking     Obesity (BMI 35.0-39.9 without comorbidity)           Plan:       1. IKDC, SF-12 and KOOS was not filled out today in clinic.     RTC in 3 weeks with Dr. Matt Smith Patient will not fill out IKDC, SF-12 and KOOS and bilateral knee series on return.    2. Drain removed today and all remaining sutures    3. Continue PICC line for 3 more weeks and oral ABX    4. Continue immobilzer               Patient questionnaires may have been collected.  "

## 2018-03-15 LAB
FUNGUS SPEC CULT: NORMAL

## 2018-03-20 DIAGNOSIS — M25.569 KNEE PAIN, UNSPECIFIED CHRONICITY, UNSPECIFIED LATERALITY: Primary | ICD-10-CM

## 2018-03-20 RX ORDER — OXYCODONE AND ACETAMINOPHEN 10; 325 MG/1; MG/1
1 TABLET ORAL EVERY 12 HOURS PRN
Qty: 28 TABLET | Refills: 0 | Status: SHIPPED | OUTPATIENT
Start: 2018-03-20 | End: 2018-03-26 | Stop reason: SDUPTHER

## 2018-03-20 NOTE — TELEPHONE ENCOUNTER
----- Message from Luis Miguel Isabel sent at 3/20/2018  9:39 AM CDT -----  Contact: self/home   Pt would like a refill on her percocet rx.

## 2018-03-22 LAB — FUNGUS SPEC CULT: NORMAL

## 2018-03-26 ENCOUNTER — OFFICE VISIT (OUTPATIENT)
Dept: SPORTS MEDICINE | Facility: CLINIC | Age: 48
End: 2018-03-26
Payer: MEDICAID

## 2018-03-26 ENCOUNTER — HOSPITAL ENCOUNTER (OUTPATIENT)
Dept: RADIOLOGY | Facility: HOSPITAL | Age: 48
Discharge: HOME | End: 2018-03-26
Attending: ORTHOPAEDIC SURGERY
Payer: MEDICAID

## 2018-03-26 VITALS
HEART RATE: 117 BPM | HEIGHT: 64 IN | BODY MASS INDEX: 35.51 KG/M2 | SYSTOLIC BLOOD PRESSURE: 137 MMHG | DIASTOLIC BLOOD PRESSURE: 90 MMHG | WEIGHT: 208 LBS

## 2018-03-26 DIAGNOSIS — M25.561 RIGHT KNEE PAIN, UNSPECIFIED CHRONICITY: Primary | ICD-10-CM

## 2018-03-26 DIAGNOSIS — M25.60 JOINT STIFFNESS: ICD-10-CM

## 2018-03-26 DIAGNOSIS — M25.561 RIGHT KNEE PAIN, UNSPECIFIED CHRONICITY: ICD-10-CM

## 2018-03-26 DIAGNOSIS — Z96.651 H/O TOTAL KNEE REPLACEMENT, RIGHT: ICD-10-CM

## 2018-03-26 DIAGNOSIS — M12.561 TRAUMATIC ARTHRITIS OF RIGHT KNEE: ICD-10-CM

## 2018-03-26 DIAGNOSIS — R29.898 RIGHT LEG WEAKNESS: ICD-10-CM

## 2018-03-26 DIAGNOSIS — M25.569 KNEE PAIN, UNSPECIFIED CHRONICITY, UNSPECIFIED LATERALITY: ICD-10-CM

## 2018-03-26 DIAGNOSIS — T84.53XA INFECTION AND INFLAMMATORY REACTION DUE TO INTERNAL RIGHT KNEE PROSTHESIS, INITIAL ENCOUNTER: ICD-10-CM

## 2018-03-26 DIAGNOSIS — Z98.890 S/P KNEE SURGERY: ICD-10-CM

## 2018-03-26 PROCEDURE — 99024 POSTOP FOLLOW-UP VISIT: CPT | Mod: S$PBB,,, | Performed by: ORTHOPAEDIC SURGERY

## 2018-03-26 PROCEDURE — 99999 PR PBB SHADOW E&M-EST. PATIENT-LVL III: CPT | Mod: PBBFAC,,, | Performed by: ORTHOPAEDIC SURGERY

## 2018-03-26 PROCEDURE — 99213 OFFICE O/P EST LOW 20 MIN: CPT | Mod: PBBFAC,25,PO | Performed by: ORTHOPAEDIC SURGERY

## 2018-03-26 PROCEDURE — 73564 X-RAY EXAM KNEE 4 OR MORE: CPT | Mod: TC,50,FY,PO

## 2018-03-26 PROCEDURE — 73564 X-RAY EXAM KNEE 4 OR MORE: CPT | Mod: 26,50,, | Performed by: RADIOLOGY

## 2018-03-26 RX ORDER — CIPROFLOXACIN 750 MG/1
750 TABLET, FILM COATED ORAL EVERY 12 HOURS
Qty: 60 TABLET | Refills: 0 | Status: SHIPPED | OUTPATIENT
Start: 2018-03-26 | End: 2018-11-05 | Stop reason: SDUPTHER

## 2018-03-26 RX ORDER — OXYCODONE AND ACETAMINOPHEN 10; 325 MG/1; MG/1
1 TABLET ORAL EVERY 12 HOURS PRN
Qty: 28 TABLET | Refills: 0 | Status: SHIPPED | OUTPATIENT
Start: 2018-03-26 | End: 2018-04-05 | Stop reason: SDUPTHER

## 2018-03-26 NOTE — PROGRESS NOTES
Subjective:          Chief Complaint: Brionna Jones is a 48 y.o. female who had concerns including Post-op Evaluation of the Right Knee.    Patient is here for a follow up for her right knee.     DATE OF PROCEDURE:  02/20/2018     ATTENDING SURGEON:  Matt Smith M.D.     CO-SURGEON:  Julio C Pederson MD.     FIRST ASSISTANT:  Annalee Dominguez PA-C.     PREOPERATIVE DIAGNOSIS:  Right knee medial retinacular tear.     POSTOPERATIVE DIAGNOSES:  1.  Right knee medial retinacular tear.  2.  Right knee seroma.     PROCEDURES PERFORMED:  1.  Right knee Allopatch HD graft application to 15 cm area intraarticularly.  2.  Right knee medial retinacular repair.  3.  Right knee deep complex incision and drainage.  4.  Right knee Parth-Barboza drain application.  5.  Right knee amniotic arthrocentesis.    DATE OF PROCEDURE: 2/12/2018      PREOPERATIVE DIAGNOSES:   1. Right total knee wound dehiscence  2. Right periprosthetic acute infection, suspected      POSTOPERATIVE DIAGNOSES:   1. Right total knee wound dehiscence   2. Right periprosthetic acute infection, suspected      PROCEDURES PERFORMED:   1. Irrigation and excisional debridement of right total knee  2. Revision complex wound closure of right total knee  3. Application of incisional negative pressure dressing (<50cm2) of right total knee    DATE OF PROCEDURE:  1/23/2018     ATTENDING SURGEON: Surgeon(s) and Role:     * Matt Smith MD - Primary     Co-Surgeon:Adán Linda MD      SECOND ASSISTANT: Angie Dominguez PA-C        Co-Surgeon Duties: Due to the complexity of the case and the need for significant intra-operative decision making co-surgeon duties were medically necessary. The chronicity of the disease process and the level of deformity dictated that co-surgeon duties be undertaken. A separate note will be dictated/reported by Dr. Matt Smith stating the specific co-surgeon activities and roles performed during the surgery.        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         Pain   Associated symptoms include joint swelling. Pertinent negatives include no abdominal pain, chest pain, chills, congestion, coughing, fever, headaches, myalgias, nausea, numbness, rash, sore throat or vomiting.   Knee Pain    Associated symptoms include stiffness. Pertinent negatives include no fever, itching or numbness.         Review of Systems   Constitution: Negative. Negative for chills, fever, weight gain and weight loss.   HENT: Negative for congestion and sore throat.    Eyes: Negative for blurred vision and double vision.   Cardiovascular: Negative for chest pain, leg swelling and palpitations.   Respiratory: Negative for cough and shortness of breath.    Hematologic/Lymphatic: Does not bruise/bleed easily.   Skin: Negative for itching, poor wound healing and rash.   Musculoskeletal: Positive for joint pain, joint swelling and stiffness. Negative for back pain, muscle weakness and myalgias.   Gastrointestinal: Negative for abdominal pain, constipation, diarrhea, nausea and vomiting.   Genitourinary: Negative.  Negative for frequency and hematuria.   Neurological: Negative for dizziness, headaches, numbness,  paresthesias and sensory change.   Psychiatric/Behavioral: Negative for altered mental status and depression. The patient is not nervous/anxious.    Allergic/Immunologic: Negative for hives.       Pain Related Questions  Over the past 3 days, what was your average pain during activity? (I.e. running, jogging, walking, climbing stairs, getting dressed, ect.): 10  Over the past 3 days, what was your highest pain level?: 10  Over the past 3 days, what was your lowest pain level? : 7    Other  How many nights a week are you awakened by your affected body part?: 7  Was the patient's HEIGHT measured or patient reported?: Patient Reported  Was the patient's WEIGHT measured or patient reported?: Measured      Objective:        General: Brionna is well-developed, well-nourished, appears stated age, in no acute distress, alert and oriented to time, place and person.     General    Vitals reviewed.  Constitutional: She is oriented to person, place, and time. She appears well-developed and well-nourished. No distress.   HENT:   Head: Normocephalic and atraumatic.   Mouth/Throat: No oropharyngeal exudate.   Eyes: EOM are normal. Right eye exhibits no discharge. Left eye exhibits no discharge.   Neck: Normal range of motion.   Cardiovascular: Normal rate and regular rhythm.    Pulmonary/Chest: Effort normal and breath sounds normal. No respiratory distress.   Neurological: She is alert and oriented to person, place, and time. She has normal reflexes. No cranial nerve deficit. Coordination normal.   Psychiatric: She has a normal mood and affect. Her behavior is normal. Judgment and thought content normal.     General Musculoskeletal Exam   Gait: normal       Right Knee Exam     Inspection   Erythema: absent  Scars: present  Swelling: present  Effusion: present  Deformity: deformity  Bruising: absent    Tenderness   The patient is tender to palpation of the patella, lateral joint line and medial joint line.    Crepitus   The patient  has crepitus of the patella.    Range of Motion   Extension:  0 normal   Flexion:  50 abnormal     Tests   Meniscus   Zeenat:  Medial - negative Lateral - negative  Ligament Examination   MCL - Valgus: normal (0 to 2mm)  LCL - Varus: normalDial Test at 30 degrees: normal (< 5 degrees)Dial Test at 90 degrees: normal (< 5 degrees)  Posterior Sag Test: negative  Posterolateral Corner: unstable (>15 degrees difference)  Patella   Patellar Apprehension: negative  Passive Patellar Tilt: neutral  Patellar Tracking: normal  Patellar Glide (quadrants): Lateral - 1   Medial - 2  Q-Angle at 90 degrees: normal  Patellar Grind: negative  J-Sign: none    Other   Meniscal Cyst: absent  Popliteal (Baker's) Cyst: absent  Sensation: normal    Comments:  Incision clean and dry with nylon sutures in place. SKYLAR drains x2 in place.    Left Knee Exam     Inspection   Erythema: absent  Scars: absent  Swelling: absent  Effusion: absent  Deformity: deformity  Bruising: absent    Tenderness   The patient is experiencing no tenderness.         Range of Motion   Extension:  -5 normal   Flexion:  130 normal     Tests   Meniscus   Zeenat:  Medial - negative Lateral - negative  Stability Lachman: normal (-1 to 2mm) PCL-Posterior Drawer: normal (0 to 2mm)  MCL - Valgus: normal (0 to 2mm)  LCL - Varus: normal (0 to 2mm)Pivot Shift: normal (Equal)Reverse Pivot Shift: normal (Equal)Dial Test at 30 degrees: normal (< 5 degrees)Dial Test at 90 degrees: normal (< 5 degrees)  Posterior Sag Test: negative  Posterolateral Corner: unstable (>15 degrees difference)  Patella   Patellar Apprehension: negative  Passive Patellar Tilt: neutral  Patellar Tracking: normal  Patellar Glide (Quadrants): Lateral - 1 Medial - 2  Q-Angle at 90 degrees: normal  Patellar Grind: positive  J-Sign: J sign absent    Other   Meniscal Cyst: absent  Popliteal (Baker's) Cyst: absent  Sensation: normal    Right Hip Exam     Tests   Aimee: negative  Left Hip Exam     Tests   Aimee:  "negative          Muscle Strength   Right Lower Extremity   Hip Abduction: 4/5   Quadriceps:  4/5   Hamstrin/5   Left Lower Extremity   Hip Abduction: 5/5   Quadriceps:  5/5   Hamstrin/5     Reflexes     Left Side  Quadriceps:  2+  Achilles:  2+    Right Side   Quadriceps:  2+  Achilles:  2+    Vascular Exam     Right Pulses  Dorsalis Pedis:      2+  Posterior Tibial:      2+        Left Pulses  Dorsalis Pedis:      2+  Posterior Tibial:      2+          Proteus mirabilis       CULTURE, TISSUE     Amox/K Clav'ate <=8/4 "><=8/4  Sensitive     Amp/Sulbactam <=8/4 "><=8/4  Sensitive     Ampicillin <=8 "><=8  Sensitive     Cefazolin <=8 "><=8  Sensitive     Cefepime <=8 "><=8  Sensitive     Ceftriaxone <=8 "><=8  Sensitive     Ciprofloxacin <=1 "><=1  Sensitive     Ertapenem <=2 "><=2  Sensitive     Gentamicin <=4 "><=4  Sensitive     Meropenem <=4 "><=4  Sensitive     Piperacillin/Tazo <=16 "><=16  Sensitive     Tobramycin <=4 "><=4  Sensitive     Trimeth/Sulfa <=2/38 "><=2/38  Sensitive           Susceptibility      Escherichia coli     CULTURE, TISSUE     Amox/K Clav'ate <=8/4 "><=8/4  Sensitive     Amp/Sulbactam >16/8  Resistant     Ampicillin >16  Resistant     Cefazolin <=8 "><=8  Sensitive     Cefepime <=8 "><=8  Sensitive     Ceftriaxone <=8 "><=8  Sensitive     Ciprofloxacin <=1 "><=1  Sensitive     Ertapenem <=2 "><=2  Sensitive     Gentamicin <=4 "><=4  Sensitive     Meropenem <=4 "><=4  Sensitive     Piperacillin/Tazo <=16 "><=16  Sensitive     Tetracycline <=4 "><=4  Sensitive     Tobramycin <=4 "><=4  Sensitive     Trimeth/Sulfa <=2/38 "><=2/38  Sensitive      Aerobic culture   Order: [id number]   Status:  Final result   Visible to patient:  Yes (Patient Portal)   Next appt:  2018 at 10:45 AM in Sports Medicine (Matt Smith MD)    6d ago   Aerobic Bacterial Culture PSEUDOMONAS AERUGINOSA   Rare        Resulting Agency [facility]   Susceptibility      Pseudomonas aeruginosa     CULTURE, AEROBIC " " (SPECIFY SOURCE)     Amikacin <=16 mcg/mL"><=16 mcg/mL Sensitive     Cefepime <=8 mcg/mL"><=8 mcg/mL Sensitive     Ciprofloxacin <=1 mcg/mL"><=1 mcg/mL Sensitive     Gentamicin <=4 mcg/mL"><=4 mcg/mL Sensitive     Meropenem <=4 mcg/mL"><=4 mcg/mL Sensitive     Piperacillin/Tazo <=16 mcg/mL"><=16 mcg/mL Sensitive     Tobramycin <=4 mcg/mL"><=4 mcg/mL Sensitive                         Assessment:       Encounter Diagnoses   Name Primary?    Right knee pain, unspecified chronicity Yes    S/P knee surgery     Right leg weakness     Joint stiffness     Traumatic arthritis of right knee     Infection and inflammatory reaction due to internal right knee prosthesis, initial encounter     H/O total knee replacement, right           Plan:       1. IKDC, SF-12 and KOOS was not filled out today in clinic.     RTC in 3 weeks with Dr. Matt Smith Patient will not fill out IKDC, SF-12 and KOOS.    2. Continue PICC line until last dose tonight. Can DC PICC line after last abx dose. Begin oral ABX    3. Begin PT at this time     4. [id number] - Tray Domínguez, performed a custom orthotic / brace adjustment, fitting and training with the patient. The patient demonstrated understanding and proper care. This was performed for 16 minutes.            Patient questionnaires may have been collected.  "

## 2018-03-27 ENCOUNTER — TELEPHONE (OUTPATIENT)
Dept: SPORTS MEDICINE | Facility: CLINIC | Age: 48
End: 2018-03-27

## 2018-03-27 NOTE — TELEPHONE ENCOUNTER
s/w the pt and told her I am faxing over the d/c of the Picc line now to IV services that Dr. Smith signed. she also wanted to know if she can shower, I told her that I talked to Lionel and he said that is fine. She asked if she can put lotion on the area where the incision is and I told her no.

## 2018-03-27 NOTE — TELEPHONE ENCOUNTER
----- Message from Luis Miguel Isabel sent at 3/27/2018  9:38 AM CDT -----  Contact: self/home   Pt would like to speak with you regarding a letter that needs to be sent to [facility] to have her pick line removed. Pt would also like to know if she is able to shower now. Pt stated that she left a message regarding this matter on yesterday and has not received a call back.

## 2018-04-05 DIAGNOSIS — M25.569 KNEE PAIN, UNSPECIFIED CHRONICITY, UNSPECIFIED LATERALITY: ICD-10-CM

## 2018-04-05 RX ORDER — OXYCODONE AND ACETAMINOPHEN 10; 325 MG/1; MG/1
1 TABLET ORAL EVERY 12 HOURS PRN
Qty: 28 TABLET | Refills: 0 | Status: SHIPPED | OUTPATIENT
Start: 2018-04-05 | End: 2018-04-30 | Stop reason: SDUPTHER

## 2018-04-05 NOTE — TELEPHONE ENCOUNTER
----- Message from Ponce Dixon sent at 4/5/2018 10:01 AM CDT -----  Contact: self  Patient states she called 3 days ago in regards to needing a prescription for her oxyCODONE-acetaminophen (PERCOCET)  mg per tablet Patient ask for a call at

## 2018-04-06 ENCOUNTER — CLINICAL SUPPORT (OUTPATIENT)
Dept: REHABILITATION | Facility: HOSPITAL | Age: 48
End: 2018-04-06
Attending: ORTHOPAEDIC SURGERY
Payer: MEDICAID

## 2018-04-06 DIAGNOSIS — Z96.651 CHRONIC KNEE PAIN AFTER TOTAL REPLACEMENT OF RIGHT KNEE JOINT: ICD-10-CM

## 2018-04-06 DIAGNOSIS — R68.89 DECREASED STRENGTH, ENDURANCE, AND MOBILITY: ICD-10-CM

## 2018-04-06 DIAGNOSIS — Z74.09 DECREASED STRENGTH, ENDURANCE, AND MOBILITY: ICD-10-CM

## 2018-04-06 DIAGNOSIS — G89.29 CHRONIC KNEE PAIN AFTER TOTAL REPLACEMENT OF RIGHT KNEE JOINT: ICD-10-CM

## 2018-04-06 DIAGNOSIS — M25.669 DECREASED RANGE OF MOTION (ROM) OF KNEE: ICD-10-CM

## 2018-04-06 DIAGNOSIS — M25.561 CHRONIC KNEE PAIN AFTER TOTAL REPLACEMENT OF RIGHT KNEE JOINT: ICD-10-CM

## 2018-04-06 DIAGNOSIS — R53.1 DECREASED STRENGTH, ENDURANCE, AND MOBILITY: ICD-10-CM

## 2018-04-06 PROCEDURE — 97162 PT EVAL MOD COMPLEX 30 MIN: CPT | Mod: PN
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
## 2018-04-16 LAB
ACID FAST MOD KINY STN SPEC: NORMAL
MYCOBACTERIUM SPEC QL CULT: NORMAL

## 2018-04-17 LAB
ACID FAST MOD KINY STN SPEC: NORMAL
ACID FAST MOD KINY STN SPEC: NORMAL
MYCOBACTERIUM SPEC QL CULT: NORMAL
MYCOBACTERIUM SPEC QL CULT: NORMAL

## 2018-04-18 ENCOUNTER — OFFICE VISIT (OUTPATIENT)
Dept: SPORTS MEDICINE | Facility: CLINIC | Age: 48
End: 2018-04-18
Payer: MEDICAID

## 2018-04-18 VITALS
DIASTOLIC BLOOD PRESSURE: 102 MMHG | WEIGHT: 208 LBS | HEIGHT: 64 IN | SYSTOLIC BLOOD PRESSURE: 169 MMHG | HEART RATE: 112 BPM | BODY MASS INDEX: 35.51 KG/M2

## 2018-04-18 DIAGNOSIS — Z96.651 S/P TOTAL KNEE ARTHROPLASTY, RIGHT: Primary | ICD-10-CM

## 2018-04-18 PROCEDURE — 99999 PR PBB SHADOW E&M-EST. PATIENT-LVL IV: CPT | Mod: PBBFAC,,, | Performed by: ORTHOPAEDIC SURGERY

## 2018-04-18 PROCEDURE — 99214 OFFICE O/P EST MOD 30 MIN: CPT | Mod: PBBFAC,PO | Performed by: ORTHOPAEDIC SURGERY

## 2018-04-18 PROCEDURE — 99024 POSTOP FOLLOW-UP VISIT: CPT | Mod: ,,, | Performed by: ORTHOPAEDIC SURGERY

## 2018-04-18 NOTE — PROGRESS NOTES
Subjective:          Chief Complaint: Brionna Jones is a 48 y.o. female who had concerns including Pain of the Right Knee.    Patient is here for a follow up for her right knee.     DATE OF PROCEDURE:  02/20/2018     ATTENDING SURGEON:  Matt Smith M.D.     CO-SURGEON:  Julio C Pederson MD.     FIRST ASSISTANT:  Annalee Dominguez PA-C.     PREOPERATIVE DIAGNOSIS:  Right knee medial retinacular tear.     POSTOPERATIVE DIAGNOSES:  1.  Right knee medial retinacular tear.  2.  Right knee seroma.     PROCEDURES PERFORMED:  1.  Right knee Allopatch HD graft application to 15 cm area intraarticularly.  2.  Right knee medial retinacular repair.  3.  Right knee deep complex incision and drainage.  4.  Right knee Parth-Barboza drain application.  5.  Right knee amniotic arthrocentesis.    DATE OF PROCEDURE: 2/12/2018      PREOPERATIVE DIAGNOSES:   1. Right total knee wound dehiscence  2. Right periprosthetic acute infection, suspected      POSTOPERATIVE DIAGNOSES:   1. Right total knee wound dehiscence   2. Right periprosthetic acute infection, suspected      PROCEDURES PERFORMED:   1. Irrigation and excisional debridement of right total knee  2. Revision complex wound closure of right total knee  3. Application of incisional negative pressure dressing (<50cm2) of right total knee    DATE OF PROCEDURE:  1/23/2018     ATTENDING SURGEON: Surgeon(s) and Role:     * Matt Smith MD - Primary     Co-Surgeon:Adán Linda MD      SECOND ASSISTANT: Angie Dominguez PA-C        Co-Surgeon Duties: Due to the complexity of the case and the need for significant intra-operative decision making co-surgeon duties were medically necessary. The chronicity of the disease process and the level of deformity dictated that co-surgeon duties be undertaken. A separate note will be dictated/reported by Dr. Matt Smith stating the specific co-surgeon activities and roles performed during the surgery.        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 and Genu Varum (acquired) M21.169     PROCEDURES PERFORMED:  right Replacement, Knee, Medial and Lateral compartment (Total Knee) 72495    Surgery Date: 11/11/2014      Surgeon(s) and Role:  * Matt Smith MD - Primary     Pre-op Diagnosis: Unspecified internal derangement of knee (717.9)  Tear of medial cartilage or meniscus of knee, current (836.0)     Post-op Diagnosis: Same     Procedure(s) (LRB):  ARTHROSCOPY-KNEE (Right)  MENISCECTOMY (Right)  REMOVAL-FOREIGN BODY / LOOSE BODY (Right)    There was an area of 1 cm x 1 cm area on the lateral tibial plateau, which   showed grade III and grade IV chondral defect.         Pain   Associated symptoms include joint swelling. Pertinent negatives include no abdominal pain, chest pain, chills, congestion, coughing, fever, headaches, myalgias, nausea, numbness, rash, sore throat or vomiting.   Knee Pain    Associated symptoms include stiffness. Pertinent negatives include no fever, itching or numbness.         Review of Systems   Constitution: Negative. Negative for chills, fever, weight gain and weight loss.   HENT: Negative for congestion and sore throat.    Eyes: Negative for blurred vision and double vision.   Cardiovascular: Negative for chest pain, leg swelling and palpitations.   Respiratory: Negative for cough and shortness of breath.    Hematologic/Lymphatic: Does not bruise/bleed easily.   Skin: Negative for itching, poor wound healing and rash.   Musculoskeletal: Positive for joint pain, joint swelling and stiffness. Negative for back pain, muscle weakness and myalgias.   Gastrointestinal: Negative for abdominal pain, constipation, diarrhea, nausea and vomiting.   Genitourinary: Negative.  Negative for frequency and hematuria.   Neurological: Negative for dizziness, headaches, numbness,  paresthesias and sensory change.   Psychiatric/Behavioral: Negative for altered mental status and depression. The patient is not nervous/anxious.    Allergic/Immunologic: Negative for hives.       Pain Related Questions  Over the past 3 days, what was your average pain during activity? (I.e. running, jogging, walking, climbing stairs, getting dressed, ect.): 9  Over the past 3 days, what was your highest pain level?: 9  Over the past 3 days, what was your lowest pain level? : 4    Other  How many nights a week are you awakened by your affected body part?: 0  Was the patient's HEIGHT measured or patient reported?: Patient Reported  Was the patient's WEIGHT measured or patient reported?: Measured      Objective:        General: Brionna is well-developed, well-nourished, appears stated age, in no acute distress, alert and oriented to time, place and person.     General    Vitals reviewed.  Constitutional: She is oriented to person, place, and time. She appears well-developed and well-nourished. No distress.   HENT:   Head: Normocephalic and atraumatic.   Mouth/Throat: No oropharyngeal exudate.   Eyes: EOM are normal. Right eye exhibits no discharge. Left eye exhibits no discharge.   Neck: Normal range of motion.   Cardiovascular: Normal rate and regular rhythm.    Pulmonary/Chest: Effort normal and breath sounds normal. No respiratory distress.   Neurological: She is alert and oriented to person, place, and time. She has normal reflexes. No cranial nerve deficit. Coordination normal.   Psychiatric: She has a normal mood and affect. Her behavior is normal. Judgment and thought content normal.     General Musculoskeletal Exam   Gait: normal       Right Knee Exam     Inspection   Erythema: absent  Scars: present  Swelling: present  Effusion: present  Deformity: deformity  Bruising: absent    Tenderness   The patient is tender to palpation of the patella, lateral joint line and medial joint line.    Crepitus   The patient  has crepitus of the patella.    Range of Motion   Extension:  0 normal   Flexion:  50 abnormal     Tests   Meniscus   Zeenat:  Medial - negative Lateral - negative  Ligament Examination   MCL - Valgus: normal (0 to 2mm)  LCL - Varus: normalDial Test at 30 degrees: normal (< 5 degrees)Dial Test at 90 degrees: normal (< 5 degrees)  Posterior Sag Test: negative  Posterolateral Corner: unstable (>15 degrees difference)  Patella   Patellar Apprehension: negative  Passive Patellar Tilt: neutral  Patellar Tracking: normal  Patellar Glide (quadrants): Lateral - 1   Medial - 2  Q-Angle at 90 degrees: normal  Patellar Grind: negative  J-Sign: none    Other   Meniscal Cyst: absent  Popliteal (Baker's) Cyst: absent  Sensation: normal    Comments:  Incision clean and dry, no evidence of infection    Left Knee Exam     Inspection   Erythema: absent  Scars: absent  Swelling: absent  Effusion: absent  Deformity: deformity  Bruising: absent    Tenderness   The patient is experiencing no tenderness.         Range of Motion   Extension:  -5 normal   Flexion:  130 normal     Tests   Meniscus   Zeenat:  Medial - negative Lateral - negative  Stability Lachman: normal (-1 to 2mm) PCL-Posterior Drawer: normal (0 to 2mm)  MCL - Valgus: normal (0 to 2mm)  LCL - Varus: normal (0 to 2mm)Pivot Shift: normal (Equal)Reverse Pivot Shift: normal (Equal)Dial Test at 30 degrees: normal (< 5 degrees)Dial Test at 90 degrees: normal (< 5 degrees)  Posterior Sag Test: negative  Posterolateral Corner: unstable (>15 degrees difference)  Patella   Patellar Apprehension: negative  Passive Patellar Tilt: neutral  Patellar Tracking: normal  Patellar Glide (Quadrants): Lateral - 1 Medial - 2  Q-Angle at 90 degrees: normal  Patellar Grind: positive  J-Sign: J sign absent    Other   Meniscal Cyst: absent  Popliteal (Baker's) Cyst: absent  Sensation: normal    Right Hip Exam     Tests   Aimee: negative  Left Hip Exam     Tests   Aimee: negative          Muscle  Strength   Right Lower Extremity   Hip Abduction: 4/5   Quadriceps:  4/5   Hamstrin/5   Left Lower Extremity   Hip Abduction: 5/5   Quadriceps:  5/5   Hamstrin/5     Reflexes     Left Side  Quadriceps:  2+  Achilles:  2+    Right Side   Quadriceps:  2+  Achilles:  2+    Vascular Exam     Right Pulses  Dorsalis Pedis:      2+  Posterior Tibial:      2+        Left Pulses  Dorsalis Pedis:      2+  Posterior Tibial:      2+                      Assessment:       Encounter Diagnosis   Name Primary?    S/P total knee arthroplasty, right Yes          Plan:       1. IKDC, SF-12 and KOOS was not filled out today in clinic.     RTC in 6 weeks with Dr. Matt Smith Patient will not fill out IKDC, SF-12 and KOOS on return.    2. [facility] for PT    3. Continue dynasplint    4. Stitch abscess removal performed by Dr Matt Smith today            Patient questionnaires may have been collected.

## 2018-04-24 LAB
ACID FAST MOD KINY STN SPEC: NORMAL
MYCOBACTERIUM SPEC QL CULT: NORMAL

## 2018-04-27 ENCOUNTER — CLINICAL SUPPORT (OUTPATIENT)
Dept: REHABILITATION | Facility: HOSPITAL | Age: 48
End: 2018-04-27
Attending: ORTHOPAEDIC SURGERY
Payer: MEDICAID

## 2018-04-27 DIAGNOSIS — Z96.651 CHRONIC KNEE PAIN AFTER TOTAL REPLACEMENT OF RIGHT KNEE JOINT: ICD-10-CM

## 2018-04-27 DIAGNOSIS — M25.561 CHRONIC KNEE PAIN AFTER TOTAL REPLACEMENT OF RIGHT KNEE JOINT: ICD-10-CM

## 2018-04-27 DIAGNOSIS — Z74.09 DECREASED STRENGTH, ENDURANCE, AND MOBILITY: ICD-10-CM

## 2018-04-27 DIAGNOSIS — R53.1 DECREASED STRENGTH, ENDURANCE, AND MOBILITY: ICD-10-CM

## 2018-04-27 DIAGNOSIS — M25.669 DECREASED RANGE OF MOTION (ROM) OF KNEE: ICD-10-CM

## 2018-04-27 DIAGNOSIS — R68.89 DECREASED STRENGTH, ENDURANCE, AND MOBILITY: ICD-10-CM

## 2018-04-27 DIAGNOSIS — G89.29 CHRONIC KNEE PAIN AFTER TOTAL REPLACEMENT OF RIGHT KNEE JOINT: ICD-10-CM

## 2018-04-27 PROCEDURE — 97110 THERAPEUTIC EXERCISES: CPT | Mod: PN

## 2018-04-27 NOTE — PROGRESS NOTES
"                                                    Physical Therapy Daily Note     [id number]  Diagnosis:   Encounter Diagnoses   Name Primary?   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 Matt Smith     2/12/18: (performed by Dr. Smith)  PREOPERATIVE DIAGNOSES:   1. Right total knee wound dehiscence  2. Right periprosthetic acute infection, suspected      POSTOPERATIVE DIAGNOSES:   1. Right total knee wound dehiscence   2. Right periprosthetic acute infection, suspected      PROCEDURES PERFORMED:   1. Irrigation and excisional debridement of right total knee  2. Revision complex wound closure of right total knee  3. Application of incisional negative pressure dressing (<50cm2) of right total knee     3/26/18: procedure performed by Dr. Smith  POSTOPERATIVE DIAGNOSES:  1.  Right knee medial retinacular tear.  2.  Right knee seroma.     PROCEDURES PERFORMED:  1.  Right knee Allopatch HD graft application to 15 cm area intraarticularly.  2.  Right knee medial retinacular repair.  3.  Right knee deep complex incision and drainage.  4.  Right knee Parth-Barboza drain application.  5.  Right knee amniotic arthrocentesis.    Visit #: 2 of 12  PTA Visit #: 0  Time In: 9:30  Time Out: 10:30  Total treatment time: 60 min (1:1 30 min)    Subjective     Pt reports: that she has not been in for therapy since her evaluation on 4/6/18 due to not being able to get an appointment. Pt reports that she has a dynasplint that she uses at home; "they gave it to me 2 weeks ago." She also reports compliance with her HEP. Her c/c today is stiffness in the knee.    Pain Scale: Brionna rates pain on a scale of 0-10 to be 5 currently.    Objective " "    Pt dons stability brace in clinic today.    Brionna received individual therapeutic exercises to develop strength, endurance, ROM, flexibility, posture and core stabilization for 40 minutes including:  Supine Heel slides: 5 min x 5" holds  Supine heel slides with passive OP: 5 min x 5" holds  Seated heel slides: 5 min x 5" holds  Supine flex to 90-90: 5 min  Seated foot prop into knee flex: 5 min    Brionna received the following manual therapy techniques:   10 min x Joint mobilizations were applied to the: R knee (Patellar glides, tibiofemoral PA/PA glides)  10 min x preparation and application of kinesiotape for scar mobility and increased ST mobility over lateral aspect of knee.    Written Home Exercises Provided: none today.  Pt demo good understanding of the education provided. Brionna demonstrated good return demonstration of activities.     Education provided re:  Brionna verbalized good understanding of education provided.   No spiritual or educational barriers to learning provided    Assessment     Patient tolerated therex well today. Pt presents with worsening of ROM flexion>extension today, indicating poor compliance at home with use of dynasplint and HEP. Poor compliance with HEP in addition to poor compliance with attending therapy continues to limit progress towards functional goals. Heavy education today regarding importance of consistency and compliance with her HEP to progress ROM and limit increased stiffness. Pt presents with phone today and frequently required VC to put down phone and stay on task.    This is a 48 y.o. female referred to outpatient physical therapy and presents with a medical diagnosis of R knee pain s/p TKA and demonstrates limitations as described in the problem list. Pt prognosis is Fair. Pt will continue to benefit from skilled outpatient physical therapy to address the deficits listed in the problem list, provide pt/family education and to maximize pt's level of independence in " the home and community environment.     Goals as follows: See IE on 4/6/18 (PN due by 5/6/18)      Plan     Continue with established Plan of Care towards PT goals.    Therapist: Miguelina Hanson, PT  4/27/2018

## 2018-04-30 DIAGNOSIS — M25.569 KNEE PAIN, UNSPECIFIED CHRONICITY, UNSPECIFIED LATERALITY: ICD-10-CM

## 2018-05-01 RX ORDER — OXYCODONE AND ACETAMINOPHEN 10; 325 MG/1; MG/1
1 TABLET ORAL EVERY 12 HOURS PRN
Qty: 28 TABLET | Refills: 0 | Status: SHIPPED | OUTPATIENT
Start: 2018-05-01 | End: 2018-05-25 | Stop reason: SDUPTHER

## 2018-05-04 ENCOUNTER — CLINICAL SUPPORT (OUTPATIENT)
Dept: REHABILITATION | Facility: HOSPITAL | Age: 48
End: 2018-05-04
Attending: ORTHOPAEDIC SURGERY
Payer: MEDICAID

## 2018-05-04 DIAGNOSIS — R53.1 DECREASED STRENGTH, ENDURANCE, AND MOBILITY: ICD-10-CM

## 2018-05-04 DIAGNOSIS — M25.669 DECREASED RANGE OF MOTION (ROM) OF KNEE: ICD-10-CM

## 2018-05-04 DIAGNOSIS — Z74.09 DECREASED STRENGTH, ENDURANCE, AND MOBILITY: ICD-10-CM

## 2018-05-04 DIAGNOSIS — R68.89 DECREASED STRENGTH, ENDURANCE, AND MOBILITY: ICD-10-CM

## 2018-05-04 DIAGNOSIS — M25.561 CHRONIC KNEE PAIN AFTER TOTAL REPLACEMENT OF RIGHT KNEE JOINT: ICD-10-CM

## 2018-05-04 DIAGNOSIS — G89.29 CHRONIC KNEE PAIN AFTER TOTAL REPLACEMENT OF RIGHT KNEE JOINT: ICD-10-CM

## 2018-05-04 DIAGNOSIS — Z96.651 CHRONIC KNEE PAIN AFTER TOTAL REPLACEMENT OF RIGHT KNEE JOINT: ICD-10-CM

## 2018-05-04 PROCEDURE — 97110 THERAPEUTIC EXERCISES: CPT | Mod: PN

## 2018-05-04 NOTE — PROGRESS NOTES
Physical Therapy Daily Note     Name: Brionna Jones  Clinic Number: [id number]  Diagnosis:   Encounter Diagnoses   Name Primary?   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 Matt Smith     2/12/18: (performed by Dr. Smith)  PREOPERATIVE DIAGNOSES:   1. Right total knee wound dehiscence  2. Right periprosthetic acute infection, suspected      POSTOPERATIVE DIAGNOSES:   1. Right total knee wound dehiscence   2. Right periprosthetic acute infection, suspected      PROCEDURES PERFORMED:   1. Irrigation and excisional debridement of right total knee  2. Revision complex wound closure of right total knee  3. Application of incisional negative pressure dressing (<50cm2) of right total knee     3/26/18: procedure performed by Dr. Smith  POSTOPERATIVE DIAGNOSES:  1.  Right knee medial retinacular tear.  2.  Right knee seroma.     PROCEDURES PERFORMED:  1.  Right knee Allopatch HD graft application to 15 cm area intraarticularly.  2.  Right knee medial retinacular repair.  3.  Right knee deep complex incision and drainage.  4.  Right knee Parth-Barboza drain application.  5.  Right knee amniotic arthrocentesis.    Visit #: 3 of 12  PTA Visit #: 0  Time In: 9:30  Time Out: 10:30  Total treatment time: 60 min (1:1 42 min)    Subjective     Pt reports: that she has a F/U with Dr Smith on 5/23/18. She reports compliance with her HEP.   Pain Scale: Brionna rates pain on a scale of 0-10 to be 5 currently.    Objective     Pt dons stability brace in clinic today.    Palpation: TTP with poor MF mobility of R TKA incision  Hip Strength: grossly 3/5      Knee    Right      Left    Pain/Dysfunction with Movement      AROM  PROM   "MMT  AROM  PROM  MMT      Flexion  57* 65*   NT 4  *indicates Pain   Extension  Lacking 5 degrees Lacks 3 deg  NT +5  NT 4        Girth (midpatella): R = 39.5 cm, L = 43.5 cm - no change     Flexibility: quads/hip flexors = poor, hamstrings = fair+, gastroc = fair - no change     Gait: independent, dons stability brace, demo's poor knee and hip flexion during swing phase of gait with increased use of circumduction.  Transfers: independent, but increased use of BUE and uninvolved LE with surgical leg in increased extension.      Other: FOTO limitation = 76% disability, FAQ = 19%      Brionna received individual therapeutic exercises to develop strength, endurance, ROM, flexibility, posture and core stabilization for 60 minutes including:  +Pedal bike: 5 min (half revolutions) - change to upright bike next visit  +shuttle: 5 min x 2 cords  Supine Heel slides: 5 min x 5" holds  Supine heel slides with passive OP: 5 min x 5" holds  Seated heel slides: 5 min x 5" holds  Supine flex to 90-90: 5 min  Seated foot prop into knee flex: 5 min  +Heel prop: 5 min  +Heel prop with QS: 3 min  +QS into SLR: 2 x 10  10 min x Joint mobilizations were applied to the: R knee (Patellar glides, tibiofemoral PA/PA glides)    Add next visit: sit to stands, prone hangs      Written Home Exercises Provided: none today.  Pt demo good understanding of the education provided. Brionna demonstrated good return demonstration of activities.     Education provided re:  Brionna verbalized good understanding of education provided.   No spiritual or educational barriers to learning provided    Assessment     Patient presents with self limiting behaviors with decreased knee flexion during swing phase of gait as well as increased R knee extension during sit to/from standing transfers. Pt demo's slow improvements in flexion ROM, but remains limited due to pain, guarding, and poor joint mobility. Poor compliance with therapy attendance as well as poor " compliance with HEP, as evidenced by inability to perform HEP upon request, continues to limit overall progression towards therapeutic goals as improvements in joint mobility, flexibility, ROM, and strength. Despite minimal change in perceived functional mobility, as seen with change in FOTO limitation score from 74% to 76% disability, pt demo's reduced fear and anxiety with movement, as seen with an improved FAQ score from 98% to 19%.    This is a 48 y.o. female referred to outpatient physical therapy and presents with a medical diagnosis of R knee pain s/p TKA and demonstrates limitations as described in the problem list. Pt prognosis is Fair. Pt will continue to benefit from skilled outpatient physical therapy to address the deficits listed in the problem list, provide pt/family education and to maximize pt's level of independence in the home and community environment.     Goals as follows: See PN on 5/4/18 (PN due by 6/4/18)   Short Term Goals (4 weeks)  1. Pt will demonstrate improvements in R knee ROM to 0-4-90 for ease with ambulation - Not Met  2. Pt will demonstrate improvements in R knee strength to 4-/5 for ease with getting out of a chair. - Not Met  3. Pt will be able to ambulate 200 ft with LRAD and without the presence of antalgic gait pattern - Not Met  4. Pt will report <5/10 pain within the R knee for ease with ADL's - Not Met  5. Pt presents with improved functional mobility with FOTO limitation <=64% disability. - Not Met  Long Term Goals (8 weeks)   1. Pt will demonstrate improvements in R knee ROM to 0-0-120 for ease with ambulation - Not Met  2. Pt will demonstrate 4+/5 strength within the R knee for ease with house hold chores and climbing stairs - Not Met  3. Pt will report being independent with his/her HEP for maintenance of improvements gained during therapy sessions - Not Met  4. Pt will report <2/10 pain within the R knee for ease with ADLs - Not Met  5. Pt will demonstrate ambulation x  300 ft without AD or antalgic gait.   - Not Met  6. Pt presents with improved functional mobility with FOTO limitation <=54% disability. - Not Met    Plan     Continue with established Plan of Care towards PT goals.    Therapist: Miguelina Hanson, PT  5/4/2018

## 2018-05-07 ENCOUNTER — CLINICAL SUPPORT (OUTPATIENT)
Dept: REHABILITATION | Facility: HOSPITAL | Age: 48
End: 2018-05-07
Attending: ORTHOPAEDIC SURGERY
Payer: MEDICAID

## 2018-05-07 DIAGNOSIS — G89.29 CHRONIC KNEE PAIN AFTER TOTAL REPLACEMENT OF RIGHT KNEE JOINT: ICD-10-CM

## 2018-05-07 DIAGNOSIS — M25.561 CHRONIC KNEE PAIN AFTER TOTAL REPLACEMENT OF RIGHT KNEE JOINT: ICD-10-CM

## 2018-05-07 DIAGNOSIS — R53.1 DECREASED STRENGTH, ENDURANCE, AND MOBILITY: ICD-10-CM

## 2018-05-07 DIAGNOSIS — M25.669 DECREASED RANGE OF MOTION (ROM) OF KNEE: ICD-10-CM

## 2018-05-07 DIAGNOSIS — Z74.09 DECREASED STRENGTH, ENDURANCE, AND MOBILITY: ICD-10-CM

## 2018-05-07 DIAGNOSIS — R68.89 DECREASED STRENGTH, ENDURANCE, AND MOBILITY: ICD-10-CM

## 2018-05-07 DIAGNOSIS — Z96.651 CHRONIC KNEE PAIN AFTER TOTAL REPLACEMENT OF RIGHT KNEE JOINT: ICD-10-CM

## 2018-05-07 DIAGNOSIS — Z12.39 BREAST CANCER SCREENING: ICD-10-CM

## 2018-05-07 PROCEDURE — 97110 THERAPEUTIC EXERCISES: CPT | Mod: PN

## 2018-05-07 NOTE — PROGRESS NOTES
"                                                    Physical Therapy Daily Note     Name: Brionna Jones  Clinic Number: [id number]  Diagnosis:   Encounter Diagnoses   Name Primary?   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 Matt Smith     2/12/18: (performed by Dr. Smith)  PREOPERATIVE DIAGNOSES:   1. Right total knee wound dehiscence  2. Right periprosthetic acute infection, suspected      POSTOPERATIVE DIAGNOSES:   1. Right total knee wound dehiscence   2. Right periprosthetic acute infection, suspected      PROCEDURES PERFORMED:   1. Irrigation and excisional debridement of right total knee  2. Revision complex wound closure of right total knee  3. Application of incisional negative pressure dressing (<50cm2) of right total knee     3/26/18: procedure performed by Dr. Smith  POSTOPERATIVE DIAGNOSES:  1.  Right knee medial retinacular tear.  2.  Right knee seroma.     PROCEDURES PERFORMED:  1.  Right knee Allopatch HD graft application to 15 cm area intraarticularly.  2.  Right knee medial retinacular repair.  3.  Right knee deep complex incision and drainage.  4.  Right knee Parth-Barboza drain application.  5.  Right knee amniotic arthrocentesis.    Visit #: 4 of 12  PTA Visit #: 0  Time In: 10:00  Time Out: 11:00  Total treatment time: 60 min (1:1 30 min)    Subjective     Pt reports: that she felt really good after the previous session. "I try to keep my knee moving over the weekend with that machine, but it still stiffens up on me." Pt has a F/U with Dr Smith on 5/23/18. She reports compliance with her HEP.   Pain Scale: Brionna rates pain on a scale of 0-10 to be 5 currently.    Objective     Pt dons stability brace in clinic today.    R knee ROM: " "Flex = 53 deg    Brionna received individual therapeutic exercises to develop strength, endurance, ROM, flexibility, posture and core stabilization for 60 minutes including:  Pedal bike: 5 min (half revolutions) - change to upright bike next visit  Shuttle: 7 min x 2 cords  Supine Heel slides: 5 min x 5" holds  Supine heel slides with passive OP: 5 min x 5" holds  Seated heel slides: 5 min x 5" holds  Supine flex to 90-90: 5 min  Seated foot prop into knee flex: 5 min  Heel prop: 5 min  Heel prop with QS: 3 min  QS into SLR: 2 x 10  10 min x Joint mobilizations were applied to the: R knee (Patellar glides, tibiofemoral PA/PA glides)    Add next visit: sit to stands, prone hangs      Written Home Exercises Provided: none today.  Pt demo good understanding of the education provided. Brionna demonstrated good return demonstration of activities.     Education provided re:  Brionna verbalized good understanding of education provided.   No spiritual or educational barriers to learning provided    Assessment     Pt presents with regression of ROM, indicating poor compliance with HEP at home.   Patient presents with self limiting behaviors with decreased knee flexion during swing phase of gait as well as increased R knee extension during sit to/from standing transfers.     This is a 48 y.o. female referred to outpatient physical therapy and presents with a medical diagnosis of R knee pain s/p TKA and demonstrates limitations as described in the problem list. Pt prognosis is Fair. Pt will continue to benefit from skilled outpatient physical therapy to address the deficits listed in the problem list, provide pt/family education and to maximize pt's level of independence in the home and community environment.     Goals as follows: See PN on 5/4/18 (PN due by 6/4/18)   Short Term Goals (4 weeks)  1. Pt will demonstrate improvements in R knee ROM to 0-4-90 for ease with ambulation - Not Met  2. Pt will demonstrate improvements in R " knee strength to 4-/5 for ease with getting out of a chair. - Not Met  3. Pt will be able to ambulate 200 ft with LRAD and without the presence of antalgic gait pattern - Not Met  4. Pt will report <5/10 pain within the R knee for ease with ADL's - Not Met  5. Pt presents with improved functional mobility with FOTO limitation <=64% disability. - Not Met  Long Term Goals (8 weeks)   1. Pt will demonstrate improvements in R knee ROM to 0-0-120 for ease with ambulation - Not Met  2. Pt will demonstrate 4+/5 strength within the R knee for ease with house hold chores and climbing stairs - Not Met  3. Pt will report being independent with his/her HEP for maintenance of improvements gained during therapy sessions - Not Met  4. Pt will report <2/10 pain within the R knee for ease with ADLs - Not Met  5. Pt will demonstrate ambulation x 300 ft without AD or antalgic gait.   - Not Met  6. Pt presents with improved functional mobility with FOTO limitation <=54% disability. - Not Met    Plan     Continue with established Plan of Care towards PT goals.    Therapist: Miguelina Hanson, PT  5/7/2018

## 2018-05-11 ENCOUNTER — CLINICAL SUPPORT (OUTPATIENT)
Dept: REHABILITATION | Facility: HOSPITAL | Age: 48
End: 2018-05-11
Attending: ORTHOPAEDIC SURGERY
Payer: MEDICAID

## 2018-05-11 DIAGNOSIS — R53.1 DECREASED STRENGTH, ENDURANCE, AND MOBILITY: ICD-10-CM

## 2018-05-11 DIAGNOSIS — M25.669 DECREASED RANGE OF MOTION (ROM) OF KNEE: ICD-10-CM

## 2018-05-11 DIAGNOSIS — Z96.651 CHRONIC KNEE PAIN AFTER TOTAL REPLACEMENT OF RIGHT KNEE JOINT: ICD-10-CM

## 2018-05-11 DIAGNOSIS — G89.29 CHRONIC KNEE PAIN AFTER TOTAL REPLACEMENT OF RIGHT KNEE JOINT: ICD-10-CM

## 2018-05-11 DIAGNOSIS — R68.89 DECREASED STRENGTH, ENDURANCE, AND MOBILITY: ICD-10-CM

## 2018-05-11 DIAGNOSIS — M25.561 CHRONIC KNEE PAIN AFTER TOTAL REPLACEMENT OF RIGHT KNEE JOINT: ICD-10-CM

## 2018-05-11 DIAGNOSIS — Z74.09 DECREASED STRENGTH, ENDURANCE, AND MOBILITY: ICD-10-CM

## 2018-05-11 PROCEDURE — 97110 THERAPEUTIC EXERCISES: CPT | Mod: PN

## 2018-05-11 NOTE — PROGRESS NOTES
"                                                    Physical Therapy Daily Note     Name: Brionna Jones  Clinic Number: [id number]  Diagnosis:   Encounter Diagnoses   Name Primary?   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 Matt Smith     2/12/18: (performed by Dr. Smith)  PREOPERATIVE DIAGNOSES:   1. Right total knee wound dehiscence  2. Right periprosthetic acute infection, suspected      POSTOPERATIVE DIAGNOSES:   1. Right total knee wound dehiscence   2. Right periprosthetic acute infection, suspected      PROCEDURES PERFORMED:   1. Irrigation and excisional debridement of right total knee  2. Revision complex wound closure of right total knee  3. Application of incisional negative pressure dressing (<50cm2) of right total knee     3/26/18: procedure performed by Dr. Smith  POSTOPERATIVE DIAGNOSES:  1.  Right knee medial retinacular tear.  2.  Right knee seroma.     PROCEDURES PERFORMED:  1.  Right knee Allopatch HD graft application to 15 cm area intraarticularly.  2.  Right knee medial retinacular repair.  3.  Right knee deep complex incision and drainage.  4.  Right knee Parth-Barboza drain application.  5.  Right knee amniotic arthrocentesis.    Visit #: 4 of 12  PTA Visit #: 0  Time In: 10:00  Time Out: 11:00  Total treatment time: 60 min (1:1 30 min)    Subjective     Pt reports: that she felt really good after the previous session. "I try to keep my knee moving over the weekend with that machine, but it still stiffens up on me." Pt has a F/U with Dr Smith on 5/23/18. She reports compliance with her HEP.   Pain Scale: Brionna rates pain on a scale of 0-10 to be 5 currently.    Objective     Pt dons stability brace in clinic today.    R knee ROM: " "Flex = 53/60 deg pain at end range    Brionna received individual therapeutic exercises to develop strength, endurance, ROM, flexibility, posture and core stabilization for 60 minutes including:  Upright bike: 8 min (half revolutions)   Shuttle: 7 min x 2 cords  Supine Heel slides: 5 min x 5" holds  Supine heel slides with passive OP: 5 min x 5" holds  Seated heel slides: 5 min x 5" holds  Supine flex to 90-90: 5 min  Seated foot prop into knee flex: 5 min  Heel prop: 5 min  Heel prop with QS: 3 min  QS into SLR: 2 x 10  10 min x Joint mobilizations were applied to the: R knee (Patellar glides, tibiofemoral PA/PA glides)  +sit to stands: 2 x 10    Add next visit: prone hangs      Written Home Exercises Provided: none today.  Pt demo good understanding of the education provided. Brionna demonstrated good return demonstration of activities.     Education provided re:  Brionna verbalized good understanding of education provided.   No spiritual or educational barriers to learning provided    Assessment     Reviewed transfer training to improve foot placement and increased use of LLE and muscle activation as well as to improve knee ROM and stability. Pt presents with regression of ROM, indicating poor compliance with HEP at home.   Patient presents with self limiting behaviors with decreased knee flexion during swing phase of gait as well as increased R knee extension during sit to/from standing transfers.     This is a 48 y.o. female referred to outpatient physical therapy and presents with a medical diagnosis of R knee pain s/p TKA and demonstrates limitations as described in the problem list. Pt prognosis is Fair. Pt will continue to benefit from skilled outpatient physical therapy to address the deficits listed in the problem list, provide pt/family education and to maximize pt's level of independence in the home and community environment.     Goals as follows: See PN on 5/4/18 (PN due by 6/4/18)   Short Term Goals (4 " weeks)  1. Pt will demonstrate improvements in R knee ROM to 0-4-90 for ease with ambulation - Not Met  2. Pt will demonstrate improvements in R knee strength to 4-/5 for ease with getting out of a chair. - Not Met  3. Pt will be able to ambulate 200 ft with LRAD and without the presence of antalgic gait pattern - Not Met  4. Pt will report <5/10 pain within the R knee for ease with ADL's - Not Met  5. Pt presents with improved functional mobility with FOTO limitation <=64% disability. - Not Met  Long Term Goals (8 weeks)   1. Pt will demonstrate improvements in R knee ROM to 0-0-120 for ease with ambulation - Not Met  2. Pt will demonstrate 4+/5 strength within the R knee for ease with house hold chores and climbing stairs - Not Met  3. Pt will report being independent with his/her HEP for maintenance of improvements gained during therapy sessions - Not Met  4. Pt will report <2/10 pain within the R knee for ease with ADLs - Not Met  5. Pt will demonstrate ambulation x 300 ft without AD or antalgic gait.   - Not Met  6. Pt presents with improved functional mobility with FOTO limitation <=54% disability. - Not Met    Plan     Continue with established Plan of Care towards PT goals.    Therapist: Miguelina Hanson, PT  5/11/2018

## 2018-05-16 ENCOUNTER — CLINICAL SUPPORT (OUTPATIENT)
Dept: REHABILITATION | Facility: HOSPITAL | Age: 48
End: 2018-05-16
Attending: ORTHOPAEDIC SURGERY
Payer: MEDICAID

## 2018-05-16 DIAGNOSIS — Z74.09 DECREASED STRENGTH, ENDURANCE, AND MOBILITY: ICD-10-CM

## 2018-05-16 DIAGNOSIS — G89.29 CHRONIC KNEE PAIN AFTER TOTAL REPLACEMENT OF RIGHT KNEE JOINT: ICD-10-CM

## 2018-05-16 DIAGNOSIS — M25.669 DECREASED RANGE OF MOTION (ROM) OF KNEE: ICD-10-CM

## 2018-05-16 DIAGNOSIS — R68.89 DECREASED STRENGTH, ENDURANCE, AND MOBILITY: ICD-10-CM

## 2018-05-16 DIAGNOSIS — R53.1 DECREASED STRENGTH, ENDURANCE, AND MOBILITY: ICD-10-CM

## 2018-05-16 DIAGNOSIS — M25.561 CHRONIC KNEE PAIN AFTER TOTAL REPLACEMENT OF RIGHT KNEE JOINT: ICD-10-CM

## 2018-05-16 DIAGNOSIS — Z96.651 CHRONIC KNEE PAIN AFTER TOTAL REPLACEMENT OF RIGHT KNEE JOINT: ICD-10-CM

## 2018-05-16 PROCEDURE — 97110 THERAPEUTIC EXERCISES: CPT | Mod: PN

## 2018-05-16 NOTE — PROGRESS NOTES
"                                                    Physical Therapy Daily Note     [id number]  Diagnosis:   Encounter Diagnoses   Name Primary?   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 Matt Smith     2/12/18: (performed by Dr. Smith)  PREOPERATIVE DIAGNOSES:   1. Right total knee wound dehiscence  2. Right periprosthetic acute infection, suspected      POSTOPERATIVE DIAGNOSES:   1. Right total knee wound dehiscence   2. Right periprosthetic acute infection, suspected      PROCEDURES PERFORMED:   1. Irrigation and excisional debridement of right total knee  2. Revision complex wound closure of right total knee  3. Application of incisional negative pressure dressing (<50cm2) of right total knee     3/26/18: procedure performed by Dr. Smith  POSTOPERATIVE DIAGNOSES:  1.  Right knee medial retinacular tear.  2.  Right knee seroma.     PROCEDURES PERFORMED:  1.  Right knee Allopatch HD graft application to 15 cm area intraarticularly.  2.  Right knee medial retinacular repair.  3.  Right knee deep complex incision and drainage.  4.  Right knee Parth-Barboza drain application.  5.  Right knee amniotic arthrocentesis.    Visit #: 4 of 12  PTA Visit #: 0  Time In: 10:00  Time Out: 11:00  Total treatment time: 60 min (1:1 30 min)    Subjective     Pt reports: that she is frustrated with her lack of progress. She notes pain in the R knee, worse at night, as well as increased stiffness shortly after she gets done her exercises. "I try to use that flexion machine at home, but I don't know if its helping me anymore." Pt has a F/U with Dr Smith on 5/23/18. She reports compliance with her HEP.   Pain Scale: Brionna rates pain on a " "scale of 0-10 to be 5 currently.    Objective     Pt dons stability brace in clinic today.    R knee ROM: Flex = 53/68 deg pain at end range    Brionna received individual therapeutic exercises to develop strength, endurance, ROM, flexibility, posture and core stabilization for 60 minutes including:  Upright bike: 8 min (half revolutions)   Shuttle: 7 min x 2 cords  Supine Heel slides: 5 min x 5" holds  Supine heel slides with passive OP: 5 min x 5" holds  Seated heel slides: 5 min x 5" holds  Supine flex to 90-90: 5 min  Seated foot prop into knee flex: 5 min  Heel prop: 5 min  Heel prop with QS: 3 min  QS into SLR: 2 x 10  10 min x Joint mobilizations were applied to the: R knee (Patellar glides, tibiofemoral PA/PA glides)  sit to stands: 2 x 10    Add next visit: Matrix quad extension (for static flexion holds)      Written Home Exercises Provided: none today.  Pt demo good understanding of the education provided. Brionna demonstrated good return demonstration of activities.     Education provided re:  Brionna verbalized good understanding of education provided.   No spiritual or educational barriers to learning provided    Assessment     Pt continues to ambulate with poor knee flexion during swing phase of gait. Poor progression of knee flexion ROM due to pain and guarding.     This is a 48 y.o. female referred to outpatient physical therapy and presents with a medical diagnosis of R knee pain s/p TKA and demonstrates limitations as described in the problem list. Pt prognosis is Fair. Pt will continue to benefit from skilled outpatient physical therapy to address the deficits listed in the problem list, provide pt/family education and to maximize pt's level of independence in the home and community environment.     Goals as follows: See PN on 5/4/18 (PN due by 6/4/18)   Short Term Goals (4 weeks)  1. Pt will demonstrate improvements in R knee ROM to 0-4-90 for ease with ambulation - Not Met  2. Pt will demonstrate " improvements in R knee strength to 4-/5 for ease with getting out of a chair. - Not Met  3. Pt will be able to ambulate 200 ft with LRAD and without the presence of antalgic gait pattern - Not Met  4. Pt will report <5/10 pain within the R knee for ease with ADL's - Not Met  5. Pt presents with improved functional mobility with FOTO limitation <=64% disability. - Not Met  Long Term Goals (8 weeks)   1. Pt will demonstrate improvements in R knee ROM to 0-0-120 for ease with ambulation - Not Met  2. Pt will demonstrate 4+/5 strength within the R knee for ease with house hold chores and climbing stairs - Not Met  3. Pt will report being independent with his/her HEP for maintenance of improvements gained during therapy sessions - Not Met  4. Pt will report <2/10 pain within the R knee for ease with ADLs - Not Met  5. Pt will demonstrate ambulation x 300 ft without AD or antalgic gait.   - Not Met  6. Pt presents with improved functional mobility with FOTO limitation <=54% disability. - Not Met    Plan     Continue with established Plan of Care towards PT goals.    Therapist: Miguelina Hanson, PT  5/16/2018

## 2018-05-23 ENCOUNTER — OFFICE VISIT (OUTPATIENT)
Dept: SPORTS MEDICINE | Facility: CLINIC | Age: 48
End: 2018-05-23
Payer: MEDICAID

## 2018-05-23 VITALS
DIASTOLIC BLOOD PRESSURE: 100 MMHG | HEIGHT: 64 IN | HEART RATE: 94 BPM | WEIGHT: 208 LBS | BODY MASS INDEX: 35.51 KG/M2 | SYSTOLIC BLOOD PRESSURE: 148 MMHG

## 2018-05-23 DIAGNOSIS — R29.898 RIGHT LEG WEAKNESS: ICD-10-CM

## 2018-05-23 DIAGNOSIS — Z96.651 H/O TOTAL KNEE REPLACEMENT, RIGHT: Primary | ICD-10-CM

## 2018-05-23 DIAGNOSIS — M25.669 DECREASED RANGE OF MOTION (ROM) OF KNEE: ICD-10-CM

## 2018-05-23 DIAGNOSIS — Z98.890 S/P KNEE SURGERY: ICD-10-CM

## 2018-05-23 DIAGNOSIS — G89.29 CHRONIC PAIN OF RIGHT KNEE: ICD-10-CM

## 2018-05-23 DIAGNOSIS — M25.60 JOINT STIFFNESS: ICD-10-CM

## 2018-05-23 DIAGNOSIS — M25.561 CHRONIC PAIN OF RIGHT KNEE: ICD-10-CM

## 2018-05-23 PROCEDURE — 99999 PR PBB SHADOW E&M-EST. PATIENT-LVL IV: CPT | Mod: PBBFAC,,, | Performed by: ORTHOPAEDIC SURGERY

## 2018-05-23 PROCEDURE — 99214 OFFICE O/P EST MOD 30 MIN: CPT | Mod: S$PBB,,, | Performed by: ORTHOPAEDIC SURGERY

## 2018-05-23 PROCEDURE — 99214 OFFICE O/P EST MOD 30 MIN: CPT | Mod: PBBFAC,PO | Performed by: ORTHOPAEDIC SURGERY

## 2018-05-23 PROCEDURE — 97110 THERAPEUTIC EXERCISES: CPT | Mod: PBBFAC,PO | Performed by: ORTHOPAEDIC SURGERY

## 2018-05-23 NOTE — PATIENT INSTRUCTIONS
Knee Arthroscopy  Knee problems can often be diagnosed and treated with a technique called arthroscopy. This type of surgery is done using an instrument called an arthroscope (scope). Only a few small incisions are needed for this surgery. The procedure can be used to diagnose a knee problem. In many cases, treatment can also be done using arthroscopy.     Insertion of fluid, arthroscope, and instruments through small incisions (portals).         Patient undergoes procedure      The arthroscope  The scope allows the doctor to look directly into the knee joint. It is about the size of a pencil and contains a pathway for fluids. It also contains coated glass fibers that beam an intense, cool light into the knee joint. A camera is attached to the scope as well. It provides clear images of most areas in your knee joint. The doctor views these images on a monitor.  Preparing for the procedure  · Have lab or other testing done as advised.  · Tell your doctor about any medicines or supplements you take  · Do not eat or drink anything for 10 hours before the procedure.  · Once you arrive for surgery, you will be given an IV line in your arm or hand. This provides fluids and medicines.  · To keep you free of pain during the surgery, youll receive medicine called anesthesia. You may have:  ¨ General anesthesia. This puts you into a deep sleep during the surgery.  ¨ Regional anesthesia. This numbs the body from the waist down.  ¨ Local anesthesia. This numbs just the knee.  In addition to regional or local anesthesia, you may receive sedation. This medicine makes you relaxed and sleepy during the surgery.  The procedure  · A few small incisions (portals) are made in your knee.  · The scope is inserted through one of the portals.  · Sterile fluid is put into the knee joint. This makes it easier to see and work inside your joint.  · Using the scope, the doctor confirms the type and degree of knee damage. If possible,  the problem is treated at this time. This is done using surgical tools put through the other portals.  · When the surgery is done, all tools are removed. The incisions are closed with sutures, staples, surgical glue, or strips of surgical tape.  Risks and complications of arthroscopy  All surgeries have risks. The risks of arthroscopy include:  · Bleeding  · Infection  · Blood clots  · Swelling and stiffness of the knee  · Injury to normal tissue  · Continuing knee problems   Date Last Reviewed: 9/20/2015 © 2000-2017 [employer]. [address] [place] [address]. All rights reserved. This information is not intended as a substitute for professional medical care. Always follow your healthcare professional's instructions.        Knee Arthroscopy  Knee problems can often be diagnosed and treated with a technique called arthroscopy. This type of surgery is done using an instrument called an arthroscope (scope). Only a few small incisions are needed for this surgery. The procedure can be used to diagnose a knee problem. In many cases, treatment can also be done using arthroscopy.     Insertion of fluid, arthroscope, and instruments through small incisions (portals).         Patient undergoes procedure      The arthroscope  The scope allows the doctor to look directly into the knee joint. It is about the size of a pencil and contains a pathway for fluids. It also contains coated glass fibers that beam an intense, cool light into the knee joint. A camera is attached to the scope as well. It provides clear images of most areas in your knee joint. The doctor views these images on a monitor.  Preparing for the procedure  · Have lab or other testing done as advised.  · Tell your doctor about any medicines or supplements you take  · Do not eat or drink anything for 10 hours before the procedure.  · Once you arrive for surgery, you will be given an IV line in your arm or hand. This provides fluids and  medicines.  · To keep you free of pain during the surgery, youll receive medicine called anesthesia. You may have:  ¨ General anesthesia. This puts you into a deep sleep during the surgery.  ¨ Regional anesthesia. This numbs the body from the waist down.  ¨ Local anesthesia. This numbs just the knee.  In addition to regional or local anesthesia, you may receive sedation. This medicine makes you relaxed and sleepy during the surgery.  The procedure  · A few small incisions (portals) are made in your knee.  · The scope is inserted through one of the portals.  · Sterile fluid is put into the knee joint. This makes it easier to see and work inside your joint.  · Using the scope, the doctor confirms the type and degree of knee damage. If possible, the problem is treated at this time. This is done using surgical tools put through the other portals.  · When the surgery is done, all tools are removed. The incisions are closed with sutures, staples, surgical glue, or strips of surgical tape.  Risks and complications of arthroscopy  All surgeries have risks. The risks of arthroscopy include:  · Bleeding  · Infection  · Blood clots  · Swelling and stiffness of the knee  · Injury to normal tissue  · Continuing knee problems   Date Last Reviewed: 9/20/2015  © 5607-4290 [employer]. [address] [place] [address], [place] [id number]. All rights reserved. This information is not intended as a substitute for professional medical care. Always follow your healthcare professional's instructions.        Knee Arthroscopy: Conditions Treated  Arthroscopy is used to find and treat many types of knee problems. These include tears in the meniscal cartilage, joint loose bodies, or anterior cruciate ligament (ACL) tears.     Damaged meniscal cartilage is removed.   Meniscal cartilage tears  There are several types of meniscal cartilage tears. The meniscal cartilage attaches on the tibia (shinbone) and acts as a shock absorber for the  knee. Your surgery will depend on the type and extent of your injury. Your surgeon can remove the damaged tissue or fix the tear. Treatment should ease the pain and swelling. It can also help keep the joint from locking.     To replace damaged ACL tissue, a graft of strong, healthy tissue is attached.   ACL tears  A torn ACL (anterior cruciate ligament) can make the knee unstable. You may have pain and swelling, and your knee may give out. Your surgeon can repair the ACL by reconstructing it. To rebuild your ACL, damaged tissue may be replaced with a graft of healthy tissue from an area near your knee, or from a donor.     Date Last Reviewed: 8/31/2015  © 0185-9295 [employer]. [address] [place] [address], [place] [address]. All rights reserved. This information is not intended as a substitute for professional medical care. Always follow your healthcare professional's instructions.        After Knee Arthroscopy  After surgery, your joint may be swollen, painful, and stiff. Your recovery time will depend on what was done. Your surgeon will tell you when to resume activity and weight bearing. If you had meniscal cartilage or loose bodies removed, you may be told to bear weight early on. After ACL repair, do not pivot or make sudden moves.     You may be told to ride an exercise bike daily. This will help restore your knee's motion and strength.   At home  Follow your surgeons guidelines for healing:  · Elevate your knee as much as possible and ice your knee for 20 minutes. then allow at least 20 minutes before the next icing session.  · Limit weight-bearing, if instructed to do so.  · Keep your knee bandaged according to your surgeon's instructions.  · When you shower, wrap your knee with plastic to keep it dry.  · Take pain medicine as directed.  Your checklist  The checklist below helps remind you what to do after arthroscopy.  ? Schedule your first follow-up visit for 5 days after surgery or as directed  by your surgeon.  ? Take care of your incision and bathe as directed.  ? Complete your physical therapy program.  ? Talk with your surgeon about activities you can do immediately and those that need to wait.  Contact your surgeon right away if you have any of the following:  · Fever  · Chills  · Persistent warmth or redness around the knee  · Persistent or increased pain  · Significant swelling in your knee  · Increasing pain in your calf muscle  Date Last Reviewed: 9/22/2015 © 2000-2017 [employer]. [address] [place] [address]. All rights reserved. This information is not intended as a substitute for professional medical care. Always follow your healthcare professional's instructions.        After Knee Arthroscopy: Recovering from Surgery  Your doctor may prescribe physical therapy before or after your surgery or both. This can help relieve pain, increase range of motion, and improve strength. Your physical therapist will design a program for you based on your knee problem and recovery goals. This program may include office visits and a home exercise program.    Working with your physical therapist  Your physical therapist can help you set goals and work toward them. But a successful recovery depends mostly on you. Follow instructions and keep your appointments.  Types of physical therapy  To help you heal, your physical therapist may use one or more of the following:  · Manual therapy. This may include moving soft tissue and joints in a specific direction to regain movement and flexibility. It may also include working against the therapist's resistance to improve the way your muscle activates.  · Weight machines. These machines work and strengthen specific muscle groups.  · Closed kinetic chain exercises. These help make your joints stronger and more stable by keeping one part of the body still while the rest of the body is moving. Squats are an example of these exercises.  · Aerobic  exercises. These include biking and walking. They strengthen the leg muscles as well as the heart and lungs.  · Electric stimulation (as needed). This uses a painless electric current to relieve pain, decrease swelling, and rebuild muscles.  · Ultrasound (as needed). This uses sound waves to help heal injured tissue.  Date Last Reviewed: 9/20/2015  © 8617-2649 [employer]. [address] [place] [address], [place] [address]. All rights reserved. This information is not intended as a substitute for professional medical care. Always follow your healthcare professional's instructions.        Discharge Instructions for Knee Arthroscopy  You had knee arthroscopy. This surgical procedure uses small incisions to locate, identify, and treat problems inside the knee. These problems include loose bodies, meniscal tears, bone spurs, osteochondritis dissecans (OCD), and synovitis. Below are tips to help speed your recovery from surgery.  Activity  · Dont drive until your doctor says its OK. And never drive while taking opioid pain medicine.  · Remember to take pain medicines as directed; dont wait for the pain to get bad. And don't drink alcohol while taking pain medicines.  · Follow weight-bearing instructions given by your doctor. He or she may require you to use crutches to keep weight off your knee.  · Unless your doctor tells you otherwise, begin using the affected knee as much as you can tolerate 3 days after surgery.  · Slowly bend and straighten your affected leg as far as you can, unless your doctor tells you otherwise. Do this several times a day.  · Rest your knee by lying down and putting pillows under it for the first 3 days after surgery. Keep your ankle elevated above the level of your heart. This helps keep swelling down.  · Follow your doctors instructions about wearing and caring for a brace, immobilizer, or elastic dressing.  · Point and flex your foot, and rotate your ankle as much as possible during  the first few weeks following surgery. Also, wiggle your toes as much as possible.  Incision care  · Check your incision daily for redness, tenderness, or drainage.  · Dont be alarmed if there is some bruising, slight swelling of the knee, or a small amount of blood on the bandage.  · Adjust the bandage or brace as needed. It should feel supportive on your knee, but not too tight.   · Dont soak your incision in water (no hot tubs, bathtubs, swimming pools) until your doctor says its OK.  · Wait 2 day(s) after your surgery to begin showering. Then shower as needed. Cover your knee with plastic to keep the dressing or brace dry. Once your dressing is removed, follow your doctors instructions for care of the wound. And sit on a shower stool so that you dont fall while showering.  · Use an ice pack or bag of frozen peas--or something similar--wrapped in a thin towel to reduce the swelling. Keep the foot elevated while you ice the knee. Apply the ice pack for 20 minutes; then remove it for 20 minutes. Repeat as needed. Icing helps reduce swelling.  Other precautions  · Arrange your household to keep the items you need within reach.  · Remove throw rugs, electrical cords, and anything else that may cause you to fall.  · Use nonslip bath mats, grab bars, an elevated toilet seat, and a shower chair in your bathroom.  · Use a cane, crutches, a walker, or handrails until your balance, flexibility, and strength improve, and you can put weight on your leg. And remember to ask for help from others when you need it.  · Free up your hands so that you can use them to keep balance. Use a bentley pack, apron, or pockets to carry things.  Follow-up  Make a follow-up appointment as directed by your doctor.     When to seek medical attention  Call 911 right away if you have any of the following:  · Chest pain  · Shortness of breath  · Severe nausea  Otherwise, call your doctor immediately if you have any of the following:  · Pain  that is not relieved by medicine or rest  · Continued bleeding through the bandage  · Tingling, numbness, or coldness in your foot or leg  · Fever above 100.4°F (38.0°C) or shaking chills  · Excessive swelling, increased redness, or any drainage around the incision  · Swelling, tenderness, or pain in your leg      Date Last Reviewed: 11/16/2015  © 4725-1358 [employer]. [address] [place] [address], [place] [id number]. All rights reserved. This information is not intended as a substitute for professional medical care. Always follow your healthcare professional's instructions.

## 2018-05-23 NOTE — PROGRESS NOTES
Subjective:          Chief Complaint: Brionna Jones is a 48 y.o. female who had concerns including Pain of the Right Knee.    Patient is here for a follow up for her right knee. She is having trouble sleeping    DATE OF PROCEDURE:  02/20/2018     ATTENDING SURGEON:  Matt Smith M.D.     CO-SURGEON:  Julio C Pederson MD.     FIRST ASSISTANT:  Annalee Dominguez PA-C.     PREOPERATIVE DIAGNOSIS:  Right knee medial retinacular tear.     POSTOPERATIVE DIAGNOSES:  1.  Right knee medial retinacular tear.  2.  Right knee seroma.     PROCEDURES PERFORMED:  1.  Right knee Allopatch HD graft application to 15 cm area intraarticularly.  2.  Right knee medial retinacular repair.  3.  Right knee deep complex incision and drainage.  4.  Right knee Parth-Barboza drain application.  5.  Right knee amniotic arthrocentesis.    DATE OF PROCEDURE: 2/12/2018      PREOPERATIVE DIAGNOSES:   1. Right total knee wound dehiscence  2. Right periprosthetic acute infection, suspected      POSTOPERATIVE DIAGNOSES:   1. Right total knee wound dehiscence   2. Right periprosthetic acute infection, suspected      PROCEDURES PERFORMED:   1. Irrigation and excisional debridement of right total knee  2. Revision complex wound closure of right total knee  3. Application of incisional negative pressure dressing (<50cm2) of right total knee    DATE OF PROCEDURE:  1/23/2018     ATTENDING SURGEON: Surgeon(s) and Role:     * Matt Smith MD - Primary     Co-Surgeon:Adán Linda MD      SECOND ASSISTANT: Angie Dominguez PA-C        Co-Surgeon Duties: Due to the complexity of the case and the need for significant intra-operative decision making co-surgeon duties were medically necessary. The chronicity of the disease process and the level of deformity dictated that co-surgeon duties be undertaken. A separate note will be dictated/reported by Dr. Matt Smith stating the specific co-surgeon activities and roles performed during the  surgery.        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         Pain   Associated symptoms include joint swelling. Pertinent negatives include no abdominal pain, chest pain, chills, congestion, coughing, fever, headaches, myalgias, nausea, numbness, rash, sore throat or vomiting.   Knee Pain    Associated symptoms include stiffness. Pertinent negatives include no fever, itching or numbness.         Review of Systems   Constitution: Negative. Negative for chills, fever, weight gain and weight loss.   HENT: Negative for congestion and sore throat.    Eyes: Negative for blurred vision and double vision.   Cardiovascular: Negative for chest pain, leg swelling and palpitations.   Respiratory: Negative for cough and shortness of breath.    Hematologic/Lymphatic: Does not bruise/bleed easily.   Skin: Negative for itching, poor wound healing and rash.   Musculoskeletal: Positive for joint pain, joint swelling and stiffness. Negative for back pain, muscle weakness and myalgias.   Gastrointestinal: Negative for abdominal pain, constipation, diarrhea, nausea and vomiting.   Genitourinary: Negative.  Negative for frequency and hematuria.   Neurological: Negative for  dizziness, headaches, numbness, paresthesias and sensory change.   Psychiatric/Behavioral: Negative for altered mental status and depression. The patient is not nervous/anxious.    Allergic/Immunologic: Negative for hives.       Pain Related Questions  Over the past 3 days, what was your average pain during activity? (I.e. running, jogging, walking, climbing stairs, getting dressed, ect.): 8  Over the past 3 days, what was your highest pain level?: 8  Over the past 3 days, what was your lowest pain level? : 7    Other  How many nights a week are you awakened by your affected body part?: 7  Was the patient's HEIGHT measured or patient reported?: Patient Reported  Was the patient's WEIGHT measured or patient reported?: Measured      Objective:        General: Brionna is well-developed, well-nourished, appears stated age, in no acute distress, alert and oriented to time, place and person.     General    Vitals reviewed.  Constitutional: She is oriented to person, place, and time. She appears well-developed and well-nourished. No distress.   HENT:   Head: Normocephalic and atraumatic.   Mouth/Throat: No oropharyngeal exudate.   Eyes: EOM are normal. Right eye exhibits no discharge. Left eye exhibits no discharge.   Neck: Normal range of motion.   Cardiovascular: Normal rate and regular rhythm.    Pulmonary/Chest: Effort normal and breath sounds normal. No respiratory distress.   Neurological: She is alert and oriented to person, place, and time. She has normal reflexes. No cranial nerve deficit. Coordination normal.   Psychiatric: She has a normal mood and affect. Her behavior is normal. Judgment and thought content normal.     General Musculoskeletal Exam   Gait: normal       Right Knee Exam     Inspection   Erythema: absent  Scars: present  Swelling: present  Effusion: effusion  Deformity: deformity  Bruising: absent    Tenderness   The patient is tender to palpation of the patella, lateral joint line and medial joint  line.    Crepitus   The patient has crepitus of the patella.    Range of Motion   Extension:  0 normal   Flexion:  50 abnormal     Tests   Meniscus   Zeenat:  Medial - negative Lateral - negative  Ligament Examination Lachman: normal (-1 to 2mm) PCL-Posterior Drawer: normal (0 to 2mm)     MCL - Valgus: normal (0 to 2mm)  LCL - Varus: normalPivot Shift: normal (Equal)Reverse Pivot Shift: normal (Equal)Dial Test at 30 degrees: normal (< 5 degrees)Dial Test at 90 degrees: normal (< 5 degrees)  Posterior Sag Test: negative  Posterolateral Corner: unstable (>15 degrees difference)  Patella   Patellar Apprehension: negative  Passive Patellar Tilt: neutral  Patellar Tracking: normal  Patellar Glide (quadrants): Lateral - 1   Medial - 2  Q-Angle at 90 degrees: normal  Patellar Grind: negative  J-Sign: none    Other   Meniscal Cyst: absent  Popliteal (Baker's) Cyst: absent  Sensation: normal    Comments:  Incision clean and dry, no evidence of infection    Left Knee Exam     Inspection   Erythema: absent  Scars: absent  Swelling: absent  Effusion: absent  Deformity: deformity  Bruising: absent    Tenderness   The patient is experiencing no tenderness.         Range of Motion   Extension:  -5 normal   Flexion:  130 normal     Tests   Meniscus   Zeenat:  Medial - negative Lateral - negative  Stability Lachman: normal (-1 to 2mm) PCL-Posterior Drawer: normal (0 to 2mm)  MCL - Valgus: normal (0 to 2mm)  LCL - Varus: normal (0 to 2mm)Pivot Shift: normal (Equal)Reverse Pivot Shift: normal (Equal)Dial Test at 30 degrees: normal (< 5 degrees)Dial Test at 90 degrees: normal (< 5 degrees)  Posterior Sag Test: negative  Posterolateral Corner: unstable (>15 degrees difference)  Patella   Patellar Apprehension: negative  Passive Patellar Tilt: neutral  Patellar Tracking: normal  Patellar Glide (Quadrants): Lateral - 1 Medial - 2  Q-Angle at 90 degrees: normal  Patellar Grind: positive  J-Sign: J sign absent    Other   Meniscal Cyst:  absent  Popliteal (Baker's) Cyst: absent  Sensation: normal    Right Hip Exam     Tests   Aimee: negative  Left Hip Exam     Tests   Aimee: negative          Muscle Strength   Right Lower Extremity   Hip Abduction: 4/5   Quadriceps:  4/5   Hamstrin/5   Left Lower Extremity   Hip Abduction: 5/5   Quadriceps:  5/5   Hamstrin/5     Reflexes     Left Side  Quadriceps:  2+  Achilles:  2+    Right Side   Quadriceps:  2+  Achilles:  2+    Vascular Exam     Right Pulses  Dorsalis Pedis:      2+  Posterior Tibial:      2+        Left Pulses  Dorsalis Pedis:      2+  Posterior Tibial:      2+                      Assessment:       Encounter Diagnoses   Name Primary?    H/O total knee replacement, right Yes    Joint stiffness     Decreased range of motion (ROM) of knee     S/P knee surgery     Right leg weakness     Chronic pain of right knee           Plan:       1. IKDC, SF-12 and KOOS was not filled out today in clinic.     RTC in 6 weeks with Dr. Matt Smith Patient will not fill out IKDC, SF-12 and KOOS on return.    2. [facility] for PT    3. Continue dynasplint    4. We reviewed with Brionna today, the pathology and natural history of her diagnosis. We have discussed a variety of treatment options including medications, physical therapy and other alternative treatments. I also explained the indications, risks and benefits of surgery. After discussion, Brionna decided to proceed with surgery. The decision was made to go forward with :    1. Right knee arthroscopy, lysis of adhesions  2. Right knee arthroscopy, anterior interval release  3. Right knee arthroscopy, synovectomy  4. Right knee amniox arthrocentesis 87902  5. Right knee manipulation under athesthesia    The details of the surgical procedure were explained, including the location of probable incisions and a description of likely hardware and/or grafts to be used.  The patient understands the likely convalescence after surgery.  Also, we  have thoroughly discussed the risks, benefits and alternatives to surgery, including, but not limited to, the risk of infection, joint stiffness, blood clot (including DVT and/or pulmonary embolus), neurologic and vascular injury.  It was explained that, if tissue has been repaired or reconstructed, there is a chance of failure, which may require further management.      All of the patient's questions were answered and informed consent was obtained. The patient will contact us if they have any questions or concerns in the interim.    5. HEP 76246 - Matt Smith, instructed and demonstrated a core HEP. The patient then demonstrated understanding of exercises and proper technique. This program was performed for 15 minutes.             Patient questionnaires may have been collected.

## 2018-05-24 NOTE — PROGRESS NOTES
Physical Therapy Daily Note     Name: Brionna Jones  [facility] Number: [id number]  Diagnosis:   Encounter Diagnoses   Name Primary?   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 Matt Smith     2/12/18: (performed by Dr. Smith)  PREOPERATIVE DIAGNOSES:   1. Right total knee wound dehiscence  2. Right periprosthetic acute infection, suspected      POSTOPERATIVE DIAGNOSES:   1. Right total knee wound dehiscence   2. Right periprosthetic acute infection, suspected      PROCEDURES PERFORMED:   1. Irrigation and excisional debridement of right total knee  2. Revision complex wound closure of right total knee  3. Application of incisional negative pressure dressing (<50cm2) of right total knee     3/26/18: procedure performed by Dr. Smith  POSTOPERATIVE DIAGNOSES:  1.  Right knee medial retinacular tear.  2.  Right knee seroma.     PROCEDURES PERFORMED:  1.  Right knee Allopatch HD graft application to 15 cm area intraarticularly.  2.  Right knee medial retinacular repair.  3.  Right knee deep complex incision and drainage.  4.  Right knee Parth-Barboza drain application.  5.  Right knee amniotic arthrocentesis.    Visit #: 4 of 12  PTA Visit #: 0  Time In: 10:30  Time Out: 11:30  Total treatment time: 60 min (1:1 30 min)    Subjective     Pt reports: that the MD was not happy with her progress and told her that he might have to operate again. He told her that they did not have any openings for surgery until July. She says that she is to continue using her dynasplint and to continue with PT. She says that she would like to continue working until June.    Pain Scale: Brionna rates pain on a scale of 0-10 to be 5  "currently.    Objective     Pt dons stability brace in clinic today.    R knee ROM: Flex = 53/68 deg pain at end range    Brionna received individual therapeutic exercises to develop strength, endurance, ROM, flexibility, posture and core stabilization for 60 minutes including:  Upright bike: 8 min (half revolutions)   Shuttle DL: 7 min x 3 cords  +Shuttle SL static holds (1 down, 2 up): 10 x 30" holds x 3 cords  Supine Heel slides: 5 min x 5" holds  Supine heel slides with passive OP: 5 min x 5" holds  Seated heel slides: 5 min x 5" holds  Supine flex to 90-90: 5 min  Seated foot prop into knee flex: 5 min  Heel prop: 5 min  Heel prop with QS: 3 min  QS into SLR: 2 x 10  0 min x Joint mobilizations were applied to the: R knee (Patellar glides, tibiofemoral PA/PA glides)  10 min x STM/MFR of quads/ITB  sit to stands: 2 x 10  +Matrix quad extension (for static flexion holds): 10 x 30" holds x 15#  +supine hip flexor stretch w/ strap: 3 x 1 min  +Self STM using rolling pin: 4 min    Written Home Exercises Provided: none today.  Pt demo good understanding of the education provided. Brionna demonstrated good return demonstration of activities.     Education provided re:  Brionna verbalized good understanding of education provided.   No spiritual or educational barriers to learning provided    Assessment     New exercises added to improve ROM, flexibility and joint mobility. Pt presents with poor MF mobility and muscular flexibility of quads, ITB, and adductors. Encouraged pt to perform daily self STM to the quads, ITB, and adductors to improve ROM, flexibility.    This is a 48 y.o. female referred to outpatient physical therapy and presents with a medical diagnosis of R knee pain s/p TKA and demonstrates limitations as described in the problem list. Pt prognosis is Fair. Pt will continue to benefit from skilled outpatient physical therapy to address the deficits listed in the problem list, provide pt/family education and to " maximize pt's level of independence in the home and community environment.     Goals as follows: See PN on 5/4/18 (PN due by 6/4/18)   Short Term Goals (4 weeks)  1. Pt will demonstrate improvements in R knee ROM to 0-4-90 for ease with ambulation - Not Met  2. Pt will demonstrate improvements in R knee strength to 4-/5 for ease with getting out of a chair. - Not Met  3. Pt will be able to ambulate 200 ft with LRAD and without the presence of antalgic gait pattern - Not Met  4. Pt will report <5/10 pain within the R knee for ease with ADL's - Not Met  5. Pt presents with improved functional mobility with FOTO limitation <=64% disability. - Not Met  Long Term Goals (8 weeks)   1. Pt will demonstrate improvements in R knee ROM to 0-0-120 for ease with ambulation - Not Met  2. Pt will demonstrate 4+/5 strength within the R knee for ease with house hold chores and climbing stairs - Not Met  3. Pt will report being independent with his/her HEP for maintenance of improvements gained during therapy sessions - Not Met  4. Pt will report <2/10 pain within the R knee for ease with ADLs - Not Met  5. Pt will demonstrate ambulation x 300 ft without AD or antalgic gait.   - Not Met  6. Pt presents with improved functional mobility with FOTO limitation <=54% disability. - Not Met    Plan     Continue with established Plan of Care towards PT goals.    Therapist: Miguelina Hanson, PT  5/25/2018

## 2018-05-25 ENCOUNTER — CLINICAL SUPPORT (OUTPATIENT)
Dept: REHABILITATION | Facility: HOSPITAL | Age: 48
End: 2018-05-25
Attending: ORTHOPAEDIC SURGERY
Payer: MEDICAID

## 2018-05-25 DIAGNOSIS — M25.561 CHRONIC KNEE PAIN AFTER TOTAL REPLACEMENT OF RIGHT KNEE JOINT: ICD-10-CM

## 2018-05-25 DIAGNOSIS — M25.669 DECREASED RANGE OF MOTION (ROM) OF KNEE: ICD-10-CM

## 2018-05-25 DIAGNOSIS — R53.1 DECREASED STRENGTH, ENDURANCE, AND MOBILITY: ICD-10-CM

## 2018-05-25 DIAGNOSIS — Z96.651 CHRONIC KNEE PAIN AFTER TOTAL REPLACEMENT OF RIGHT KNEE JOINT: ICD-10-CM

## 2018-05-25 DIAGNOSIS — M25.569 KNEE PAIN, UNSPECIFIED CHRONICITY, UNSPECIFIED LATERALITY: ICD-10-CM

## 2018-05-25 DIAGNOSIS — G89.29 CHRONIC KNEE PAIN AFTER TOTAL REPLACEMENT OF RIGHT KNEE JOINT: ICD-10-CM

## 2018-05-25 DIAGNOSIS — R68.89 DECREASED STRENGTH, ENDURANCE, AND MOBILITY: ICD-10-CM

## 2018-05-25 DIAGNOSIS — Z74.09 DECREASED STRENGTH, ENDURANCE, AND MOBILITY: ICD-10-CM

## 2018-05-25 PROCEDURE — 97110 THERAPEUTIC EXERCISES: CPT | Mod: PN

## 2018-05-25 NOTE — TELEPHONE ENCOUNTER
----- Message from Cliff Eugene sent at 5/25/2018  1:24 PM CDT -----  Contact: self   Pt is requesting a refill for oxyCODONE-acetaminophen (PERCOCET)  mg per tablet sent to [facility] [address] [place] [address] [place]PY AT [facility] [phone] (Phone)  [phone] (Fax). Pt can be reached at [phone].

## 2018-05-28 RX ORDER — OXYCODONE AND ACETAMINOPHEN 10; 325 MG/1; MG/1
1 TABLET ORAL EVERY 12 HOURS PRN
Qty: 28 TABLET | Refills: 0 | Status: SHIPPED | OUTPATIENT
Start: 2018-05-28 | End: 2019-05-22 | Stop reason: ALTCHOICE

## 2018-05-29 NOTE — PROGRESS NOTES
Physical Therapy Daily Note     Name: Brionna Jones  Clinic Number: [id number]  Diagnosis:   Encounter Diagnoses   Name Primary?   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 Matt Smith     2/12/18: (performed by Dr. Smith)  PREOPERATIVE DIAGNOSES:   1. Right total knee wound dehiscence  2. Right periprosthetic acute infection, suspected      POSTOPERATIVE DIAGNOSES:   1. Right total knee wound dehiscence   2. Right periprosthetic acute infection, suspected      PROCEDURES PERFORMED:   1. Irrigation and excisional debridement of right total knee  2. Revision complex wound closure of right total knee  3. Application of incisional negative pressure dressing (<50cm2) of right total knee     3/26/18: procedure performed by Dr. Smith  POSTOPERATIVE DIAGNOSES:  1.  Right knee medial retinacular tear.  2.  Right knee seroma.     PROCEDURES PERFORMED:  1.  Right knee Allopatch HD graft application to 15 cm area intraarticularly.  2.  Right knee medial retinacular repair.  3.  Right knee deep complex incision and drainage.  4.  Right knee Parth-Barboza drain application.  5.  Right knee amniotic arthrocentesis.    Visit #: 6 of 12  PTA Visit #: 0  Time In: 9:20  Time Out: 10:20  Total treatment time: 60 min (1:1 30 min)    Subjective     Pt reports: that she felt really good after the previous session but noted increased stiffness a little while after she stopped exercising.    Pain Scale: Brionna rates pain on a scale of 0-10 to be 5 currently.    Objective     Pt dons stability brace in clinic today.    R knee ROM: Flex = 53/68 deg pain at end range    Brionna received individual therapeutic exercises to develop  "strength, endurance, ROM, flexibility, posture and core stabilization for 50 minutes including:  Upright bike: 8 min (half revolutions)   Shuttle DL: 7 min x 3 cords  +Shuttle SL static holds (1 down, 2 up): 10 x 30" holds x 3 cords  Supine Heel slides: 5 min x 5" holds  Supine heel slides with passive OP: 5 min x 5" holds  Seated heel slides: 5 min x 5" holds  Supine flex to 90-90: 5 min  Seated foot prop into knee flex: 5 min  Heel prop: 5 min  Heel prop with QS: 3 min  QS into SLR: 2 x 10  0 min x Joint mobilizations were applied to the: R knee (Patellar glides, tibiofemoral PA/PA glides)  10 min x STM/MFR of quads/ITB  sit to stands: 2 x 10  Matrix quad extension (for static flexion holds): 10 x 30" holds x 15# - reduced to 10# as pt was UA to tolerate 15#  supine hip flexor stretch w/ strap: 3 x 1 min  Self STM using rolling pin: 4 min  +SLR: 2 x 10  +SL hip abd: 2 x 10  +SL hip add: 2 x 10    Manual therapy: 10 x SMT/MFR to RLE with instrument-assist    Written Home Exercises Provided: none today.  Pt demo good understanding of the education provided. Brionna demonstrated good return demonstration of activities.     Education provided re:  Brionna verbalized good understanding of education provided.   No spiritual or educational barriers to learning provided    Assessment     Pt tolerated overall treatment well today. Pt demo's slight improvement in ROM to 60 degrees following manual therapy without PT overpressure. New exercises added today to improve global knee stability necessary for ambulation without brace. Pt reports increased fatigue at end of session.    This is a 48 y.o. female referred to outpatient physical therapy and presents with a medical diagnosis of R knee pain s/p TKA and demonstrates limitations as described in the problem list. Pt prognosis is Fair. Pt will continue to benefit from skilled outpatient physical therapy to address the deficits listed in the problem list, provide pt/family " education and to maximize pt's level of independence in the home and community environment.     Goals as follows: See PN on 5/4/18 (PN due by 6/4/18)   Short Term Goals (4 weeks)  1. Pt will demonstrate improvements in R knee ROM to 0-4-90 for ease with ambulation - Not Met  2. Pt will demonstrate improvements in R knee strength to 4-/5 for ease with getting out of a chair. - Not Met  3. Pt will be able to ambulate 200 ft with LRAD and without the presence of antalgic gait pattern - Not Met  4. Pt will report <5/10 pain within the R knee for ease with ADL's - Not Met  5. Pt presents with improved functional mobility with FOTO limitation <=64% disability. - Not Met  Long Term Goals (8 weeks)   1. Pt will demonstrate improvements in R knee ROM to 0-0-120 for ease with ambulation - Not Met  2. Pt will demonstrate 4+/5 strength within the R knee for ease with house hold chores and climbing stairs - Not Met  3. Pt will report being independent with his/her HEP for maintenance of improvements gained during therapy sessions - Not Met  4. Pt will report <2/10 pain within the R knee for ease with ADLs - Not Met  5. Pt will demonstrate ambulation x 300 ft without AD or antalgic gait.   - Not Met  6. Pt presents with improved functional mobility with FOTO limitation <=54% disability. - Not Met    Plan     Continue with established Plan of Care towards PT goals.    Therapist: Miguelina Hanson, PT  5/30/2018

## 2018-05-30 ENCOUNTER — CLINICAL SUPPORT (OUTPATIENT)
Dept: REHABILITATION | Facility: HOSPITAL | Age: 48
End: 2018-05-30
Attending: ORTHOPAEDIC SURGERY
Payer: MEDICAID

## 2018-05-30 DIAGNOSIS — R68.89 DECREASED STRENGTH, ENDURANCE, AND MOBILITY: ICD-10-CM

## 2018-05-30 DIAGNOSIS — Z74.09 DECREASED STRENGTH, ENDURANCE, AND MOBILITY: ICD-10-CM

## 2018-05-30 DIAGNOSIS — G89.29 CHRONIC KNEE PAIN AFTER TOTAL REPLACEMENT OF RIGHT KNEE JOINT: ICD-10-CM

## 2018-05-30 DIAGNOSIS — Z96.651 CHRONIC KNEE PAIN AFTER TOTAL REPLACEMENT OF RIGHT KNEE JOINT: ICD-10-CM

## 2018-05-30 DIAGNOSIS — M25.669 DECREASED RANGE OF MOTION (ROM) OF KNEE: ICD-10-CM

## 2018-05-30 DIAGNOSIS — R53.1 DECREASED STRENGTH, ENDURANCE, AND MOBILITY: ICD-10-CM

## 2018-05-30 DIAGNOSIS — M25.561 CHRONIC KNEE PAIN AFTER TOTAL REPLACEMENT OF RIGHT KNEE JOINT: ICD-10-CM

## 2018-05-30 PROCEDURE — 97140 MANUAL THERAPY 1/> REGIONS: CPT | Mod: PN

## 2018-05-30 PROCEDURE — 97110 THERAPEUTIC EXERCISES: CPT | Mod: PN

## 2018-06-07 ENCOUNTER — CLINICAL SUPPORT (OUTPATIENT)
Dept: REHABILITATION | Facility: HOSPITAL | Age: 48
End: 2018-06-07
Attending: ORTHOPAEDIC SURGERY
Payer: MEDICAID

## 2018-06-07 DIAGNOSIS — M25.561 CHRONIC KNEE PAIN AFTER TOTAL REPLACEMENT OF RIGHT KNEE JOINT: ICD-10-CM

## 2018-06-07 DIAGNOSIS — R53.1 DECREASED STRENGTH, ENDURANCE, AND MOBILITY: ICD-10-CM

## 2018-06-07 DIAGNOSIS — R68.89 DECREASED STRENGTH, ENDURANCE, AND MOBILITY: ICD-10-CM

## 2018-06-07 DIAGNOSIS — Z96.651 CHRONIC KNEE PAIN AFTER TOTAL REPLACEMENT OF RIGHT KNEE JOINT: ICD-10-CM

## 2018-06-07 DIAGNOSIS — Z74.09 DECREASED STRENGTH, ENDURANCE, AND MOBILITY: ICD-10-CM

## 2018-06-07 DIAGNOSIS — M25.669 DECREASED RANGE OF MOTION (ROM) OF KNEE: ICD-10-CM

## 2018-06-07 DIAGNOSIS — G89.29 CHRONIC KNEE PAIN AFTER TOTAL REPLACEMENT OF RIGHT KNEE JOINT: ICD-10-CM

## 2018-06-07 PROCEDURE — 97110 THERAPEUTIC EXERCISES: CPT | Mod: PN

## 2018-06-07 NOTE — PROGRESS NOTES
"                                                    Physical Therapy Daily Note     Name: Brionna Jones  Clinic Number: [id number]  Diagnosis:   Encounter Diagnoses   Name Primary?   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 Matt Smith     2/12/18: (performed by Dr. Smith)  PREOPERATIVE DIAGNOSES:   1. Right total knee wound dehiscence  2. Right periprosthetic acute infection, suspected      POSTOPERATIVE DIAGNOSES:   1. Right total knee wound dehiscence   2. Right periprosthetic acute infection, suspected      PROCEDURES PERFORMED:   1. Irrigation and excisional debridement of right total knee  2. Revision complex wound closure of right total knee  3. Application of incisional negative pressure dressing (<50cm2) of right total knee     3/26/18: procedure performed by Dr. Smith  POSTOPERATIVE DIAGNOSES:  1.  Right knee medial retinacular tear.  2.  Right knee seroma.     PROCEDURES PERFORMED:  1.  Right knee Allopatch HD graft application to 15 cm area intraarticularly.  2.  Right knee medial retinacular repair.  3.  Right knee deep complex incision and drainage.  4.  Right knee Parth-Barboza drain application.  5.  Right knee amniotic arthrocentesis.    Visit #: 8 of 16  YOUNG: 6/8/2018   PTA Visit #: 1  Time In: 1030  Time Out: 1130  Total treatment time: 60 min (1:1 with PTA for 30 min)    Subjective     Pt reports: "I feel like I am getting worse." Patient states that she is doing her regular exercises at home.  Pain Scale: Brionna rates pain on a scale of 0-10 to be 6 currently, R knee.    Objective     Pt dons stability brace in clinic today.    R knee ROM: Flex = 53/68 deg pain at end range    Brionna received individual therapeutic exercises to develop " "strength, endurance, ROM, flexibility, posture and core stabilization for 45 minutes including:  Upright bike: 8 min (half revolutions)   Shuttle DL: 7 min x 3 cords  Shuttle SL static holds (1 down, 2 up): 10 x 30" holds x 3 cords  Supine Heel slides: 5 min x 5" holds  Supine heel slides with passive OP: 5 min x 5" holds  Seated heel slides: 5 min x 5" holds  Supine flex to 90-90: 5 min  Seated foot prop into knee flex: 5 min  Heel prop: 5 min  Heel prop with QS: 3 min  QS into SLR: 2 x 10  0 min x Joint mobilizations were applied to the: R knee (Patellar glides, tibiofemoral PA/PA glides)  10 min x STM/MFR of quads/ITB  sit to stands: 2 x 10  Matrix quad extension (for static flexion holds): 10 x 30" holds x 15#   Supine hip flexor stretch w/ strap: 3 x 1 min  Self STM using rolling pin: 4 min  SLR: 2 x 10  SL hip abd: 2 x 10  SL hip add ball squeeze: 30 x 3" hold    Manual therapy:   10 mins x SMT/MFR to RLE with instrument-assist  5 mins x Kinesio tape applied to Right quadriceps for muscle inhibiton    Written Home Exercises Provided: none today.  Pt demo good understanding of the education provided. Brionna demonstrated good return demonstration of activities.     Education provided re:   Brionna verbalized good understanding of education provided.   No spiritual or educational barriers to learning provided    Assessment     Brionna tolerated treatment session well today. Good tolerance to exercises performed reporting increased fatigue post treatment session. Patient with visible Right knee flexion deficits. Overpressure applied to heel slides with an empty end feel noted. Patient was educated on importance of compliance with HEP and use of CPM machine. Patient stated "I am using my CPM machine at setting 7." When asked how much bend she is getting in her knee with the machine patient stated "I don't know how the machine works. I need to watch the video to see how to use it." Patient continues to wear a stability " brace into and out of clinic. Patient was educated on walking without the knee brace, however patient states that she has fear of falling onto her knee if she does not wear it.     This is a 48 y.o. female referred to outpatient physical therapy and presents with a medical diagnosis of R knee pain s/p TKA and demonstrates limitations as described in the problem list. Pt prognosis is Fair. Pt will continue to benefit from skilled outpatient physical therapy to address the deficits listed in the problem list, provide pt/family education and to maximize pt's level of independence in the home and community environment.     Goals as follows: See PN on 5/4/18 (PN due by 6/4/18)   Short Term Goals (4 weeks)  1. Pt will demonstrate improvements in R knee ROM to 0-4-90 for ease with ambulation - Not Met  2. Pt will demonstrate improvements in R knee strength to 4-/5 for ease with getting out of a chair. - Not Met  3. Pt will be able to ambulate 200 ft with LRAD and without the presence of antalgic gait pattern - Not Met  4. Pt will report <5/10 pain within the R knee for ease with ADL's - Not Met  5. Pt presents with improved functional mobility with FOTO limitation <=64% disability. - Not Met  Long Term Goals (8 weeks)   1. Pt will demonstrate improvements in R knee ROM to 0-0-120 for ease with ambulation - Not Met  2. Pt will demonstrate 4+/5 strength within the R knee for ease with house hold chores and climbing stairs - Not Met  3. Pt will report being independent with his/her HEP for maintenance of improvements gained during therapy sessions - Not Met  4. Pt will report <2/10 pain within the R knee for ease with ADLs - Not Met  5. Pt will demonstrate ambulation x 300 ft without AD or antalgic gait.   - Not Met  6. Pt presents with improved functional mobility with FOTO limitation <=54% disability. - Not Met    Plan     Continue with established Plan of Care towards PT goals.    Therapist: Alyx Lee,  PTA  6/7/2018

## 2018-09-20 ENCOUNTER — TELEPHONE (OUTPATIENT)
Dept: SPORTS MEDICINE | Facility: CLINIC | Age: 48
End: 2018-09-20

## 2018-09-20 NOTE — TELEPHONE ENCOUNTER
The patient was contacted regarding their call to the office earlier and a voice mail was left to call the office back at their convenience.    ----- Message from Chaitanya Srivastava sent at 9/20/2018  2:00 PM CDT -----  Contact: Self/ [phone]  Patient would like a call back to set up surgery for her right knee. Patient said she called two weeks ago but never got a call back.

## 2018-11-05 RX ORDER — CIPROFLOXACIN 750 MG/1
TABLET, FILM COATED ORAL
Qty: 60 TABLET | Refills: 0 | Status: SHIPPED | OUTPATIENT
Start: 2018-11-05 | End: 2019-05-22 | Stop reason: ALTCHOICE

## 2018-12-07 RX ORDER — MELOXICAM 15 MG/1
15 TABLET ORAL DAILY
Qty: 30 TABLET | Refills: 6 | Status: SHIPPED | OUTPATIENT
Start: 2018-12-07 | End: 2019-05-29

## 2018-12-07 NOTE — LETTER
Patient: Brionna Jones   YOB: 1970   Clinic Number: [id number]   Today's Date: December 7, 2018        Certificate to Return to Work     Brionna Reed was seen by Matt Smith on 12/7/2018.      Brionna Reed cannot return to work due right knee osteoarthritis.    Specific restrictions: no work at this time.    If you have any questions or concerns, please feel free to contact the office at [phone].    Thank you.    Matt Smith MD

## 2018-12-07 NOTE — TELEPHONE ENCOUNTER
Will email patient her work note. Will have Meloxicam sent to patient's pharmacy.    ----- Message from Estefani Martinez sent at 12/7/2018 12:04 PM CST -----  Contact: Self  Patient says she needs a doctors note.  Requesting a call [phone]

## 2019-03-22 DIAGNOSIS — Z12.11 COLON CANCER SCREENING: ICD-10-CM

## 2019-05-06 ENCOUNTER — OFFICE VISIT (OUTPATIENT)
Dept: SPORTS MEDICINE | Facility: CLINIC | Age: 49
End: 2019-05-06
Payer: MEDICAID

## 2019-05-06 ENCOUNTER — HOSPITAL ENCOUNTER (OUTPATIENT)
Dept: RADIOLOGY | Facility: HOSPITAL | Age: 49
Discharge: HOME | End: 2019-05-06
Attending: ORTHOPAEDIC SURGERY
Payer: MEDICAID

## 2019-05-06 VITALS
HEIGHT: 64 IN | DIASTOLIC BLOOD PRESSURE: 94 MMHG | BODY MASS INDEX: 35.51 KG/M2 | HEART RATE: 103 BPM | SYSTOLIC BLOOD PRESSURE: 161 MMHG | WEIGHT: 208 LBS

## 2019-05-06 DIAGNOSIS — E66.9 OBESITY (BMI 35.0-39.9 WITHOUT COMORBIDITY): ICD-10-CM

## 2019-05-06 DIAGNOSIS — T84.53XA INFECTION AND INFLAMMATORY REACTION DUE TO INTERNAL RIGHT KNEE PROSTHESIS, INITIAL ENCOUNTER: ICD-10-CM

## 2019-05-06 DIAGNOSIS — Z96.651 H/O TOTAL KNEE REPLACEMENT, RIGHT: ICD-10-CM

## 2019-05-06 DIAGNOSIS — R29.898 RIGHT LEG WEAKNESS: ICD-10-CM

## 2019-05-06 DIAGNOSIS — M25.561 RIGHT KNEE PAIN, UNSPECIFIED CHRONICITY: ICD-10-CM

## 2019-05-06 DIAGNOSIS — M25.60 JOINT STIFFNESS: ICD-10-CM

## 2019-05-06 DIAGNOSIS — Z98.890 S/P KNEE SURGERY: ICD-10-CM

## 2019-05-06 DIAGNOSIS — M25.561 RIGHT KNEE PAIN, UNSPECIFIED CHRONICITY: Primary | ICD-10-CM

## 2019-05-06 PROBLEM — M12.561 TRAUMATIC ARTHRITIS OF RIGHT KNEE: Status: RESOLVED | Noted: 2018-01-23 | Resolved: 2019-05-06

## 2019-05-06 PROCEDURE — 99214 PR OFFICE/OUTPT VISIT, EST, LEVL IV, 30-39 MIN: ICD-10-PCS | Mod: S$PBB,,, | Performed by: ORTHOPAEDIC SURGERY

## 2019-05-06 PROCEDURE — 73564 XR KNEE ORTHO BILAT WITH FLEXION: ICD-10-PCS | Mod: 26,50,, | Performed by: RADIOLOGY

## 2019-05-06 PROCEDURE — 99999 PR PBB SHADOW E&M-EST. PATIENT-LVL III: CPT | Mod: PBBFAC,,, | Performed by: ORTHOPAEDIC SURGERY

## 2019-05-06 PROCEDURE — 99999 PR PBB SHADOW E&M-EST. PATIENT-LVL III: ICD-10-PCS | Mod: PBBFAC,,, | Performed by: ORTHOPAEDIC SURGERY

## 2019-05-06 PROCEDURE — 73564 X-RAY EXAM KNEE 4 OR MORE: CPT | Mod: TC,50,FY,PO

## 2019-05-06 PROCEDURE — 99214 OFFICE O/P EST MOD 30 MIN: CPT | Mod: S$PBB,,, | Performed by: ORTHOPAEDIC SURGERY

## 2019-05-06 PROCEDURE — 99213 OFFICE O/P EST LOW 20 MIN: CPT | Mod: PBBFAC,25,PO | Performed by: ORTHOPAEDIC SURGERY

## 2019-05-06 PROCEDURE — 73564 X-RAY EXAM KNEE 4 OR MORE: CPT | Mod: 26,50,, | Performed by: RADIOLOGY

## 2019-05-06 NOTE — PROGRESS NOTES
Subjective:          Chief Complaint: Brionna Jones is a 49 y.o. female who had concerns including Pain of the Right Knee.    Patient is here for a follow up for her right knee. She is having trouble sleeping. She has recently had several falls. She states that her right knee will not bend. She does have a brace and would like a cane today. She is having to borrow a cane to avoid falling.    DATE OF PROCEDURE:  02/20/2018     ATTENDING SURGEON:  Matt Smith M.D.     CO-SURGEON:  Julio C Pederson MD.     FIRST ASSISTANT:  Annalee Dominguez PA-C.     PREOPERATIVE DIAGNOSIS:  Right knee medial retinacular tear.     POSTOPERATIVE DIAGNOSES:  1.  Right knee medial retinacular tear.  2.  Right knee seroma.     PROCEDURES PERFORMED:  1.  Right knee Allopatch HD graft application to 15 cm area intraarticularly.  2.  Right knee medial retinacular repair.  3.  Right knee deep complex incision and drainage.  4.  Right knee Parth-Barboza drain application.  5.  Right knee amniotic arthrocentesis.    DATE OF PROCEDURE: 2/12/2018      PREOPERATIVE DIAGNOSES:   1. Right total knee wound dehiscence  2. Right periprosthetic acute infection, suspected      POSTOPERATIVE DIAGNOSES:   1. Right total knee wound dehiscence   2. Right periprosthetic acute infection, suspected      PROCEDURES PERFORMED:   1. Irrigation and excisional debridement of right total knee  2. Revision complex wound closure of right total knee  3. Application of incisional negative pressure dressing (<50cm2) of right total knee    DATE OF PROCEDURE:  1/23/2018     ATTENDING SURGEON: Surgeon(s) and Role:     * Matt Smith MD - Primary     Co-Surgeon:Adán Linda MD      SECOND ASSISTANT: Angie Dominguez PA-C        Co-Surgeon Duties: Due to the complexity of the case and the need for significant intra-operative decision making co-surgeon duties were medically necessary. The chronicity of the disease process and the level of deformity dictated that  co-surgeon duties be undertaken. A separate note will be dictated/reported by Dr. Matt Smith stating the specific co-surgeon activities and roles performed during the surgery.        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 and Genu Varum (acquired) M21.169     PROCEDURES PERFORMED:  right Replacement, Knee, Medial and Lateral compartment (Total Knee) 44391    Surgery Date: 11/11/2014      Surgeon(s) and Role:  * Matt Smith MD - Primary     Pre-op Diagnosis: Unspecified internal derangement of knee (717.9)  Tear of medial cartilage or meniscus of knee, current (836.0)     Post-op Diagnosis: Same     Procedure(s) (LRB):  ARTHROSCOPY-KNEE (Right)  MENISCECTOMY (Right)  REMOVAL-FOREIGN BODY / LOOSE BODY (Right)    There was an area of 1 cm x 1 cm area on the lateral tibial plateau, which   showed grade III and grade IV chondral defect.       Pain   Associated symptoms include joint swelling. Pertinent negatives include no abdominal pain, chest pain, chills, congestion, coughing, fever, headaches, myalgias, nausea, numbness, rash, sore throat or vomiting.   Knee Pain    Associated symptoms include stiffness. Pertinent negatives include no fever, itching or numbness.         Review of Systems   Constitution: Negative. Negative for chills, fever, weight gain and weight loss.   HENT: Negative for congestion and sore throat.    Eyes: Negative for blurred vision and double vision.   Cardiovascular: Negative for chest pain, leg swelling and palpitations.   Respiratory: Negative for cough and shortness of breath.    Hematologic/Lymphatic: Does not bruise/bleed easily.   Skin: Negative for itching, poor wound healing and rash.   Musculoskeletal: Positive for joint pain, joint swelling and stiffness. Negative for back pain, muscle weakness and myalgias.   Gastrointestinal:  Negative for abdominal pain, constipation, diarrhea, nausea and vomiting.   Genitourinary: Negative.  Negative for frequency and hematuria.   Neurological: Negative for dizziness, headaches, numbness, paresthesias and sensory change.   Psychiatric/Behavioral: Negative for altered mental status and depression. The patient is not nervous/anxious.    Allergic/Immunologic: Negative for hives.                   Objective:        General: Brionna is well-developed, well-nourished, appears stated age, in no acute distress, alert and oriented to time, place and person.     General    Vitals reviewed.  Constitutional: She is oriented to person, place, and time. She appears well-developed and well-nourished. No distress.   HENT:   Head: Normocephalic and atraumatic.   Mouth/Throat: No oropharyngeal exudate.   Eyes: EOM are normal. Right eye exhibits no discharge. Left eye exhibits no discharge.   Neck: Normal range of motion.   Cardiovascular: Normal rate and regular rhythm.    Pulmonary/Chest: Effort normal and breath sounds normal. No respiratory distress.   Neurological: She is alert and oriented to person, place, and time. She has normal reflexes. No cranial nerve deficit. Coordination normal.   Psychiatric: She has a normal mood and affect. Her behavior is normal. Judgment and thought content normal.     General Musculoskeletal Exam   Gait: normal       Right Knee Exam     Inspection   Erythema: absent  Scars: present  Swelling: present  Effusion: absent  Deformity: absent  Bruising: absent    Tenderness   The patient is tender to palpation of the patella, lateral joint line and medial joint line.    Crepitus   The patient has crepitus of the patella.    Range of Motion   Extension:  0 normal   Flexion: abnormal Right knee flexion: 65.     Tests   Meniscus   Zeenat:  Medial - negative Lateral - negative  Ligament Examination Lachman: normal (-1 to 2mm) PCL-Posterior Drawer: normal (0 to 2mm)     MCL - Valgus: normal (0  to 2mm)  LCL - Varus: normalPivot Shift: normal (Equal)Reverse Pivot Shift: normal (Equal)Dial Test at 30 degrees: normal (< 5 degrees)Dial Test at 90 degrees: normal (< 5 degrees)  Posterior Sag Test: negative  Posterolateral Corner: unstable (>15 degrees difference)  Patella   Patellar apprehension: negative  Passive Patellar Tilt: neutral  Patellar Tracking: normal  Patellar Glide (quadrants): Lateral - 1   Medial - 2  Q-Angle at 90 degrees: normal  Patellar Grind: negative  J-Sign: none    Other   Meniscal Cyst: absent  Popliteal (Baker's) Cyst: absent  Sensation: normal    Comments:  Incision clean and dry, no evidence of infection    Left Knee Exam     Inspection   Erythema: absent  Scars: absent  Swelling: absent  Effusion: absent  Deformity: absent  Bruising: absent    Tenderness   The patient is experiencing no tenderness.     Range of Motion   Extension:  -5 normal   Flexion: normal Left knee flexion: 125.     Tests   Meniscus   Zeenat:  Medial - negative Lateral - negative  Stability Lachman: normal (-1 to 2mm) PCL-Posterior Drawer: normal (0 to 2mm)  MCL - Valgus: normal (0 to 2mm)  LCL - Varus: normal (0 to 2mm)Pivot Shift: normal (Equal)Reverse Pivot Shift: normal (Equal)Dial Test at 30 degrees: normal (< 5 degrees)Dial Test at 90 degrees: normal (< 5 degrees)  Posterior Sag Test: negative  Posterolateral Corner: unstable (>15 degrees difference)  Patella   Patellar apprehension: negative  Passive Patellar Tilt: neutral  Patellar Tracking: normal  Patellar Glide (Quadrants): Lateral - 1 Medial - 2  Q-Angle at 90 degrees: normal  Patellar Grind: positive  J-Sign: J sign absent    Other   Meniscal Cyst: absent  Popliteal (Baker's) Cyst: absent  Sensation: normal    Right Hip Exam     Tests   Aimee: negative  Left Hip Exam     Tests   Aimee: negative          Muscle Strength   Right Lower Extremity   Hip Abduction: 4/5   Quadriceps:  4/5   Hamstrin/5   Left Lower Extremity   Hip Abduction: 5/5    Quadriceps:  5/5   Hamstrin/5     Reflexes     Left Side  Quadriceps:  2+  Achilles:  2+    Right Side   Quadriceps:  2+  Achilles:  2+    Vascular Exam     Right Pulses  Dorsalis Pedis:      2+  Posterior Tibial:      2+        Left Pulses  Dorsalis Pedis:      2+  Posterior Tibial:      2+                      Assessment:       Encounter Diagnoses   Name Primary?    Right knee pain, unspecified chronicity Yes    Infection and inflammatory reaction due to internal right knee prosthesis, initial encounter     H/O total knee replacement, right     Joint stiffness     S/P knee surgery     Right leg weakness     Obesity (BMI 35.0-39.9 without comorbidity)           Plan:       1. IKDC, SF-12 and KOOS was filled out today in clinic.     RTC in 3 months with Dr. Matt Smith Patient will fill out IKDC, SF-12 and KOOS on return.    2.  We reviewed with Brionna today, the pathology and natural history of her diagnosis. We have discussed a variety of treatment options including medications, physical therapy and other alternative treatments. I also explained the indications, risks and benefits of surgery. After discussion, Brionna decided to proceed with surgery. The decision was made to go forward with :    1. Right knee arthroscopy, lysis of adhesions  2. Right knee arthroscopy, anterior interval release  3. Right knee arthroscopy, synovectomy  4. Right knee amniox arthrocentesis 61101  5. Right knee manipulation under anesthesia       PT WILL NEED PREOP CLEARANCE FROM PCP-DR. PAEZ  The details of the surgical procedure were explained, including the location of probable incisions and a description of likely hardware and/or grafts to be used.  The patient understands the likely convalescence after surgery.  Also, we have thoroughly discussed the risks, benefits and alternatives to surgery, including, but not limited to, the risk of infection, joint stiffness, blood clot (including DVT and/or pulmonary embolus),  neurologic and vascular injury.  It was explained that, if tissue has been repaired or reconstructed, there is a chance of failure, which may require further management.      All of the patient's questions were answered and informed consent was obtained. The patient will contact us if they have any questions or concerns in the interim.     3. Information for provided about Knee Arthroscopy.            Patient questionnaires may have been collected.

## 2019-05-06 NOTE — PATIENT INSTRUCTIONS
Knee Arthroscopy  Knee problems can often be diagnosed and treated with a technique called arthroscopy. This type of surgery is done using an instrument called an arthroscope (scope). Only a few small incisions are needed for this surgery. The procedure can be used to diagnose a knee problem. In many cases, treatment can also be done using arthroscopy.     Insertion of fluid, arthroscope, and instruments through small incisions (portals).         Patient undergoes procedure      The arthroscope  The scope allows the doctor to look directly into the knee joint. It is about the size of a pencil and contains a pathway for fluids. It also contains coated glass fibers that beam an intense, cool light into the knee joint. A camera is attached to the scope as well. It provides clear images of most areas in your knee joint. The doctor views these images on a monitor.  Preparing for the procedure  · Have lab or other testing done as advised.  · Tell your doctor about any medicines or supplements you take  · Do not eat or drink anything for 10 hours before the procedure.  · Once you arrive for surgery, you will be given an IV line in your arm or hand. This provides fluids and medicines.  · To keep you free of pain during the surgery, youll receive medicine called anesthesia. You may have:  ¨ General anesthesia. This puts you into a deep sleep during the surgery.  ¨ Regional anesthesia. This numbs the body from the waist down.  ¨ Local anesthesia. This numbs just the knee.  In addition to regional or local anesthesia, you may receive sedation. This medicine makes you relaxed and sleepy during the surgery.  The procedure  · A few small incisions (portals) are made in your knee.  · The scope is inserted through one of the portals.  · Sterile fluid is put into the knee joint. This makes it easier to see and work inside your joint.  · Using the scope, the doctor confirms the type and degree of knee damage. If possible, the  problem is treated at this time. This is done using surgical tools put through the other portals.  · When the surgery is done, all tools are removed. The incisions are closed with sutures, staples, surgical glue, or strips of surgical tape.  Risks and complications of arthroscopy  All surgeries have risks. The risks of arthroscopy include:  · Bleeding  · Infection  · Blood clots  · Swelling and stiffness of the knee  · Injury to normal tissue  · Continuing knee problems   Date Last Reviewed: 9/20/2015 © 2000-2017 [employer]. [address] [place] [address]. All rights reserved. This information is not intended as a substitute for professional medical care. Always follow your healthcare professional's instructions.        After Knee Arthroscopy: Recovering from Surgery  Your doctor may prescribe physical therapy before or after your surgery or both. This can help relieve pain, increase range of motion, and improve strength. Your physical therapist will design a program for you based on your knee problem and recovery goals. This program may include office visits and a home exercise program.    Working with your physical therapist  Your physical therapist can help you set goals and work toward them. But a successful recovery depends mostly on you. Follow instructions and keep your appointments.  Types of physical therapy  To help you heal, your physical therapist may use one or more of the following:  · Manual therapy. This may include moving soft tissue and joints in a specific direction to regain movement and flexibility. It may also include working against the therapist's resistance to improve the way your muscle activates.  · Weight machines. These machines work and strengthen specific muscle groups.  · Closed kinetic chain exercises. These help make your joints stronger and more stable by keeping one part of the body still while the rest of the body is moving. Squats are an example of  these exercises.  · Aerobic exercises. These include biking and walking. They strengthen the leg muscles as well as the heart and lungs.  · Electric stimulation (as needed). This uses a painless electric current to relieve pain, decrease swelling, and rebuild muscles.  · Ultrasound (as needed). This uses sound waves to help heal injured tissue.  Date Last Reviewed: 9/20/2015  © 7907-2624 [employer]. [address] [place] [address], [place] [address]. All rights reserved. This information is not intended as a substitute for professional medical care. Always follow your healthcare professional's instructions.    1. Right knee arthroscopy, lysis of adhesions  2. Right knee arthroscopy, anterior interval release  3. Right knee arthroscopy, synovectomy  4. Right knee amniox arthrocentesis 08685  5. Right knee manipulation under anesthesia       PT WILL NEED PREOP CLEARANCE FROM PCP-DR. PAEZ

## 2019-05-06 NOTE — LETTER
Patient: Brionna Jones   YOB: 1970   Clinic Number: [id number]   Today's Date: May 6, 2019        Certificate to Return to Work     Brionna Reed was seen by Matt Smith MD on 5/6/2019.    Follow up in about 3 months (around 8/6/2019), or RTC in 3 months with Dr. Matt Smith Patient will fill out IKDC, SF-12 and KOOS on return., for RTC in 3 months with Dr. Matt Smith Patient will fill out IKDC, SF-12 and KOOS on return.. Brionna Reed will be seen by Dr. Matt Smith.    Brionna Reed cannot return to work at this time.    Specific restrictions: Pt unable to work due to right knee contracture. She is unable kneel, stoop, squat or bend.     If you have any questions or concerns, please feel free to contact the office at [phone].    Thank you.    Matt Smith MD

## 2019-05-15 ENCOUNTER — TELEPHONE (OUTPATIENT)
Dept: SPORTS MEDICINE | Facility: CLINIC | Age: 49
End: 2019-05-15

## 2019-05-15 NOTE — TELEPHONE ENCOUNTER
Faxed to number provided.    ----- Message from Shante Myles sent at 5/15/2019 12:27 PM CDT -----  Contact: Linda Vences [phone]  Requesting last clinic visit not from the pt last with Dr. Smith.  Please fax over to  [phone], contact number is [phone]

## 2019-05-22 ENCOUNTER — OFFICE VISIT (OUTPATIENT)
Dept: INTERNAL MEDICINE | Facility: CLINIC | Age: 49
End: 2019-05-22
Attending: FAMILY MEDICINE
Payer: MEDICAID

## 2019-05-22 VITALS
DIASTOLIC BLOOD PRESSURE: 72 MMHG | OXYGEN SATURATION: 96 % | SYSTOLIC BLOOD PRESSURE: 116 MMHG | HEART RATE: 90 BPM | TEMPERATURE: 98 F | WEIGHT: 217 LBS | HEIGHT: 64 IN | BODY MASS INDEX: 37.05 KG/M2

## 2019-05-22 DIAGNOSIS — Z12.31 ENCOUNTER FOR SCREENING MAMMOGRAM FOR MALIGNANT NEOPLASM OF BREAST: ICD-10-CM

## 2019-05-22 DIAGNOSIS — R10.9 ABDOMINAL PAIN, UNSPECIFIED ABDOMINAL LOCATION: ICD-10-CM

## 2019-05-22 DIAGNOSIS — Z00.00 ANNUAL PHYSICAL EXAM: Primary | ICD-10-CM

## 2019-05-22 DIAGNOSIS — I10 HYPERTENSION, ESSENTIAL: ICD-10-CM

## 2019-05-22 DIAGNOSIS — Z12.11 COLON CANCER SCREENING: ICD-10-CM

## 2019-05-22 DIAGNOSIS — R32 URINARY INCONTINENCE, UNSPECIFIED TYPE: ICD-10-CM

## 2019-05-22 DIAGNOSIS — M17.9 OSTEOARTHRITIS OF KNEE, UNSPECIFIED LATERALITY, UNSPECIFIED OSTEOARTHRITIS TYPE: ICD-10-CM

## 2019-05-22 DIAGNOSIS — Z87.891 PERSONAL HISTORY OF NICOTINE DEPENDENCE: ICD-10-CM

## 2019-05-22 LAB
AMORPH CRY UR QL COMP ASSIST: NORMAL
BILIRUB UR QL STRIP: NEGATIVE
CLARITY UR REFRACT.AUTO: ABNORMAL
COLOR UR AUTO: YELLOW
GLUCOSE UR QL STRIP: NEGATIVE
HGB UR QL STRIP: NEGATIVE
KETONES UR QL STRIP: NEGATIVE
LEUKOCYTE ESTERASE UR QL STRIP: NEGATIVE
MICROSCOPIC COMMENT: NORMAL
NITRITE UR QL STRIP: NEGATIVE
PH UR STRIP: 5 [PH] (ref 5–8)
PROT UR QL STRIP: NEGATIVE
SP GR UR STRIP: 1.03 (ref 1–1.03)
URN SPEC COLLECT METH UR: ABNORMAL

## 2019-05-22 PROCEDURE — 87086 URINE CULTURE/COLONY COUNT: CPT

## 2019-05-22 PROCEDURE — 99999 PR PBB SHADOW E&M-EST. PATIENT-LVL V: ICD-10-PCS | Mod: PBBFAC,,, | Performed by: FAMILY MEDICINE

## 2019-05-22 PROCEDURE — 99215 OFFICE O/P EST HI 40 MIN: CPT | Mod: PBBFAC | Performed by: FAMILY MEDICINE

## 2019-05-22 PROCEDURE — 99396 PR PREVENTIVE VISIT,EST,40-64: ICD-10-PCS | Mod: S$PBB,,, | Performed by: FAMILY MEDICINE

## 2019-05-22 PROCEDURE — 81001 URINALYSIS AUTO W/SCOPE: CPT

## 2019-05-22 PROCEDURE — 99999 PR PBB SHADOW E&M-EST. PATIENT-LVL V: CPT | Mod: PBBFAC,,, | Performed by: FAMILY MEDICINE

## 2019-05-22 PROCEDURE — 99396 PREV VISIT EST AGE 40-64: CPT | Mod: S$PBB,,, | Performed by: FAMILY MEDICINE

## 2019-05-22 RX ORDER — FLUTICASONE PROPIONATE 50 MCG
2 SPRAY, SUSPENSION (ML) NASAL DAILY
Qty: 1 BOTTLE | Refills: 5 | Status: SHIPPED | OUTPATIENT
Start: 2019-05-22 | End: 2019-05-29

## 2019-05-22 NOTE — PATIENT INSTRUCTIONS
Schedule lab orders for today.       If not contacted in a couple weeks - Colonoscopy Scheduling Number - [phone]        
Patient

## 2019-05-22 NOTE — PROGRESS NOTES
Subjective:       Patient ID: Brionna Jones is a 49 y.o. female.    Chief Complaint: Advice Only    Established patient for an annual wellness check/physical exam and also chronic disease management. Specific complaints - see dictation and please see ROS.  P, S, Fm, Soc Hx's; Meds, allergies reviewed and reconciled.  Health maintenance file reviewed and addressed items due.      Review of Systems   Constitutional: Negative for chills, fatigue, fever and unexpected weight change.   HENT: Positive for congestion and postnasal drip. Negative for trouble swallowing.    Eyes: Negative for redness and visual disturbance.   Respiratory: Positive for cough. Negative for chest tightness and shortness of breath.    Cardiovascular: Negative for chest pain, palpitations and leg swelling.   Gastrointestinal: Negative for abdominal pain and blood in stool.   Genitourinary: Positive for enuresis, frequency and pelvic pain. Negative for difficulty urinating, hematuria and menstrual problem.   Musculoskeletal: Positive for arthralgias, gait problem and myalgias. Negative for back pain, joint swelling and neck pain.   Skin: Negative for color change and rash.   Neurological: Negative for tremors, speech difficulty, weakness, numbness and headaches.   Hematological: Negative for adenopathy. Does not bruise/bleed easily.   Psychiatric/Behavioral: Negative for behavioral problems, confusion and sleep disturbance. The patient is not nervous/anxious.        Objective:      Physical Exam   Constitutional: She is oriented to person, place, and time. She appears well-developed and well-nourished.   HENT:   Head: Normocephalic.   Right Ear: Tympanic membrane, external ear and ear canal normal.   Left Ear: Tympanic membrane, external ear and ear canal normal.   Mouth/Throat: Oropharynx is clear and moist.   Eyes: Pupils are equal, round, and reactive to light. No scleral icterus.   Neck: Normal range of motion. Neck supple. Carotid bruit is  not present. No thyromegaly present.   Cardiovascular: Normal rate, regular rhythm, normal heart sounds and intact distal pulses. Exam reveals no gallop and no friction rub.   No murmur heard.  Pulmonary/Chest: Effort normal and breath sounds normal. She exhibits no tenderness.   Abdominal: Soft. She exhibits no distension and no mass. There is tenderness in the right upper quadrant and epigastric area. There is no rebound and no guarding.   Musculoskeletal: Normal range of motion. She exhibits no edema or tenderness.   Lymphadenopathy:     She has no cervical adenopathy.   Neurological: She is alert and oriented to person, place, and time. She has normal strength. She displays normal reflexes. No cranial nerve deficit or sensory deficit. Coordination and gait normal.   Skin: Skin is warm and dry. No rash noted.   Psychiatric: She has a normal mood and affect. Her behavior is normal. Judgment and thought content normal.   Nursing note and vitals reviewed.      Assessment:       1. Annual physical exam    2. Hypertension, essential    3. Personal history of nicotine dependence    4. Encounter for screening mammogram for malignant neoplasm of breast    5. Colon cancer screening    6. Urinary incontinence, unspecified type    7. Abdominal pain, unspecified abdominal location    8. Osteoarthritis of knee, unspecified laterality, unspecified osteoarthritis type        Plan:     Medication List with Changes/Refills   New Medications    FLUTICASONE PROPIONATE (FLONASE) 50 MCG/ACTUATION NASAL SPRAY    2 sprays (100 mcg total) by Each Nare route once daily.   Current Medications    AMLODIPINE (NORVASC) 10 MG TABLET    Take 10 mg by mouth once daily.    ASPIRIN (ECOTRIN) 325 MG EC TABLET    Take 1 tablet (325 mg total) by mouth once daily.    MELOXICAM (MOBIC) 15 MG TABLET    Take 1 tablet (15 mg total) by mouth once daily.   Discontinued Medications    CELECOXIB (CELEBREX) 200 MG CAPSULE    Take 1 capsule (200 mg total) by  mouth 2 (two) times daily.    CIPROFLOXACIN HCL (CIPRO) 750 MG TABLET    TAKE 1 TABLET(750 MG) BY MOUTH EVERY 12 HOURS    HYDROXYZINE PAMOATE (VISTARIL) 50 MG CAP    Take 1 capsule (50 mg total) by mouth every 6 (six) hours as needed (itching).    OXYCODONE-ACETAMINOPHEN (PERCOCET)  MG PER TABLET    Take 1 tablet by mouth every 12 (twelve) hours as needed for Pain.    PROMETHAZINE (PHENERGAN) 25 MG TABLET    Take 1 tablet (25 mg total) by mouth every 6 (six) hours as needed for Nausea.    PROMETHAZINE (PHENERGAN) 25 MG TABLET    Take 1 tablet (25 mg total) by mouth every 4 (four) hours.    RANITIDINE (ZANTAC) 75 MG TABLET    Take 75 mg by mouth as needed for Heartburn.    TRAMADOL (ULTRAM) 50 MG TABLET    Take 50 mg by mouth as needed for Pain.    VANCOMYCIN HCL (VANCOMYCIN 1 G/250 ML D5W, READY TO MIX SYSTEM,)    Inject 250 mLs (1,000 mg total) into the vein every 12 (twelve) hours.     Brionna was seen today for advice only.    Diagnoses and all orders for this visit:    Annual physical exam  -     Ambulatory referral to Obstetrics / Gynecology  -     Comprehensive metabolic panel; Standing  -     Lipid panel; Standing  -     Urinalysis  -     Urinalysis Microscopic  -     Urine culture  -     HIV 1/2 Ag/Ab (4th Gen); Future  -     RPR; Future    Hypertension, essential    Personal history of nicotine dependence  -     Ambulatory referral to Smoking Cessation Program    Encounter for screening mammogram for malignant neoplasm of breast  -     Mammo Digital Screening Bilat; Standing    Colon cancer screening  -     Case request GI: COLONOSCOPY    Urinary incontinence, unspecified type  -     Ambulatory referral to Obstetrics / Gynecology  -     Urinalysis  -     Urinalysis Microscopic  -     Urine culture    Abdominal pain, unspecified abdominal location  -     US Abdomen Complete; Future    Osteoarthritis of knee, unspecified laterality, unspecified osteoarthritis type  -     Ambulatory referral to  Orthopedics    Other orders  -     fluticasone propionate (FLONASE) 50 mcg/actuation nasal spray; 2 sprays (100 mcg total) by Each Nare route once daily.      See meds, orders, follow up, routing and instructions sections of encounter.  A 49-year-old patient.  She is in for essentially an annual physical examination   and followup.  She is complaining of lower abdominal pain in the suprapubic   area.  She also has chronic sinus drainage.  She has tried some OTC medications.    She states she is not particularly enthralled with nasal spray, although I did   offer that as a potentially better option from what she has been doing.    On examination, she had mild midepigastric tenderness, although her subjective   complaint was lower.  I suggested an abdominal ultrasound, GYN referral and   check urinalysis.  She has problems with the right knee with postoperative x3   reduced range of motion.  She did seem hesitant to have another surgery at this   time.  Options would include a second opinion or to discuss with her surgeon   other options.  I suggested that maybe we do both and I will presumably see her   back in about one year.  See laboratory orders.      STEVE/HN  dd: 05/22/2019 16:20:10 (CDT)  td: 05/23/2019 04:33:48 (CDT)  Doc ID   #[id number]  Job ID #[id number]    CC:

## 2019-05-23 LAB
BACTERIA UR CULT: NORMAL
BACTERIA UR CULT: NORMAL

## 2019-05-29 ENCOUNTER — OFFICE VISIT (OUTPATIENT)
Dept: OBSTETRICS AND GYNECOLOGY | Facility: CLINIC | Age: 49
End: 2019-05-29
Payer: MEDICAID

## 2019-05-29 VITALS
HEIGHT: 64 IN | BODY MASS INDEX: 37.23 KG/M2 | WEIGHT: 218.06 LBS | DIASTOLIC BLOOD PRESSURE: 84 MMHG | SYSTOLIC BLOOD PRESSURE: 138 MMHG

## 2019-05-29 DIAGNOSIS — Z01.419 WOMEN'S ANNUAL ROUTINE GYNECOLOGICAL EXAMINATION: Primary | ICD-10-CM

## 2019-05-29 LAB
CANDIDA RRNA VAG QL PROBE: NEGATIVE
G VAGINALIS RRNA GENITAL QL PROBE: POSITIVE
T VAGINALIS RRNA GENITAL QL PROBE: NEGATIVE

## 2019-05-29 PROCEDURE — 87491 CHLMYD TRACH DNA AMP PROBE: CPT

## 2019-05-29 PROCEDURE — 99386 PR PREVENTIVE VISIT,NEW,40-64: ICD-10-PCS | Mod: S$PBB,,, | Performed by: NURSE PRACTITIONER

## 2019-05-29 PROCEDURE — 99386 PREV VISIT NEW AGE 40-64: CPT | Mod: S$PBB,,, | Performed by: NURSE PRACTITIONER

## 2019-05-29 PROCEDURE — 87510 GARDNER VAG DNA DIR PROBE: CPT

## 2019-05-29 PROCEDURE — 99213 OFFICE O/P EST LOW 20 MIN: CPT | Mod: PBBFAC | Performed by: NURSE PRACTITIONER

## 2019-05-29 PROCEDURE — 87480 CANDIDA DNA DIR PROBE: CPT

## 2019-05-29 PROCEDURE — 87624 HPV HI-RISK TYP POOLED RSLT: CPT

## 2019-05-29 PROCEDURE — 88175 CYTOPATH C/V AUTO FLUID REDO: CPT

## 2019-05-29 PROCEDURE — 99999 PR PBB SHADOW E&M-EST. PATIENT-LVL III: ICD-10-PCS | Mod: PBBFAC,,, | Performed by: NURSE PRACTITIONER

## 2019-05-29 PROCEDURE — 99999 PR PBB SHADOW E&M-EST. PATIENT-LVL III: CPT | Mod: PBBFAC,,, | Performed by: NURSE PRACTITIONER

## 2019-05-29 NOTE — LETTER
May 29, 2019      Michael Shook MD  [address]  [facility] [address]           [facility] [address]  [address]  [facility] [address]  Phone: [phone]  Fax: [phone]          Patient: Brionna Jones   MR Number: [id number]   YOB: 1970   Date of Visit: 5/29/2019       Dear Dr. Michael Shook:    Thank you for referring Brionna Jones to me for evaluation. Attached you will find relevant portions of my assessment and plan of care.    If you have questions, please do not hesitate to call me. I look forward to following Brionna Jones along with you.    Sincerely,    Isatu Lockwood, NP    Enclosure  CC:  No Recipients    If you would like to receive this communication electronically, please contact [email or web address] or [phone] to request more information on [facility] access.    For providers and/or their staff who would like to refer a patient to [facility], please contact us through our one-stop-shop provider referral line, [facility] [place], at [phone].    If you feel you have received this communication in error or would no longer like to receive these types of communications, please e-mail [email or web address]

## 2019-05-29 NOTE — PROGRESS NOTES
CC: Annual  HPI: Pt is a 49 y.o.  female who presents for routine annual exam. She uses nothing for contraception, post menopausal, LMP: 2017. She does want STD screening.  Reports intermittent abdominal cramping for several years that has worsened recently, pain worsens with movement and position changes.  The patient participates in regular exercise: Walking.  The patient does smoke, 1/2 PDD, recently enrolled in smoking cessation program.  The patient wears seatbelts.   Pt denies any domestic violence. History of hypertension controlled with medication, uterine fibroids. Tubal ligation performed by Dr. Barker 23 years ago.     No history of abnormal pap smear or recent positive STD testing. Unknown date of last pap smear. She is sexually active intermittently with her . Currently unemployed due to health issues including right knee pain. Lives with her 24 yo son.     FH:  Breast cancer: none  Colon cancer: none  Ovarian cancer: none  Endometrial cancer: none    ROS:  GENERAL: Feeling well overall. Denies fever or chills.   SKIN: Denies rash or lesions.   HEAD: Denies head injury or headache.   NODES: Denies enlarged lymph nodes.   CHEST: Denies chest pain or shortness of breath.   CARDIOVASCULAR: Denies palpitations or left sided chest pain.   ABDOMEN: No abdominal pain, constipation, diarrhea, nausea, vomiting or rectal bleeding.   URINARY: No dysuria, hematuria, or burning on urination.  REPRODUCTIVE: See HPI.   BREASTS: Denies pain, lumps, or nipple discharge.   HEMATOLOGIC: No easy bruisability or excessive bleeding.   MUSCULOSKELETAL: Denies swelling. Joint pain- right knee.  NEUROLOGIC: Denies syncope or weakness.   PSYCHIATRIC: Denies depression, anxiety or mood swings.    PE:   APPEARANCE: Well nourished, well developed, Black or  female in no acute distress. Obese.  NODES: no cervical, supraclavicular, or inguinal lymphadenopathy  BREASTS: Symmetrical, no skin changes or visible  lesions. No palpable masses, nipple discharge or adenopathy bilaterally.  ABDOMEN: Soft. No tenderness or masses. No distention. No hernias palpated. No CVA tenderness.  VULVA: No lesions. Normal external female genitalia.  URETHRAL MEATUS: Normal size and location, no lesions, no prolapse.  URETHRA: No masses, tenderness, or prolapse.  VAGINA: Moist. No lesions or lacerations noted. No abnormal discharge present. No odor present.   CERVIX: No lesions or discharge. No cervical motion tenderness.   UTERUS: Normal size, regular shape, mobile, non-tender.  ADNEXA: No tenderness. No fullness or masses palpated in the adnexal regions.   ANUS PERINEUM: Normal.      Diagnosis:  1. Women's annual routine gynecological examination        Plan:     Orders Placed This Encounter    HPV High Risk Genotypes, PCR    C. trachomatis/N. gonorrhoeae by AMP DNA [facility]; Cervicovaginal    Vaginosis Screen by DNA Probe    Liquid-based pap smear, screening     Pap smear, HPV cotesting. GCCT, Affirm.    Screening mammogram and complete abdominal US- for evaluation of abdominal cramping ordered by PCP and will be performed on Friday, 5/31/19.    Patient was counseled today on the new ACS guidelines for cervical cytology screening as well as the current recommendations for breast cancer screening. She was counseled to follow up with her PCP for other routine health maintenance. Counseling session lasted approximately 10 minutes, and all her questions were answered.    Follow-up with me in 1 year for routine exam      HANNAH Chan

## 2019-05-30 ENCOUNTER — TELEPHONE (OUTPATIENT)
Dept: OBSTETRICS AND GYNECOLOGY | Facility: CLINIC | Age: 49
End: 2019-05-30

## 2019-05-30 DIAGNOSIS — B96.89 BACTERIAL VAGINOSIS: Primary | ICD-10-CM

## 2019-05-30 DIAGNOSIS — N76.0 BACTERIAL VAGINOSIS: Primary | ICD-10-CM

## 2019-05-30 LAB
C TRACH DNA SPEC QL NAA+PROBE: NOT DETECTED
N GONORRHOEA DNA SPEC QL NAA+PROBE: NOT DETECTED

## 2019-05-30 RX ORDER — METRONIDAZOLE 500 MG/1
500 TABLET ORAL 2 TIMES DAILY
Qty: 14 TABLET | Refills: 0 | Status: SHIPPED | OUTPATIENT
Start: 2019-05-30 | End: 2019-06-06

## 2019-05-30 NOTE — TELEPHONE ENCOUNTER
----- Message from Isatu Lockwood NP sent at 5/29/2019  3:38 PM CDT -----  Please call the patient regarding her abnormal result of bacterial vaginosis. Ask if she'd prefer oral tablets or vaginal suppositories for treatment. She cannot drink alcohol with oral tablets.

## 2019-05-30 NOTE — TELEPHONE ENCOUNTER
Patient contacted and notified of positive Affirm for bacterial vaginosis. Flagyl 500 mg BID x 7 days sent to [facility] in [place] for treatment.

## 2019-05-31 ENCOUNTER — HOSPITAL ENCOUNTER (OUTPATIENT)
Dept: RADIOLOGY | Facility: HOSPITAL | Age: 49
Discharge: HOME | End: 2019-05-31
Attending: FAMILY MEDICINE
Payer: MEDICAID

## 2019-05-31 DIAGNOSIS — Z12.31 ENCOUNTER FOR SCREENING MAMMOGRAM FOR MALIGNANT NEOPLASM OF BREAST: ICD-10-CM

## 2019-05-31 DIAGNOSIS — R10.9 ABDOMINAL PAIN, UNSPECIFIED ABDOMINAL LOCATION: ICD-10-CM

## 2019-05-31 PROCEDURE — 77067 SCR MAMMO BI INCL CAD: CPT | Mod: 26,,, | Performed by: RADIOLOGY

## 2019-05-31 PROCEDURE — 77067 MAMMO DIGITAL SCREENING BILAT WITH TOMOSYNTHESIS_CAD: ICD-10-PCS | Mod: 26,,, | Performed by: RADIOLOGY

## 2019-05-31 PROCEDURE — 76700 US EXAM ABDOM COMPLETE: CPT | Mod: 26,,, | Performed by: RADIOLOGY

## 2019-05-31 PROCEDURE — 77067 SCR MAMMO BI INCL CAD: CPT | Mod: TC

## 2019-05-31 PROCEDURE — 77063 MAMMO DIGITAL SCREENING BILAT WITH TOMOSYNTHESIS_CAD: ICD-10-PCS | Mod: 26,,, | Performed by: RADIOLOGY

## 2019-05-31 PROCEDURE — 76700 US EXAM ABDOM COMPLETE: CPT | Mod: TC

## 2019-05-31 PROCEDURE — 77063 BREAST TOMOSYNTHESIS BI: CPT | Mod: 26,,, | Performed by: RADIOLOGY

## 2019-05-31 PROCEDURE — 76700 US ABDOMEN COMPLETE: ICD-10-PCS | Mod: 26,,, | Performed by: RADIOLOGY

## 2019-06-05 ENCOUNTER — PATIENT MESSAGE (OUTPATIENT)
Dept: OBSTETRICS AND GYNECOLOGY | Facility: CLINIC | Age: 49
End: 2019-06-05

## 2019-06-05 LAB
HPV HR 12 DNA CVX QL NAA+PROBE: NEGATIVE
HPV16 AG SPEC QL: NEGATIVE
HPV18 DNA SPEC QL NAA+PROBE: NEGATIVE

## 2019-06-08 ENCOUNTER — TELEPHONE (OUTPATIENT)
Dept: INTERNAL MEDICINE | Facility: CLINIC | Age: 49
End: 2019-06-08

## 2019-06-08 NOTE — TELEPHONE ENCOUNTER
----- Message from Juliette Kennedy sent at 6/8/2019  9:37 AM CDT -----  Contact: Patient [phone]  Returned Call    Who left the message: Michael Shook MD    When did the practice call:  6/8/19 8:20 AM     Please advise.    Thank You

## 2019-06-10 ENCOUNTER — TELEPHONE (OUTPATIENT)
Dept: INTERNAL MEDICINE | Facility: CLINIC | Age: 49
End: 2019-06-10

## 2019-06-11 ENCOUNTER — TELEPHONE (OUTPATIENT)
Dept: INTERNAL MEDICINE | Facility: CLINIC | Age: 49
End: 2019-06-11

## 2019-06-11 DIAGNOSIS — I10 HYPERTENSION, ESSENTIAL: Primary | ICD-10-CM

## 2019-06-11 DIAGNOSIS — E78.5 HYPERLIPIDEMIA, UNSPECIFIED HYPERLIPIDEMIA TYPE: ICD-10-CM

## 2019-06-11 RX ORDER — ATORVASTATIN CALCIUM 10 MG/1
10 TABLET, FILM COATED ORAL DAILY
Qty: 90 TABLET | Refills: 3 | Status: SHIPPED | OUTPATIENT
Start: 2019-06-11 | End: 2019-10-04

## 2019-06-11 NOTE — TELEPHONE ENCOUNTER
Called patient and discussed labs and or test results. Patient expressed understanding and had the opportunity to ask questions. Any questions were answered. See meds, orders, follow up and instructions sections of encounter.    Specific issues include:  Elevated LDL to 180 -     Add atorvastatin - se discussed

## 2019-06-26 ENCOUNTER — TELEPHONE (OUTPATIENT)
Dept: INTERNAL MEDICINE | Facility: CLINIC | Age: 49
End: 2019-06-26

## 2019-06-26 DIAGNOSIS — R32 URINARY INCONTINENCE, UNSPECIFIED TYPE: Primary | ICD-10-CM

## 2019-06-26 NOTE — TELEPHONE ENCOUNTER
----- Message from Kirstin Whitaker sent at 6/26/2019 12:37 PM CDT -----  Contact: self/[phone]  Pt called in regards to getting her results from her test that was done a couple of weeks ago.      Please advise

## 2019-06-27 NOTE — TELEPHONE ENCOUNTER
Spoke with pt she stated she is having some bladder leaking. She went to see her gyn the week of 5/15 and she was given normal results there as well. Pt is asking why she could be having this issues..please advise.Edyta Molina

## 2019-06-28 NOTE — TELEPHONE ENCOUNTER
I recommend we refer her to a uro gynecologist for further evaluation.  Please see referral orders and please call patient to schedule a consult with urogynecology. Thank you.

## 2019-07-08 ENCOUNTER — TELEPHONE (OUTPATIENT)
Dept: INTERNAL MEDICINE | Facility: CLINIC | Age: 49
End: 2019-07-08

## 2019-07-08 NOTE — TELEPHONE ENCOUNTER
[phone] - [facility]    Or did she want the smoking cessation clinic number. Thank you.

## 2019-07-08 NOTE — TELEPHONE ENCOUNTER
----- Message from Juliette Kennedy sent at 7/8/2019 11:28 AM CDT -----  Contact: Patient [phone]  Requesting information about mental health. Stated that you had referred her but she lost the info.    Please call and advise.    Thank You

## 2019-07-08 NOTE — TELEPHONE ENCOUNTER
Message left for pt to call office  [phone] - [facility]     Or did she want the smoking cessation clinic number. Thank you.

## 2019-08-27 ENCOUNTER — TELEPHONE (OUTPATIENT)
Dept: SPORTS MEDICINE | Facility: CLINIC | Age: 49
End: 2019-08-27

## 2019-08-27 NOTE — TELEPHONE ENCOUNTER
Spoke to the patient and scheduled a f/u appt on 9/11 at 10:15am     ----- Message from Kisha Lion MA sent at 8/27/2019 11:32 AM CDT -----  Contact: self/[phone]  Pt states she was trying to call on yesterday but the phone were bad due to the bad weather, but she is trying to R/S her appt she missed and the next opening is not til Oct/Nov and she would like something sooner.

## 2019-08-27 NOTE — TELEPHONE ENCOUNTER
The patient was contacted regarding their call to the office earlier and a voice mail was left to call the office back at their convenience.    ----- Message from Gloria Carpenter sent at 8/27/2019  3:25 PM CDT -----  Contact: Maryellen/Moises  Please call Maryellen at [phone] if any questions    Fax# [phone]    Please send the most recent office notes to approve the knee brace    2nd call request     Thank you

## 2019-09-10 ENCOUNTER — TELEPHONE (OUTPATIENT)
Dept: SPORTS MEDICINE | Facility: CLINIC | Age: 49
End: 2019-09-10

## 2019-09-10 NOTE — TELEPHONE ENCOUNTER
2nd voicemail left for Raymundo explain that this patient has not been seen on the date of 8/26/2019      ----- Message from Jelani Nails sent at 9/10/2019 11:41 AM CDT -----  Contact: Moises  Needs updated office notes from 8/26/2019. Sent over request on 8/27/2019.     [phone] fax     [phone] phone

## 2019-09-11 ENCOUNTER — HOSPITAL ENCOUNTER (OUTPATIENT)
Dept: RADIOLOGY | Facility: HOSPITAL | Age: 49
Discharge: HOME | End: 2019-09-11
Attending: ORTHOPAEDIC SURGERY
Payer: MEDICAID

## 2019-09-11 ENCOUNTER — OFFICE VISIT (OUTPATIENT)
Dept: SPORTS MEDICINE | Facility: CLINIC | Age: 49
End: 2019-09-11
Payer: MEDICAID

## 2019-09-11 VITALS
BODY MASS INDEX: 37.22 KG/M2 | DIASTOLIC BLOOD PRESSURE: 90 MMHG | HEIGHT: 64 IN | WEIGHT: 218 LBS | HEART RATE: 76 BPM | SYSTOLIC BLOOD PRESSURE: 140 MMHG

## 2019-09-11 DIAGNOSIS — M25.561 RIGHT KNEE PAIN, UNSPECIFIED CHRONICITY: ICD-10-CM

## 2019-09-11 DIAGNOSIS — M25.561 CHRONIC KNEE PAIN AFTER TOTAL REPLACEMENT OF RIGHT KNEE JOINT: ICD-10-CM

## 2019-09-11 DIAGNOSIS — E66.9 OBESITY (BMI 35.0-39.9 WITHOUT COMORBIDITY): ICD-10-CM

## 2019-09-11 DIAGNOSIS — R53.1 DECREASED STRENGTH, ENDURANCE, AND MOBILITY: ICD-10-CM

## 2019-09-11 DIAGNOSIS — Z96.651 CHRONIC KNEE PAIN AFTER TOTAL REPLACEMENT OF RIGHT KNEE JOINT: ICD-10-CM

## 2019-09-11 DIAGNOSIS — R68.89 DECREASED STRENGTH, ENDURANCE, AND MOBILITY: ICD-10-CM

## 2019-09-11 DIAGNOSIS — Z74.09 DECREASED STRENGTH, ENDURANCE, AND MOBILITY: ICD-10-CM

## 2019-09-11 DIAGNOSIS — M25.669 DECREASED RANGE OF MOTION OF KNEE, UNSPECIFIED LATERALITY: ICD-10-CM

## 2019-09-11 DIAGNOSIS — Z98.890 S/P KNEE SURGERY: ICD-10-CM

## 2019-09-11 DIAGNOSIS — M25.561 RIGHT KNEE PAIN, UNSPECIFIED CHRONICITY: Primary | ICD-10-CM

## 2019-09-11 DIAGNOSIS — R29.898 RIGHT LEG WEAKNESS: ICD-10-CM

## 2019-09-11 DIAGNOSIS — G89.29 CHRONIC KNEE PAIN AFTER TOTAL REPLACEMENT OF RIGHT KNEE JOINT: ICD-10-CM

## 2019-09-11 DIAGNOSIS — Z96.651 H/O TOTAL KNEE REPLACEMENT, RIGHT: ICD-10-CM

## 2019-09-11 PROCEDURE — 99214 OFFICE O/P EST MOD 30 MIN: CPT | Mod: S$PBB,,, | Performed by: ORTHOPAEDIC SURGERY

## 2019-09-11 PROCEDURE — 73564 XR KNEE ORTHO BILAT WITH FLEXION: ICD-10-PCS | Mod: 26,50,, | Performed by: RADIOLOGY

## 2019-09-11 PROCEDURE — 73564 X-RAY EXAM KNEE 4 OR MORE: CPT | Mod: 26,50,, | Performed by: RADIOLOGY

## 2019-09-11 PROCEDURE — 99214 PR OFFICE/OUTPT VISIT, EST, LEVL IV, 30-39 MIN: ICD-10-PCS | Mod: S$PBB,,, | Performed by: ORTHOPAEDIC SURGERY

## 2019-09-11 PROCEDURE — 99999 PR PBB SHADOW E&M-EST. PATIENT-LVL III: ICD-10-PCS | Mod: PBBFAC,,, | Performed by: ORTHOPAEDIC SURGERY

## 2019-09-11 PROCEDURE — 99999 PR PBB SHADOW E&M-EST. PATIENT-LVL III: CPT | Mod: PBBFAC,,, | Performed by: ORTHOPAEDIC SURGERY

## 2019-09-11 PROCEDURE — 99213 OFFICE O/P EST LOW 20 MIN: CPT | Mod: PBBFAC,25,PO | Performed by: ORTHOPAEDIC SURGERY

## 2019-09-11 PROCEDURE — 73564 X-RAY EXAM KNEE 4 OR MORE: CPT | Mod: TC,50,FY,PO

## 2019-09-11 RX ORDER — METHOCARBAMOL 500 MG/1
500 TABLET, FILM COATED ORAL 2 TIMES DAILY PRN
Qty: 60 TABLET | Refills: 2 | Status: SHIPPED | OUTPATIENT
Start: 2019-09-11 | End: 2019-09-21

## 2019-09-11 NOTE — PROGRESS NOTES
Subjective:          Chief Complaint: Brionna Jones is a 49 y.o. female who had concerns including Pain of the Right Knee.    Patient is here for a follow up for her right knee. She is having trouble sleeping. She has recently had several falls. She states that her right knee will not bend. She does have a brace and would like a cane today. She is having to borrow a cane to avoid falling. She explains that she has had continued muscle spasms in right leg greater than left leg. She also reports that she has had swelling in her feet more recently. She is unable to wear certain shoes due to her foot swelling. She is afraid to proceed with right knee surgery at this time. She would like to discuss the muscle spasm and foot swelling.     DATE OF PROCEDURE:  02/20/2018     ATTENDING SURGEON:  Matt Smith M.D.     CO-SURGEON:  Julio C Pederson MD.     FIRST ASSISTANT:  Annalee Dominguez PA-C.     PREOPERATIVE DIAGNOSIS:  Right knee medial retinacular tear.     POSTOPERATIVE DIAGNOSES:  1.  Right knee medial retinacular tear.  2.  Right knee seroma.     PROCEDURES PERFORMED:  1.  Right knee Allopatch HD graft application to 15 cm area intraarticularly.  2.  Right knee medial retinacular repair.  3.  Right knee deep complex incision and drainage.  4.  Right knee Parth-Barboza drain application.  5.  Right knee amniotic arthrocentesis.    DATE OF PROCEDURE: 2/12/2018      PREOPERATIVE DIAGNOSES:   1. Right total knee wound dehiscence  2. Right periprosthetic acute infection, suspected      POSTOPERATIVE DIAGNOSES:   1. Right total knee wound dehiscence   2. Right periprosthetic acute infection, suspected      PROCEDURES PERFORMED:   1. Irrigation and excisional debridement of right total knee  2. Revision complex wound closure of right total knee  3. Application of incisional negative pressure dressing (<50cm2) of right total knee    DATE OF PROCEDURE:  1/23/2018     ATTENDING SURGEON: Surgeon(s) and Role:     * Matt AVILA  MD Luis - Primary     Co-Surgeon:Adán Linda MD      SECOND ASSISTANT: Angie Dominguez PA-C        Co-Surgeon Duties: Due to the complexity of the case and the need for significant intra-operative decision making co-surgeon duties were medically necessary. The chronicity of the disease process and the level of deformity dictated that co-surgeon duties be undertaken. A separate note will be dictated/reported by Dr. Matt Smith stating the specific co-surgeon activities and roles performed during the surgery.        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       Pain   Associated symptoms include joint swelling. Pertinent negatives include no abdominal pain, chest pain, chills, congestion, coughing, fever, headaches, myalgias, nausea, numbness, rash, sore throat or vomiting.   Knee Pain    Associated symptoms include stiffness. Pertinent negatives include no fever, itching or numbness.         Review of Systems   Constitution: Negative. Negative for chills, fever, weight gain and weight loss.   HENT: Negative for congestion and sore throat.    Eyes: Negative for blurred vision and double vision.   Cardiovascular: Negative for chest pain, leg swelling and  palpitations.   Respiratory: Negative for cough and shortness of breath.    Hematologic/Lymphatic: Does not bruise/bleed easily.   Skin: Negative for itching, poor wound healing and rash.   Musculoskeletal: Positive for joint pain, joint swelling and stiffness. Negative for back pain, muscle weakness and myalgias.   Gastrointestinal: Negative for abdominal pain, constipation, diarrhea, nausea and vomiting.   Genitourinary: Negative.  Negative for frequency and hematuria.   Neurological: Negative for dizziness, headaches, numbness, paresthesias and sensory change.   Psychiatric/Behavioral: Negative for altered mental status and depression. The patient is not nervous/anxious.    Allergic/Immunologic: Negative for hives.       Pain Related Questions  Over the past 3 days, what was your average pain during activity? (I.e. running, jogging, walking, climbing stairs, getting dressed, ect.): 8  Over the past 3 days, what was your highest pain level?: 8  Over the past 3 days, what was your lowest pain level? : 6    Other  How many nights a week are you awakened by your affected body part?: 7  Was the patient's HEIGHT measured or patient reported?: Patient Reported  Was the patient's WEIGHT measured or patient reported?: Measured      Objective:        General: Brionna is well-developed, well-nourished, appears stated age, in no acute distress, alert and oriented to time, place and person.     General    Vitals reviewed.  Constitutional: She is oriented to person, place, and time. She appears well-developed and well-nourished. No distress.   HENT:   Head: Normocephalic and atraumatic.   Mouth/Throat: No oropharyngeal exudate.   Eyes: EOM are normal. Right eye exhibits no discharge. Left eye exhibits no discharge.   Neck: Normal range of motion.   Cardiovascular: Normal rate and regular rhythm.    Pulmonary/Chest: Effort normal and breath sounds normal. No respiratory distress.   Neurological: She is alert and oriented to  person, place, and time. She has normal reflexes. No cranial nerve deficit. Coordination normal.   Psychiatric: She has a normal mood and affect. Her behavior is normal. Judgment and thought content normal.     General Musculoskeletal Exam   Gait: normal       Right Knee Exam     Inspection   Erythema: absent  Scars: present  Swelling: present  Effusion: absent  Deformity: absent  Bruising: absent    Tenderness   The patient is tender to palpation of the patella, lateral joint line and medial joint line.    Crepitus   The patient has crepitus of the patella.    Range of Motion   Extension:  0 normal   Flexion: abnormal Right knee flexion: 65.     Tests   Meniscus   Zeenat:  Medial - negative Lateral - negative  Ligament Examination Lachman: normal (-1 to 2mm) PCL-Posterior Drawer: normal (0 to 2mm)     MCL - Valgus: normal (0 to 2mm)  LCL - Varus: normalPivot Shift: normal (Equal)Reverse Pivot Shift: normal (Equal)Dial Test at 30 degrees: normal (< 5 degrees)Dial Test at 90 degrees: normal (< 5 degrees)  Posterior Sag Test: negative  Posterolateral Corner: unstable (>15 degrees difference)  Patella   Patellar apprehension: negative  Passive Patellar Tilt: neutral  Patellar Tracking: normal  Patellar Glide (quadrants): Lateral - 1   Medial - 2  Q-Angle at 90 degrees: normal  Patellar Grind: negative  J-Sign: none    Other   Meniscal Cyst: absent  Popliteal (Baker's) Cyst: absent  Sensation: normal    Comments:  Incision clean and dry, no evidence of infection    Left Knee Exam     Inspection   Erythema: absent  Scars: absent  Swelling: absent  Effusion: absent  Deformity: absent  Bruising: absent    Tenderness   The patient is experiencing no tenderness.     Range of Motion   Extension:  -5 normal   Flexion: normal Left knee flexion: 125.     Tests   Meniscus   Zeenat:  Medial - negative Lateral - negative  Stability Lachman: normal (-1 to 2mm) PCL-Posterior Drawer: normal (0 to 2mm)  MCL - Valgus: normal (0 to  2mm)  LCL - Varus: normal (0 to 2mm)Pivot Shift: normal (Equal)Reverse Pivot Shift: normal (Equal)Dial Test at 30 degrees: normal (< 5 degrees)Dial Test at 90 degrees: normal (< 5 degrees)  Posterior Sag Test: negative  Posterolateral Corner: unstable (>15 degrees difference)  Patella   Patellar apprehension: negative  Passive Patellar Tilt: neutral  Patellar Tracking: normal  Patellar Glide (Quadrants): Lateral - 1 Medial - 2  Q-Angle at 90 degrees: normal  Patellar Grind: positive  J-Sign: J sign absent    Other   Meniscal Cyst: absent  Popliteal (Baker's) Cyst: absent  Sensation: normal    Right Hip Exam     Tests   Aimee: negative  Left Hip Exam     Tests   Aimee: negative          Muscle Strength   Right Lower Extremity   Hip Abduction: 4/5   Quadriceps:  4/5   Hamstrin/5   Left Lower Extremity   Hip Abduction: 5/5   Quadriceps:  5/5   Hamstrin/5     Reflexes     Left Side  Quadriceps:  2+  Achilles:  2+    Right Side   Quadriceps:  2+  Achilles:  2+    Vascular Exam     Right Pulses  Dorsalis Pedis:      2+  Posterior Tibial:      2+        Left Pulses  Dorsalis Pedis:      2+  Posterior Tibial:      2+        Radiographs ordered and reviewed today in clinic of the bilateral knee   demonstrates no fracture, dislocation, swelling or degenerative changes noted.   Right knee ConforMIS TKR in good position with loss of motion  Right knee demonstrates no loosening                  Assessment:       Encounter Diagnoses   Name Primary?    Right knee pain, unspecified chronicity Yes    Obesity (BMI 35.0-39.9 without comorbidity)     S/P knee surgery     Right leg weakness     H/O total knee replacement, right     Decreased range of motion of knee, unspecified laterality     Chronic knee pain after total replacement of right knee joint     Decreased strength, endurance, and mobility           Plan:       1. IKDC, SF-12 and KOOS was filled out today in clinic.     RTC in 6 months with Dr. Matt Smith  Patient will fill out IKDC, SF-12 and KOOS on return.    2.  We reviewed with Brionna today, the pathology and natural history of her diagnosis. We have discussed a variety of treatment options including medications, physical therapy and other alternative treatments. I also explained the indications, risks and benefits of surgery. After discussion, Brionna decided NOT to proceed with surgery. The tentative plan should she choose is listed below:  NOT READY TO SCHEDULE AT THIS TIME, FEARFUL OF SURGERY    1. Right knee arthroscopy, lysis of adhesions  2. Right knee arthroscopy, anterior interval release  3. Right knee arthroscopy, synovectomy  4. Right knee amniox arthrocentesis 36886  5. Right knee manipulation under anesthesia       PT WILL NEED PREOP CLEARANCE FROM PCP-DR. SHOOK  The details of the surgical procedure were explained, including the location of probable incisions and a description of likely hardware and/or grafts to be used.  The patient understands the likely convalescence after surgery.  Also, we have thoroughly discussed the risks, benefits and alternatives to surgery, including, but not limited to, the risk of infection, joint stiffness, blood clot (including DVT and/or pulmonary embolus), neurologic and vascular injury.  It was explained that, if tissue has been repaired or reconstructed, there is a chance of failure, which may require further management.      All of the patient's questions were answered and informed consent was obtained. The patient will contact us if they have any questions or concerns in the interim.     3. Information for provided about Knee Arthroscopy.    4. Patient has appt with Dr. Shook tomorrow.    5. Robaxin 500 mg po BID prn ordered today          Patient questionnaires may have been collected.

## 2019-09-13 ENCOUNTER — LAB VISIT (OUTPATIENT)
Dept: LAB | Facility: HOSPITAL | Age: 49
End: 2019-09-13
Attending: FAMILY MEDICINE
Payer: MEDICAID

## 2019-09-13 DIAGNOSIS — E78.5 HYPERLIPIDEMIA, UNSPECIFIED HYPERLIPIDEMIA TYPE: ICD-10-CM

## 2019-09-13 LAB
ALT SERPL W/O P-5'-P-CCNC: 18 U/L (ref 10–44)
AST SERPL-CCNC: 25 U/L (ref 10–40)
CHOLEST SERPL-MCNC: 227 MG/DL (ref 120–199)
CHOLEST/HDLC SERPL: 5.8 {RATIO} (ref 2–5)
HDLC SERPL-MCNC: 39 MG/DL (ref 40–75)
HDLC SERPL: 17.2 % (ref 20–50)
LDLC SERPL CALC-MCNC: 166.2 MG/DL (ref 63–159)
NONHDLC SERPL-MCNC: 188 MG/DL
TRIGL SERPL-MCNC: 109 MG/DL (ref 30–150)

## 2019-09-13 PROCEDURE — 84460 ALANINE AMINO (ALT) (SGPT): CPT

## 2019-09-13 PROCEDURE — 84450 TRANSFERASE (AST) (SGOT): CPT

## 2019-09-13 PROCEDURE — 36415 COLL VENOUS BLD VENIPUNCTURE: CPT | Mod: PO

## 2019-09-13 PROCEDURE — 80061 LIPID PANEL: CPT

## 2019-09-19 ENCOUNTER — TELEPHONE (OUTPATIENT)
Dept: SPORTS MEDICINE | Facility: CLINIC | Age: 49
End: 2019-09-19

## 2019-09-19 NOTE — TELEPHONE ENCOUNTER
Faxed Office Notes to Number Provided:  Fax: [phone]  ----- Message from Nettie Austin MA sent at 9/18/2019 12:10 PM CDT -----  Contact: Aletha splint      ----- Message -----  From: Jelani Nails  Sent: 9/18/2019  11:20 AM  To: [facility] Staff    Requesting office notes from 9/11 visit.    Fax: [phone]  Phone: [phone]

## 2019-09-24 ENCOUNTER — TELEPHONE (OUTPATIENT)
Dept: SPORTS MEDICINE | Facility: CLINIC | Age: 49
End: 2019-09-24

## 2019-09-24 NOTE — TELEPHONE ENCOUNTER
LVM returning patient call.    ----- Message from Elana Smith sent at 9/24/2019 10:31 AM CDT -----  Contact: self [phone]  pt states was her forms filled out for her disability states she been leaving message for 3 days states can she please get a call back to discuss

## 2019-10-03 ENCOUNTER — TELEPHONE (OUTPATIENT)
Dept: INTERNAL MEDICINE | Facility: CLINIC | Age: 49
End: 2019-10-03

## 2019-10-04 ENCOUNTER — TELEPHONE (OUTPATIENT)
Dept: INTERNAL MEDICINE | Facility: CLINIC | Age: 49
End: 2019-10-04

## 2019-10-04 DIAGNOSIS — E78.5 HYPERLIPIDEMIA, UNSPECIFIED HYPERLIPIDEMIA TYPE: ICD-10-CM

## 2019-10-04 RX ORDER — AMLODIPINE BESYLATE 10 MG/1
10 TABLET ORAL DAILY
Qty: 90 TABLET | Refills: 1 | Status: SHIPPED | OUTPATIENT
Start: 2019-10-04 | End: 2022-05-16 | Stop reason: SDUPTHER

## 2019-10-04 RX ORDER — ATORVASTATIN CALCIUM 20 MG/1
20 TABLET, FILM COATED ORAL DAILY
Qty: 90 TABLET | Refills: 1 | Status: SHIPPED | OUTPATIENT
Start: 2019-10-04 | End: 2022-05-16 | Stop reason: SDUPTHER

## 2019-10-04 NOTE — TELEPHONE ENCOUNTER
Called patient and discussed labs and or test results. Patient expressed understanding and had the opportunity to ask questions. Any questions were answered. See meds, orders, follow up and instructions sections of encounter.    Specific issues include:  Her cholesterol was a bit elevated.  She has been irregular taking her atorvastatin, missing doses.  Will increase the dose to 20 mg, stressed compliance, refill amlodipine, follow-up with me in May and recheck laboratory at that time.

## 2019-10-04 NOTE — TELEPHONE ENCOUNTER
----- Message from Kirstin Whitaker sent at 10/4/2019  1:17 PM CDT -----  Contact: self/[phone]  Pt called in regard to a missed call from the DR about lab results.      Please advise

## 2019-10-04 NOTE — TELEPHONE ENCOUNTER
----- Message from Yi Pemberton sent at 10/4/2019  9:27 AM CDT -----  Contact: Pt [phone]  Patient is returning a phone call.  Who left a message for the patient: Dr. Houston  Does patient know what this is regarding:  No

## 2019-10-07 ENCOUNTER — TELEPHONE (OUTPATIENT)
Dept: SPORTS MEDICINE | Facility: CLINIC | Age: 49
End: 2019-10-07

## 2019-10-07 NOTE — TELEPHONE ENCOUNTER
Spoke to the patient regarding her documents. Spoke to 's office and they are refaxing documents.      ----- Message from Katrina Mcneill sent at 10/7/2019 11:17 AM CDT -----  Contact: patient   [phone]-please call above patient at number in message said she needs to know if paper work from her attorneys office has been filled out said she has been calling about this since September waiting on a call from the nurse thanks

## 2019-10-17 ENCOUNTER — TELEPHONE (OUTPATIENT)
Dept: SPORTS MEDICINE | Facility: CLINIC | Age: 49
End: 2019-10-17

## 2019-10-17 NOTE — TELEPHONE ENCOUNTER
The patient was contacted regarding their call to the office earlier and a voice mail was left to call the office back at their convenience.    ----- Message from Kristina Puente sent at 10/17/2019 10:11 AM CDT -----  Contact: Nydia ([employer]) [phone]  Nydia called regarding the patient's medical form. She said it's an urgent matter.

## 2019-10-17 NOTE — TELEPHONE ENCOUNTER
[employer] statement sent over to Litigation [employer] who will return the form to the 's office. LVM for them explain such.    ----- Message from Nettie Austin MA sent at 10/16/2019  4:40 PM CDT -----  Contact: Nydia/[employer]      ----- Message -----  From: Gloria Carpenter  Sent: 10/16/2019   9:15 AM CDT  To: [facility] Staff    Attn Nettie    Please call Nydia at [phone] until 5pm    Calling in regards to the C forms faxed over to clinic on 10/07/19    What is the status of the form completion?    Thank you

## 2019-10-21 ENCOUNTER — TELEPHONE (OUTPATIENT)
Dept: SPORTS MEDICINE | Facility: CLINIC | Age: 49
End: 2019-10-21

## 2019-10-21 NOTE — TELEPHONE ENCOUNTER
Spoke to the patient and explained that we have notified the law office and informed them that the documents are being sent over to them. Sent a message to our litigation department.    ----- Message from Gloria Carpenter sent at 10/21/2019 11:47 AM CDT -----  Contact: patient  Please call pt at [phone]    What is the status of the [facility] forms for social security disability?    Forms were suppose to be returned back to the law firm in Sept 2019    Patient also stated that no one has returned her call in 2 weeks of calling the clinic    Thank you

## 2019-10-21 NOTE — TELEPHONE ENCOUNTER
The patient was contacted regarding their call to the office earlier and a voice mail was left to call the office back at their convenience.    ----- Message from Gloria Mar sent at 10/21/2019  2:55 PM CDT -----  Contact: Nydia Stafford from the law firm is requesting to speak with Nettie. Please give Nettie a call back at [phone].

## 2020-01-29 ENCOUNTER — TELEPHONE (OUTPATIENT)
Dept: SPORTS MEDICINE | Facility: CLINIC | Age: 50
End: 2020-01-29

## 2020-01-29 NOTE — TELEPHONE ENCOUNTER
HERMELINDA informing Nicky that we had not received request for patient medical records. Asked that she fax to the office.    ----- Message from Toi Amor sent at 1/29/2020 11:51 AM CST -----  Contact: Nicky Verdugo   Calling to confirm the CMN and request for medical records was received.    Contact Info

## 2020-02-15 ENCOUNTER — HOSPITAL ENCOUNTER (EMERGENCY)
Facility: HOSPITAL | Age: 50
Discharge: HOME | End: 2020-02-15
Attending: EMERGENCY MEDICINE
Payer: MEDICAID

## 2020-02-15 VITALS
RESPIRATION RATE: 18 BRPM | HEIGHT: 64 IN | BODY MASS INDEX: 34.15 KG/M2 | WEIGHT: 200 LBS | HEART RATE: 77 BPM | TEMPERATURE: 98 F | SYSTOLIC BLOOD PRESSURE: 138 MMHG | OXYGEN SATURATION: 98 % | DIASTOLIC BLOOD PRESSURE: 79 MMHG

## 2020-02-15 DIAGNOSIS — R05.9 COUGH: ICD-10-CM

## 2020-02-15 DIAGNOSIS — J06.9 VIRAL URI WITH COUGH: Primary | ICD-10-CM

## 2020-02-15 LAB
CTP QC/QA: YES
POC MOLECULAR INFLUENZA A AGN: NEGATIVE
POC MOLECULAR INFLUENZA B AGN: NEGATIVE

## 2020-02-15 PROCEDURE — 87502 INFLUENZA DNA AMP PROBE: CPT

## 2020-02-15 PROCEDURE — 99284 EMERGENCY DEPT VISIT MOD MDM: CPT | Mod: 25

## 2020-02-15 RX ORDER — CETIRIZINE HYDROCHLORIDE 10 MG/1
10 TABLET ORAL DAILY
Qty: 30 TABLET | Refills: 0 | Status: SHIPPED | OUTPATIENT
Start: 2020-02-15 | End: 2022-11-11

## 2020-02-15 RX ORDER — FLUTICASONE PROPIONATE 50 MCG
1 SPRAY, SUSPENSION (ML) NASAL 2 TIMES DAILY PRN
Qty: 15 G | Refills: 0 | Status: SHIPPED | OUTPATIENT
Start: 2020-02-15

## 2020-02-15 RX ORDER — BENZONATATE 100 MG/1
100 CAPSULE ORAL 3 TIMES DAILY PRN
Qty: 20 CAPSULE | Refills: 0 | Status: SHIPPED | OUTPATIENT
Start: 2020-02-15 | End: 2020-02-25
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
## 2020-02-15 NOTE — ED TRIAGE NOTES
Pt presents to ED with c/o cough, congestion, and diarrhea x 1 week. Pt states that her  was dx with flu and pneumonia. Pt denies taking meds PTA. NAD noted.

## 2020-02-16 NOTE — DISCHARGE INSTRUCTIONS
Please take new medication as directed and follow discharge instructions provided. Please make sure to follow up with your PCP to discuss today's Emergency Department visit and for further evaluation and management. Please return to the Emergency Department if your symptoms worsen or you develop any additional concerning symptoms.

## 2020-03-10 ENCOUNTER — TELEPHONE (OUTPATIENT)
Dept: SPORTS MEDICINE | Facility: CLINIC | Age: 50
End: 2020-03-10

## 2020-03-10 NOTE — TELEPHONE ENCOUNTER
LVM for patient explaining that if she is doing well and still not interested in surgery we do not need to see her. We can complete any medication refills or letter over the phone.

## 2020-03-11 ENCOUNTER — TELEPHONE (OUTPATIENT)
Dept: SPORTS MEDICINE | Facility: CLINIC | Age: 50
End: 2020-03-11

## 2020-03-11 NOTE — TELEPHONE ENCOUNTER
LVM informing patient to telephone to reschedule.    ----- Message from Joseph Jennings sent at 3/11/2020  9:29 AM CDT -----  Contact: self  Mg Have nurse to give pt a call needs to reschedule appt. Can't make the 10:15 for today    Contact

## 2020-10-05 ENCOUNTER — PATIENT MESSAGE (OUTPATIENT)
Dept: ADMINISTRATIVE | Facility: HOSPITAL | Age: 50
End: 2020-10-05

## 2020-12-11 ENCOUNTER — PATIENT MESSAGE (OUTPATIENT)
Dept: OTHER | Facility: OTHER | Age: 50
End: 2020-12-11

## 2021-01-04 ENCOUNTER — PATIENT MESSAGE (OUTPATIENT)
Dept: ADMINISTRATIVE | Facility: HOSPITAL | Age: 51
End: 2021-01-04

## 2021-04-05 ENCOUNTER — PATIENT MESSAGE (OUTPATIENT)
Dept: ADMINISTRATIVE | Facility: HOSPITAL | Age: 51
End: 2021-04-05

## 2021-07-06 ENCOUNTER — PATIENT MESSAGE (OUTPATIENT)
Dept: ADMINISTRATIVE | Facility: HOSPITAL | Age: 51
End: 2021-07-06

## 2021-10-04 ENCOUNTER — PATIENT MESSAGE (OUTPATIENT)
Dept: ADMINISTRATIVE | Facility: HOSPITAL | Age: 51
End: 2021-10-04

## 2022-03-16 ENCOUNTER — PATIENT MESSAGE (OUTPATIENT)
Dept: ADMINISTRATIVE | Facility: HOSPITAL | Age: 52
End: 2022-03-16
Payer: MEDICAID

## 2022-05-16 DIAGNOSIS — E78.5 HYPERLIPIDEMIA, UNSPECIFIED HYPERLIPIDEMIA TYPE: ICD-10-CM

## 2022-05-17 NOTE — TELEPHONE ENCOUNTER
No new care gaps identified.  [facility] Embedded Care Gaps. Reference number: [id number]. 5/16/2022   8:29:08 PM CDT

## 2022-05-17 NOTE — TELEPHONE ENCOUNTER
Spoke to pt she stated she tested positive for covid on 5/13 pt complain of fatigue and lightheadedness pt would like to see a provider....please place lab orders f/u and lab appt scheduled

## 2022-05-17 NOTE — TELEPHONE ENCOUNTER
----- Message from Gerri Carvalho sent at 5/17/2022  4:29 PM CDT -----  Contact: [phone] Patient  Requesting an RX refill or new RX.  Is this a refill or new RX: new  RX name and strength (copy/paste from chart):  amLODIPine (NORVASC) 10 MG tablet  Is this a 30 day or 90 day RX:   Pharmacy name and phone # (copy/paste from chart):    [id number] [address] AT [address] [address]  Phone: [phone] Fax: [phone]

## 2022-05-17 NOTE — TELEPHONE ENCOUNTER
----- Message from Gerri Carvalho sent at 5/17/2022  4:26 PM CDT -----  Contact: [phone] Patient  Patient would like to get medical advice.  Symptoms (please be specific):  lightheadness &  weakness with positive home Covid test  How long have you had these symptoms: 05/13/22  Would you like a call back, or a response through your [facility] portal?:   pt portal  Pharmacy name and phone # (copy from chart):     [facility] #[id number] [address] AT [address] [address]  Phone: [phone] Fax: [phone]  Comments:

## 2022-05-19 RX ORDER — ATORVASTATIN CALCIUM 20 MG/1
20 TABLET, FILM COATED ORAL DAILY
Qty: 90 TABLET | Refills: 0 | Status: SHIPPED | OUTPATIENT
Start: 2022-05-19 | End: 2022-06-07 | Stop reason: SDUPTHER

## 2022-05-19 RX ORDER — AMLODIPINE BESYLATE 10 MG/1
10 TABLET ORAL DAILY
Qty: 90 TABLET | Refills: 0 | Status: SHIPPED | OUTPATIENT
Start: 2022-05-19 | End: 2022-06-07 | Stop reason: SDUPTHER

## 2022-06-01 ENCOUNTER — LAB VISIT (OUTPATIENT)
Dept: LAB | Facility: HOSPITAL | Age: 52
End: 2022-06-01
Payer: MEDICARE

## 2022-06-01 DIAGNOSIS — Z00.00 ANNUAL PHYSICAL EXAM: ICD-10-CM

## 2022-06-01 LAB
ALBUMIN SERPL BCP-MCNC: 3.8 G/DL (ref 3.5–5.2)
ALP SERPL-CCNC: 79 U/L (ref 55–135)
ALT SERPL W/O P-5'-P-CCNC: 12 U/L (ref 10–44)
ANION GAP SERPL CALC-SCNC: 10 MMOL/L (ref 8–16)
AST SERPL-CCNC: 14 U/L (ref 10–40)
BILIRUB SERPL-MCNC: 0.5 MG/DL (ref 0.1–1)
BUN SERPL-MCNC: 16 MG/DL (ref 6–20)
CALCIUM SERPL-MCNC: 9 MG/DL (ref 8.7–10.5)
CHLORIDE SERPL-SCNC: 108 MMOL/L (ref 95–110)
CHOLEST SERPL-MCNC: 223 MG/DL (ref 120–199)
CHOLEST/HDLC SERPL: 5.3 {RATIO} (ref 2–5)
CO2 SERPL-SCNC: 23 MMOL/L (ref 23–29)
CREAT SERPL-MCNC: 0.8 MG/DL (ref 0.5–1.4)
EST. GFR  (AFRICAN AMERICAN): >60 ML/MIN/1.73 M^2
EST. GFR  (NON AFRICAN AMERICAN): >60 ML/MIN/1.73 M^2
GLUCOSE SERPL-MCNC: 105 MG/DL (ref 70–110)
HDLC SERPL-MCNC: 42 MG/DL (ref 40–75)
HDLC SERPL: 18.8 % (ref 20–50)
LDLC SERPL CALC-MCNC: 160.6 MG/DL (ref 63–159)
NONHDLC SERPL-MCNC: 181 MG/DL
POTASSIUM SERPL-SCNC: 3.7 MMOL/L (ref 3.5–5.1)
PROT SERPL-MCNC: 6.9 G/DL (ref 6–8.4)
SODIUM SERPL-SCNC: 141 MMOL/L (ref 136–145)
TRIGL SERPL-MCNC: 102 MG/DL (ref 30–150)

## 2022-06-01 PROCEDURE — 36415 COLL VENOUS BLD VENIPUNCTURE: CPT | Performed by: FAMILY MEDICINE

## 2022-06-01 PROCEDURE — 80061 LIPID PANEL: CPT | Performed by: FAMILY MEDICINE

## 2022-06-01 PROCEDURE — 80053 COMPREHEN METABOLIC PANEL: CPT | Performed by: FAMILY MEDICINE

## 2022-06-07 ENCOUNTER — OFFICE VISIT (OUTPATIENT)
Dept: INTERNAL MEDICINE | Facility: CLINIC | Age: 52
End: 2022-06-07
Attending: FAMILY MEDICINE
Payer: MEDICAID

## 2022-06-07 ENCOUNTER — LAB VISIT (OUTPATIENT)
Dept: LAB | Facility: HOSPITAL | Age: 52
End: 2022-06-07
Attending: FAMILY MEDICINE
Payer: MEDICARE

## 2022-06-07 VITALS
HEIGHT: 64 IN | SYSTOLIC BLOOD PRESSURE: 130 MMHG | OXYGEN SATURATION: 98 % | HEART RATE: 92 BPM | BODY MASS INDEX: 34.62 KG/M2 | WEIGHT: 202.81 LBS | DIASTOLIC BLOOD PRESSURE: 82 MMHG

## 2022-06-07 DIAGNOSIS — Z87.891 PERSONAL HISTORY OF NICOTINE DEPENDENCE: ICD-10-CM

## 2022-06-07 DIAGNOSIS — R07.9 CHEST PAIN, UNSPECIFIED TYPE: ICD-10-CM

## 2022-06-07 DIAGNOSIS — Z86.16 HISTORY OF 2019 NOVEL CORONAVIRUS DISEASE (COVID-19): ICD-10-CM

## 2022-06-07 DIAGNOSIS — Z00.00 ANNUAL PHYSICAL EXAM: ICD-10-CM

## 2022-06-07 DIAGNOSIS — R10.9 ABDOMINAL PAIN, UNSPECIFIED ABDOMINAL LOCATION: ICD-10-CM

## 2022-06-07 DIAGNOSIS — Z12.31 ENCOUNTER FOR SCREENING MAMMOGRAM FOR MALIGNANT NEOPLASM OF BREAST: ICD-10-CM

## 2022-06-07 DIAGNOSIS — Z00.00 ANNUAL PHYSICAL EXAM: Primary | ICD-10-CM

## 2022-06-07 DIAGNOSIS — Z12.11 COLON CANCER SCREENING: ICD-10-CM

## 2022-06-07 DIAGNOSIS — E78.5 HYPERLIPIDEMIA, UNSPECIFIED HYPERLIPIDEMIA TYPE: ICD-10-CM

## 2022-06-07 DIAGNOSIS — I10 HYPERTENSION, ESSENTIAL: ICD-10-CM

## 2022-06-07 PROBLEM — Z72.0 TOBACCO USE: Status: RESOLVED | Noted: 2017-06-16 | Resolved: 2022-06-07

## 2022-06-07 PROBLEM — M25.561 RIGHT KNEE PAIN: Status: RESOLVED | Noted: 2017-02-06 | Resolved: 2022-06-07

## 2022-06-07 LAB
BASOPHILS # BLD AUTO: 0.04 K/UL (ref 0–0.2)
BASOPHILS NFR BLD: 0.7 % (ref 0–1.9)
DIFFERENTIAL METHOD: ABNORMAL
EOSINOPHIL # BLD AUTO: 0.4 K/UL (ref 0–0.5)
EOSINOPHIL NFR BLD: 6.9 % (ref 0–8)
ERYTHROCYTE [DISTWIDTH] IN BLOOD BY AUTOMATED COUNT: 14.5 % (ref 11.5–14.5)
FERRITIN SERPL-MCNC: 189 NG/ML (ref 20–300)
HCT VFR BLD AUTO: 42.8 % (ref 37–48.5)
HGB BLD-MCNC: 13.3 G/DL (ref 12–16)
IMM GRANULOCYTES # BLD AUTO: 0.01 K/UL (ref 0–0.04)
IMM GRANULOCYTES NFR BLD AUTO: 0.2 % (ref 0–0.5)
IRON SERPL-MCNC: 63 UG/DL (ref 30–160)
LIPASE SERPL-CCNC: 25 U/L (ref 4–60)
LYMPHOCYTES # BLD AUTO: 1.8 K/UL (ref 1–4.8)
LYMPHOCYTES NFR BLD: 32.5 % (ref 18–48)
MCH RBC QN AUTO: 26.1 PG (ref 27–31)
MCHC RBC AUTO-ENTMCNC: 31.1 G/DL (ref 32–36)
MCV RBC AUTO: 84 FL (ref 82–98)
MONOCYTES # BLD AUTO: 0.5 K/UL (ref 0.3–1)
MONOCYTES NFR BLD: 8.7 % (ref 4–15)
NEUTROPHILS # BLD AUTO: 2.8 K/UL (ref 1.8–7.7)
NEUTROPHILS NFR BLD: 51 % (ref 38–73)
NRBC BLD-RTO: 0 /100 WBC
PLATELET # BLD AUTO: 267 K/UL (ref 150–450)
PMV BLD AUTO: 12.9 FL (ref 9.2–12.9)
RBC # BLD AUTO: 5.09 M/UL (ref 4–5.4)
WBC # BLD AUTO: 5.5 K/UL (ref 3.9–12.7)

## 2022-06-07 PROCEDURE — 99214 OFFICE O/P EST MOD 30 MIN: CPT | Mod: PBBFAC | Performed by: FAMILY MEDICINE

## 2022-06-07 PROCEDURE — 85025 COMPLETE CBC W/AUTO DIFF WBC: CPT | Performed by: FAMILY MEDICINE

## 2022-06-07 PROCEDURE — 99396 PREV VISIT EST AGE 40-64: CPT | Mod: S$PBB,GZ,, | Performed by: FAMILY MEDICINE

## 2022-06-07 PROCEDURE — 99999 PR PBB SHADOW E&M-EST. PATIENT-LVL IV: CPT | Mod: PBBFAC,,, | Performed by: FAMILY MEDICINE

## 2022-06-07 PROCEDURE — 99396 PR PREVENTIVE VISIT,EST,40-64: ICD-10-PCS | Mod: S$PBB,GZ,, | Performed by: FAMILY MEDICINE

## 2022-06-07 PROCEDURE — 99999 PR PBB SHADOW E&M-EST. PATIENT-LVL IV: ICD-10-PCS | Mod: PBBFAC,,, | Performed by: FAMILY MEDICINE

## 2022-06-07 PROCEDURE — 83540 ASSAY OF IRON: CPT | Performed by: FAMILY MEDICINE

## 2022-06-07 PROCEDURE — 83690 ASSAY OF LIPASE: CPT | Performed by: FAMILY MEDICINE

## 2022-06-07 PROCEDURE — 82728 ASSAY OF FERRITIN: CPT | Performed by: FAMILY MEDICINE

## 2022-06-07 PROCEDURE — 36415 COLL VENOUS BLD VENIPUNCTURE: CPT | Performed by: FAMILY MEDICINE

## 2022-06-07 RX ORDER — ATORVASTATIN CALCIUM 20 MG/1
20 TABLET, FILM COATED ORAL DAILY
Qty: 90 TABLET | Refills: 1 | Status: SHIPPED | OUTPATIENT
Start: 2022-06-07 | End: 2023-06-23 | Stop reason: SDUPTHER

## 2022-06-07 RX ORDER — AMLODIPINE BESYLATE 10 MG/1
10 TABLET ORAL DAILY
Qty: 90 TABLET | Refills: 1 | Status: SHIPPED | OUTPATIENT
Start: 2022-06-07 | End: 2023-06-23 | Stop reason: SDUPTHER

## 2022-06-07 RX ORDER — BENZONATATE 200 MG/1
200 CAPSULE ORAL 3 TIMES DAILY PRN
Qty: 30 CAPSULE | Refills: 1 | Status: SHIPPED | OUTPATIENT
Start: 2022-06-07 | End: 2022-08-19 | Stop reason: SDUPTHER

## 2022-06-07 NOTE — PATIENT INSTRUCTIONS
If not contacted in a couple weeks by colonoscopy scheduling department - call Colonoscopy Scheduling Number - [phone].     Information about cholesterol, high blood pressure and healthy diet and activity recommendations can be found at the following links on the Internet:    http://www.nhlbi.nih.gov/health/health-topics/topics/hbc  http://www.nhlbi.nih.gov/health/educational/lose_wt/index.htm  Http://www.nhlbi.nih.gov/files/docs/public/heart/hbp_low.pdf  http://www.heart.org/HEARTORG/  http://diabetes.org/  https://www.cdc.gov/  Https://healthfinder.gov/  https://health.gov/dietaryguidelines/2015/guidelines/  https://health.gov/paguidelines/second-edition/pdf/Physical_Activity_Guidelines_2nd_edition.pdf

## 2022-06-07 NOTE — PROGRESS NOTES
Subjective:       Patient ID: Brionna Jones is a 52 y.o. female.    Chief Complaint: Annual Exam    Established patient for an annual wellness check/physical exam and also chronic disease management. Specific complaints - see dictation and please see ROS.  P, S, Fm, Soc Hx's; Meds, allergies reviewed and reconciled.  Health maintenance file reviewed and addressed items due. Recent applicable lab, imaging and cardiovascular results reviewed.  Problem list items reviewed and modified or added entries (in the overview section) may not be transcribed into this encounter note due to note writer format.    Has not been taking lipitor.  Lost to follow-up since 2019.    Atypical chest pain, not necessarily exertional.  Occurs from time to time and last minutes or longer.  Poorly localized in poorly characterized.  She has had previous cardiovascular stress testing.  Compliant with blood pressure medicine.    Abdominal pain reported.  Mid or lower abdomen.  No recent gyn visits.  She has had a tubal ligation but I do not think any other surgeries.  History is a little bit poor.      Had COVID by home test about 3 weeks ago.  Still with mild cough.  No shortness of breath.  No fever, chills.    Review of Systems   Constitutional: Negative for appetite change, chills, diaphoresis, fatigue and fever.   HENT: Negative for congestion, postnasal drip, rhinorrhea, sore throat and trouble swallowing.    Eyes: Negative for visual disturbance.   Respiratory: Positive for cough. Negative for choking, chest tightness, shortness of breath and wheezing.    Cardiovascular: Positive for chest pain. Negative for leg swelling.   Gastrointestinal: Positive for abdominal pain. Negative for abdominal distention, diarrhea, nausea and vomiting.   Genitourinary: Negative for difficulty urinating and hematuria.   Musculoskeletal: Negative for arthralgias and myalgias.   Skin: Negative for rash.   Neurological: Negative for weakness,  light-headedness and headaches.   Hematological: Does not bruise/bleed easily.   Psychiatric/Behavioral: Negative for decreased concentration and dysphoric mood.       Objective:      Physical Exam  Vitals and nursing note reviewed.   Constitutional:       Appearance: She is well-developed. She is not diaphoretic.   Eyes:      General: No scleral icterus.  Neck:      Thyroid: No thyromegaly.      Vascular: No JVD.      Trachea: No tracheal deviation.   Cardiovascular:      Rate and Rhythm: Normal rate.      Heart sounds: Normal heart sounds. No murmur heard.    No friction rub. No gallop.   Pulmonary:      Effort: Pulmonary effort is normal. No respiratory distress.      Breath sounds: Normal breath sounds. No wheezing or rales.   Abdominal:      General: There is no distension or abdominal bruit.      Palpations: Abdomen is soft. There is no mass.      Tenderness: There is no abdominal tenderness. There is no guarding or rebound.   Musculoskeletal:      Cervical back: Normal range of motion and neck supple.   Lymphadenopathy:      Cervical: No cervical adenopathy.   Skin:     General: Skin is warm and dry.      Findings: No erythema or rash.   Neurological:      Mental Status: She is alert and oriented to person, place, and time.      Cranial Nerves: No cranial nerve deficit.      Motor: No tremor.      Coordination: Coordination normal.      Gait: Gait normal.   Psychiatric:         Behavior: Behavior normal.         Thought Content: Thought content normal.         Judgment: Judgment normal.         Assessment:       1. Annual physical exam    2. Encounter for screening mammogram for malignant neoplasm of breast    3. Colon cancer screening    4. Hypertension, essential    5. Hyperlipidemia, unspecified hyperlipidemia type    6. Chest pain, unspecified type    7. Abdominal pain, unspecified abdominal location    8. History of 2019 novel coronavirus disease (COVID-19)    9. Personal history of nicotine dependence         Plan:     Medication List with Changes/Refills   New Medications    BENZONATATE (TESSALON) 200 MG CAPSULE    Take 1 capsule (200 mg total) by mouth 3 (three) times daily as needed for Cough.   Current Medications    CETIRIZINE (ZYRTEC) 10 MG TABLET    Take 1 tablet (10 mg total) by mouth once daily.    FLUTICASONE PROPIONATE (FLONASE) 50 MCG/ACTUATION NASAL SPRAY    1 spray (50 mcg total) by Each Nostril route 2 (two) times daily as needed.   Changed and/or Refilled Medications    Modified Medication Previous Medication    AMLODIPINE (NORVASC) 10 MG TABLET amLODIPine (NORVASC) 10 MG tablet       Take 1 tablet (10 mg total) by mouth once daily.    Take 1 tablet (10 mg total) by mouth once daily.    ATORVASTATIN (LIPITOR) 20 MG TABLET atorvastatin (LIPITOR) 20 MG tablet       Take 1 tablet (20 mg total) by mouth once daily.    Take 1 tablet (20 mg total) by mouth once daily.     Brionna was seen today for annual exam.    Diagnoses and all orders for this visit:    Annual physical exam  -     CBC Auto Differential; Future  -     Iron; Future  -     Ferritin; Future  -     Ambulatory referral/consult to Obstetrics / Gynecology; Future    Encounter for screening mammogram for malignant neoplasm of breast  -     Mammo Digital Screening Bilat w/ Srini; Future    Colon cancer screening  -     Fecal Immunochemical Test (iFOBT); Future    Hypertension, essential  -     amLODIPine (NORVASC) 10 MG tablet; Take 1 tablet (10 mg total) by mouth once daily.    Hyperlipidemia, unspecified hyperlipidemia type  -     atorvastatin (LIPITOR) 20 MG tablet; Take 1 tablet (20 mg total) by mouth once daily.    Chest pain, unspecified type  -     EKG 12-lead; Future  -     Exercise Stress - EKG; Future    Abdominal pain, unspecified abdominal location  -     Urinalysis; Future  -     Urinalysis Microscopic; Future  -     Lipase; Future  -     US Abdomen Complete; Future  -     US Pelvis Comp with Transvag NON-OB (xpd; Future    History of  2019 novel coronavirus disease (COVID-19)  Comments:  3 weeks ago by home test    Personal history of nicotine dependence    Other orders  -     benzonatate (TESSALON) 200 MG capsule; Take 1 capsule (200 mg total) by mouth 3 (three) times daily as needed for Cough.      See meds, orders, follow up, routing and instructions sections of encounter and AVS. Discussed with patient and provided on AVS.    Discussed diet and exercise and links provided on AVS for detailed information.    Two month follow-up for test and symptom review.

## 2022-06-08 ENCOUNTER — TELEPHONE (OUTPATIENT)
Dept: INTERNAL MEDICINE | Facility: CLINIC | Age: 52
End: 2022-06-08
Payer: MEDICARE

## 2022-06-08 DIAGNOSIS — R82.90 ABNORMAL URINALYSIS: Primary | ICD-10-CM

## 2022-06-08 NOTE — TELEPHONE ENCOUNTER
Please call patient and explain that I would like to repeat some labs since there was a borderline result. The tests show slightly abnormal urinalysis.    Please see orders for Urine culture and please call patient to schedule soon.     Thank you.

## 2022-06-09 ENCOUNTER — TELEPHONE (OUTPATIENT)
Dept: INTERNAL MEDICINE | Facility: CLINIC | Age: 52
End: 2022-06-09
Payer: MEDICARE

## 2022-06-09 NOTE — TELEPHONE ENCOUNTER
----- Message from Jesusita Benoit sent at 6/9/2022  3:21 PM CDT -----  Contact: [phone]  Patient is returning a phone call.  Who left a message for the patient: Dr Shook's office  Does patient know what this is regarding:  no  Would you like a call back, or a response through your [facility] portal?:   phone  Comments: patient has been told that a message was sent through [employer] about redo some test, but is asking to talk with office. Thank you

## 2022-06-13 ENCOUNTER — PATIENT MESSAGE (OUTPATIENT)
Dept: ADMINISTRATIVE | Facility: HOSPITAL | Age: 52
End: 2022-06-13
Payer: MEDICARE

## 2022-06-13 ENCOUNTER — LAB VISIT (OUTPATIENT)
Dept: LAB | Facility: HOSPITAL | Age: 52
End: 2022-06-13
Attending: FAMILY MEDICINE
Payer: MEDICAID

## 2022-06-13 DIAGNOSIS — Z00.00 ANNUAL PHYSICAL EXAM: ICD-10-CM

## 2022-06-13 LAB
ALBUMIN SERPL BCP-MCNC: 4.1 G/DL (ref 3.5–5.2)
ALP SERPL-CCNC: 79 U/L (ref 55–135)
ALT SERPL W/O P-5'-P-CCNC: 13 U/L (ref 10–44)
ANION GAP SERPL CALC-SCNC: 11 MMOL/L (ref 8–16)
AST SERPL-CCNC: 17 U/L (ref 10–40)
BILIRUB SERPL-MCNC: 0.9 MG/DL (ref 0.1–1)
BUN SERPL-MCNC: 16 MG/DL (ref 6–20)
CALCIUM SERPL-MCNC: 9.3 MG/DL (ref 8.7–10.5)
CHLORIDE SERPL-SCNC: 105 MMOL/L (ref 95–110)
CHOLEST SERPL-MCNC: 236 MG/DL (ref 120–199)
CHOLEST/HDLC SERPL: 5.5 {RATIO} (ref 2–5)
CO2 SERPL-SCNC: 26 MMOL/L (ref 23–29)
CREAT SERPL-MCNC: 0.8 MG/DL (ref 0.5–1.4)
EST. GFR  (AFRICAN AMERICAN): >60 ML/MIN/1.73 M^2
EST. GFR  (NON AFRICAN AMERICAN): >60 ML/MIN/1.73 M^2
GLUCOSE SERPL-MCNC: 94 MG/DL (ref 70–110)
HDLC SERPL-MCNC: 43 MG/DL (ref 40–75)
HDLC SERPL: 18.2 % (ref 20–50)
LDLC SERPL CALC-MCNC: 170.2 MG/DL (ref 63–159)
NONHDLC SERPL-MCNC: 193 MG/DL
POTASSIUM SERPL-SCNC: 4.2 MMOL/L (ref 3.5–5.1)
PROT SERPL-MCNC: 7.2 G/DL (ref 6–8.4)
SODIUM SERPL-SCNC: 142 MMOL/L (ref 136–145)
TRIGL SERPL-MCNC: 114 MG/DL (ref 30–150)

## 2022-06-13 PROCEDURE — 80053 COMPREHEN METABOLIC PANEL: CPT | Performed by: FAMILY MEDICINE

## 2022-06-13 PROCEDURE — 36415 COLL VENOUS BLD VENIPUNCTURE: CPT | Performed by: FAMILY MEDICINE

## 2022-06-13 PROCEDURE — 80061 LIPID PANEL: CPT | Performed by: FAMILY MEDICINE

## 2022-06-16 ENCOUNTER — TELEPHONE (OUTPATIENT)
Dept: INTERNAL MEDICINE | Facility: CLINIC | Age: 52
End: 2022-06-16
Payer: MEDICARE

## 2022-06-16 RX ORDER — CIPROFLOXACIN 500 MG/1
500 TABLET ORAL EVERY 12 HOURS
Qty: 6 TABLET | Refills: 0 | Status: SHIPPED | OUTPATIENT
Start: 2022-06-16 | End: 2022-06-19

## 2022-06-16 NOTE — TELEPHONE ENCOUNTER
----- Message from Nayla Esqueda sent at 6/16/2022  2:07 PM CDT -----  Contact: Pt Mobile [phone]  Patient would like a call back in regards to her wanting to get clarification for her urine specimen, and her fasting lab that was done on 06/13/2022 at the [facility] location please.

## 2022-06-17 NOTE — TELEPHONE ENCOUNTER
Called patient and discussed labs and or test results. Patient expressed understanding and had the opportunity to ask questions. Any questions were answered. See meds, orders, follow up and instructions sections of encounter.    Specific issues include:   UTI - culture back today -     Has not started liptor yet - advised to do so

## 2022-06-23 ENCOUNTER — HOSPITAL ENCOUNTER (OUTPATIENT)
Dept: RADIOLOGY | Facility: HOSPITAL | Age: 52
Discharge: HOME | End: 2022-06-23
Attending: FAMILY MEDICINE
Payer: MEDICARE

## 2022-06-23 DIAGNOSIS — R10.9 ABDOMINAL PAIN, UNSPECIFIED ABDOMINAL LOCATION: ICD-10-CM

## 2022-06-23 PROCEDURE — 76856 US EXAM PELVIC COMPLETE: CPT | Mod: 26,,, | Performed by: STUDENT IN AN ORGANIZED HEALTH CARE EDUCATION/TRAINING PROGRAM

## 2022-06-23 PROCEDURE — 76830 TRANSVAGINAL US NON-OB: CPT | Mod: 26,,, | Performed by: STUDENT IN AN ORGANIZED HEALTH CARE EDUCATION/TRAINING PROGRAM

## 2022-06-23 PROCEDURE — 76830 US PELVIS COMP WITH TRANSVAG NON-OB (XPD): ICD-10-PCS | Mod: 26,,, | Performed by: STUDENT IN AN ORGANIZED HEALTH CARE EDUCATION/TRAINING PROGRAM

## 2022-06-23 PROCEDURE — 76700 US EXAM ABDOM COMPLETE: CPT | Mod: 26,,, | Performed by: STUDENT IN AN ORGANIZED HEALTH CARE EDUCATION/TRAINING PROGRAM

## 2022-06-23 PROCEDURE — 76830 TRANSVAGINAL US NON-OB: CPT | Mod: TC

## 2022-06-23 PROCEDURE — 76700 US ABDOMEN COMPLETE: ICD-10-PCS | Mod: 26,,, | Performed by: STUDENT IN AN ORGANIZED HEALTH CARE EDUCATION/TRAINING PROGRAM

## 2022-06-23 PROCEDURE — 76856 US PELVIS COMP WITH TRANSVAG NON-OB (XPD): ICD-10-PCS | Mod: 26,,, | Performed by: STUDENT IN AN ORGANIZED HEALTH CARE EDUCATION/TRAINING PROGRAM

## 2022-06-23 PROCEDURE — 76700 US EXAM ABDOM COMPLETE: CPT | Mod: TC

## 2022-07-02 ENCOUNTER — PATIENT MESSAGE (OUTPATIENT)
Dept: ADMINISTRATIVE | Facility: HOSPITAL | Age: 52
End: 2022-07-02
Payer: MEDICARE

## 2022-07-02 DIAGNOSIS — Z12.11 SCREENING FOR COLON CANCER: ICD-10-CM

## 2022-07-11 ENCOUNTER — HOSPITAL ENCOUNTER (OUTPATIENT)
Dept: RADIOLOGY | Facility: HOSPITAL | Age: 52
Discharge: HOME | End: 2022-07-11
Attending: FAMILY MEDICINE
Payer: MEDICARE

## 2022-07-11 DIAGNOSIS — Z12.31 ENCOUNTER FOR SCREENING MAMMOGRAM FOR MALIGNANT NEOPLASM OF BREAST: ICD-10-CM

## 2022-07-11 PROCEDURE — 77067 SCR MAMMO BI INCL CAD: CPT | Mod: TC,PO

## 2022-07-11 PROCEDURE — 77067 MAMMO DIGITAL SCREENING BILAT WITH TOMO: ICD-10-PCS | Mod: 26,,, | Performed by: RADIOLOGY

## 2022-07-11 PROCEDURE — 77063 MAMMO DIGITAL SCREENING BILAT WITH TOMO: ICD-10-PCS | Mod: 26,,, | Performed by: RADIOLOGY

## 2022-07-11 PROCEDURE — 77063 BREAST TOMOSYNTHESIS BI: CPT | Mod: 26,,, | Performed by: RADIOLOGY

## 2022-07-11 PROCEDURE — 77067 SCR MAMMO BI INCL CAD: CPT | Mod: 26,,, | Performed by: RADIOLOGY

## 2022-07-13 ENCOUNTER — PATIENT MESSAGE (OUTPATIENT)
Dept: ADMINISTRATIVE | Facility: HOSPITAL | Age: 52
End: 2022-07-13
Payer: MEDICARE

## 2022-07-14 ENCOUNTER — TELEPHONE (OUTPATIENT)
Dept: INTERNAL MEDICINE | Facility: CLINIC | Age: 52
End: 2022-07-14
Payer: MEDICARE

## 2022-07-14 NOTE — TELEPHONE ENCOUNTER
----- Message from Mesha Witt sent at 7/14/2022  3:49 PM CDT -----  Contact: [phone]  2TESTRESULTS    Type: Test Results    What test was performed?vivian    Who ordered the test?juan    When and where were the test performed? 7/11/22 [facility]    Would you like response via Mychart: amelie    Comments:

## 2022-07-15 NOTE — TELEPHONE ENCOUNTER
Message left on her recorder that her mammogram has not been read by the radiologist. She's to call back in a few days to inquire about the result.

## 2022-07-22 ENCOUNTER — TELEPHONE (OUTPATIENT)
Dept: INTERNAL MEDICINE | Facility: CLINIC | Age: 52
End: 2022-07-22
Payer: MEDICARE

## 2022-07-22 DIAGNOSIS — N83.8 OVARIAN MASS: Primary | ICD-10-CM

## 2022-07-23 NOTE — TELEPHONE ENCOUNTER
Please call patient and explain that the tests show small non-specific area on ovary..    I would like to refer patient to the ob gyn department for further evaluation and treatment.    Please see referral orders and please call patient to schedule.     Thank you.

## 2022-08-01 ENCOUNTER — OFFICE VISIT (OUTPATIENT)
Dept: OBSTETRICS AND GYNECOLOGY | Facility: CLINIC | Age: 52
End: 2022-08-01
Payer: MEDICARE

## 2022-08-01 VITALS
WEIGHT: 203.38 LBS | SYSTOLIC BLOOD PRESSURE: 116 MMHG | DIASTOLIC BLOOD PRESSURE: 74 MMHG | BODY MASS INDEX: 34.91 KG/M2

## 2022-08-01 DIAGNOSIS — Z01.419 GYNECOLOGIC EXAM NORMAL: Primary | ICD-10-CM

## 2022-08-01 DIAGNOSIS — N83.8 OVARIAN MASS: ICD-10-CM

## 2022-08-01 PROCEDURE — 1159F PR MEDICATION LIST DOCUMENTED IN MEDICAL RECORD: ICD-10-PCS | Mod: CPTII,S$GLB,, | Performed by: OBSTETRICS & GYNECOLOGY

## 2022-08-01 PROCEDURE — 99213 OFFICE O/P EST LOW 20 MIN: CPT | Mod: PBBFAC | Performed by: OBSTETRICS & GYNECOLOGY

## 2022-08-01 PROCEDURE — 88175 CYTOPATH C/V AUTO FLUID REDO: CPT | Performed by: OBSTETRICS & GYNECOLOGY

## 2022-08-01 PROCEDURE — G0101 PR CA SCREEN;PELVIC/BREAST EXAM: ICD-10-PCS | Mod: S$GLB,,, | Performed by: OBSTETRICS & GYNECOLOGY

## 2022-08-01 PROCEDURE — 3008F PR BODY MASS INDEX (BMI) DOCUMENTED: ICD-10-PCS | Mod: CPTII,S$GLB,, | Performed by: OBSTETRICS & GYNECOLOGY

## 2022-08-01 PROCEDURE — 3074F SYST BP LT 130 MM HG: CPT | Mod: CPTII,S$GLB,, | Performed by: OBSTETRICS & GYNECOLOGY

## 2022-08-01 PROCEDURE — 87624 HPV HI-RISK TYP POOLED RSLT: CPT | Performed by: OBSTETRICS & GYNECOLOGY

## 2022-08-01 PROCEDURE — 1159F MED LIST DOCD IN RCRD: CPT | Mod: CPTII,S$GLB,, | Performed by: OBSTETRICS & GYNECOLOGY

## 2022-08-01 PROCEDURE — 3078F PR MOST RECENT DIASTOLIC BLOOD PRESSURE < 80 MM HG: ICD-10-PCS | Mod: CPTII,S$GLB,, | Performed by: OBSTETRICS & GYNECOLOGY

## 2022-08-01 PROCEDURE — 3074F PR MOST RECENT SYSTOLIC BLOOD PRESSURE < 130 MM HG: ICD-10-PCS | Mod: CPTII,S$GLB,, | Performed by: OBSTETRICS & GYNECOLOGY

## 2022-08-01 PROCEDURE — 99999 PR PBB SHADOW E&M-EST. PATIENT-LVL III: CPT | Mod: PBBFAC,,, | Performed by: OBSTETRICS & GYNECOLOGY

## 2022-08-01 PROCEDURE — 3008F BODY MASS INDEX DOCD: CPT | Mod: CPTII,S$GLB,, | Performed by: OBSTETRICS & GYNECOLOGY

## 2022-08-01 PROCEDURE — 99999 PR PBB SHADOW E&M-EST. PATIENT-LVL III: ICD-10-PCS | Mod: PBBFAC,,, | Performed by: OBSTETRICS & GYNECOLOGY

## 2022-08-01 PROCEDURE — 3078F DIAST BP <80 MM HG: CPT | Mod: CPTII,S$GLB,, | Performed by: OBSTETRICS & GYNECOLOGY

## 2022-08-01 PROCEDURE — G0101 CA SCREEN;PELVIC/BREAST EXAM: HCPCS | Mod: S$GLB,,, | Performed by: OBSTETRICS & GYNECOLOGY

## 2022-08-06 NOTE — PROGRESS NOTES
Subjective:       Patient ID: Brionna Jones is a 52 y.o. female.    Chief Complaint:  Well Woman      History of Present Illness  HPI  Annual Exam-Premenopausal  Patient presents for annual exam. The patient has no complaints today. The patient is sexually active. GYN screening history: last pap: approximate date 2019 and was normal. The patient wears seatbelts: yes. The patient participates in regular exercise: not asked. Has the patient ever been transfused or tattooed?: no. The patient reports that there is not domestic violence in her life.    Pt has MMG already ordered.    Pt referred for possible adnexal mass.  Pt is not having any pain at this time.  Pt has been skipping some months of menses, but pt has not gone 1 year without menses yet.    Sono 2022:  Uterus:   Size: 7.3 x 5.0 x 7.0 cm.   Masses: 3 intramural fibroids: (1) 3.1 x 3.3 x 3.0 cm at the fundus (2) 1.9 x 2.3 x 2.2 cm at the uterine body (3) 1.5 x 1.5 x 1.7 cm at the uterine body.   Endometrium: Normal in this pre menopausal patient, measuring 3 mm.     Right ovary:   Size: 2.2 x 2.4 x 2.5 cm   Appearance: Normal   Vascular flow: Normal.     Left ovary:   Size: 3.0 x 1.4 x 2.4 cm   Appearance: Hyperechoic 0.6 x 0.3 x 0.4 cm focus.  Otherwise, normal.   Vascular Flow: Normal.     Free Fluid:   Small volume of pelvic free fluid.     Impression:   Three uterine fibroids.   Subcentimeter left ovary hyperechoic focus.  Nonspecific, although likely benign.   Small volume of free fluid in the pelvis.    GYN & OB History  No LMP recorded. Patient is perimenopausal.   Date of Last Pap: 8/3/2022    OB History    Para Term  AB Living   4 4 4         SAB IAB Ectopic Multiple Live Births                  # Outcome Date GA Lbr [place]/2nd Weight Sex Delivery Anes PTL Lv   4 Term            3 Term            2 Term            1 Term                Review of Systems  Review of Systems   Constitutional: Negative.    Eyes: Negative.     Respiratory: Negative.    Cardiovascular: Negative.    Gastrointestinal: Negative.    Endocrine: Negative.    Genitourinary: Negative.    Musculoskeletal: Positive for arthralgias, leg pain and myalgias.   Neurological: Negative.    Hematological: Negative.    Psychiatric/Behavioral: The patient is nervous/anxious.    Breast: negative.            Objective:    Physical Exam:   Constitutional: She is oriented to person, place, and time. She appears well-developed and well-nourished. No distress.    HENT:   Head: Normocephalic and atraumatic.       Pulmonary/Chest: Effort normal. No respiratory distress. Right breast exhibits no inverted nipple, no mass, no nipple discharge, no skin change, no tenderness, presence, no bleeding, no swelling, no mastectomy, no augmentation and no lumpectomy. Left breast exhibits no inverted nipple, no mass, no nipple discharge, no skin change, no tenderness, presence, no bleeding, no swelling, no mastectomy, no augmentation and no lumpectomy.        Abdominal: Soft. She exhibits no distension and no mass. There is no abdominal tenderness. There is no rebound and no guarding.     Genitourinary:    Vagina, uterus, right adnexa and left adnexa normal.      Pelvic exam was performed with patient supine.   The external female genitalia was normal.   No external genitalia lesions identified,Genitalia hair distrobution normal .   Labial bartholins normal.There is no rash, tenderness, lesion or injury on the right labia. There is no rash, tenderness, lesion or injury on the left labia. Cervix is normal. Right adnexum displays no mass, no tenderness and no fullness. Left adnexum displays no mass, no tenderness and no fullness. Vagina exhibits no lesion. No erythema,  no vaginal discharge, tenderness, bleeding, rectocele, cystocele or unspecified prolapse of vaginal walls in the vagina.    No foreign body in the vagina.      No signs of injury in the vagina.   Vagina was moist.Cervix exhibits  no motion tenderness, no lesion, no discharge, no friability, no lesion, no tenderness and no polyp.    pap smear completedUterus consistancy normal. and Uerus contour normal  Uterus is not deviated, not enlarged, not fixed, not tender, not hosting fibroids and no uterine prolapse. Normal urethral meatus.Urethral Meatus exhibits: urethral lesion and prolapsedUrethra findings: no urethral mass, no tenderness and no urethral scarringBladder findings: no bladder distention and no bladder tenderness          Musculoskeletal: Normal range of motion and moves all extremeties. No tenderness.       Neurological: She is alert and oriented to person, place, and time. No cranial nerve deficit. Coordination normal.    Skin: She is not diaphoretic.    Psychiatric: She has a normal mood and affect. Her behavior is normal. Judgment and thought content normal.        Discussed US finding of relatively small fibroids and L adnexal mass which very small.  If pt not having pain or bleeding issues, no further f/u needed on this.     Assessment:        1. Gynecologic exam normal    2. Ovarian mass               Plan:      1.  Pap and HR HPV collected  2.  Pt reassured about pelvic sono

## 2022-08-07 LAB
FINAL PATHOLOGIC DIAGNOSIS: NORMAL
Lab: NORMAL

## 2022-08-19 ENCOUNTER — OFFICE VISIT (OUTPATIENT)
Dept: INTERNAL MEDICINE | Facility: CLINIC | Age: 52
End: 2022-08-19
Attending: FAMILY MEDICINE
Payer: MEDICARE

## 2022-08-19 VITALS
HEART RATE: 86 BPM | HEIGHT: 64 IN | DIASTOLIC BLOOD PRESSURE: 82 MMHG | SYSTOLIC BLOOD PRESSURE: 120 MMHG | OXYGEN SATURATION: 98 % | WEIGHT: 202.81 LBS | BODY MASS INDEX: 34.62 KG/M2

## 2022-08-19 DIAGNOSIS — R05.9 COUGH: ICD-10-CM

## 2022-08-19 DIAGNOSIS — R32 URINARY INCONTINENCE, UNSPECIFIED TYPE: ICD-10-CM

## 2022-08-19 DIAGNOSIS — E78.5 HYPERLIPIDEMIA, UNSPECIFIED HYPERLIPIDEMIA TYPE: ICD-10-CM

## 2022-08-19 DIAGNOSIS — N83.8 OVARIAN MASS: ICD-10-CM

## 2022-08-19 DIAGNOSIS — I10 HYPERTENSION, ESSENTIAL: ICD-10-CM

## 2022-08-19 DIAGNOSIS — R07.9 CHEST PAIN, UNSPECIFIED TYPE: ICD-10-CM

## 2022-08-19 DIAGNOSIS — R10.9 ABDOMINAL PAIN, UNSPECIFIED ABDOMINAL LOCATION: Primary | ICD-10-CM

## 2022-08-19 DIAGNOSIS — N39.0 URINARY TRACT INFECTION WITHOUT HEMATURIA, SITE UNSPECIFIED: ICD-10-CM

## 2022-08-19 DIAGNOSIS — R92.8 ABNORMAL MAMMOGRAM: ICD-10-CM

## 2022-08-19 PROCEDURE — 3008F PR BODY MASS INDEX (BMI) DOCUMENTED: ICD-10-PCS | Mod: CPTII,S$GLB,, | Performed by: FAMILY MEDICINE

## 2022-08-19 PROCEDURE — 1159F MED LIST DOCD IN RCRD: CPT | Mod: CPTII,S$GLB,, | Performed by: FAMILY MEDICINE

## 2022-08-19 PROCEDURE — 99999 PR PBB SHADOW E&M-EST. PATIENT-LVL V: CPT | Mod: PBBFAC,,, | Performed by: FAMILY MEDICINE

## 2022-08-19 PROCEDURE — 1160F PR REVIEW ALL MEDS BY PRESCRIBER/CLIN PHARMACIST DOCUMENTED: ICD-10-PCS | Mod: CPTII,S$GLB,, | Performed by: FAMILY MEDICINE

## 2022-08-19 PROCEDURE — 1159F PR MEDICATION LIST DOCUMENTED IN MEDICAL RECORD: ICD-10-PCS | Mod: CPTII,S$GLB,, | Performed by: FAMILY MEDICINE

## 2022-08-19 PROCEDURE — 99999 PR PBB SHADOW E&M-EST. PATIENT-LVL V: ICD-10-PCS | Mod: PBBFAC,,, | Performed by: FAMILY MEDICINE

## 2022-08-19 PROCEDURE — 3008F BODY MASS INDEX DOCD: CPT | Mod: CPTII,S$GLB,, | Performed by: FAMILY MEDICINE

## 2022-08-19 PROCEDURE — 3079F PR MOST RECENT DIASTOLIC BLOOD PRESSURE 80-89 MM HG: ICD-10-PCS | Mod: CPTII,S$GLB,, | Performed by: FAMILY MEDICINE

## 2022-08-19 PROCEDURE — 1160F RVW MEDS BY RX/DR IN RCRD: CPT | Mod: CPTII,S$GLB,, | Performed by: FAMILY MEDICINE

## 2022-08-19 PROCEDURE — 3074F SYST BP LT 130 MM HG: CPT | Mod: CPTII,S$GLB,, | Performed by: FAMILY MEDICINE

## 2022-08-19 PROCEDURE — 3079F DIAST BP 80-89 MM HG: CPT | Mod: CPTII,S$GLB,, | Performed by: FAMILY MEDICINE

## 2022-08-19 PROCEDURE — 3074F PR MOST RECENT SYSTOLIC BLOOD PRESSURE < 130 MM HG: ICD-10-PCS | Mod: CPTII,S$GLB,, | Performed by: FAMILY MEDICINE

## 2022-08-19 PROCEDURE — 99214 OFFICE O/P EST MOD 30 MIN: CPT | Mod: S$GLB,,, | Performed by: FAMILY MEDICINE

## 2022-08-19 PROCEDURE — 99214 PR OFFICE/OUTPT VISIT, EST, LEVL IV, 30-39 MIN: ICD-10-PCS | Mod: S$GLB,,, | Performed by: FAMILY MEDICINE

## 2022-08-19 PROCEDURE — 99215 OFFICE O/P EST HI 40 MIN: CPT | Mod: PBBFAC | Performed by: FAMILY MEDICINE

## 2022-08-19 RX ORDER — BENZONATATE 200 MG/1
200 CAPSULE ORAL 3 TIMES DAILY PRN
Qty: 30 CAPSULE | Refills: 1 | Status: SHIPPED | OUTPATIENT
Start: 2022-08-19 | End: 2024-02-12

## 2022-08-19 NOTE — PATIENT INSTRUCTIONS
See standing or future lab orders and please draw Lipids, ALT, and AST - in 3 months, thank you.    UA and culture today

## 2022-08-19 NOTE — ASSESSMENT & PLAN NOTE
Completed pelvic and abdominal ultrasounds.  Has seen Gynecology concerning incidental findings.  Abdominal ultrasound negative.  Still with intermittent mid epigastric mild tenderness worse when drinking [place].  For the time being will advised to discontinue drinking and consider Gastroenterology consult

## 2022-08-19 NOTE — ASSESSMENT & PLAN NOTE
Follow-up ultrasound and diagnostic is pending on August 31.  Patient already cancel 1 appointment is and is aware of the importance.

## 2022-08-19 NOTE — ASSESSMENT & PLAN NOTE
Nocturnal cough.  Unclear etiology.  Sometimes daytime.  No fever, chills or dyspnea.  Causes incontinence.  Does smoke cigars.

## 2022-08-19 NOTE — PROGRESS NOTES
Subjective:       Patient ID: Brionna Jones is a 52 y.o. female.    Chief Complaint: Follow-up (2 MONTH F/U) and Medication Refill    Established patient follows up for management of chronic medical illnesses with complaints today. Please see dictation and ROS for interval problems, specific complaints and disease management discussion.    P, S, Fm, Soc Hx's; Meds, allergies reviewed and reconciled.  Health maintenance file reviewed and addressed items due. Recent applicable lab, imaging and cardiovascular results reviewed.  Problem list items reviewed and modified or added entries (in the overview section) may not be transcribed into this encounter note due to note writer format.        Review of Systems   Constitutional: Negative for appetite change, chills, diaphoresis, fatigue and fever.   HENT: Negative for congestion, postnasal drip, rhinorrhea, sore throat and trouble swallowing.    Eyes: Negative for visual disturbance.   Respiratory: Positive for cough. Negative for choking, chest tightness, shortness of breath and wheezing.    Cardiovascular: Positive for chest pain. Negative for leg swelling.   Gastrointestinal: Positive for abdominal pain. Negative for abdominal distention, diarrhea, nausea and vomiting.   Genitourinary: Positive for enuresis. Negative for difficulty urinating and hematuria.   Musculoskeletal: Negative for arthralgias and myalgias.   Skin: Negative for rash.   Neurological: Negative for weakness, light-headedness and headaches.   Hematological: Does not bruise/bleed easily.   Psychiatric/Behavioral: Negative for decreased concentration and dysphoric mood.       Objective:      Physical Exam  Vitals and nursing note reviewed.   Constitutional:       Appearance: She is well-developed. She is not diaphoretic.   HENT:      Head: Normocephalic and atraumatic.   Eyes:      General: No scleral icterus.     Conjunctiva/sclera: Conjunctivae normal.   Cardiovascular:      Rate and Rhythm:  Normal rate.      Heart sounds: Normal heart sounds. No murmur heard.    No friction rub. No gallop.   Pulmonary:      Effort: Pulmonary effort is normal. No respiratory distress.      Breath sounds: Normal breath sounds. No wheezing or rales.   Abdominal:      General: There is no distension or abdominal bruit.      Palpations: There is no mass.      Tenderness: There is no abdominal tenderness. There is no right CVA tenderness, left CVA tenderness, guarding or rebound.   Musculoskeletal:         General: No deformity.      Cervical back: Normal range of motion and neck supple.   Skin:     General: Skin is warm and dry.      Findings: No erythema or rash.   Neurological:      Mental Status: She is alert and oriented to person, place, and time.      Cranial Nerves: No cranial nerve deficit.      Motor: No tremor.      Coordination: Coordination normal.      Gait: Gait normal.   Psychiatric:         Behavior: Behavior normal.         Thought Content: Thought content normal.         Judgment: Judgment normal.         Assessment:       1. Abdominal pain, unspecified abdominal location    2. Chest pain, unspecified type    3. Abnormal mammogram    4. Ovarian mass    5. Urinary incontinence, unspecified type    6. Urinary tract infection without hematuria, site unspecified    7. Hypertension, essential    8. Hyperlipidemia, unspecified hyperlipidemia type    9. Cough        Plan:     Medication List with Changes/Refills   New Medications    BENZONATATE (TESSALON) 200 MG CAPSULE    Take 1 capsule (200 mg total) by mouth 3 (three) times daily as needed for Cough.   Current Medications    AMLODIPINE (NORVASC) 10 MG TABLET    Take 1 tablet (10 mg total) by mouth once daily.    ATORVASTATIN (LIPITOR) 20 MG TABLET    Take 1 tablet (20 mg total) by mouth once daily.    CETIRIZINE (ZYRTEC) 10 MG TABLET    Take 1 tablet (10 mg total) by mouth once daily.    FLUTICASONE PROPIONATE (FLONASE) 50 MCG/ACTUATION NASAL SPRAY    1 spray  (50 mcg total) by Each Nostril route 2 (two) times daily as needed.   Discontinued Medications    BENZONATATE (TESSALON) 200 MG CAPSULE    Take 1 capsule (200 mg total) by mouth 3 (three) times daily as needed for Cough.     Brionna was seen today for follow-up and medication refill.    Diagnoses and all orders for this visit:    Abdominal pain, unspecified abdominal location  -     Ambulatory referral/consult to Gastroenterology; Future    Chest pain, unspecified type  -     EKG 12-lead; Future  -     Exercise Stress - EKG; Future    Abnormal mammogram    Ovarian mass    Urinary incontinence, unspecified type  -     Urinalysis; Future  -     Urinalysis Microscopic; Future  -     Urine culture; Future  -     Ambulatory referral/consult to Urogynecology; Future    Urinary tract infection without hematuria, site unspecified  -     Urinalysis; Future  -     Urinalysis Microscopic; Future  -     Urine culture; Future    Hypertension, essential    Hyperlipidemia, unspecified hyperlipidemia type  -     Lipid Panel; Future  -     AST (SGOT); Future  -     ALT (SGPT); Future    Cough  -     benzonatate (TESSALON) 200 MG capsule; Take 1 capsule (200 mg total) by mouth 3 (three) times daily as needed for Cough.      See meds, orders, follow up, routing and instructions sections of encounter and AVS. Discussed with patient and provided on AVS.    Abdominal pain  Completed pelvic and abdominal ultrasounds.  Has seen Gynecology concerning incidental findings.  Abdominal ultrasound negative.  Still with intermittent mid epigastric mild tenderness worse when drinking [place].  For the time being will advised to discontinue drinking and consider Gastroenterology consult    Cough  Nocturnal cough.  Unclear etiology.  Sometimes daytime.  No fever, chills or dyspnea.  Causes incontinence.  Does smoke cigars.    Urinary incontinence  States due to nocturnal coughing.  Recent urinary tract infection was treated.    Abnormal  mammogram  Follow-up ultrasound and diagnostic is pending on August 31.  Patient already cancel 1 appointment is and is aware of the importance.    Chest pain  Did not schedule stress test ordered at last visit, will reorder.  Nonspecific, nonexertional pattern.  Had a previous echocardiogram stress test in 2018, normal    Lab Results   Component Value Date     06/13/2022    K 4.2 06/13/2022     06/13/2022    BUN 16 06/13/2022    CREATININE 0.8 06/13/2022    GLU 94 06/13/2022    MG 1.9 02/15/2018    AST 18 08/19/2022    ALT 17 08/19/2022    ALBUMIN 4.1 06/13/2022    PROT 7.2 06/13/2022    BILITOT 0.9 06/13/2022    CHOL 177 08/19/2022    HDL 48 08/19/2022    LDLCALC 112.4 08/19/2022    TRIG 83 08/19/2022    WBC 5.50 06/07/2022    HGB 13.3 06/07/2022    HCT 42.8 06/07/2022     06/07/2022    TSH 0.56 11/15/2005

## 2022-08-24 ENCOUNTER — TELEPHONE (OUTPATIENT)
Dept: INTERNAL MEDICINE | Facility: CLINIC | Age: 52
End: 2022-08-24

## 2022-08-24 LAB
HPV HR 12 DNA SPEC QL NAA+PROBE: POSITIVE
HPV16 AG SPEC QL: NEGATIVE
HPV18 DNA SPEC QL NAA+PROBE: NEGATIVE

## 2022-08-24 NOTE — TELEPHONE ENCOUNTER
----- Message from Asuncion Fernandez sent at 8/24/2022  9:45 AM CDT -----  Regarding: Lab orders  Patient needs blood work done in 3 months but labs are unable to be reinstated please but order back in for scheduling.    Thank you

## 2022-08-31 ENCOUNTER — HOSPITAL ENCOUNTER (OUTPATIENT)
Dept: RADIOLOGY | Facility: HOSPITAL | Age: 52
Discharge: HOME | End: 2022-08-31
Attending: FAMILY MEDICINE
Payer: MEDICARE

## 2022-08-31 DIAGNOSIS — R92.8 ABNORMAL MAMMOGRAM OF RIGHT BREAST: ICD-10-CM

## 2022-08-31 PROCEDURE — 77061 BREAST TOMOSYNTHESIS UNI: CPT | Mod: 26,RT,, | Performed by: RADIOLOGY

## 2022-08-31 PROCEDURE — 77061 MAMMO DIGITAL DIAGNOSTIC RIGHT WITH TOMO: ICD-10-PCS | Mod: 26,RT,, | Performed by: RADIOLOGY

## 2022-08-31 PROCEDURE — 76642 ULTRASOUND BREAST LIMITED: CPT | Mod: TC,RT

## 2022-08-31 PROCEDURE — 77065 MAMMO DIGITAL DIAGNOSTIC RIGHT WITH TOMO: ICD-10-PCS | Mod: 26,RT,, | Performed by: RADIOLOGY

## 2022-08-31 PROCEDURE — 76642 ULTRASOUND BREAST LIMITED: CPT | Mod: 26,RT,, | Performed by: RADIOLOGY

## 2022-08-31 PROCEDURE — 77065 DX MAMMO INCL CAD UNI: CPT | Mod: TC,RT

## 2022-08-31 PROCEDURE — 77065 DX MAMMO INCL CAD UNI: CPT | Mod: 26,RT,, | Performed by: RADIOLOGY

## 2022-08-31 PROCEDURE — 76642 US BREAST RIGHT LIMITED: ICD-10-PCS | Mod: 26,RT,, | Performed by: RADIOLOGY

## 2022-09-20 ENCOUNTER — TELEPHONE (OUTPATIENT)
Dept: INTERNAL MEDICINE | Facility: CLINIC | Age: 52
End: 2022-09-20
Payer: MEDICARE

## 2022-09-20 NOTE — TELEPHONE ENCOUNTER
Called and discussed test results and recommendations with the pt. The pt expressed understanding.     Pt is scheduled for Urogyn

## 2022-09-20 NOTE — TELEPHONE ENCOUNTER
Please call patient and explain that the tests show few red cells in the urine, no infection.  I placed a urogynecology order at her recent appointment with me - but not scheduled.    I would like to refer patient to the urogynecology department for further evaluation and treatment.    Please see referral orders and please call patient to schedule.     Thank you.

## 2022-09-21 ENCOUNTER — HOSPITAL ENCOUNTER (OUTPATIENT)
Dept: RADIOLOGY | Facility: HOSPITAL | Age: 52
Discharge: HOME | End: 2022-09-21
Attending: FAMILY MEDICINE
Payer: MEDICARE

## 2022-09-21 DIAGNOSIS — R92.8 ABNORMAL MAMMOGRAM OF RIGHT BREAST: ICD-10-CM

## 2022-09-21 PROCEDURE — 88305 TISSUE EXAM BY PATHOLOGIST: CPT | Mod: 26,,, | Performed by: PATHOLOGY

## 2022-09-21 PROCEDURE — 88360 TUMOR IMMUNOHISTOCHEM/MANUAL: CPT | Mod: 26,,, | Performed by: PATHOLOGY

## 2022-09-21 PROCEDURE — 88360 TUMOR IMMUNOHISTOCHEM/MANUAL: CPT | Mod: 59 | Performed by: PATHOLOGY

## 2022-09-21 PROCEDURE — 19081 BX BREAST 1ST LESION STRTCTC: CPT | Mod: RT,,, | Performed by: RADIOLOGY

## 2022-09-21 PROCEDURE — 25000003 PHARM REV CODE 250: Performed by: RADIOLOGY

## 2022-09-21 PROCEDURE — 88341 PR IHC OR ICC EACH ADD'L SINGLE ANTIBODY  STAINPR: ICD-10-PCS | Mod: 26,59,, | Performed by: PATHOLOGY

## 2022-09-21 PROCEDURE — 88305 TISSUE EXAM BY PATHOLOGIST: ICD-10-PCS | Mod: 26,,, | Performed by: PATHOLOGY

## 2022-09-21 PROCEDURE — 88305 TISSUE EXAM BY PATHOLOGIST: CPT | Performed by: PATHOLOGY

## 2022-09-21 PROCEDURE — 88342 IMHCHEM/IMCYTCHM 1ST ANTB: CPT | Performed by: PATHOLOGY

## 2022-09-21 PROCEDURE — 88342 CHG IMMUNOCYTOCHEMISTRY: ICD-10-PCS | Mod: 26,XU,, | Performed by: PATHOLOGY

## 2022-09-21 PROCEDURE — 88360 PR  TUMOR IMMUNOHISTOCHEM/MANUAL: ICD-10-PCS | Mod: 26,,, | Performed by: PATHOLOGY

## 2022-09-21 PROCEDURE — 27200934 MAMMO BREAST STEREOTACTIC BREAST BIOPSY RIGHT

## 2022-09-21 PROCEDURE — 88342 IMHCHEM/IMCYTCHM 1ST ANTB: CPT | Mod: 26,XU,, | Performed by: PATHOLOGY

## 2022-09-21 PROCEDURE — 19081 MAMMO BREAST STEREOTACTIC BREAST BIOPSY RIGHT: ICD-10-PCS | Mod: RT,,, | Performed by: RADIOLOGY

## 2022-09-21 PROCEDURE — 88341 IMHCHEM/IMCYTCHM EA ADD ANTB: CPT | Mod: 26,59,, | Performed by: PATHOLOGY

## 2022-09-21 PROCEDURE — 88341 IMHCHEM/IMCYTCHM EA ADD ANTB: CPT | Performed by: PATHOLOGY

## 2022-09-21 RX ORDER — LIDOCAINE HYDROCHLORIDE AND EPINEPHRINE 20; 10 MG/ML; UG/ML
20 INJECTION, SOLUTION INFILTRATION; PERINEURAL ONCE
Status: COMPLETED | OUTPATIENT
Start: 2022-09-21 | End: 2022-09-21

## 2022-09-21 RX ORDER — LIDOCAINE HYDROCHLORIDE 20 MG/ML
20 INJECTION, SOLUTION INFILTRATION; PERINEURAL ONCE
Status: COMPLETED | OUTPATIENT
Start: 2022-09-21 | End: 2022-09-21

## 2022-09-21 RX ADMIN — LIDOCAINE HYDROCHLORIDE,EPINEPHRINE BITARTRATE 14 ML: 20; .01 INJECTION, SOLUTION INFILTRATION; PERINEURAL at 10:09

## 2022-09-21 RX ADMIN — LIDOCAINE HYDROCHLORIDE 4 ML: 20 INJECTION, SOLUTION INFILTRATION; PERINEURAL at 10:09

## 2022-09-29 LAB
FINAL PATHOLOGIC DIAGNOSIS: NORMAL
GROSS: NORMAL
Lab: NORMAL
MICROSCOPIC EXAM: NORMAL

## 2022-10-07 ENCOUNTER — TELEPHONE (OUTPATIENT)
Dept: INTERNAL MEDICINE | Facility: CLINIC | Age: 52
End: 2022-10-07
Payer: MEDICARE

## 2022-10-07 NOTE — TELEPHONE ENCOUNTER
----- Message from Renetta Lara sent at 10/7/2022  2:29 PM CDT -----  Contact: [phone] Patient  2TESTRESULTS    Type: Test Results    What test was performed?MAMMO STEREOTACTIC BREAST BI     Who ordered the test?Dr Shook    When and where were the test performed? [facility]  09/21    Would you like response via [employer]: Call Back Please    Comments:

## 2022-10-10 ENCOUNTER — DOCUMENTATION ONLY (OUTPATIENT)
Dept: SURGERY | Facility: CLINIC | Age: 52
End: 2022-10-10
Payer: MEDICARE

## 2022-10-10 NOTE — NURSING
Received message. Contacted patient for appt. Patient scheduled for 10/11/2022. Reviewed location of appt. Patient verbalized understanding.  Oncology Navigation   Intake  Date of Diagnosis: 09/21/22  Cancer Type: Breast  Internal / External Referral: Internal  Date of Referral: 10/10/22  Initial Nurse Navigator Contact: 10/10/22  Referral to Initial Contact Timeline (days): 0  Date Worked: 10/10/22     Treatment  Current Status: Active    Surgical Oncologist: Dr. Boswell  Consult Date: 10/11/22          Procedures: Biopsy  Biopsy Schedule Date: 09/21/22       ER: Positive  MN: Negative       Support Systems: Family members     Acuity      Follow Up  No follow-ups on file.

## 2022-10-10 NOTE — PROGRESS NOTES
Received message from staff at [facility] 10/10, I also Contacted patient, plan is in place for follow-up with breast Center tomorrow, she is aware.  Explained that they will go over all the details and treatments based on the pathologic findings.  Encouraged her to remain very optimistic.

## 2022-10-11 ENCOUNTER — OFFICE VISIT (OUTPATIENT)
Dept: SURGERY | Facility: CLINIC | Age: 52
End: 2022-10-11
Payer: MEDICARE

## 2022-10-11 ENCOUNTER — LAB VISIT (OUTPATIENT)
Dept: LAB | Facility: HOSPITAL | Age: 52
End: 2022-10-11
Attending: SURGERY
Payer: MEDICARE

## 2022-10-11 ENCOUNTER — DOCUMENTATION ONLY (OUTPATIENT)
Dept: SURGERY | Facility: CLINIC | Age: 52
End: 2022-10-11
Payer: MEDICARE

## 2022-10-11 VITALS
SYSTOLIC BLOOD PRESSURE: 138 MMHG | WEIGHT: 202 LBS | BODY MASS INDEX: 34.49 KG/M2 | DIASTOLIC BLOOD PRESSURE: 88 MMHG | HEART RATE: 83 BPM | HEIGHT: 64 IN

## 2022-10-11 DIAGNOSIS — C50.919 MALIGNANT NEOPLASM OF BREAST (FEMALE): Primary | ICD-10-CM

## 2022-10-11 DIAGNOSIS — D05.11 DUCTAL CARCINOMA IN SITU (DCIS) OF RIGHT BREAST: ICD-10-CM

## 2022-10-11 DIAGNOSIS — Z01.818 PRE-OP TESTING: ICD-10-CM

## 2022-10-11 DIAGNOSIS — Z17.0 MALIGNANT NEOPLASM OF OVERLAPPING SITES OF RIGHT BREAST IN FEMALE, ESTROGEN RECEPTOR POSITIVE: Primary | ICD-10-CM

## 2022-10-11 DIAGNOSIS — D05.11 DUCTAL CARCINOMA IN SITU (DCIS) OF RIGHT BREAST: Primary | ICD-10-CM

## 2022-10-11 DIAGNOSIS — C50.811 MALIGNANT NEOPLASM OF OVERLAPPING SITES OF RIGHT BREAST IN FEMALE, ESTROGEN RECEPTOR POSITIVE: Primary | ICD-10-CM

## 2022-10-11 LAB
ALBUMIN SERPL BCP-MCNC: 4 G/DL (ref 3.5–5.2)
ALP SERPL-CCNC: 94 U/L (ref 55–135)
ALT SERPL W/O P-5'-P-CCNC: 18 U/L (ref 10–44)
ANION GAP SERPL CALC-SCNC: 6 MMOL/L (ref 8–16)
AST SERPL-CCNC: 20 U/L (ref 10–40)
BASOPHILS # BLD AUTO: 0.02 K/UL (ref 0–0.2)
BASOPHILS NFR BLD: 0.4 % (ref 0–1.9)
BILIRUB SERPL-MCNC: 0.4 MG/DL (ref 0.1–1)
BUN SERPL-MCNC: 13 MG/DL (ref 6–20)
CALCIUM SERPL-MCNC: 9.2 MG/DL (ref 8.7–10.5)
CHLORIDE SERPL-SCNC: 105 MMOL/L (ref 95–110)
CO2 SERPL-SCNC: 26 MMOL/L (ref 23–29)
CREAT SERPL-MCNC: 0.7 MG/DL (ref 0.5–1.4)
DIFFERENTIAL METHOD: ABNORMAL
EOSINOPHIL # BLD AUTO: 0.3 K/UL (ref 0–0.5)
EOSINOPHIL NFR BLD: 7.3 % (ref 0–8)
ERYTHROCYTE [DISTWIDTH] IN BLOOD BY AUTOMATED COUNT: 14.6 % (ref 11.5–14.5)
EST. GFR  (NO RACE VARIABLE): >60 ML/MIN/1.73 M^2
GLUCOSE SERPL-MCNC: 95 MG/DL (ref 70–110)
HCT VFR BLD AUTO: 44.5 % (ref 37–48.5)
HGB BLD-MCNC: 13.7 G/DL (ref 12–16)
IMM GRANULOCYTES # BLD AUTO: 0.02 K/UL (ref 0–0.04)
IMM GRANULOCYTES NFR BLD AUTO: 0.4 % (ref 0–0.5)
LYMPHOCYTES # BLD AUTO: 1.3 K/UL (ref 1–4.8)
LYMPHOCYTES NFR BLD: 28.8 % (ref 18–48)
MCH RBC QN AUTO: 25.8 PG (ref 27–31)
MCHC RBC AUTO-ENTMCNC: 30.8 G/DL (ref 32–36)
MCV RBC AUTO: 84 FL (ref 82–98)
MONOCYTES # BLD AUTO: 0.5 K/UL (ref 0.3–1)
MONOCYTES NFR BLD: 10.1 % (ref 4–15)
NEUTROPHILS # BLD AUTO: 2.4 K/UL (ref 1.8–7.7)
NEUTROPHILS NFR BLD: 53 % (ref 38–73)
NRBC BLD-RTO: 0 /100 WBC
PLATELET # BLD AUTO: 269 K/UL (ref 150–450)
PMV BLD AUTO: 12.7 FL (ref 9.2–12.9)
POTASSIUM SERPL-SCNC: 4.1 MMOL/L (ref 3.5–5.1)
PROT SERPL-MCNC: 6.9 G/DL (ref 6–8.4)
RBC # BLD AUTO: 5.31 M/UL (ref 4–5.4)
SODIUM SERPL-SCNC: 137 MMOL/L (ref 136–145)
WBC # BLD AUTO: 4.55 K/UL (ref 3.9–12.7)

## 2022-10-11 PROCEDURE — 80053 COMPREHEN METABOLIC PANEL: CPT | Performed by: SURGERY

## 2022-10-11 PROCEDURE — 99999 PR PBB SHADOW E&M-EST. PATIENT-LVL III: CPT | Mod: PBBFAC,,, | Performed by: SURGERY

## 2022-10-11 PROCEDURE — 99205 OFFICE O/P NEW HI 60 MIN: CPT | Mod: S$GLB,,, | Performed by: SURGERY

## 2022-10-11 PROCEDURE — 1160F PR REVIEW ALL MEDS BY PRESCRIBER/CLIN PHARMACIST DOCUMENTED: ICD-10-PCS | Mod: CPTII,S$GLB,, | Performed by: SURGERY

## 2022-10-11 PROCEDURE — 99999 PR PBB SHADOW E&M-EST. PATIENT-LVL III: ICD-10-PCS | Mod: PBBFAC,,, | Performed by: SURGERY

## 2022-10-11 PROCEDURE — 3008F PR BODY MASS INDEX (BMI) DOCUMENTED: ICD-10-PCS | Mod: CPTII,S$GLB,, | Performed by: SURGERY

## 2022-10-11 PROCEDURE — 36415 COLL VENOUS BLD VENIPUNCTURE: CPT | Performed by: SURGERY

## 2022-10-11 PROCEDURE — 3079F PR MOST RECENT DIASTOLIC BLOOD PRESSURE 80-89 MM HG: ICD-10-PCS | Mod: CPTII,S$GLB,, | Performed by: SURGERY

## 2022-10-11 PROCEDURE — 85025 COMPLETE CBC W/AUTO DIFF WBC: CPT | Performed by: SURGERY

## 2022-10-11 PROCEDURE — 3075F SYST BP GE 130 - 139MM HG: CPT | Mod: CPTII,S$GLB,, | Performed by: SURGERY

## 2022-10-11 PROCEDURE — 1160F RVW MEDS BY RX/DR IN RCRD: CPT | Mod: CPTII,S$GLB,, | Performed by: SURGERY

## 2022-10-11 PROCEDURE — 3008F BODY MASS INDEX DOCD: CPT | Mod: CPTII,S$GLB,, | Performed by: SURGERY

## 2022-10-11 PROCEDURE — 1159F PR MEDICATION LIST DOCUMENTED IN MEDICAL RECORD: ICD-10-PCS | Mod: CPTII,S$GLB,, | Performed by: SURGERY

## 2022-10-11 PROCEDURE — 3079F DIAST BP 80-89 MM HG: CPT | Mod: CPTII,S$GLB,, | Performed by: SURGERY

## 2022-10-11 PROCEDURE — 1159F MED LIST DOCD IN RCRD: CPT | Mod: CPTII,S$GLB,, | Performed by: SURGERY

## 2022-10-11 PROCEDURE — 3075F PR MOST RECENT SYSTOLIC BLOOD PRESS GE 130-139MM HG: ICD-10-PCS | Mod: CPTII,S$GLB,, | Performed by: SURGERY

## 2022-10-11 PROCEDURE — 99205 PR OFFICE/OUTPT VISIT, NEW, LEVL V, 60-74 MIN: ICD-10-PCS | Mod: S$GLB,,, | Performed by: SURGERY

## 2022-10-11 NOTE — PROGRESS NOTES
Genetics Lay Navigator Note:    Met with patient at her consult with Dr. Boswell today 10/11/2022, to facilitate genetic testing. Set patient up with [facility] genetic counselor over the phone to complete counseling prior to testing. Patient verbalized understanding to all counseling information. [facility] brochure with number to call with questions or concerns provided to patient as well as my card. Encouraged patient to call me or [facility] at any time.     Lab appointment made and patient escorted with [facility] kit to lab for specimen draw and processing. Patient instructed that results will be provided as soon as they are available. No questions or concerns from patient about plan of care.     [facility] Genetic Pedigree attached to documentation note dated today 10/11/2022.    [employer] Tracking # [id number]

## 2022-10-11 NOTE — Clinical Note
I am seeing her for a new diagnosis of DCIS.  She is opting for a lumpectomy with breast reduction, but will undergo an MRI and genetic testing prior for surgical planning.  Thanks for sending her! Renetta Boswell MD Breast Surgical Oncology
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
## 2022-10-12 PROBLEM — C50.811 MALIGNANT NEOPLASM OF OVERLAPPING SITES OF RIGHT BREAST IN FEMALE, ESTROGEN RECEPTOR POSITIVE: Status: ACTIVE | Noted: 2022-10-12

## 2022-10-12 PROBLEM — Z17.0 MALIGNANT NEOPLASM OF OVERLAPPING SITES OF RIGHT BREAST IN FEMALE, ESTROGEN RECEPTOR POSITIVE: Status: ACTIVE | Noted: 2022-10-12

## 2022-10-13 ENCOUNTER — DOCUMENTATION ONLY (OUTPATIENT)
Dept: SURGERY | Facility: CLINIC | Age: 52
End: 2022-10-13
Payer: MEDICARE

## 2022-10-13 DIAGNOSIS — Z17.0 MALIGNANT NEOPLASM OF OVERLAPPING SITES OF RIGHT BREAST IN FEMALE, ESTROGEN RECEPTOR POSITIVE: Primary | ICD-10-CM

## 2022-10-13 DIAGNOSIS — C50.811 MALIGNANT NEOPLASM OF OVERLAPPING SITES OF RIGHT BREAST IN FEMALE, ESTROGEN RECEPTOR POSITIVE: Primary | ICD-10-CM

## 2022-10-13 NOTE — NURSING
Nurse Navigator Note:     Met with patient during her consult with Dr. Boswell.  Patient and I reviewed the information she discussed with Dr. Boswell, including treatment options, diagnosis, and future plans for workup. Patient and I went through the new patient binder, explained some of the information and why it is provided.     Also offered patient consults with our other specialty clinics: Dr. Ellington for Integrative Therapies, Survivorship and/or Women's Gynecologic needs, our breast physical therapy department for pre-op and post-operative assessments, Oncologic Psychology for psychological support, and Oncologic Nutrition for nutritional counseling. Explained to patient that all of these support services are completely optional. Discussed that physical therapy may call patient to offer pre-op appt, and what that appt would entail.     Patient was given a copy of her appointments, Dr. Boswell's card, and my card. Encouraged her to call me if she has any questions or concerns or would like to schedule any additional appointments. Verbalized understanding of all information.     Genetics done at visit. PT and integrative oncology referrals placed. Patient added to support group e mail. Patient scheduled for MRI and to see Dr. Barker. Bag and bra given to patient for post op care.  Oncology Navigation   Intake  Date of Diagnosis: 09/21/22  Cancer Type: Breast  Internal / External Referral: Internal  Date of Referral: 10/10/22  Initial Nurse Navigator Contact: 10/10/22  Referral to Initial Contact Timeline (days): 0  Date Worked: 10/13/22     Treatment  Current Status: Staging work-up    Surgery: Planned  Surgical Oncologist: Dr. Boswell  Plastic Surgeon: Dr. Barker  Type of Surgery: Flap or reduction  Consult Date: 10/11/22          Procedures: Genetic test; MRI  Biopsy Schedule Date: 09/21/22  Genetic Testing Date Sent: 10/13/22  MRI Schedule Date: 10/14/22    Physical Therapy Referral Date: 10/13/22    ER:  Positive  WI: Negative       Support Systems: Family members     Acuity      Follow Up  Follow up in about 6 days (around 10/19/2022) for f/u after Dr. Barker.

## 2022-10-14 ENCOUNTER — OFFICE VISIT (OUTPATIENT)
Dept: UROGYNECOLOGY | Facility: CLINIC | Age: 52
End: 2022-10-14
Payer: MEDICARE

## 2022-10-14 ENCOUNTER — HOSPITAL ENCOUNTER (OUTPATIENT)
Dept: RADIOLOGY | Facility: OTHER | Age: 52
Discharge: HOME | End: 2022-10-14
Attending: SURGERY
Payer: MEDICARE

## 2022-10-14 VITALS
HEIGHT: 64 IN | WEIGHT: 202 LBS | BODY MASS INDEX: 34.49 KG/M2 | SYSTOLIC BLOOD PRESSURE: 130 MMHG | DIASTOLIC BLOOD PRESSURE: 80 MMHG

## 2022-10-14 DIAGNOSIS — N81.11 CYSTOCELE, MIDLINE: ICD-10-CM

## 2022-10-14 DIAGNOSIS — N39.3 STRESS INCONTINENCE: Primary | ICD-10-CM

## 2022-10-14 DIAGNOSIS — N39.41 URGE INCONTINENCE OF URINE: ICD-10-CM

## 2022-10-14 DIAGNOSIS — R31.29 MICROSCOPIC HEMATURIA: ICD-10-CM

## 2022-10-14 DIAGNOSIS — C50.919 MALIGNANT NEOPLASM OF BREAST (FEMALE): ICD-10-CM

## 2022-10-14 DIAGNOSIS — R32 URINARY INCONTINENCE, UNSPECIFIED TYPE: ICD-10-CM

## 2022-10-14 LAB
BACTERIA #/AREA URNS AUTO: NORMAL /HPF
BILIRUB UR QL STRIP: NEGATIVE
BILIRUB UR QL STRIP: NEGATIVE
CLARITY UR REFRACT.AUTO: CLEAR
COLOR UR AUTO: YELLOW
GLUCOSE UR QL STRIP: NEGATIVE
GLUCOSE UR QL STRIP: NEGATIVE
HGB UR QL STRIP: ABNORMAL
KETONES UR QL STRIP: NEGATIVE
KETONES UR QL STRIP: NEGATIVE
LEUKOCYTE ESTERASE UR QL STRIP: NEGATIVE
LEUKOCYTE ESTERASE UR QL STRIP: NEGATIVE
MICROSCOPIC COMMENT: NORMAL
NITRITE UR QL STRIP: NEGATIVE
PH UR STRIP: 6 [PH] (ref 5–8)
PH, POC UA: 5
POC BLOOD, URINE: POSITIVE
POC NITRATES, URINE: NEGATIVE
PROT UR QL STRIP: NEGATIVE
PROT UR QL STRIP: POSITIVE
RBC #/AREA URNS AUTO: 1 /HPF (ref 0–4)
SP GR UR STRIP: 1.01 (ref 1–1.03)
SP GR UR STRIP: 1.02 (ref 1–1.03)
SQUAMOUS #/AREA URNS AUTO: 2 /HPF
URN SPEC COLLECT METH UR: ABNORMAL
UROBILINOGEN UR STRIP-ACNC: NEGATIVE (ref 0.1–1.1)
WBC #/AREA URNS AUTO: 1 /HPF (ref 0–5)

## 2022-10-14 PROCEDURE — 99215 OFFICE O/P EST HI 40 MIN: CPT | Mod: 25,S$GLB,, | Performed by: OBSTETRICS & GYNECOLOGY

## 2022-10-14 PROCEDURE — 1160F PR REVIEW ALL MEDS BY PRESCRIBER/CLIN PHARMACIST DOCUMENTED: ICD-10-PCS | Mod: CPTII,S$GLB,, | Performed by: OBSTETRICS & GYNECOLOGY

## 2022-10-14 PROCEDURE — 3079F PR MOST RECENT DIASTOLIC BLOOD PRESSURE 80-89 MM HG: ICD-10-PCS | Mod: CPTII,S$GLB,, | Performed by: OBSTETRICS & GYNECOLOGY

## 2022-10-14 PROCEDURE — 77049 MRI BREAST C-+ W/CAD BI: CPT | Mod: 26,,, | Performed by: RADIOLOGY

## 2022-10-14 PROCEDURE — 99215 PR OFFICE/OUTPT VISIT, EST, LEVL V, 40-54 MIN: ICD-10-PCS | Mod: 25,S$GLB,, | Performed by: OBSTETRICS & GYNECOLOGY

## 2022-10-14 PROCEDURE — 1159F PR MEDICATION LIST DOCUMENTED IN MEDICAL RECORD: ICD-10-PCS | Mod: CPTII,S$GLB,, | Performed by: OBSTETRICS & GYNECOLOGY

## 2022-10-14 PROCEDURE — 1160F RVW MEDS BY RX/DR IN RCRD: CPT | Mod: CPTII,S$GLB,, | Performed by: OBSTETRICS & GYNECOLOGY

## 2022-10-14 PROCEDURE — 25500020 PHARM REV CODE 255: Performed by: SURGERY

## 2022-10-14 PROCEDURE — 77049 MRI BREAST C-+ W/CAD BI: CPT | Mod: TC

## 2022-10-14 PROCEDURE — 1159F MED LIST DOCD IN RCRD: CPT | Mod: CPTII,S$GLB,, | Performed by: OBSTETRICS & GYNECOLOGY

## 2022-10-14 PROCEDURE — 99999 PR PBB SHADOW E&M-EST. PATIENT-LVL III: CPT | Mod: PBBFAC,,, | Performed by: OBSTETRICS & GYNECOLOGY

## 2022-10-14 PROCEDURE — 99999 PR PBB SHADOW E&M-EST. PATIENT-LVL III: ICD-10-PCS | Mod: PBBFAC,,, | Performed by: OBSTETRICS & GYNECOLOGY

## 2022-10-14 PROCEDURE — 3075F SYST BP GE 130 - 139MM HG: CPT | Mod: CPTII,S$GLB,, | Performed by: OBSTETRICS & GYNECOLOGY

## 2022-10-14 PROCEDURE — A9577 INJ MULTIHANCE: HCPCS | Performed by: SURGERY

## 2022-10-14 PROCEDURE — 81001 URINALYSIS AUTO W/SCOPE: CPT | Performed by: OBSTETRICS & GYNECOLOGY

## 2022-10-14 PROCEDURE — 3079F DIAST BP 80-89 MM HG: CPT | Mod: CPTII,S$GLB,, | Performed by: OBSTETRICS & GYNECOLOGY

## 2022-10-14 PROCEDURE — 81003 URINALYSIS AUTO W/O SCOPE: CPT | Performed by: OBSTETRICS & GYNECOLOGY

## 2022-10-14 PROCEDURE — 77049 MRI BREAST W/WO CONTRAST, W/CAD, BILATERAL: ICD-10-PCS | Mod: 26,,, | Performed by: RADIOLOGY

## 2022-10-14 PROCEDURE — 51798 US URINE CAPACITY MEASURE: CPT | Mod: S$GLB,,, | Performed by: OBSTETRICS & GYNECOLOGY

## 2022-10-14 PROCEDURE — 51798 PR MEAS,POST-VOID RES,US,NON-IMAGING: ICD-10-PCS | Mod: S$GLB,,, | Performed by: OBSTETRICS & GYNECOLOGY

## 2022-10-14 PROCEDURE — 3075F PR MOST RECENT SYSTOLIC BLOOD PRESS GE 130-139MM HG: ICD-10-PCS | Mod: CPTII,S$GLB,, | Performed by: OBSTETRICS & GYNECOLOGY

## 2022-10-14 RX ADMIN — GADOBENATE DIMEGLUMINE 18 ML: 529 INJECTION, SOLUTION INTRAVENOUS at 05:10
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
## 2022-10-18 ENCOUNTER — OFFICE VISIT (OUTPATIENT)
Dept: PLASTIC SURGERY | Facility: CLINIC | Age: 52
End: 2022-10-18
Payer: MEDICARE

## 2022-10-18 VITALS
BODY MASS INDEX: 35 KG/M2 | WEIGHT: 205 LBS | HEART RATE: 98 BPM | HEIGHT: 64 IN | DIASTOLIC BLOOD PRESSURE: 99 MMHG | SYSTOLIC BLOOD PRESSURE: 188 MMHG

## 2022-10-18 DIAGNOSIS — C50.811 MALIGNANT NEOPLASM OF OVERLAPPING SITES OF RIGHT BREAST IN FEMALE, ESTROGEN RECEPTOR POSITIVE: ICD-10-CM

## 2022-10-18 DIAGNOSIS — Z17.0 MALIGNANT NEOPLASM OF OVERLAPPING SITES OF RIGHT BREAST IN FEMALE, ESTROGEN RECEPTOR POSITIVE: ICD-10-CM

## 2022-10-18 DIAGNOSIS — D05.11 DUCTAL CARCINOMA IN SITU (DCIS) OF RIGHT BREAST: Primary | ICD-10-CM

## 2022-10-18 PROCEDURE — 3080F DIAST BP >= 90 MM HG: CPT | Mod: CPTII,S$GLB,, | Performed by: SURGERY

## 2022-10-18 PROCEDURE — 99204 OFFICE O/P NEW MOD 45 MIN: CPT | Mod: S$GLB,,, | Performed by: SURGERY

## 2022-10-18 PROCEDURE — 3077F PR MOST RECENT SYSTOLIC BLOOD PRESSURE >= 140 MM HG: ICD-10-PCS | Mod: CPTII,S$GLB,, | Performed by: SURGERY

## 2022-10-18 PROCEDURE — 3080F PR MOST RECENT DIASTOLIC BLOOD PRESSURE >= 90 MM HG: ICD-10-PCS | Mod: CPTII,S$GLB,, | Performed by: SURGERY

## 2022-10-18 PROCEDURE — 3077F SYST BP >= 140 MM HG: CPT | Mod: CPTII,S$GLB,, | Performed by: SURGERY

## 2022-10-18 PROCEDURE — 99204 PR OFFICE/OUTPT VISIT, NEW, LEVL IV, 45-59 MIN: ICD-10-PCS | Mod: S$GLB,,, | Performed by: SURGERY

## 2022-10-18 NOTE — ASSESSMENT & PLAN NOTE
Discussed with the patient the difference between breast conserving therapy and mastectomy, also the implications of her surgical choice on the need for radiation.  We discussed the difference between the role of the plastic surgeon and the role of the breast oncologic surgeon.  The patient does smoke cigars daily, she smokes cigarettes.  I feel like this precludes her from autologous free flap reconstruction.  I do think she would be a candidate for an oncoplastic reduction.  We did discuss the affects of smoking on wound healing and wound infection in addition to the typical discussion about breast reduction techniques, incisions, expected postoperative scarring, normal postoperative course and recovery, and usual complications.  It sounds like she will see Dr. Boswell again and make final decisions regarding her surgical plan.  Expanded based on the timing for surgery we may see her back again for a preoperative visit to review her surgical plan once more as the patient is still somewhat unsure.
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
## 2022-10-20 DIAGNOSIS — N39.3 STRESS INCONTINENCE: Primary | ICD-10-CM

## 2022-10-21 ENCOUNTER — TELEPHONE (OUTPATIENT)
Dept: SURGERY | Facility: CLINIC | Age: 52
End: 2022-10-21
Payer: MEDICARE

## 2022-10-21 NOTE — TELEPHONE ENCOUNTER
Genetic Lay Navigator Note:    Called patient with the results of genetic testing. 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 Discussed that the results do not indicate any further action is needed at this time, and that the company will notify us if any additional recommendations become available. Explained that we will provide a formal copy of report via [facility] or mail to patient.    Offered patient a phone session with a genetic counselor through [facility], or a in person session with our [facility] genetic counselors. Also discussed that this will remain an available option for patient at any time in the future.     Patient was instructed to call with any additional questions or concerns. Verbalized understanding of all information.    Provider notified of results and that patient was given them.     Full results report scanned in media and attached to documentation note dated 10/11/2022.       Text: The above diagnosis and findings were noted on the exam but not discussed at this time with the patient. Detail Level: Detailed

## 2022-10-25 ENCOUNTER — HOSPITAL ENCOUNTER (OUTPATIENT)
Dept: RADIOLOGY | Facility: HOSPITAL | Age: 52
Discharge: HOME | End: 2022-10-25
Attending: SURGERY
Payer: MEDICARE

## 2022-10-25 ENCOUNTER — PATIENT MESSAGE (OUTPATIENT)
Dept: SURGERY | Facility: CLINIC | Age: 52
End: 2022-10-25
Payer: MEDICARE

## 2022-10-25 DIAGNOSIS — C50.811 MALIGNANT NEOPLASM OF OVERLAPPING SITES OF RIGHT BREAST IN FEMALE, ESTROGEN RECEPTOR POSITIVE: ICD-10-CM

## 2022-10-25 DIAGNOSIS — Z17.0 MALIGNANT NEOPLASM OF OVERLAPPING SITES OF RIGHT BREAST IN FEMALE, ESTROGEN RECEPTOR POSITIVE: ICD-10-CM

## 2022-10-25 PROCEDURE — 25000003 PHARM REV CODE 250: Performed by: SURGERY

## 2022-10-25 PROCEDURE — 19281 PERQ DEVICE BREAST 1ST IMAG: CPT | Mod: RT

## 2022-10-25 PROCEDURE — 19281 PERQ DEVICE BREAST 1ST IMAG: CPT | Mod: RT,,, | Performed by: RADIOLOGY

## 2022-10-25 PROCEDURE — A4648 IMPLANTABLE TISSUE MARKER: HCPCS

## 2022-10-25 PROCEDURE — 19281 MAMMO BREAST RADAR REFLECTOR LOC W/MAMMO GUIDANCE, 1ST LESION, RIGHT: ICD-10-PCS | Mod: RT,,, | Performed by: RADIOLOGY

## 2022-10-25 RX ORDER — LIDOCAINE HYDROCHLORIDE 20 MG/ML
10 INJECTION, SOLUTION INFILTRATION; PERINEURAL ONCE
Status: COMPLETED | OUTPATIENT
Start: 2022-10-25 | End: 2022-10-25

## 2022-10-25 RX ADMIN — LIDOCAINE HYDROCHLORIDE 10 ML: 20 INJECTION, SOLUTION INFILTRATION; PERINEURAL at 10:10

## 2022-11-02 ENCOUNTER — TELEPHONE (OUTPATIENT)
Dept: PLASTIC SURGERY | Facility: CLINIC | Age: 52
End: 2022-11-02
Payer: MEDICARE

## 2022-11-02 NOTE — TELEPHONE ENCOUNTER
Called pt and instructed on preop and post -op appointments made . Pt verbalized understanding and aware of her scheduled appts.

## 2022-11-03 ENCOUNTER — TELEPHONE (OUTPATIENT)
Dept: PLASTIC SURGERY | Facility: CLINIC | Age: 52
End: 2022-11-03
Payer: MEDICARE

## 2022-11-03 LAB — INTEGRATED BRAC ANALYSIS: NORMAL

## 2022-11-11 ENCOUNTER — ANESTHESIA EVENT (OUTPATIENT)
Dept: SURGERY | Facility: OTHER | Age: 52
End: 2022-11-11
Payer: MEDICARE

## 2022-11-11 ENCOUNTER — HOSPITAL ENCOUNTER (OUTPATIENT)
Dept: PREADMISSION TESTING | Facility: OTHER | Age: 52
Discharge: HOME | End: 2022-11-11
Attending: SURGERY
Payer: MEDICARE

## 2022-11-11 VITALS
BODY MASS INDEX: 34.49 KG/M2 | HEART RATE: 73 BPM | HEIGHT: 64 IN | RESPIRATION RATE: 16 BRPM | SYSTOLIC BLOOD PRESSURE: 151 MMHG | WEIGHT: 202 LBS | TEMPERATURE: 98 F | OXYGEN SATURATION: 95 % | DIASTOLIC BLOOD PRESSURE: 88 MMHG

## 2022-11-11 DIAGNOSIS — Z01.818 PRE-OP TESTING: ICD-10-CM

## 2022-11-11 PROCEDURE — 93010 ELECTROCARDIOGRAM REPORT: CPT | Mod: ,,, | Performed by: INTERNAL MEDICINE

## 2022-11-11 PROCEDURE — 93005 ELECTROCARDIOGRAM TRACING: CPT

## 2022-11-11 PROCEDURE — 93010 EKG 12-LEAD: ICD-10-PCS | Mod: ,,, | Performed by: INTERNAL MEDICINE

## 2022-11-11 RX ORDER — ACETAMINOPHEN 500 MG
1000 TABLET ORAL
Status: CANCELLED | OUTPATIENT
Start: 2022-11-11 | End: 2022-11-11

## 2022-11-11 RX ORDER — SODIUM CHLORIDE, SODIUM LACTATE, POTASSIUM CHLORIDE, CALCIUM CHLORIDE 600; 310; 30; 20 MG/100ML; MG/100ML; MG/100ML; MG/100ML
INJECTION, SOLUTION INTRAVENOUS CONTINUOUS
Status: CANCELLED | OUTPATIENT
Start: 2022-11-11

## 2022-11-11 RX ORDER — LIDOCAINE HYDROCHLORIDE 10 MG/ML
0.5 INJECTION, SOLUTION EPIDURAL; INFILTRATION; INTRACAUDAL; PERINEURAL ONCE
Status: CANCELLED | OUTPATIENT
Start: 2022-11-11 | End: 2022-11-11

## 2022-11-11 RX ORDER — FAMOTIDINE 20 MG/1
20 TABLET, FILM COATED ORAL
Status: CANCELLED | OUTPATIENT
Start: 2022-11-11 | End: 2022-11-11

## 2022-11-11 RX ORDER — ASPIRIN 81 MG/1
81 TABLET ORAL DAILY
COMMUNITY

## 2022-11-11 NOTE — ANESTHESIA PREPROCEDURE EVALUATION
11/11/2022  Brionna Funes is a 52 y.o., female.    Anesthesia Evaluation    I have reviewed the Patient Summary Reports.    I have reviewed the Nursing Notes. I have reviewed the NPO Status.   I have reviewed the Medications.     Review of Systems  Anesthesia Hx:  No problems with previous Anesthesia  History of prior surgery of interest to airway management or planning: Previous anesthesia: General 2018 x 4 s/p infected TKA with general anesthesia.  Procedure performed at an [facility]. Airway issues documented on chart review include mask, easy, laryngeal mask airway used, GETA, videolaryngoscope used  Denies Family Hx of Anesthesia complications.   Denies Personal Hx of Anesthesia complications.   Social:  Former Smoker    Hematology/Oncology:  Hematology Normal      Current/Recent Cancer. Breast right   EENT/Dental:EENT/Dental Normal   Cardiovascular:   Exercise tolerance: good Hypertension, well controlled hyperlipidemia ECG has been reviewed. 2018 stress echo neg for ischemia   Pulmonary:  Pulmonary Normal    Renal/:  Renal/ Normal     Hepatic/GI:  Hepatic/GI Normal    Musculoskeletal:   Arthritis     Neurological:  Neurology Normal    Endocrine:  Endocrine Normal  Obesity / BMI > 30  Dermatological:  Skin Normal    Psych:  Psychiatric Normal           Physical Exam  General:  Cooperative, Alert and Oriented      Airway/Jaw/Neck:  Airway Findings: Mouth Opening: Normal   Tongue: Normal   General Airway Assessment: Adult Mallampati: II  TM Distance: Normal, at least 6 cm   Neck ROM: Normal ROM       Dental:  Dental Findings: Periodontal disease, Mild, molar caps, upper front caps          Mental Status:  Mental Status Findings:  Cooperative, Alert and Oriented         Anesthesia Plan  Type of Anesthesia, risks & benefits discussed:  Anesthesia Type:  general    Patient's Preference:    Plan Factors:          Intra-op Monitoring Plan: standard ASA monitors  Intra-op Monitoring Plan Comments:   Post Op Pain Control Plan: multimodal analgesia  Post Op Pain Control Plan Comments:     Induction:   IV  Beta Blocker:         Informed Consent: Informed consent signed with the Patient and all parties understand the risks and agree with anesthesia plan.  All questions answered.  Anesthesia consent signed with patient.  ASA Score: 3     Day of Surgery Review of History & Physical:        Anesthesia Plan Notes: Recent labs in epic OK  EKG today        Ready For Surgery From Anesthesia Perspective.           Physical Exam  General: Cooperative, Alert and Oriented    Airway:  Mallampati: II   Mouth Opening: Normal  TM Distance: Normal, at least 6 cm  Tongue: Normal  Neck ROM: Normal ROM    Dental:  Periodontal disease, Mild, molar caps, upper front caps          Anesthesia Plan  Type of Anesthesia, risks & benefits discussed:    Anesthesia Type: general  Intra-op Monitoring Plan: standard ASA monitors  Post Op Pain Control Plan: multimodal analgesia  Induction:  IV  Informed Consent: Informed consent signed with the Patient and all parties understand the risks and agree with anesthesia plan.  All questions answered.   ASA Score: 3  Anesthesia Plan Notes: Recent labs in epic OK  EKG today    Ready For Surgery From Anesthesia Perspective.       .

## 2022-11-11 NOTE — DISCHARGE INSTRUCTIONS
Information to Prepare you for your Surgery    PRE-ADMIT TESTING -  [phone]    [address] [facility]        Your surgery has been scheduled at [facility]. We are pleased to have the opportunity to serve you. For Further Information please call [phone].    On the day of surgery please report to the Information Desk on the 1st floor.    CONTACT YOUR PHYSICIAN'S OFFICE THE DAY PRIOR TO YOUR SURGERY TO OBTAIN YOUR ARRIVAL TIME.     The evening before surgery do not eat anything after 9 p.m. ( this includes hard candy, chewing gum and mints).  You may only have GATORADE, POWERADE AND WATER  from 9 p.m. until you leave your home.   DO NOT DRINK ANY LIQUIDS ON THE WAY TO THE HOSPITAL.      Why does your anesthesiologist allow you to drink Gatorade/Powerade before surgery?  Gatorade/Powerade helps to increase your comfort before surgery and to decrease your nausea after surgery.   The carbohydrates in Gatorade/Powerade help reduce your body's stress response to surgery.  If you are a diabetic-drink only water prior to surgery.    Current Visitor policy(12/27/2021) - Patients may have 2 visitors pre and post procedure. Only 2 visitors will be allowed in the Surgical building with the patient.     SPECIAL MEDICATION INSTRUCTIONS: TAKE medications checked off by the Anesthesiologist on your Medication List.    Surgery Patients:  If you take ASPIRIN - Your PHYSICIAN/SURGEON will need to inform you IF/OR when you need to stop taking aspirin prior to your surgery.     The week prior to surgery do not ot take any medications containing IBUPROFEN or NSAIDS ( Advil, Motrin, Goodys, BC, Aleve, Naproxen etc) If you are not sure if you should take a medicine please call your surgeon's office.  You may take Tylenol.    Do Not Wear any make-up (especially eye make-up) to surgery. Please remove any false eyelashes or eyelash extensions. If you arrive the day of surgery with makeup/eyelashes  on you will be required to remove prior to surgery. (There is a risk of corneal abrasions if eye makeup/eyelash extensions are not removed)    Leave all valuables at home.   Do Not wear any jewelry or watches, including any metal in body piercings. Jewelry must be removed prior to coming to the hospital.  There is a possibility that rings that are unable to be removed may be cut off if they are on the surgical extremity.    Please remove all hair extensions, wigs, clips and any other metal accessories/ ornaments from your hair.  These items may pose a flammable/fire risk in Surgery and must be removed.    Do not shave your surgical area at least 5 days prior to your surgery. The surgical prep will be performed at the hospital according to Infection Control regulations.    Contact Lens must be removed before surgery. Either do not wear the contact lens or bring a case and solution for storage.  Please bring a container for eyeglasses or dentures as required.  Bring any paperwork your physician has provided, such as consent forms,  history and physicals, doctor's orders, etc.   Bring comfortable clothes that are loose fitting to wear upon discharge. Take into consideration the type of surgery being performed.  Maintain your diet as advised per your physician the day prior to surgery.    Adequate rest the night before surgery is advised.   Park in the Parking lot behind the hospital or in the [facility] across the street from the parking lot. Parking is complimentary.  If you will be discharged the same day as your procedure, please arrange for a responsible adult to drive you home or to accompany you if traveling by taxi.   YOU WILL NOT BE PERMITTED TO DRIVE OR TO LEAVE THE HOSPITAL ALONE AFTER SURGERY.   If you are being discharged the same day, it is strongly recommended that you arrange for someone to remain with you for the first 24 hrs following your surgery.    The Surgeon will speak to your  family/visitor after your surgery regarding the outcome of your surgery and post op care.  The Surgeon may speak to you after your surgery, but there is a possibility you may not remember the details.  Please check with your family members regarding the conversation with the Surgeon.    We strongly recommend whoever is bringing you home be present for discharge instructions.  This will ensure a thorough understanding for your post op home care.    ALL CHILDREN MUST ALWAYS BE ACCOMPANIED BY AN ADULT.    Visitors-Refer to current Visitor policy handouts.    Thank you for your cooperation.  The Staff of [facility].            Bathing Instructions with Hibiclens  Shower the evening before and morning of your procedure with Chlorhexidine (Hibiclens)  do not use Chlorhexidine on your face or genitals. Do not get in your eyes.  Wash your face with water and your regular face wash/soap  Use your regular shampoo  Apply Chlorhexidine (Hibiclens) directly on your skin or on a wet washcloth and wash gently. When showering: Move away from the shower stream when applying Chlorhexidine (Hibiclens) to avoid rinsing off too soon.  Rinse thoroughly with warm water  Do not dilute Chlorhexidine (Hibiclens)   Dry off as usual, do not use any deodorant, powder, body lotions, perfume, after shave or cologne.

## 2022-11-15 ENCOUNTER — OFFICE VISIT (OUTPATIENT)
Dept: PLASTIC SURGERY | Facility: CLINIC | Age: 52
End: 2022-11-15
Payer: MEDICARE

## 2022-11-15 VITALS
SYSTOLIC BLOOD PRESSURE: 131 MMHG | WEIGHT: 202 LBS | BODY MASS INDEX: 34.49 KG/M2 | OXYGEN SATURATION: 98 % | DIASTOLIC BLOOD PRESSURE: 97 MMHG | HEART RATE: 77 BPM | HEIGHT: 64 IN

## 2022-11-15 DIAGNOSIS — Z17.0 MALIGNANT NEOPLASM OF OVERLAPPING SITES OF RIGHT BREAST IN FEMALE, ESTROGEN RECEPTOR POSITIVE: Primary | ICD-10-CM

## 2022-11-15 DIAGNOSIS — C50.811 MALIGNANT NEOPLASM OF OVERLAPPING SITES OF RIGHT BREAST IN FEMALE, ESTROGEN RECEPTOR POSITIVE: Primary | ICD-10-CM

## 2022-11-15 PROCEDURE — 3008F BODY MASS INDEX DOCD: CPT | Mod: CPTII,S$GLB,, | Performed by: SURGERY

## 2022-11-15 PROCEDURE — 3080F PR MOST RECENT DIASTOLIC BLOOD PRESSURE >= 90 MM HG: ICD-10-PCS | Mod: CPTII,S$GLB,, | Performed by: SURGERY

## 2022-11-15 PROCEDURE — 1159F PR MEDICATION LIST DOCUMENTED IN MEDICAL RECORD: ICD-10-PCS | Mod: CPTII,S$GLB,, | Performed by: SURGERY

## 2022-11-15 PROCEDURE — 99213 OFFICE O/P EST LOW 20 MIN: CPT | Mod: S$GLB,,, | Performed by: SURGERY

## 2022-11-15 PROCEDURE — 3075F SYST BP GE 130 - 139MM HG: CPT | Mod: CPTII,S$GLB,, | Performed by: SURGERY

## 2022-11-15 PROCEDURE — 3075F PR MOST RECENT SYSTOLIC BLOOD PRESS GE 130-139MM HG: ICD-10-PCS | Mod: CPTII,S$GLB,, | Performed by: SURGERY

## 2022-11-15 PROCEDURE — 99213 PR OFFICE/OUTPT VISIT, EST, LEVL III, 20-29 MIN: ICD-10-PCS | Mod: S$GLB,,, | Performed by: SURGERY

## 2022-11-15 PROCEDURE — 3008F PR BODY MASS INDEX (BMI) DOCUMENTED: ICD-10-PCS | Mod: CPTII,S$GLB,, | Performed by: SURGERY

## 2022-11-15 PROCEDURE — 1159F MED LIST DOCD IN RCRD: CPT | Mod: CPTII,S$GLB,, | Performed by: SURGERY

## 2022-11-15 PROCEDURE — 3080F DIAST BP >= 90 MM HG: CPT | Mod: CPTII,S$GLB,, | Performed by: SURGERY

## 2022-11-16 NOTE — ASSESSMENT & PLAN NOTE
Discussed oncoplastic plastic breast reduction.  Of note her area of disease is close to her right areola and said that she may or may not be able to salvage her nipple due to whether it is safe oncologically and also as this may affect the blood supply.    Discussed the procedure in detail including: using a pedicle of breast tissue to support blood supply to the nipple, the use of Wise pattern skin flaps to reduce size and support the pedicle, risks of skin loss, fat necrosis, partial or complete nipple loss, delayed wound healing, normal scarring, outpatient surgery, general recovery and the use of drains. Also discussed that breasts may be bruised or swollen afterwards and won't have their final appearance for 3-6 months after surgery.  All questions answered.  Patient with many questions regarding adjuvant therapy she may need.  Photographic and surgical consents signed.  Preop instructions given.  She is scheduled for surgery on Friday

## 2022-11-16 NOTE — PROGRESS NOTES
Ms. Arteaga is here for a preoperative visit.    She has DCIS of the right breast.  It is in the retroareolar position, middle that.  She is deflated ptotic breasts with significant rashes in her skin fold.  We would previously met and discussed oncoplastic breast reduction should help reconstruct the lumpectomy defect but also help with the rashes under her breasts.  She is a former smoker and smokes 1 cigar daily.  We discussed how she is nicotine could affect her postoperative wound healing  Also in the retroareolar position, it is a distance would be oncologically safe to spare her nipple, however we discussed the possible impact of its location on the blood supply the nipple.  I drew her pictures to show what her scars would look like with and without being able to save the right nipple.    Patient also had questions regarding her adjuvant treatment.  She is inspecting to need a pill, also told her that she would need radiotherapy.    Gen: NAD, Aox3  CV: RRR  Pulm: Breathing non-labored, chest wall movement equal bilaterally  Breasts: deflated soft breasts, active mild erythematous rash under each breast skin fold    Abdomen: soft, nontender, no guarding  Psych: normal mood and affect      Malignant neoplasm of overlapping sites of right breast in female, estrogen receptor positive  Discussed oncoplastic plastic breast reduction.  Of note her area of disease is close to her right areola and said that she may or may not be able to salvage her nipple due to whether it is safe oncologically and also as this may affect the blood supply.    Discussed the procedure in detail including: using a pedicle of breast tissue to support blood supply to the nipple, the use of Wise pattern skin flaps to reduce size and support the pedicle, risks of skin loss, fat necrosis, partial or complete nipple loss, delayed wound healing, normal scarring, outpatient surgery, general recovery and the use of drains. Also discussed  that breasts may be bruised or swollen afterwards and won't have their final appearance for 3-6 months after surgery.  All questions answered.  Patient with many questions regarding adjuvant therapy she may need.  Photographic and surgical consents signed.  Preop instructions given.  She is scheduled for surgery on Friday    Lionel Barker DO  Plastic and Reconstructive Surgery    I spent a total of 25 minutes on the day of the visit.This includes face to face time and non-face to face time preparing to see the patient (eg, review of tests), obtaining and/or reviewing separately obtained history, documenting clinical information in the electronic or other health record, independently interpreting results and communicating results to the patient/family/caregiver, or care coordinator.

## 2022-11-17 ENCOUNTER — TELEPHONE (OUTPATIENT)
Dept: SURGERY | Facility: CLINIC | Age: 52
End: 2022-11-17
Payer: MEDICARE

## 2022-11-17 NOTE — TELEPHONE ENCOUNTER
Confirmed arrival time for surgery on 11/18/22 at the [facility] with Dr.Amy Boswell. Arrival time is for 9 am surgery is scheduled for 11 am . Nothing to eat after 9 pm this evening 11/17/22 . Clear liquids Gatorade up until patient leaves home for the hospital in the morning. Patient may have 2 people with her on the morning of surgery if needed. Please leave all Jewelry home wear a button down shirt on the morning of surgery. Mrs. Funes appreciated the call and voiced understanding to this call.

## 2022-11-18 ENCOUNTER — HOSPITAL ENCOUNTER (OUTPATIENT)
Dept: RADIOLOGY | Facility: OTHER | Age: 52
Discharge: HOME | End: 2022-11-18
Attending: SURGERY | Admitting: SURGERY
Payer: MEDICARE

## 2022-11-18 ENCOUNTER — HOSPITAL ENCOUNTER (OUTPATIENT)
Facility: OTHER | Age: 52
Discharge: HOME | End: 2022-11-18
Attending: SURGERY | Admitting: SURGERY
Payer: MEDICARE

## 2022-11-18 ENCOUNTER — ANESTHESIA (OUTPATIENT)
Dept: SURGERY | Facility: OTHER | Age: 52
End: 2022-11-18
Payer: MEDICARE

## 2022-11-18 DIAGNOSIS — C50.811 MALIGNANT NEOPLASM OF OVERLAPPING SITES OF RIGHT BREAST IN FEMALE, ESTROGEN RECEPTOR POSITIVE: ICD-10-CM

## 2022-11-18 DIAGNOSIS — C50.811 MALIGNANT NEOPLASM OF OVERLAPPING SITES OF RIGHT BREAST IN FEMALE, ESTROGEN RECEPTOR POSITIVE: Primary | ICD-10-CM

## 2022-11-18 DIAGNOSIS — Z17.0 MALIGNANT NEOPLASM OF OVERLAPPING SITES OF RIGHT BREAST IN FEMALE, ESTROGEN RECEPTOR POSITIVE: ICD-10-CM

## 2022-11-18 DIAGNOSIS — Z17.0 MALIGNANT NEOPLASM OF OVERLAPPING SITES OF RIGHT BREAST IN FEMALE, ESTROGEN RECEPTOR POSITIVE: Primary | ICD-10-CM

## 2022-11-18 DIAGNOSIS — C50.911 MALIGNANT NEOPLASM OF RIGHT BREAST: ICD-10-CM

## 2022-11-18 PROCEDURE — 88360 TUMOR IMMUNOHISTOCHEM/MANUAL: CPT | Mod: 26,,, | Performed by: PATHOLOGY

## 2022-11-18 PROCEDURE — 63600175 PHARM REV CODE 636 W HCPCS: Performed by: STUDENT IN AN ORGANIZED HEALTH CARE EDUCATION/TRAINING PROGRAM

## 2022-11-18 PROCEDURE — 88341 IMHCHEM/IMCYTCHM EA ADD ANTB: CPT | Mod: 26,59,, | Performed by: PATHOLOGY

## 2022-11-18 PROCEDURE — P9045 ALBUMIN (HUMAN), 5%, 250 ML: HCPCS | Mod: JG | Performed by: NURSE ANESTHETIST, CERTIFIED REGISTERED

## 2022-11-18 PROCEDURE — 88307 TISSUE EXAM BY PATHOLOGIST: CPT | Performed by: PATHOLOGY

## 2022-11-18 PROCEDURE — 38525 PR BIOPSY/REM LYMPH NODES, AXILLARY: ICD-10-PCS | Mod: 51,RT,, | Performed by: SURGERY

## 2022-11-18 PROCEDURE — 88342 CHG IMMUNOCYTOCHEMISTRY: ICD-10-PCS | Mod: 26,59,, | Performed by: PATHOLOGY

## 2022-11-18 PROCEDURE — 25000003 PHARM REV CODE 250: Performed by: SURGERY

## 2022-11-18 PROCEDURE — 88341 IMHCHEM/IMCYTCHM EA ADD ANTB: CPT | Performed by: PATHOLOGY

## 2022-11-18 PROCEDURE — 25000003 PHARM REV CODE 250: Performed by: ANESTHESIOLOGY

## 2022-11-18 PROCEDURE — 63600175 PHARM REV CODE 636 W HCPCS: Performed by: ANESTHESIOLOGY

## 2022-11-18 PROCEDURE — 76098 MAMMO BREAST SPECIMEN: ICD-10-PCS | Mod: 26,,, | Performed by: RADIOLOGY

## 2022-11-18 PROCEDURE — 63600175 PHARM REV CODE 636 W HCPCS: Performed by: NURSE ANESTHETIST, CERTIFIED REGISTERED

## 2022-11-18 PROCEDURE — 36000706: Performed by: SURGERY

## 2022-11-18 PROCEDURE — 19301 PARTIAL MASTECTOMY: CPT | Mod: RT,,, | Performed by: SURGERY

## 2022-11-18 PROCEDURE — 71000015 HC POSTOP RECOV 1ST HR: Performed by: SURGERY

## 2022-11-18 PROCEDURE — 27201423 OPTIME MED/SURG SUP & DEVICES STERILE SUPPLY: Performed by: SURGERY

## 2022-11-18 PROCEDURE — 71000039 HC RECOVERY, EACH ADD'L HOUR: Performed by: SURGERY

## 2022-11-18 PROCEDURE — 19318 BREAST REDUCTION: CPT | Mod: 50,,, | Performed by: SURGERY

## 2022-11-18 PROCEDURE — 38792 RA TRACER ID OF SENTINL NODE: CPT | Mod: RT,,, | Performed by: SURGERY

## 2022-11-18 PROCEDURE — 88342 IMHCHEM/IMCYTCHM 1ST ANTB: CPT | Mod: 26,59,, | Performed by: PATHOLOGY

## 2022-11-18 PROCEDURE — 88309 TISSUE EXAM BY PATHOLOGIST: CPT | Mod: 26,,, | Performed by: PATHOLOGY

## 2022-11-18 PROCEDURE — 37000009 HC ANESTHESIA EA ADD 15 MINS: Performed by: SURGERY

## 2022-11-18 PROCEDURE — A9520 TC99 TILMANOCEPT DIAG 0.5MCI: HCPCS

## 2022-11-18 PROCEDURE — C1894 INTRO/SHEATH, NON-LASER: HCPCS | Performed by: SURGERY

## 2022-11-18 PROCEDURE — 88360 PR  TUMOR IMMUNOHISTOCHEM/MANUAL: ICD-10-PCS | Mod: 26,,, | Performed by: PATHOLOGY

## 2022-11-18 PROCEDURE — 38525 BIOPSY/REMOVAL LYMPH NODES: CPT | Mod: 51,RT,, | Performed by: SURGERY

## 2022-11-18 PROCEDURE — 88309 PR  SURG PATH,LEVEL VI: ICD-10-PCS | Mod: 26,,, | Performed by: PATHOLOGY

## 2022-11-18 PROCEDURE — 88307 PR  SURG PATH,LEVEL V: ICD-10-PCS | Mod: 26,,, | Performed by: PATHOLOGY

## 2022-11-18 PROCEDURE — 25000003 PHARM REV CODE 250: Performed by: NURSE ANESTHETIST, CERTIFIED REGISTERED

## 2022-11-18 PROCEDURE — 88305 TISSUE EXAM BY PATHOLOGIST: ICD-10-PCS | Mod: 26,,, | Performed by: PATHOLOGY

## 2022-11-18 PROCEDURE — 71000033 HC RECOVERY, INTIAL HOUR: Performed by: SURGERY

## 2022-11-18 PROCEDURE — 71000016 HC POSTOP RECOV ADDL HR: Performed by: SURGERY

## 2022-11-18 PROCEDURE — 88305 TISSUE EXAM BY PATHOLOGIST: CPT | Performed by: PATHOLOGY

## 2022-11-18 PROCEDURE — C1729 CATH, DRAINAGE: HCPCS | Performed by: SURGERY

## 2022-11-18 PROCEDURE — 36000707: Performed by: SURGERY

## 2022-11-18 PROCEDURE — 63600175 PHARM REV CODE 636 W HCPCS: Performed by: SURGERY

## 2022-11-18 PROCEDURE — 76098 X-RAY EXAM SURGICAL SPECIMEN: CPT | Mod: TC

## 2022-11-18 PROCEDURE — 37000008 HC ANESTHESIA 1ST 15 MINUTES: Performed by: SURGERY

## 2022-11-18 PROCEDURE — 19301 PR MASTECTOMY, PARTIAL: ICD-10-PCS | Mod: RT,,, | Performed by: SURGERY

## 2022-11-18 PROCEDURE — 88342 IMHCHEM/IMCYTCHM 1ST ANTB: CPT | Mod: 59 | Performed by: PATHOLOGY

## 2022-11-18 PROCEDURE — 88360 TUMOR IMMUNOHISTOCHEM/MANUAL: CPT | Performed by: PATHOLOGY

## 2022-11-18 PROCEDURE — 38792 PR IDENTIFY SENTINEL 2DE: ICD-10-PCS | Mod: RT,,, | Performed by: SURGERY

## 2022-11-18 PROCEDURE — 88341 PR IHC OR ICC EACH ADD'L SINGLE ANTIBODY  STAINPR: ICD-10-PCS | Mod: 26,59,, | Performed by: PATHOLOGY

## 2022-11-18 PROCEDURE — 76098 X-RAY EXAM SURGICAL SPECIMEN: CPT | Mod: 26,,, | Performed by: RADIOLOGY

## 2022-11-18 PROCEDURE — 19318 PR REDUCTION OF LARGE BREAST: ICD-10-PCS | Mod: 50,,, | Performed by: SURGERY

## 2022-11-18 PROCEDURE — 88305 TISSUE EXAM BY PATHOLOGIST: CPT | Mod: 26,,, | Performed by: PATHOLOGY

## 2022-11-18 PROCEDURE — 88307 TISSUE EXAM BY PATHOLOGIST: CPT | Mod: 26,,, | Performed by: PATHOLOGY

## 2022-11-18 RX ORDER — PROCHLORPERAZINE EDISYLATE 5 MG/ML
5 INJECTION INTRAMUSCULAR; INTRAVENOUS EVERY 30 MIN PRN
Status: DISCONTINUED | OUTPATIENT
Start: 2022-11-18 | End: 2022-11-18 | Stop reason: HOSPADM

## 2022-11-18 RX ORDER — ACETAMINOPHEN 500 MG
1000 TABLET ORAL
Status: COMPLETED | OUTPATIENT
Start: 2022-11-18 | End: 2022-11-18

## 2022-11-18 RX ORDER — DEXAMETHASONE SODIUM PHOSPHATE 4 MG/ML
INJECTION, SOLUTION INTRA-ARTICULAR; INTRALESIONAL; INTRAMUSCULAR; INTRAVENOUS; SOFT TISSUE
Status: DISCONTINUED | OUTPATIENT
Start: 2022-11-18 | End: 2022-11-18

## 2022-11-18 RX ORDER — IBUPROFEN 800 MG/1
800 TABLET ORAL EVERY 8 HOURS
Start: 2022-11-18 | End: 2023-06-23

## 2022-11-18 RX ORDER — FENTANYL CITRATE 50 UG/ML
INJECTION, SOLUTION INTRAMUSCULAR; INTRAVENOUS
Status: DISCONTINUED | OUTPATIENT
Start: 2022-11-18 | End: 2022-11-18

## 2022-11-18 RX ORDER — MEPERIDINE HYDROCHLORIDE 25 MG/ML
12.5 INJECTION INTRAMUSCULAR; INTRAVENOUS; SUBCUTANEOUS ONCE AS NEEDED
Status: DISCONTINUED | OUTPATIENT
Start: 2022-11-18 | End: 2022-11-18 | Stop reason: HOSPADM

## 2022-11-18 RX ORDER — ALBUMIN HUMAN 50 G/1000ML
SOLUTION INTRAVENOUS CONTINUOUS PRN
Status: DISCONTINUED | OUTPATIENT
Start: 2022-11-18 | End: 2022-11-18

## 2022-11-18 RX ORDER — PROPOFOL 10 MG/ML
VIAL (ML) INTRAVENOUS
Status: DISCONTINUED | OUTPATIENT
Start: 2022-11-18 | End: 2022-11-18

## 2022-11-18 RX ORDER — LIDOCAINE HYDROCHLORIDE 10 MG/ML
INJECTION, SOLUTION EPIDURAL; INFILTRATION; INTRACAUDAL; PERINEURAL
Status: DISCONTINUED | OUTPATIENT
Start: 2022-11-18 | End: 2022-11-18 | Stop reason: HOSPADM

## 2022-11-18 RX ORDER — ONDANSETRON 2 MG/ML
INJECTION INTRAMUSCULAR; INTRAVENOUS
Status: DISCONTINUED | OUTPATIENT
Start: 2022-11-18 | End: 2022-11-18

## 2022-11-18 RX ORDER — MIDAZOLAM HYDROCHLORIDE 1 MG/ML
INJECTION INTRAMUSCULAR; INTRAVENOUS
Status: DISCONTINUED | OUTPATIENT
Start: 2022-11-18 | End: 2022-11-18

## 2022-11-18 RX ORDER — SODIUM CHLORIDE, SODIUM LACTATE, POTASSIUM CHLORIDE, CALCIUM CHLORIDE 600; 310; 30; 20 MG/100ML; MG/100ML; MG/100ML; MG/100ML
INJECTION, SOLUTION INTRAVENOUS CONTINUOUS
Status: DISCONTINUED | OUTPATIENT
Start: 2022-11-18 | End: 2022-11-18 | Stop reason: HOSPADM

## 2022-11-18 RX ORDER — ROCURONIUM BROMIDE 10 MG/ML
INJECTION, SOLUTION INTRAVENOUS
Status: DISCONTINUED | OUTPATIENT
Start: 2022-11-18 | End: 2022-11-18

## 2022-11-18 RX ORDER — LIDOCAINE HYDROCHLORIDE 10 MG/ML
0.5 INJECTION, SOLUTION EPIDURAL; INFILTRATION; INTRACAUDAL; PERINEURAL ONCE
Status: DISCONTINUED | OUTPATIENT
Start: 2022-11-18 | End: 2022-11-18 | Stop reason: HOSPADM

## 2022-11-18 RX ORDER — SODIUM CHLORIDE 9 MG/ML
INJECTION, SOLUTION INTRAVENOUS CONTINUOUS
Status: DISCONTINUED | OUTPATIENT
Start: 2022-11-18 | End: 2022-11-18 | Stop reason: HOSPADM

## 2022-11-18 RX ORDER — EPINEPHRINE 1 MG/ML
INJECTION, SOLUTION, CONCENTRATE INTRAVENOUS
Status: DISCONTINUED | OUTPATIENT
Start: 2022-11-18 | End: 2022-11-18 | Stop reason: HOSPADM

## 2022-11-18 RX ORDER — GABAPENTIN 300 MG/1
300 CAPSULE ORAL 3 TIMES DAILY
Status: DISCONTINUED | OUTPATIENT
Start: 2022-11-18 | End: 2022-11-18 | Stop reason: HOSPADM

## 2022-11-18 RX ORDER — LIDOCAINE HYDROCHLORIDE 20 MG/ML
INJECTION INTRAVENOUS
Status: DISCONTINUED | OUTPATIENT
Start: 2022-11-18 | End: 2022-11-18

## 2022-11-18 RX ORDER — HYDROMORPHONE HYDROCHLORIDE 2 MG/ML
0.4 INJECTION, SOLUTION INTRAMUSCULAR; INTRAVENOUS; SUBCUTANEOUS EVERY 5 MIN PRN
Status: DISCONTINUED | OUTPATIENT
Start: 2022-11-18 | End: 2022-11-18 | Stop reason: HOSPADM

## 2022-11-18 RX ORDER — CEFAZOLIN SODIUM 1 G/3ML
2 INJECTION, POWDER, FOR SOLUTION INTRAMUSCULAR; INTRAVENOUS
Status: COMPLETED | OUTPATIENT
Start: 2022-11-18 | End: 2022-11-18

## 2022-11-18 RX ORDER — OXYCODONE HYDROCHLORIDE 5 MG/1
5 TABLET ORAL
Status: DISCONTINUED | OUTPATIENT
Start: 2022-11-18 | End: 2022-11-18 | Stop reason: HOSPADM

## 2022-11-18 RX ORDER — PHENYLEPHRINE HYDROCHLORIDE 10 MG/ML
INJECTION INTRAVENOUS
Status: DISCONTINUED | OUTPATIENT
Start: 2022-11-18 | End: 2022-11-18

## 2022-11-18 RX ORDER — SODIUM CHLORIDE 0.9 % (FLUSH) 0.9 %
3 SYRINGE (ML) INJECTION
Status: DISCONTINUED | OUTPATIENT
Start: 2022-11-18 | End: 2022-11-18 | Stop reason: HOSPADM

## 2022-11-18 RX ORDER — OXYCODONE HYDROCHLORIDE 5 MG/1
5 TABLET ORAL EVERY 4 HOURS PRN
Qty: 10 TABLET | Refills: 0 | Status: SHIPPED | OUTPATIENT
Start: 2022-11-18 | End: 2023-06-23

## 2022-11-18 RX ORDER — ACETAMINOPHEN 500 MG
1000 TABLET ORAL EVERY 8 HOURS
Refills: 0
Start: 2022-11-18

## 2022-11-18 RX ORDER — FAMOTIDINE 20 MG/1
20 TABLET, FILM COATED ORAL
Status: COMPLETED | OUTPATIENT
Start: 2022-11-18 | End: 2022-11-18

## 2022-11-18 RX ORDER — KETAMINE HCL IN 0.9 % NACL 50 MG/5 ML
SYRINGE (ML) INTRAVENOUS
Status: DISCONTINUED | OUTPATIENT
Start: 2022-11-18 | End: 2022-11-18

## 2022-11-18 RX ADMIN — PHENYLEPHRINE HYDROCHLORIDE 200 MCG: 10 INJECTION INTRAVENOUS at 01:11

## 2022-11-18 RX ADMIN — PROPOFOL 100 MG: 10 INJECTION, EMULSION INTRAVENOUS at 11:11

## 2022-11-18 RX ADMIN — PHENYLEPHRINE HYDROCHLORIDE 200 MCG: 10 INJECTION INTRAVENOUS at 11:11

## 2022-11-18 RX ADMIN — PHENYLEPHRINE HYDROCHLORIDE 300 MCG: 10 INJECTION INTRAVENOUS at 01:11

## 2022-11-18 RX ADMIN — CARBOXYMETHYLCELLULOSE SODIUM 2 DROP: 2.5 SOLUTION/ DROPS OPHTHALMIC at 10:11

## 2022-11-18 RX ADMIN — HYDROMORPHONE HYDROCHLORIDE 0.4 MG: 2 INJECTION INTRAMUSCULAR; INTRAVENOUS; SUBCUTANEOUS at 03:11

## 2022-11-18 RX ADMIN — PHENYLEPHRINE HYDROCHLORIDE 300 MCG: 10 INJECTION INTRAVENOUS at 02:11

## 2022-11-18 RX ADMIN — ACETAMINOPHEN 1000 MG: 500 TABLET ORAL at 09:11

## 2022-11-18 RX ADMIN — ROCURONIUM BROMIDE 20 MG: 10 INJECTION, SOLUTION INTRAVENOUS at 01:11

## 2022-11-18 RX ADMIN — FENTANYL CITRATE 100 MCG: 50 INJECTION, SOLUTION INTRAMUSCULAR; INTRAVENOUS at 10:11

## 2022-11-18 RX ADMIN — SODIUM CHLORIDE, SODIUM LACTATE, POTASSIUM CHLORIDE, AND CALCIUM CHLORIDE: 600; 310; 30; 20 INJECTION, SOLUTION INTRAVENOUS at 10:11

## 2022-11-18 RX ADMIN — ALBUMIN (HUMAN): 12.5 SOLUTION INTRAVENOUS at 11:11

## 2022-11-18 RX ADMIN — FAMOTIDINE 20 MG: 20 TABLET ORAL at 09:11

## 2022-11-18 RX ADMIN — HYDROMORPHONE HYDROCHLORIDE 0.4 MG: 2 INJECTION INTRAMUSCULAR; INTRAVENOUS; SUBCUTANEOUS at 04:11

## 2022-11-18 RX ADMIN — SODIUM CHLORIDE, SODIUM LACTATE, POTASSIUM CHLORIDE, AND CALCIUM CHLORIDE: 600; 310; 30; 20 INJECTION, SOLUTION INTRAVENOUS at 01:11

## 2022-11-18 RX ADMIN — ONDANSETRON HYDROCHLORIDE 4 MG: 2 INJECTION INTRAMUSCULAR; INTRAVENOUS at 01:11

## 2022-11-18 RX ADMIN — DEXAMETHASONE SODIUM PHOSPHATE 8 MG: 4 INJECTION, SOLUTION INTRAMUSCULAR; INTRAVENOUS at 01:11

## 2022-11-18 RX ADMIN — LIDOCAINE HYDROCHLORIDE 80 MG: 20 INJECTION, SOLUTION INTRAVENOUS at 10:11

## 2022-11-18 RX ADMIN — ESMOLOL HYDROCHLORIDE 20 MG: 100 INJECTION, SOLUTION INTRAVENOUS at 01:11

## 2022-11-18 RX ADMIN — GABAPENTIN 300 MG: 300 CAPSULE ORAL at 03:11

## 2022-11-18 RX ADMIN — GLYCOPYRROLATE 0.2 MG: 0.2 INJECTION, SOLUTION INTRAMUSCULAR; INTRAVITREAL at 12:11

## 2022-11-18 RX ADMIN — Medication 50 MG: at 10:11

## 2022-11-18 RX ADMIN — FENTANYL CITRATE 100 MCG: 50 INJECTION, SOLUTION INTRAMUSCULAR; INTRAVENOUS at 11:11

## 2022-11-18 RX ADMIN — CEFAZOLIN 2 G: 330 INJECTION, POWDER, FOR SOLUTION INTRAMUSCULAR; INTRAVENOUS at 10:11

## 2022-11-18 RX ADMIN — MIDAZOLAM HYDROCHLORIDE 2 MG: 1 INJECTION, SOLUTION INTRAMUSCULAR; INTRAVENOUS at 10:11

## 2022-11-18 RX ADMIN — PROPOFOL 200 MG: 10 INJECTION, EMULSION INTRAVENOUS at 10:11

## 2022-11-18 RX ADMIN — OXYCODONE 5 MG: 5 TABLET ORAL at 03:11

## 2022-11-18 RX ADMIN — SUGAMMADEX 200 MG: 100 INJECTION, SOLUTION INTRAVENOUS at 02:11

## 2022-11-18 RX ADMIN — ROCURONIUM BROMIDE 30 MG: 10 INJECTION, SOLUTION INTRAVENOUS at 10:11

## 2022-11-18 RX ADMIN — PHENYLEPHRINE HYDROCHLORIDE 0.3 MCG/KG/MIN: 10 INJECTION INTRAVENOUS at 11:11

## 2022-11-18 NOTE — PATIENT INSTRUCTIONS
Plastic Surgery Discharge Instructions  Lionel Barker, DO    Wound Care  1. Shower daily beginning 2 days after surgery  2. Okay to let warm soapy water run over the wound at that time  3. Do not submerge wounds in the bath  4. Leave any skin glue or mesh tape in place    Drain Care  If you have drains, strip tubing and record output when drain bulb becomes half full, or at least twice daily  Record output for each drain and bring to our follow up appointment    Diet  Regular Diet    Activity  Light activity only - no lifting greater than 10 lbs  Avoid strenuous activity that would cause you to get hot or sweaty    Medications  You have been given a prescription for narcotic pain medication and nerve pain  Please take medications as prescribed     What to call for:  1. Sustained fever > 101.0  2. Changes in color, sensation, temperature or pain at surgical site  3. Redness and/or drainage from the surgical site  4. Any reaction to prescribed medications  5. Continuous bloody drainage from surgical drains  6. Wound vac malfunction  7. Questions related to the procedure    Follow-up  1. Please call [phone] to schedule or confirm your follow up visit at the 3rd floor office, [facility]  2. Call with any questions or concerns.  2. After hours call [phone] - ask to speak with the Plastic Surgery fellow on call

## 2022-11-18 NOTE — BRIEF OP NOTE
[facility] - Surgery ([place])  Brief Operative Note    Surgery Date: 11/18/2022     Surgeon(s) and Role:  Panel 1:     * Renetta Boswell MD - Primary   Star Stallworth MD - resident, assisting    Panel 2:     * Davdi Barker DO - Primary    Assisting Surgeon: None    Pre-op Diagnosis:  Malignant neoplasm of overlapping sites of right breast in female, estrogen receptor positive [C50.811, Z17.0]    Post-op Diagnosis:  Post-Op Diagnosis Codes:     * Malignant neoplasm of overlapping sites of right breast in female, estrogen receptor positive [C50.811, Z17.0]     * Estrogen receptor positive [Z17.0]     * 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

## 2022-11-18 NOTE — BRIEF OP NOTE
[facility] - Surgery ([place])  Brief Operative Note     SUMMARY     Surgery Date: 11/18/2022     Surgeon(s) and Role:  Panel 1:     * Renetta Boswell MD - Primary  Panel 2:     * David Barker DO - Primary    Assistant: Mikhail Sanchez MD, Fellow    Pre-op Diagnosis:  Malignant neoplasm of overlapping sites of right breast in female, estrogen receptor positive [C50.811, Z17.0]    Post-op Diagnosis:  Post-Op Diagnosis Codes:     * Malignant neoplasm of overlapping sites of right breast in female, estrogen receptor positive [C50.811, Z17.0]     * Estrogen receptor positive [Z17.0]     * 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]    Procedure(s) (LRB):  MASTECTOMY, PARTIAL-right with radiological marker (Right)  BIOPSY, LYMPH NODE, SENTINEL-right (Right)  INJECTION, FOR SENTINEL NODE IDENTIFICATION-right (Right)  MAMMOPLASTY, REDUCTION,  ONCOPLASTIC REDUCTION BILATERAL  BREAST (Bilateral)    Anesthesia: General    Description of the findings of the procedure: wise pattern: left inferior pedicle, right medial pedicle  Left 616 gms Right 484 gm (160 lumpecomty + 324 breast reduction)    Findings/Key Components: See operative report    Estimated Blood Loss: 75 mL         Specimens:   Specimen (24h ago, onward)       Start     Ordered    11/18/22 1323  Specimen to Pathology, Surgery Breast  Once        Comments: Pre-op Diagnosis: Malignant neoplasm of overlapping sites of right breast in female, estrogen receptor positive [C50.811, Z17.0]Procedure(s):MASTECTOMY, PARTIAL-right with radiological markerBIOPSY, LYMPH NODE, SENTINEL-rightINJECTION, FOR SENTINEL NODE IDENTIFICATION-rightLYMPHADENECTOMY, AXILLARY-rightMAMMOPLASTY, REDUCTION,  ONCOPLASTIC REDUCTION BILATERAL  BREAST Number of specimens: 7Name of specimens: 1. Right Lumpectomy (green - inferior, blue - superior, orange-lateral, yellow- anterior, black-posterior, red - medial )2. Right Axillary sentinel lymph node  hot 71942. Right Axillary sentinel  lymph node Hot 3044. Right axillary sentinel lymph node Hot 2725. New Medial margin, ink marks new margin6. Left Breast7. Right Breast     References:    Click here for ordering Quick Tip   Question Answer Comment   Procedure Type: Breast    Specimen Class: Known or suspected malignancy    Which provider would you like to cc? SWATI BARILLAS    Which provider would you like to cc? GENARO RUFFIN    Release to patient Immediate        11/18/22 1322    11/18/22 1203  Specimen to Pathology, Surgery Breast  Once,   Status:  Canceled        Comments: Pre-op Diagnosis: Malignant neoplasm of overlapping sites of right breast in female, estrogen receptor positive [C50.811, Z17.0]Procedure(s):MASTECTOMY, PARTIAL-right with radiological markerBIOPSY, LYMPH NODE, SENTINEL-rightINJECTION, FOR SENTINEL NODE IDENTIFICATION-rightLYMPHADENECTOMY, AXILLARY-rightMAMMOPLASTY, REDUCTION,  ONCOPLASTIC REDUCTION BILATERAL  BREAST Number of specimens: Name of specimens: 1. Right Lumpectomy (green - inferior, blue - superior, orange-lateral, yellow- anterior, black-posterior, red - medial )2. Right Axillary sentinel lymph node  hot 29438. Right Axillary sentinel lymph node Hot 3044. Right axillary sentinel lymph node Hot 2725. New Medial margin, ink marks new margin     References:    Click here for ordering Quick Tip   Question Answer Comment   Procedure Type: Breast    Specimen Class: Known or suspected malignancy    Which provider would you like to cc? SWATI BARILLAS    Which provider would you like to cc? GENARO RUFFIN    Release to patient Immediate        11/18/22 1234                    Implants: * No implants in log *    Complications: None    Discharge Note    SUMMARY     Admit Date: 11/18/2022    Discharge Date and Time:  11/18/2022 2:17 PM    Hospital Course: Patient was placed in observation status for the above procedure.  See above.  Postoperatively was discharged home from the PACU once criteria was  met.    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     ibuprofen 800 MG tablet  Commonly known as: ADVIL,MOTRIN  Take 1 tablet (800 mg total) by mouth every 8 (eight) hours.     oxyCODONE 5 MG immediate release tablet  Commonly known as: ROXICODONE  Take 1 tablet (5 mg total) by mouth every 4 (four) hours as needed for Pain.            CONTINUE taking these medications      amLODIPine 10 MG tablet  Commonly known as: NORVASC  Take 1 tablet (10 mg total) by mouth once daily.     aspirin 81 MG EC tablet  Commonly known as: ECOTRIN  Take 81 mg by mouth once daily.     atorvastatin 20 MG tablet  Commonly known as: LIPITOR  Take 1 tablet (20 mg total) by mouth once daily.     benzonatate 200 MG capsule  Commonly known as: TESSALON  Take 1 capsule (200 mg total) by mouth 3 (three) times daily as needed for Cough.     cetirizine 10 MG tablet  Commonly known as: ZYRTEC  Take 1 tablet (10 mg total) by mouth once daily.     fluticasone propionate 50 mcg/actuation nasal spray  Commonly known as: FLONASE  1 spray (50 mcg total) by Each Nostril route 2 (two) times daily as needed.     UNABLE TO FIND  Take by mouth as needed. Sleep aid            Discharge Procedure Orders   Diet general     Lifting restrictions   Order Comments: Light activity only     Teach SKYLAR drain care and provide sheet to record output     Wear Surgical Bra and/or Girdle at all times (may remove for short times to shower)   Order Comments: Wear  Surgical Bra and/or Girdle at all times (may remove for short times to shower)

## 2022-11-18 NOTE — DISCHARGE INSTRUCTIONS

## 2022-11-18 NOTE — ANESTHESIA POSTPROCEDURE EVALUATION
Anesthesia Post Evaluation    Patient: Brionna Funes    Procedure(s) Performed: Procedure(s) (LRB):  MASTECTOMY, PARTIAL-right with radiological marker (Right)  BIOPSY, LYMPH NODE, SENTINEL-right (Right)  INJECTION, FOR SENTINEL NODE IDENTIFICATION-right (Right)  MAMMOPLASTY, REDUCTION,  ONCOPLASTIC REDUCTION BILATERAL  BREAST (Bilateral)    Final Anesthesia Type: general      Patient location during evaluation: PACU  Patient participation: Yes- Able to Participate  Level of consciousness: awake and alert  Post-procedure vital signs: reviewed and stable  Pain management: adequate  Airway patency: patent  SARAH mitigation strategies: Extubation while patient is awake  PONV status at discharge: No PONV  Anesthetic complications: no      Cardiovascular status: hemodynamically stable  Respiratory status: unassisted  Hydration status: euvolemic  Follow-up not needed.          Vitals Value Taken Time   /64 11/18/22 1451   Temp 36.6 °C (97.9 °F) 11/18/22 1447   Pulse 100 11/18/22 1500   Resp 18 11/18/22 1447   SpO2 100 % 11/18/22 1458   Vitals shown include unvalidated device data.      No case tracking events are documented in the log.      Pain/Radha Score: Pain Rating Prior to Med Admin: 4 (11/18/2022  9:51 AM)
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
## 2022-11-18 NOTE — ANESTHESIA PROCEDURE NOTES
Intubation    Date/Time: 11/18/2022 11:00 AM  Performed by: Henna Antoine CRNA  Authorized by: Cristiano Serrato MD     Intubation:     Induction:  Intravenous    Intubated:  Postinduction    Mask Ventilation:  Easy mask    Attempts:  1    Attempted By:  CRNA    Method of Intubation:  Direct and video laryngoscopy    Blade:  Tera 3    Laryngeal View Grade: Grade I - full view of cords      Difficult Airway Encountered?: No      Complications:  None    Airway Device:  Oral endotracheal tube    Airway Device Size:  7.0    Style/Cuff Inflation:  Cuffed    Inflation Amount (mL):  3    Tube secured:  21    Secured at:  The lips    Placement Verified By:  Capnometry    Complicating Factors:  None    Findings Post-Intubation:  BS equal bilateral

## 2022-11-18 NOTE — TRANSFER OF CARE
"Anesthesia Transfer of Care Note    Patient: Brionna Funes    Procedure(s) Performed: Procedure(s) (LRB):  MASTECTOMY, PARTIAL-right with radiological marker (Right)  BIOPSY, LYMPH NODE, SENTINEL-right (Right)  INJECTION, FOR SENTINEL NODE IDENTIFICATION-right (Right)  MAMMOPLASTY, REDUCTION,  ONCOPLASTIC REDUCTION BILATERAL  BREAST (Bilateral)    Patient location: PACU    Anesthesia Type: general    Transport from OR: Transported from OR on 2-3 L/min O2 by NC with adequate spontaneous ventilation    Post pain: adequate analgesia    Post assessment: no apparent anesthetic complications    Post vital signs: stable    Level of consciousness: awake, alert and oriented    Nausea/Vomiting: no nausea/vomiting    Complications: none    Transfer of care protocol was followed      Last vitals:   Visit Vitals  BP (!) 146/83 (BP Location: Right arm, Patient Position: Sitting)   Pulse 81   Temp 36.6 °C (97.9 °F) (Oral)   Resp 18   Ht 5' 4" (1.626 m)   Wt 91.6 kg (202 lb)   LMP 07/15/2017 (Approximate)   SpO2 97%   Breastfeeding No   BMI 34.67 kg/m²     "

## 2022-11-19 NOTE — PLAN OF CARE
Brionna Gambinoóscar Marin has met all discharge criteria from Phase II. Vital Signs are stable, ambulating  without difficulty.Pain is now under control and tolerable for the pt. Pain score 4 at this time.  Discharge instructions given, patient verbalized understanding. Discharged from facility via wheelchair in stable condition.

## 2022-11-20 VITALS
DIASTOLIC BLOOD PRESSURE: 85 MMHG | HEIGHT: 64 IN | BODY MASS INDEX: 34.49 KG/M2 | WEIGHT: 202 LBS | RESPIRATION RATE: 16 BRPM | TEMPERATURE: 98 F | HEART RATE: 85 BPM | OXYGEN SATURATION: 100 % | SYSTOLIC BLOOD PRESSURE: 145 MMHG

## 2022-11-20 NOTE — OP NOTE
[facility] - Surgery ([place])  Plastic Surgery  Operative Note    SUMMARY     Date of Procedure: 11/18/2022     Location:  [facility]    Procedure(s): Procedure(s) (LRB):  MASTECTOMY, PARTIAL-right with radiological marker (Right)  BIOPSY, LYMPH NODE, SENTINEL-right (Right)  INJECTION, FOR SENTINEL NODE IDENTIFICATION-right (Right)  MAMMOPLASTY, REDUCTION,  ONCOPLASTIC REDUCTION BILATERAL  BREAST (Bilateral)     Surgeon(s) and Role:  Panel 1:     * Renetta Boswell MD - Primary  Panel 2:     * David Barker DO - Primary    Assistant: Mikhail Sanchez MD, Fellow    Pre-Operative Diagnosis: Malignant neoplasm of overlapping sites of right breast in female, estrogen receptor positive [C50.811, Z17.0]    Post-Operative Diagnosis: same    Anesthesia: General    Indications for Procedure:  52-year-old woman with a history of right breast cancer.  She chose to undergo partial mastectomy.  She also has ptotic breasts with significant rashes in the intertriginous folds.  She was a good candidate for right oncoplastic breast reduction and left breast reduction.    Operative Findings (including complications, if any): wise pattern, left inferior pedicle, right medial pedicle  15F drain in each breast    Description of Procedures:  Patient was seen in the preoperative holding area.  Consents have been signed in preop visit.  Wise pattern skin excision was marked on both breasts.  The location of the mass was discussed with Dr. Boswell.  Patient was taken to the operating room.  General anesthesia was induced.  Both breasts were prepped and draped in sterile fashion.    Dr. Boswell was planning on performing a partial mastectomy from roughly 9-12 o'clock between the areola and the vertical portions of the wise pattern.  Anticipated using a inferior pedicle on that side.  42 mm cookie cutter was used around the right areola and de-epithelialized the upper portion of an inferior pedicle around the areola.  Therefore on the  right side a began by marking a 10 cm wide inferior pedicle.    A 42 mm cookie cutter was used around the areola.  The inferior pedicle was de-epithelialized.  Skin flaps were raised superiorly.  The patient did not need a large volume reduction.  Medially the skin below the wise pattern was removed but majority of the breast tissue was left in place.  Laterally the tissue lateral to the inferior pedicle was all removed.  Some tissue was removed from the flaps so that they had an even contour and also some tissue off the back of the pedicle.  The T point was temporarily sutured with 2-0 Vicryl and the breast was stapled closed.      Please see Dr. Boswell's note for her portion of the procedure, that includes the sentinel lymph node biopsy and lumpectomy.  The wound was inspected once lumpectomy was complete.  Tissue was removed from 9-12 o'clock position.  There was also some undermining of the areola down to the chest wall.  Because of this I did not feel comfortable the inferior pedicle as initially planned.  I therefore marked out a wide medial pedicle.  the remainder of the pedicle was de-epithelialized.  Because of the tissue already removed I did not want too much medial tissue.  The skin outside of the pedicle was removed medially.  The dermis was incised using the Bovie the pedicle to turn the nipple into a good position.  The remainder of the lateral tissue outside the pedicle was removed.  Skin flaps were raised superiorly to redirect the breast.  At this point the breast was temporarily sutured with 2-0 Vicryl and staples.  The right breast was significantly smaller than the left.  Left breast was reopened and additional tissue was removed medially and from underneath the pedicle.  This gave us much similar sized breasts on the table.      Lateral dog ears were marked.  The breasts were opened up.  They are irrigated.  Fifteen Cameroonian drains were placed and sewn into the anterior axillary line.  Both  breasts were checked for hemostasis.  Both nipples were viable.  Small dart was left at the T point to offload tension.  Both breasts were closed with interrupted 2-0 Vicryl at the T point.  Buried 2-0 Monocryl along the horizontal.  3-0 Quill was used to close horizontal subcuticular layer.  The vertical incision was closed with buried 3-0 Monocryl.  Small amount of skin was cut to inset the areola using a cookie cutter.  The areola was inset with buried 3-0 Monocryl.  A running 4-0 Monocryl was used around the areola and for the vertical pattern.  The skin was dressed with Prineo tape.  Steri-Strips were placed around the nipples.    The final weights were: Left 616 gms Right 484 gm (160 lumpecomty + 324 breast reduction).  There is symmetry to the size of the breasts.  Patient tolerated procedure well was taken to the PACU in stable condition        Significant Surgical Tasks Conducted by the Assistant(s), if Applicable: na    Estimated Blood Loss (EBL): 100           Implants: * No implants in log *    Specimens:   Specimen (24h ago, onward)      None                    Condition: Good    Disposition: PACU - hemodynamically stable., discharged home    Attestation: I was present and scrubbed for the entire procedure.

## 2022-11-22 ENCOUNTER — OFFICE VISIT (OUTPATIENT)
Dept: PLASTIC SURGERY | Facility: CLINIC | Age: 52
End: 2022-11-22
Payer: MEDICARE

## 2022-11-22 VITALS
BODY MASS INDEX: 34.49 KG/M2 | WEIGHT: 202 LBS | SYSTOLIC BLOOD PRESSURE: 122 MMHG | HEART RATE: 68 BPM | HEIGHT: 64 IN | OXYGEN SATURATION: 97 % | DIASTOLIC BLOOD PRESSURE: 61 MMHG

## 2022-11-22 DIAGNOSIS — C50.811 MALIGNANT NEOPLASM OF OVERLAPPING SITES OF RIGHT BREAST IN FEMALE, ESTROGEN RECEPTOR POSITIVE: Primary | ICD-10-CM

## 2022-11-22 DIAGNOSIS — Z17.0 MALIGNANT NEOPLASM OF OVERLAPPING SITES OF RIGHT BREAST IN FEMALE, ESTROGEN RECEPTOR POSITIVE: Primary | ICD-10-CM

## 2022-11-22 PROCEDURE — 3074F PR MOST RECENT SYSTOLIC BLOOD PRESSURE < 130 MM HG: ICD-10-PCS | Mod: CPTII,S$GLB,, | Performed by: SURGERY

## 2022-11-22 PROCEDURE — 3008F BODY MASS INDEX DOCD: CPT | Mod: CPTII,S$GLB,, | Performed by: SURGERY

## 2022-11-22 PROCEDURE — 99499 UNLISTED E&M SERVICE: CPT | Mod: S$GLB,,, | Performed by: SURGERY

## 2022-11-22 PROCEDURE — 3074F SYST BP LT 130 MM HG: CPT | Mod: CPTII,S$GLB,, | Performed by: SURGERY

## 2022-11-22 PROCEDURE — 99499 NO LOS: ICD-10-PCS | Mod: S$GLB,,, | Performed by: SURGERY

## 2022-11-22 PROCEDURE — 3078F DIAST BP <80 MM HG: CPT | Mod: CPTII,S$GLB,, | Performed by: SURGERY

## 2022-11-22 PROCEDURE — 3078F PR MOST RECENT DIASTOLIC BLOOD PRESSURE < 80 MM HG: ICD-10-PCS | Mod: CPTII,S$GLB,, | Performed by: SURGERY

## 2022-11-22 PROCEDURE — 1159F MED LIST DOCD IN RCRD: CPT | Mod: CPTII,S$GLB,, | Performed by: SURGERY

## 2022-11-22 PROCEDURE — 3008F PR BODY MASS INDEX (BMI) DOCUMENTED: ICD-10-PCS | Mod: CPTII,S$GLB,, | Performed by: SURGERY

## 2022-11-22 PROCEDURE — 1159F PR MEDICATION LIST DOCUMENTED IN MEDICAL RECORD: ICD-10-PCS | Mod: CPTII,S$GLB,, | Performed by: SURGERY

## 2022-11-22 NOTE — PROGRESS NOTES
Ms Arteaga is 4 days status post oncoplastic breast reduction.  Her pain is controlled.  She is doing well.  She has no concerns.      Steri-Strips were removed around her nipple.  She is having no wound healing issues.  Both drains were removed.  They were serous.    Her Prineo tape is still in place him adherent to the skin.      Placed Band-Aids over drain sites.  Advised her to shower daily.  She is to apply antibiotic ointment around her areola twice daily.  I will see her back in 2 weeks she is also seeing Dr. Boswell around that time to discuss her pathology.  It is not yet resulted.    Lionel Barker, DO  Plastic and Reconstructive Surgery

## 2022-11-25 NOTE — OP NOTE
DATE OF PROCEDURE: 11/18/2022    SURGEON: Surgeon(s) and Role:  Panel 1:     * Renetta Boswell MD - Primary  Panel 2:     * Daivd Barker DO - Primary    PREOPERATIVE DIAGNOSIS: Invasive breast carcinoma of the right breast upper inner quadrant estrogen positive    POSTOPERATIVE DIAGNOSIS: same    ANESTHESIA: local and general    PROCEDURES PERFORMED:   1. right breast reflector with ultrasound localization partial mastectomy (lumpectomy) with excision for clear margins   2. right axillary deep sentinel lymph node biopsy   3. injection of right breast with technetium-labeled radiocolloid for sentinel lymph node identification  4. Identification of sentinel lymph node         PROCEDURE IN DETAIL:   The patient underwent informed consent.  The films were reviewed.    She was then brought to the Operating Room and placed in the supine position. local and general anesthesia was administered.    The right breast was injected in the subareolar region with the technetium-labeled radiocolloid.   The right breast, anterior chest, arm and axilla were then prepped and draped in a sterile fashion.     Next, we turned our attention to the right breast itself. Ultrasound and reflector was used to identify the breast mass at 1 o'clock.  The area if interest was identified with a clip within and then was marked for localization using ultrasound and reflector guidance.  An incision was made in the upper inner quadrant of the right breast over the anticipated tract of the lesion in a paulette-tumoral location.  The specimen was dissected circumferentially around the cancer.  We did dissect all the way down to, but not including the underlying pectoralis fascia.  The ultrasound localization lumpectomy specimen was inked on the back table using green ink inferiorly, blue ink superiorly, orange ink laterally, yellow ink anteriorly, black ink posteriorly, and the red ink medially.  It was then fixed with acetic acid and submitted for  specimen radiograph with the Mozart, which confirmed the clip and area of interest within the specimen, and was interpreted in the operating room. Given the appearance and location of the lesion, additional margins were taken including medial.       Using the gamma probe, activity was noted and localized in the right axilla. Local anesthetic with 1% lidocaine was injected into the skin where the incision will be placed. We made a small transverse inferior axillary incision over the area of activity. We then dissected down through the clavipectoral fascia using electrocautery, identifying a hot afferent lymphatic channel coming from the upper outer quadrant axillary tail of Wyatt breast tissue to a level 1 sentinel node, which was excised. It was dissected circumferentially with blunt dissection and the afferent and efferent lymphatics were tied together. The lymph node was labeled as specimen #1 for permanent sectioning, hot and not blue with an ex vivo count of 1398. A total of 3 axillary sentinel lymph nodes were removed.  The probe was placed in the cavity and there was no significant residual background radioactivity or blue dye noted in the axilla indicating adequate and appropriate sentinel lymph node biopsy. The cavity was then palpated and 0 further palpable nodes were noted. Any additional palpable nodes were sent to pathology for permanent section.       The case was then turned over to the plastic surgery service.    Dermabond was applied. Sterile fluff gauze was placed and a post-procedure bra was placed. She tolerated the procedure well without complication and was turned over to Anesthesia for transport to the recovery area in a satisfactory condition. All specimens were sent to Pathology for permanent sectioning.    ESTIMATED BLOOD LOSS: 10ml    COMPLICATIONS: none    DISPOSITION:  PACU--hemodynamically stable    ATTESTATION:  I was present and scrubbed for the entire procedure.

## 2022-11-28 ENCOUNTER — TELEPHONE (OUTPATIENT)
Dept: PLASTIC SURGERY | Facility: CLINIC | Age: 52
End: 2022-11-28
Payer: MEDICARE

## 2022-11-28 NOTE — TELEPHONE ENCOUNTER
Called patient.  Having occasional sharp pain and pain at night.  Currently not taking anything.  Recommended 1000mg tylenol and 800mg ibuprofen q6h  Informed patient that at this point most patients are not taking narcotics for pain after breat reduction  Patient to call back if still having pain    Lionel Barker, DO  Plastic and Reconstructive Surgery        ----- Message from Susana Scruggs MA sent at 11/28/2022  2:24 PM CST -----  Regarding: Patient Refill Request  Hi Dr. Barker,    Could you please take a look at this patient requesting a refill on pain related medication. The original prescription was ordered on 11/18/22.    Thank You,  Trupti Scruggs MA  ----- Message -----  From: Carolynn Salcedo  Sent: 11/28/2022   1:48 PM CST  To: David Barker Staff    Type:  RX Refill Request    Who Called: Brionna  Refill or New Rx:Refill  RX Name and Strength:oxyCODONE (ROXICODONE) 5 MG immediate release tablet  How is the patient currently taking it? (ex. 1XDay):Take 1 tablet (5 mg total) by mouth every 4 (four) hours as needed for Pain. - Oral  Is this a 30 day or 90 day RX:10 tablet  Preferred Pharmacy with phone number:[facility] [address] [place] [address];  [phone]  Local or Mail Order:Local  Ordering Provider:David Barker  Would the patient rather a call back or a response via [facility]? call back  Best Call Back Number:[phone]  Additional Information: Patient is having stabbing pain in breasts

## 2022-12-06 ENCOUNTER — OFFICE VISIT (OUTPATIENT)
Dept: PLASTIC SURGERY | Facility: CLINIC | Age: 52
End: 2022-12-06
Payer: MEDICARE

## 2022-12-06 VITALS
HEIGHT: 64 IN | DIASTOLIC BLOOD PRESSURE: 83 MMHG | WEIGHT: 202 LBS | BODY MASS INDEX: 34.49 KG/M2 | SYSTOLIC BLOOD PRESSURE: 125 MMHG | HEART RATE: 80 BPM

## 2022-12-06 DIAGNOSIS — Z17.0 MALIGNANT NEOPLASM OF OVERLAPPING SITES OF RIGHT BREAST IN FEMALE, ESTROGEN RECEPTOR POSITIVE: Primary | ICD-10-CM

## 2022-12-06 DIAGNOSIS — C50.811 MALIGNANT NEOPLASM OF OVERLAPPING SITES OF RIGHT BREAST IN FEMALE, ESTROGEN RECEPTOR POSITIVE: Primary | ICD-10-CM

## 2022-12-06 PROCEDURE — 3074F PR MOST RECENT SYSTOLIC BLOOD PRESSURE < 130 MM HG: ICD-10-PCS | Mod: CPTII,S$GLB,, | Performed by: SURGERY

## 2022-12-06 PROCEDURE — 99499 UNLISTED E&M SERVICE: CPT | Mod: S$GLB,,, | Performed by: SURGERY

## 2022-12-06 PROCEDURE — 3008F BODY MASS INDEX DOCD: CPT | Mod: CPTII,S$GLB,, | Performed by: SURGERY

## 2022-12-06 PROCEDURE — 1159F PR MEDICATION LIST DOCUMENTED IN MEDICAL RECORD: ICD-10-PCS | Mod: CPTII,S$GLB,, | Performed by: SURGERY

## 2022-12-06 PROCEDURE — 3079F DIAST BP 80-89 MM HG: CPT | Mod: CPTII,S$GLB,, | Performed by: SURGERY

## 2022-12-06 PROCEDURE — 3079F PR MOST RECENT DIASTOLIC BLOOD PRESSURE 80-89 MM HG: ICD-10-PCS | Mod: CPTII,S$GLB,, | Performed by: SURGERY

## 2022-12-06 PROCEDURE — 99499 NO LOS: ICD-10-PCS | Mod: S$GLB,,, | Performed by: SURGERY

## 2022-12-06 PROCEDURE — 3074F SYST BP LT 130 MM HG: CPT | Mod: CPTII,S$GLB,, | Performed by: SURGERY

## 2022-12-06 PROCEDURE — 1159F MED LIST DOCD IN RCRD: CPT | Mod: CPTII,S$GLB,, | Performed by: SURGERY

## 2022-12-06 PROCEDURE — 3008F PR BODY MASS INDEX (BMI) DOCUMENTED: ICD-10-PCS | Mod: CPTII,S$GLB,, | Performed by: SURGERY

## 2022-12-06 NOTE — PROGRESS NOTES
Ms Funes is now three weeks s/p oncoplastic breast reduction    She is very happy with her size  Sub-centimeter wound breakdown at both T points  She removed prineo tape a couple days ago  Right breast larger than the left, both still edematous    Discussed intentionally larger on right in anticipation of breast getting smaller with XRT  Edema will improve over next 2-3 months  Will have her follow up in 3 months    Lionel Barker, DO  Plastic and Reconstructive Surgery

## 2022-12-08 ENCOUNTER — OFFICE VISIT (OUTPATIENT)
Dept: SURGERY | Facility: CLINIC | Age: 52
End: 2022-12-08
Payer: MEDICARE

## 2022-12-08 VITALS
WEIGHT: 207 LBS | BODY MASS INDEX: 35.34 KG/M2 | SYSTOLIC BLOOD PRESSURE: 154 MMHG | HEART RATE: 87 BPM | DIASTOLIC BLOOD PRESSURE: 80 MMHG | HEIGHT: 64 IN

## 2022-12-08 DIAGNOSIS — Z17.0 MALIGNANT NEOPLASM OF OVERLAPPING SITES OF RIGHT BREAST IN FEMALE, ESTROGEN RECEPTOR POSITIVE: ICD-10-CM

## 2022-12-08 DIAGNOSIS — C50.811 MALIGNANT NEOPLASM OF OVERLAPPING SITES OF RIGHT BREAST IN FEMALE, ESTROGEN RECEPTOR POSITIVE: ICD-10-CM

## 2022-12-08 DIAGNOSIS — Z12.31 SCREENING MAMMOGRAM, ENCOUNTER FOR: Primary | ICD-10-CM

## 2022-12-08 LAB
FINAL PATHOLOGIC DIAGNOSIS: NORMAL
GROSS: NORMAL
Lab: NORMAL
SUPPLEMENTAL DIAGNOSIS: NORMAL

## 2022-12-08 PROCEDURE — 99024 PR POST-OP FOLLOW-UP VISIT: ICD-10-PCS | Mod: S$GLB,,, | Performed by: SURGERY

## 2022-12-08 PROCEDURE — 3077F SYST BP >= 140 MM HG: CPT | Mod: CPTII,S$GLB,, | Performed by: SURGERY

## 2022-12-08 PROCEDURE — 99999 PR PBB SHADOW E&M-EST. PATIENT-LVL III: ICD-10-PCS | Mod: PBBFAC,,, | Performed by: SURGERY

## 2022-12-08 PROCEDURE — 3079F PR MOST RECENT DIASTOLIC BLOOD PRESSURE 80-89 MM HG: ICD-10-PCS | Mod: CPTII,S$GLB,, | Performed by: SURGERY

## 2022-12-08 PROCEDURE — 3077F PR MOST RECENT SYSTOLIC BLOOD PRESSURE >= 140 MM HG: ICD-10-PCS | Mod: CPTII,S$GLB,, | Performed by: SURGERY

## 2022-12-08 PROCEDURE — 3079F DIAST BP 80-89 MM HG: CPT | Mod: CPTII,S$GLB,, | Performed by: SURGERY

## 2022-12-08 PROCEDURE — 1160F RVW MEDS BY RX/DR IN RCRD: CPT | Mod: CPTII,S$GLB,, | Performed by: SURGERY

## 2022-12-08 PROCEDURE — 1159F MED LIST DOCD IN RCRD: CPT | Mod: CPTII,S$GLB,, | Performed by: SURGERY

## 2022-12-08 PROCEDURE — 1159F PR MEDICATION LIST DOCUMENTED IN MEDICAL RECORD: ICD-10-PCS | Mod: CPTII,S$GLB,, | Performed by: SURGERY

## 2022-12-08 PROCEDURE — 3008F BODY MASS INDEX DOCD: CPT | Mod: CPTII,S$GLB,, | Performed by: SURGERY

## 2022-12-08 PROCEDURE — 99999 PR PBB SHADOW E&M-EST. PATIENT-LVL III: CPT | Mod: PBBFAC,,, | Performed by: SURGERY

## 2022-12-08 PROCEDURE — 1160F PR REVIEW ALL MEDS BY PRESCRIBER/CLIN PHARMACIST DOCUMENTED: ICD-10-PCS | Mod: CPTII,S$GLB,, | Performed by: SURGERY

## 2022-12-08 PROCEDURE — 99024 POSTOP FOLLOW-UP VISIT: CPT | Mod: S$GLB,,, | Performed by: SURGERY

## 2022-12-08 PROCEDURE — 3008F PR BODY MASS INDEX (BMI) DOCUMENTED: ICD-10-PCS | Mod: CPTII,S$GLB,, | Performed by: SURGERY

## 2022-12-08 NOTE — PROGRESS NOTES
[facility]       Post-Op        REFERRING PHYSICIAN:         Michael Houston MD    MEDICAL ONCOLOGIST:    pending  RADIATION ONCOLOGIST:   pending    DIAGNOSIS:    This is a 52 y.o. female with a stage pT1a N0 Mx grade 2 ER + GA - HER2 not assessed IDC (2 small foci up to 2mm within extensive DCIS) of the right breast.    TREATMENT SUMMARY:  The patient is status post right partial mastectomy and sentinel node biopsy on 11/18/2022.  Final pathology showed     Final Pathologic Diagnosis   Date/Time Value Ref Range Status   11/18/2022 01:41 PM   Final    1. RIGHT BREAST, PARTIAL MASTECTOMY:  Invasive ductal carcinoma, Christina histologic grade 2, multifocal (2 small  foci up to 2 mm).  Associated and extensive ductal carcinoma in situ (DCIS), high nuclear grade,  papillary and micropapillary types.  Surgical margins are negative for in situ or invasive carcinoma.  Background biopsy site changes are present including cavity formation,  hemorrhage and granulation tissue.  Skin uninvolved by carcinoma.  Comment:  There is a 2 mm focus of invasive carcinoma and a separate 1 mm  focus of microinvasive carcinoma, both identified adjacent to high-grade DCIS  which is present extensively in background breast tissue.  P63 staining  performed on select blocks reveals myoepithelial cells at the periphery of  DCIS lesions, but is lost among infiltrating carcinoma, supporting the  interpretation.  Breast biomarkers for invasive carcinoma are pending and  will be issued in a supplemental report.  2. RIGHT AXILLARY SENTINEL LYMPH NODE, EXCISION:  One lymph node negative for malignancy (0/1) by routine staining and  epithelial immunostain panel (AE1/AE3, WSK, cam 5.2).  3. RIGHT AXILLARY SENTINEL LYMPH NODE, EXCISION:  One lymph node negative for malignancy (0/1) by routine staining and  epithelial immunostain panel (AE1/AE3, WSK, cam 5.2).  4. RIGHT AXILLARY SENTINEL LYMPH NODE, EXCISION:  Three lymph node  negative for malignancy (0/3) by routine staining and  epithelial immunostain panel (AE1/AE3, WSK, cam 5.2).  5. RIGHT BREAST, NEW MEDIAL MARGIN, EXCISION:  Benign breast tissue with fibrocystic changes including usual ductal  hyperplasia and apocrine metaplasia.  Negative for atypia or malignancy.  6. LEFT BREAST, EXCISION:  Benign breast tissue and skin.  Negative for atypia or malignancy.  7. RIGHT BREAST, EXCISION:  Benign breast tissue and skin.  Negative for atypia or malignancy.  [facility] SURGICAL PATHOLOGY CANCER CASE SUMMARY:  INVASIVE CARCINOMA OF THE BREAST  Procedure: Excision, Less than Total mastectomy  Specimen laterality:  Right  TUMOR  Histologic type:  Invasive carcinoma of no special type (ductal)  Histologic grade:    Glandular/tubular differentiation:  Score 3    Nuclear pleomorphism:  Score 3    Mitotic rate:  Score 1    Overall grade:  Grade 2  Tumor size:  Greatest dimension of largest invasive focus is 2 mm.  Tumor focality:  Multiple foci of invasive carcinoma (2)  Ductal carcinoma in situ (DCIS):  Present, positive for extensive intraductal  component    Number of blocks with DCIS:  16    Number of blocks examined:  38    Nuclear grade:  Grade 3  Lymphovascular invasion:  Not identified  MARGINS  Margin status for invasive carcinoma:  All margins negative for invasive  carcinoma    Distance from invasive carcinoma to closest margin:  3 mm    Closest margin to invasive carcinoma:  Posterior    All other margins greater than 10 mm away  Margin status for DCIS:  All margins negative for DCIS    Distance from DCIS to closest margin:  1 mm    Closest margin to DCIS:  Medial (on main lumpectomy specimen, additional  re-excision of medial margin is uninvolved by tumor)    DCIS is 3 mm from the anterior margin and 5 mm from the posterior margin;  all other margins greater than 5 mm away  Regional lymph nodes:  All Regional lymph nodes negative for tumor  Total number of lymph nodes examined:  5  Number  "of sentinel nodes examined:  5  Pathologic stage classification (pTNM): pT1a pN0  Breast biomarkers from previous biopsy  Estrogen receptor (ER):  Low positive  Progesterone receptor (IN):  Negative       Comment:     Interp By FRANCINE Rizo M.D., Signed on 12/05/2022 at 18:52            INTERVAL HISTORY:   Brionna Funes comes in for a post-op check.  She denies fever, chills, chest pain or shortness of breath.  Her pain is well controlled.      MEDICATIONS:  Current Outpatient Medications   Medication Sig Dispense Refill    acetaminophen (TYLENOL) 500 MG tablet Take 2 tablets (1,000 mg total) by mouth every 8 (eight) hours.  0    amLODIPine (NORVASC) 10 MG tablet Take 1 tablet (10 mg total) by mouth once daily. 90 tablet 1    aspirin (ECOTRIN) 81 MG EC tablet Take 81 mg by mouth once daily.      atorvastatin (LIPITOR) 20 MG tablet Take 1 tablet (20 mg total) by mouth once daily. 90 tablet 1    benzonatate (TESSALON) 200 MG capsule Take 1 capsule (200 mg total) by mouth 3 (three) times daily as needed for Cough. 30 capsule 1    cetirizine (ZYRTEC) 10 MG tablet Take 1 tablet (10 mg total) by mouth once daily. 30 tablet 0    fluticasone propionate (FLONASE) 50 mcg/actuation nasal spray 1 spray (50 mcg total) by Each Nostril route 2 (two) times daily as needed. 15 g 0    ibuprofen (ADVIL,MOTRIN) 800 MG tablet Take 1 tablet (800 mg total) by mouth every 8 (eight) hours.      oxyCODONE (ROXICODONE) 5 MG immediate release tablet Take 1 tablet (5 mg total) by mouth every 4 (four) hours as needed for Pain. 10 tablet 0    UNABLE TO FIND Take by mouth as needed. Sleep aid       No current facility-administered medications for this visit.       ALLERGIES:   Review of patient's allergies indicates:   Allergen Reactions    Codeine Itching     Unsure if she can take oxycodone, hydrocodone, etc    11/18/22 @ 1448 Pt., stated "I can take dilaudid & oxycodone with benadryl"         PHYSICAL EXAMINATION:   General:  This " is a well appearing female with appropriate speech, affect and gait.     Breast:  Incision clean, dry, and intact    IMPRESSION:   The patient has had an uneventful postoperative course.    PLAN:   1. return in July 2023 months for a follow up office visit and breast exam  2. bilateral mammogram in July 2023   3. The patient is advised in continued exam of the breast chest wall and to report to this office sooner should she note any areas of abnormality or concern.   4.  She has been instructed to meet with med onc and rad onc for discussion of adjuvant treatment recommendations. Receptors pending

## 2022-12-09 ENCOUNTER — DOCUMENTATION ONLY (OUTPATIENT)
Dept: SURGERY | Facility: CLINIC | Age: 52
End: 2022-12-09
Payer: MEDICARE

## 2022-12-09 NOTE — NURSING
Met with patient at post op visit, patient doing well. Scheduled patient with Dr. Ronquillo and Dr. Dyer. Reviewed location of appt. Patient verbalized understanding.  Oncology Navigation   Intake  Date of Diagnosis: 09/21/22  Cancer Type: Breast  Internal / External Referral: Internal  Date of Referral: 10/10/22  Initial Nurse Navigator Contact: 10/10/22  Referral to Initial Contact Timeline (days): 0  Date Worked: 12/09/22     Treatment  Current Status: Active    Surgery: Completed  Surgical Oncologist: Dr. Boswell  Plastic Surgeon: Dr. Barker  Type of Surgery: right partial mastectomy with SLNB  Consult Date: 10/11/22  Surgery Schedule Date: 11/18/22    Medical Oncologist: Dr. Ronquillo  Consult Date: 12/16/22    Radiation Oncologist: Dr. Dyer    Procedures: Genetic test; MRI  Biopsy Schedule Date: 09/21/22  Genetic Testing Date Sent: 10/13/22  MRI Schedule Date: 10/14/22    Physical Therapy Referral Date: 10/13/22    ER: Positive  [place]: Negative    Radiation Oncologist: Dr. Dyer    Support Systems: Family members     Acuity      Follow Up  Follow up in about 9 days (around 12/18/2022) for f/u after med/rad onc.

## 2022-12-15 ENCOUNTER — TELEPHONE (OUTPATIENT)
Dept: RADIATION ONCOLOGY | Facility: CLINIC | Age: 52
End: 2022-12-15
Payer: MEDICARE

## 2022-12-15 ENCOUNTER — TELEPHONE (OUTPATIENT)
Dept: SURGERY | Facility: CLINIC | Age: 52
End: 2022-12-15
Payer: MEDICARE
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
## 2022-12-19 ENCOUNTER — OFFICE VISIT (OUTPATIENT)
Dept: RADIATION ONCOLOGY | Facility: CLINIC | Age: 52
End: 2022-12-19
Payer: MEDICARE

## 2022-12-19 VITALS
HEART RATE: 80 BPM | WEIGHT: 206.81 LBS | TEMPERATURE: 98 F | HEIGHT: 64 IN | BODY MASS INDEX: 35.31 KG/M2 | OXYGEN SATURATION: 99 % | DIASTOLIC BLOOD PRESSURE: 79 MMHG | SYSTOLIC BLOOD PRESSURE: 170 MMHG

## 2022-12-19 DIAGNOSIS — C50.811 MALIGNANT NEOPLASM OF OVERLAPPING SITES OF RIGHT BREAST IN FEMALE, ESTROGEN RECEPTOR POSITIVE: Primary | ICD-10-CM

## 2022-12-19 DIAGNOSIS — Z17.0 MALIGNANT NEOPLASM OF OVERLAPPING SITES OF RIGHT BREAST IN FEMALE, ESTROGEN RECEPTOR POSITIVE: Primary | ICD-10-CM

## 2022-12-19 DIAGNOSIS — L29.9 PRURITUS: ICD-10-CM

## 2022-12-19 PROCEDURE — 3008F PR BODY MASS INDEX (BMI) DOCUMENTED: ICD-10-PCS | Mod: CPTII,S$GLB,, | Performed by: RADIOLOGY

## 2022-12-19 PROCEDURE — 3077F SYST BP >= 140 MM HG: CPT | Mod: CPTII,S$GLB,, | Performed by: RADIOLOGY

## 2022-12-19 PROCEDURE — 3078F PR MOST RECENT DIASTOLIC BLOOD PRESSURE < 80 MM HG: ICD-10-PCS | Mod: CPTII,S$GLB,, | Performed by: RADIOLOGY

## 2022-12-19 PROCEDURE — 3078F DIAST BP <80 MM HG: CPT | Mod: CPTII,S$GLB,, | Performed by: RADIOLOGY

## 2022-12-19 PROCEDURE — 99205 PR OFFICE/OUTPT VISIT, NEW, LEVL V, 60-74 MIN: ICD-10-PCS | Mod: S$GLB,,, | Performed by: RADIOLOGY

## 2022-12-19 PROCEDURE — 99999 PR PBB SHADOW E&M-EST. PATIENT-LVL III: ICD-10-PCS | Mod: PBBFAC,,, | Performed by: RADIOLOGY

## 2022-12-19 PROCEDURE — 99999 PR PBB SHADOW E&M-EST. PATIENT-LVL III: CPT | Mod: PBBFAC,,, | Performed by: RADIOLOGY

## 2022-12-19 PROCEDURE — 99205 OFFICE O/P NEW HI 60 MIN: CPT | Mod: S$GLB,,, | Performed by: RADIOLOGY

## 2022-12-19 PROCEDURE — 3077F PR MOST RECENT SYSTOLIC BLOOD PRESSURE >= 140 MM HG: ICD-10-PCS | Mod: CPTII,S$GLB,, | Performed by: RADIOLOGY

## 2022-12-19 PROCEDURE — 3008F BODY MASS INDEX DOCD: CPT | Mod: CPTII,S$GLB,, | Performed by: RADIOLOGY

## 2022-12-19 RX ORDER — HYDROXYZINE HYDROCHLORIDE 50 MG/1
50 TABLET, FILM COATED ORAL 4 TIMES DAILY
Qty: 30 TABLET | Refills: 1 | Status: SHIPPED | OUTPATIENT
Start: 2022-12-19

## 2022-12-22 ENCOUNTER — OFFICE VISIT (OUTPATIENT)
Dept: HEMATOLOGY/ONCOLOGY | Facility: CLINIC | Age: 52
End: 2022-12-22
Payer: MEDICARE

## 2022-12-22 VITALS
WEIGHT: 204.81 LBS | DIASTOLIC BLOOD PRESSURE: 81 MMHG | SYSTOLIC BLOOD PRESSURE: 145 MMHG | OXYGEN SATURATION: 100 % | RESPIRATION RATE: 18 BRPM | HEIGHT: 64 IN | TEMPERATURE: 97 F | BODY MASS INDEX: 34.97 KG/M2 | HEART RATE: 98 BPM

## 2022-12-22 DIAGNOSIS — C50.811 MALIGNANT NEOPLASM OF OVERLAPPING SITES OF RIGHT BREAST IN FEMALE, ESTROGEN RECEPTOR POSITIVE: Primary | ICD-10-CM

## 2022-12-22 DIAGNOSIS — Z17.0 MALIGNANT NEOPLASM OF OVERLAPPING SITES OF RIGHT BREAST IN FEMALE, ESTROGEN RECEPTOR POSITIVE: Primary | ICD-10-CM

## 2022-12-22 PROCEDURE — 1160F PR REVIEW ALL MEDS BY PRESCRIBER/CLIN PHARMACIST DOCUMENTED: ICD-10-PCS | Mod: CPTII,S$GLB,, | Performed by: INTERNAL MEDICINE

## 2022-12-22 PROCEDURE — 3077F PR MOST RECENT SYSTOLIC BLOOD PRESSURE >= 140 MM HG: ICD-10-PCS | Mod: CPTII,S$GLB,, | Performed by: INTERNAL MEDICINE

## 2022-12-22 PROCEDURE — 3008F BODY MASS INDEX DOCD: CPT | Mod: CPTII,S$GLB,, | Performed by: INTERNAL MEDICINE

## 2022-12-22 PROCEDURE — 99999 PR PBB SHADOW E&M-EST. PATIENT-LVL III: CPT | Mod: PBBFAC,,, | Performed by: INTERNAL MEDICINE

## 2022-12-22 PROCEDURE — 99204 PR OFFICE/OUTPT VISIT, NEW, LEVL IV, 45-59 MIN: ICD-10-PCS | Mod: S$GLB,,, | Performed by: INTERNAL MEDICINE

## 2022-12-22 PROCEDURE — 3008F PR BODY MASS INDEX (BMI) DOCUMENTED: ICD-10-PCS | Mod: CPTII,S$GLB,, | Performed by: INTERNAL MEDICINE

## 2022-12-22 PROCEDURE — 99999 PR PBB SHADOW E&M-EST. PATIENT-LVL III: ICD-10-PCS | Mod: PBBFAC,,, | Performed by: INTERNAL MEDICINE

## 2022-12-22 PROCEDURE — 1159F MED LIST DOCD IN RCRD: CPT | Mod: CPTII,S$GLB,, | Performed by: INTERNAL MEDICINE

## 2022-12-22 PROCEDURE — 3079F DIAST BP 80-89 MM HG: CPT | Mod: CPTII,S$GLB,, | Performed by: INTERNAL MEDICINE

## 2022-12-22 PROCEDURE — 3077F SYST BP >= 140 MM HG: CPT | Mod: CPTII,S$GLB,, | Performed by: INTERNAL MEDICINE

## 2022-12-22 PROCEDURE — 1159F PR MEDICATION LIST DOCUMENTED IN MEDICAL RECORD: ICD-10-PCS | Mod: CPTII,S$GLB,, | Performed by: INTERNAL MEDICINE

## 2022-12-22 PROCEDURE — 99204 OFFICE O/P NEW MOD 45 MIN: CPT | Mod: S$GLB,,, | Performed by: INTERNAL MEDICINE

## 2022-12-22 PROCEDURE — 3079F PR MOST RECENT DIASTOLIC BLOOD PRESSURE 80-89 MM HG: ICD-10-PCS | Mod: CPTII,S$GLB,, | Performed by: INTERNAL MEDICINE

## 2022-12-22 PROCEDURE — 1160F RVW MEDS BY RX/DR IN RCRD: CPT | Mod: CPTII,S$GLB,, | Performed by: INTERNAL MEDICINE
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
## 2023-01-03 ENCOUNTER — APPOINTMENT (OUTPATIENT)
Dept: RADIATION THERAPY | Facility: OTHER | Age: 53
End: 2023-01-03
Attending: RADIOLOGY
Payer: MEDICARE

## 2023-01-04 ENCOUNTER — HOSPITAL ENCOUNTER (OUTPATIENT)
Dept: RADIATION THERAPY | Facility: HOSPITAL | Age: 53
Discharge: HOME | End: 2023-01-04
Attending: RADIOLOGY
Payer: MEDICARE

## 2023-01-04 PROCEDURE — 77263 PR  RADIATION THERAPY PLAN COMPLEX: ICD-10-PCS | Mod: ,,, | Performed by: RADIOLOGY

## 2023-01-04 PROCEDURE — 77290 PR  SET RADN THERAPY FIELD COMPLEX: ICD-10-PCS | Mod: 26,,, | Performed by: RADIOLOGY

## 2023-01-04 PROCEDURE — 77332 RADIATION TREATMENT AID(S): CPT | Mod: TC | Performed by: RADIOLOGY

## 2023-01-04 PROCEDURE — 77263 THER RADIOLOGY TX PLNG CPLX: CPT | Mod: ,,, | Performed by: RADIOLOGY

## 2023-01-04 PROCEDURE — 77014 HC CT GUIDANCE RADIATION THERAPY FLDS PLACEMENT: CPT | Mod: TC | Performed by: RADIOLOGY

## 2023-01-04 PROCEDURE — 77290 THER RAD SIMULAJ FIELD CPLX: CPT | Mod: 26,,, | Performed by: RADIOLOGY

## 2023-01-04 PROCEDURE — 77332 RADIATION TREATMENT AID(S): CPT | Mod: 26,,, | Performed by: RADIOLOGY

## 2023-01-04 PROCEDURE — 77290 THER RAD SIMULAJ FIELD CPLX: CPT | Mod: TC | Performed by: RADIOLOGY

## 2023-01-04 PROCEDURE — 77332 PR  RADN TREATMENT AID(S) SIMPLE: ICD-10-PCS | Mod: 26,,, | Performed by: RADIOLOGY

## 2023-01-09 PROCEDURE — 77295 3-D RADIOTHERAPY PLAN: CPT | Mod: TC | Performed by: RADIOLOGY

## 2023-01-09 PROCEDURE — 77334 RADIATION TREATMENT AID(S): CPT | Mod: 26,,, | Performed by: RADIOLOGY

## 2023-01-09 PROCEDURE — 77295 PR 3D RADIOTHERAPY PLAN: ICD-10-PCS | Mod: 26,,, | Performed by: RADIOLOGY

## 2023-01-09 PROCEDURE — 77334 PR  RADN TREATMENT AID(S) COMPLX: ICD-10-PCS | Mod: 26,,, | Performed by: RADIOLOGY

## 2023-01-09 PROCEDURE — 77300 RADIATION THERAPY DOSE PLAN: CPT | Mod: TC | Performed by: RADIOLOGY

## 2023-01-09 PROCEDURE — 77300 PR RADIATION THERAPY,DOSIMETRY PLAN: ICD-10-PCS | Mod: 26,,, | Performed by: RADIOLOGY

## 2023-01-09 PROCEDURE — 77334 RADIATION TREATMENT AID(S): CPT | Mod: TC | Performed by: RADIOLOGY

## 2023-01-09 PROCEDURE — 77295 3-D RADIOTHERAPY PLAN: CPT | Mod: 26,,, | Performed by: RADIOLOGY

## 2023-01-09 PROCEDURE — 77300 RADIATION THERAPY DOSE PLAN: CPT | Mod: 26,,, | Performed by: RADIOLOGY

## 2023-01-11 PROCEDURE — 77280 THER RAD SIMULAJ FIELD SMPL: CPT | Mod: TC | Performed by: RADIOLOGY

## 2023-01-11 PROCEDURE — 77280 PR  SET RADN THERAPY FIELD SIMPLE: ICD-10-PCS | Mod: 26,,, | Performed by: RADIOLOGY

## 2023-01-11 PROCEDURE — 77280 THER RAD SIMULAJ FIELD SMPL: CPT | Mod: 26,,, | Performed by: RADIOLOGY

## 2023-01-12 PROCEDURE — 77412 RADIATION TX DELIVERY LVL 3: CPT | Performed by: RADIOLOGY

## 2023-01-12 PROCEDURE — G6002 STEREOSCOPIC X-RAY GUIDANCE: HCPCS | Mod: 26,,, | Performed by: RADIOLOGY

## 2023-01-12 PROCEDURE — G6002 PR STEREOSCOPIC XRAY GUIDE FOR RADIATION TX DELIV: ICD-10-PCS | Mod: 26,,, | Performed by: RADIOLOGY

## 2023-01-12 PROCEDURE — 77387 GUIDANCE FOR RADJ TX DLVR: CPT | Mod: TC | Performed by: RADIOLOGY

## 2023-01-13 ENCOUNTER — DOCUMENTATION ONLY (OUTPATIENT)
Dept: RADIATION ONCOLOGY | Facility: CLINIC | Age: 53
End: 2023-01-13
Payer: MEDICARE

## 2023-01-13 PROCEDURE — G6002 PR STEREOSCOPIC XRAY GUIDE FOR RADIATION TX DELIV: ICD-10-PCS | Mod: 26,,, | Performed by: RADIOLOGY

## 2023-01-13 PROCEDURE — 77412 RADIATION TX DELIVERY LVL 3: CPT | Performed by: RADIOLOGY

## 2023-01-13 PROCEDURE — G6002 STEREOSCOPIC X-RAY GUIDANCE: HCPCS | Mod: 26,,, | Performed by: RADIOLOGY

## 2023-01-13 PROCEDURE — 77387 GUIDANCE FOR RADJ TX DLVR: CPT | Mod: TC | Performed by: RADIOLOGY

## 2023-01-17 PROCEDURE — G6002 PR STEREOSCOPIC XRAY GUIDE FOR RADIATION TX DELIV: ICD-10-PCS | Mod: 26,,, | Performed by: RADIOLOGY

## 2023-01-17 PROCEDURE — G6002 STEREOSCOPIC X-RAY GUIDANCE: HCPCS | Mod: 26,,, | Performed by: RADIOLOGY

## 2023-01-17 PROCEDURE — 77412 RADIATION TX DELIVERY LVL 3: CPT | Performed by: RADIOLOGY

## 2023-01-17 PROCEDURE — 77387 GUIDANCE FOR RADJ TX DLVR: CPT | Mod: TC | Performed by: RADIOLOGY

## 2023-01-18 PROCEDURE — 77412 RADIATION TX DELIVERY LVL 3: CPT | Performed by: RADIOLOGY

## 2023-01-18 PROCEDURE — 77387 GUIDANCE FOR RADJ TX DLVR: CPT | Mod: TC | Performed by: RADIOLOGY

## 2023-01-18 PROCEDURE — 77336 RADIATION PHYSICS CONSULT: CPT | Performed by: RADIOLOGY

## 2023-01-18 PROCEDURE — G6002 PR STEREOSCOPIC XRAY GUIDE FOR RADIATION TX DELIV: ICD-10-PCS | Mod: 26,,, | Performed by: RADIOLOGY

## 2023-01-18 PROCEDURE — G6002 STEREOSCOPIC X-RAY GUIDANCE: HCPCS | Mod: 26,,, | Performed by: RADIOLOGY

## 2023-01-19 PROCEDURE — 77387 GUIDANCE FOR RADJ TX DLVR: CPT | Mod: TC | Performed by: RADIOLOGY

## 2023-01-19 PROCEDURE — 77412 RADIATION TX DELIVERY LVL 3: CPT | Performed by: RADIOLOGY

## 2023-01-19 PROCEDURE — G6002 PR STEREOSCOPIC XRAY GUIDE FOR RADIATION TX DELIV: ICD-10-PCS | Mod: 26,,, | Performed by: RADIOLOGY

## 2023-01-19 PROCEDURE — G6002 STEREOSCOPIC X-RAY GUIDANCE: HCPCS | Mod: 26,,, | Performed by: RADIOLOGY

## 2023-01-20 ENCOUNTER — DOCUMENTATION ONLY (OUTPATIENT)
Dept: RADIATION ONCOLOGY | Facility: CLINIC | Age: 53
End: 2023-01-20
Payer: MEDICARE

## 2023-01-20 PROCEDURE — G6002 PR STEREOSCOPIC XRAY GUIDE FOR RADIATION TX DELIV: ICD-10-PCS | Mod: 26,,, | Performed by: RADIOLOGY

## 2023-01-20 PROCEDURE — 77387 GUIDANCE FOR RADJ TX DLVR: CPT | Mod: TC | Performed by: RADIOLOGY

## 2023-01-20 PROCEDURE — 77412 RADIATION TX DELIVERY LVL 3: CPT | Performed by: RADIOLOGY

## 2023-01-20 PROCEDURE — 77417 THER RADIOLOGY PORT IMAGE(S): CPT | Performed by: RADIOLOGY

## 2023-01-20 PROCEDURE — G6002 STEREOSCOPIC X-RAY GUIDANCE: HCPCS | Mod: 26,,, | Performed by: RADIOLOGY

## 2023-01-23 PROCEDURE — G6002 PR STEREOSCOPIC XRAY GUIDE FOR RADIATION TX DELIV: ICD-10-PCS | Mod: 26,,, | Performed by: RADIOLOGY

## 2023-01-23 PROCEDURE — 77387 GUIDANCE FOR RADJ TX DLVR: CPT | Mod: TC | Performed by: RADIOLOGY

## 2023-01-23 PROCEDURE — G6002 STEREOSCOPIC X-RAY GUIDANCE: HCPCS | Mod: 26,,, | Performed by: RADIOLOGY

## 2023-01-23 PROCEDURE — 77412 RADIATION TX DELIVERY LVL 3: CPT | Performed by: RADIOLOGY

## 2023-01-24 PROCEDURE — G6002 STEREOSCOPIC X-RAY GUIDANCE: HCPCS | Mod: 26,,, | Performed by: RADIOLOGY

## 2023-01-24 PROCEDURE — 77412 RADIATION TX DELIVERY LVL 3: CPT | Performed by: RADIOLOGY

## 2023-01-24 PROCEDURE — G6002 PR STEREOSCOPIC XRAY GUIDE FOR RADIATION TX DELIV: ICD-10-PCS | Mod: 26,,, | Performed by: RADIOLOGY

## 2023-01-24 PROCEDURE — 77387 GUIDANCE FOR RADJ TX DLVR: CPT | Mod: TC | Performed by: RADIOLOGY

## 2023-01-25 PROCEDURE — G6002 PR STEREOSCOPIC XRAY GUIDE FOR RADIATION TX DELIV: ICD-10-PCS | Mod: 26,,, | Performed by: RADIOLOGY

## 2023-01-25 PROCEDURE — 77387 GUIDANCE FOR RADJ TX DLVR: CPT | Mod: TC | Performed by: RADIOLOGY

## 2023-01-25 PROCEDURE — 77336 RADIATION PHYSICS CONSULT: CPT | Performed by: RADIOLOGY

## 2023-01-25 PROCEDURE — 77412 RADIATION TX DELIVERY LVL 3: CPT | Performed by: RADIOLOGY

## 2023-01-25 PROCEDURE — G6002 STEREOSCOPIC X-RAY GUIDANCE: HCPCS | Mod: 26,,, | Performed by: RADIOLOGY

## 2023-01-26 PROCEDURE — 77387 GUIDANCE FOR RADJ TX DLVR: CPT | Mod: TC | Performed by: RADIOLOGY

## 2023-01-26 PROCEDURE — G6002 PR STEREOSCOPIC XRAY GUIDE FOR RADIATION TX DELIV: ICD-10-PCS | Mod: 26,,, | Performed by: RADIOLOGY

## 2023-01-26 PROCEDURE — 77412 RADIATION TX DELIVERY LVL 3: CPT | Performed by: RADIOLOGY

## 2023-01-26 PROCEDURE — G6002 STEREOSCOPIC X-RAY GUIDANCE: HCPCS | Mod: 26,,, | Performed by: RADIOLOGY

## 2023-01-27 ENCOUNTER — DOCUMENTATION ONLY (OUTPATIENT)
Dept: RADIATION ONCOLOGY | Facility: CLINIC | Age: 53
End: 2023-01-27
Payer: MEDICARE

## 2023-01-27 PROCEDURE — 77417 THER RADIOLOGY PORT IMAGE(S): CPT | Performed by: RADIOLOGY

## 2023-01-27 PROCEDURE — 77387 GUIDANCE FOR RADJ TX DLVR: CPT | Mod: TC | Performed by: RADIOLOGY

## 2023-01-27 PROCEDURE — G6002 STEREOSCOPIC X-RAY GUIDANCE: HCPCS | Mod: 26,,, | Performed by: RADIOLOGY

## 2023-01-27 PROCEDURE — 77412 RADIATION TX DELIVERY LVL 3: CPT | Performed by: RADIOLOGY

## 2023-01-27 PROCEDURE — G6002 PR STEREOSCOPIC XRAY GUIDE FOR RADIATION TX DELIV: ICD-10-PCS | Mod: 26,,, | Performed by: RADIOLOGY

## 2023-01-27 NOTE — PLAN OF CARE
Pt. On day 11 of outpt. Xrt to breast.  Pt. Skin intact.  Using cream 2xdaily.  Pt. C/o cough after treatment.

## 2023-01-30 PROCEDURE — G6002 PR STEREOSCOPIC XRAY GUIDE FOR RADIATION TX DELIV: ICD-10-PCS | Mod: 26,,, | Performed by: RADIOLOGY

## 2023-01-30 PROCEDURE — G6002 STEREOSCOPIC X-RAY GUIDANCE: HCPCS | Mod: 26,,, | Performed by: RADIOLOGY

## 2023-01-30 PROCEDURE — 77387 GUIDANCE FOR RADJ TX DLVR: CPT | Mod: TC | Performed by: RADIOLOGY

## 2023-01-30 PROCEDURE — 77412 RADIATION TX DELIVERY LVL 3: CPT | Performed by: RADIOLOGY

## 2023-01-31 PROCEDURE — G6002 STEREOSCOPIC X-RAY GUIDANCE: HCPCS | Mod: 26,,, | Performed by: RADIOLOGY

## 2023-01-31 PROCEDURE — 77387 GUIDANCE FOR RADJ TX DLVR: CPT | Mod: TC | Performed by: RADIOLOGY

## 2023-01-31 PROCEDURE — 77412 RADIATION TX DELIVERY LVL 3: CPT | Performed by: RADIOLOGY

## 2023-01-31 PROCEDURE — G6002 PR STEREOSCOPIC XRAY GUIDE FOR RADIATION TX DELIV: ICD-10-PCS | Mod: 26,,, | Performed by: RADIOLOGY

## 2023-02-01 ENCOUNTER — APPOINTMENT (OUTPATIENT)
Dept: RADIATION THERAPY | Facility: OTHER | Age: 53
End: 2023-02-01
Attending: RADIOLOGY
Payer: MEDICARE

## 2023-02-01 PROCEDURE — 77412 RADIATION TX DELIVERY LVL 3: CPT | Performed by: RADIOLOGY

## 2023-02-01 PROCEDURE — 77387 GUIDANCE FOR RADJ TX DLVR: CPT | Mod: TC | Performed by: RADIOLOGY

## 2023-02-01 PROCEDURE — G6002 STEREOSCOPIC X-RAY GUIDANCE: HCPCS | Mod: 26,,, | Performed by: RADIOLOGY

## 2023-02-01 PROCEDURE — G6002 PR STEREOSCOPIC XRAY GUIDE FOR RADIATION TX DELIV: ICD-10-PCS | Mod: 26,,, | Performed by: RADIOLOGY

## 2023-02-01 PROCEDURE — 77336 RADIATION PHYSICS CONSULT: CPT | Performed by: RADIOLOGY

## 2023-02-02 PROCEDURE — G6002 STEREOSCOPIC X-RAY GUIDANCE: HCPCS | Mod: 26,,, | Performed by: RADIOLOGY

## 2023-02-02 PROCEDURE — 77387 GUIDANCE FOR RADJ TX DLVR: CPT | Mod: TC | Performed by: RADIOLOGY

## 2023-02-02 PROCEDURE — G6002 PR STEREOSCOPIC XRAY GUIDE FOR RADIATION TX DELIV: ICD-10-PCS | Mod: 26,,, | Performed by: RADIOLOGY

## 2023-02-02 PROCEDURE — 77412 RADIATION TX DELIVERY LVL 3: CPT | Performed by: RADIOLOGY

## 2023-02-03 ENCOUNTER — DOCUMENTATION ONLY (OUTPATIENT)
Dept: RADIATION ONCOLOGY | Facility: CLINIC | Age: 53
End: 2023-02-03
Payer: MEDICARE

## 2023-02-03 PROCEDURE — 77387 GUIDANCE FOR RADJ TX DLVR: CPT | Mod: TC | Performed by: RADIOLOGY

## 2023-02-03 PROCEDURE — G6002 PR STEREOSCOPIC XRAY GUIDE FOR RADIATION TX DELIV: ICD-10-PCS | Mod: 26,,, | Performed by: RADIOLOGY

## 2023-02-03 PROCEDURE — G6002 STEREOSCOPIC X-RAY GUIDANCE: HCPCS | Mod: 26,,, | Performed by: RADIOLOGY

## 2023-02-03 PROCEDURE — 77412 RADIATION TX DELIVERY LVL 3: CPT | Performed by: RADIOLOGY

## 2023-02-07 ENCOUNTER — TELEPHONE (OUTPATIENT)
Dept: PLASTIC SURGERY | Facility: CLINIC | Age: 53
End: 2023-02-07
Payer: MEDICARE

## 2023-02-07 ENCOUNTER — OFFICE VISIT (OUTPATIENT)
Dept: HEMATOLOGY/ONCOLOGY | Facility: CLINIC | Age: 53
End: 2023-02-07
Payer: MEDICARE

## 2023-02-07 VITALS
RESPIRATION RATE: 18 BRPM | DIASTOLIC BLOOD PRESSURE: 76 MMHG | BODY MASS INDEX: 35.49 KG/M2 | HEART RATE: 84 BPM | SYSTOLIC BLOOD PRESSURE: 136 MMHG | WEIGHT: 207.88 LBS | HEIGHT: 64 IN | OXYGEN SATURATION: 100 % | TEMPERATURE: 98 F

## 2023-02-07 DIAGNOSIS — C50.811 MALIGNANT NEOPLASM OF OVERLAPPING SITES OF RIGHT BREAST IN FEMALE, ESTROGEN RECEPTOR POSITIVE: Primary | ICD-10-CM

## 2023-02-07 DIAGNOSIS — Z17.0 MALIGNANT NEOPLASM OF OVERLAPPING SITES OF RIGHT BREAST IN FEMALE, ESTROGEN RECEPTOR POSITIVE: Primary | ICD-10-CM

## 2023-02-07 PROCEDURE — 3075F PR MOST RECENT SYSTOLIC BLOOD PRESS GE 130-139MM HG: ICD-10-PCS | Mod: CPTII,S$GLB,, | Performed by: INTERNAL MEDICINE

## 2023-02-07 PROCEDURE — 3008F BODY MASS INDEX DOCD: CPT | Mod: CPTII,S$GLB,, | Performed by: INTERNAL MEDICINE

## 2023-02-07 PROCEDURE — 1159F MED LIST DOCD IN RCRD: CPT | Mod: CPTII,S$GLB,, | Performed by: INTERNAL MEDICINE

## 2023-02-07 PROCEDURE — 99999 PR PBB SHADOW E&M-EST. PATIENT-LVL III: ICD-10-PCS | Mod: PBBFAC,,, | Performed by: INTERNAL MEDICINE

## 2023-02-07 PROCEDURE — 3078F DIAST BP <80 MM HG: CPT | Mod: CPTII,S$GLB,, | Performed by: INTERNAL MEDICINE

## 2023-02-07 PROCEDURE — 3078F PR MOST RECENT DIASTOLIC BLOOD PRESSURE < 80 MM HG: ICD-10-PCS | Mod: CPTII,S$GLB,, | Performed by: INTERNAL MEDICINE

## 2023-02-07 PROCEDURE — 3008F PR BODY MASS INDEX (BMI) DOCUMENTED: ICD-10-PCS | Mod: CPTII,S$GLB,, | Performed by: INTERNAL MEDICINE

## 2023-02-07 PROCEDURE — 99215 PR OFFICE/OUTPT VISIT, EST, LEVL V, 40-54 MIN: ICD-10-PCS | Mod: S$GLB,,, | Performed by: INTERNAL MEDICINE

## 2023-02-07 PROCEDURE — 99999 PR PBB SHADOW E&M-EST. PATIENT-LVL III: CPT | Mod: PBBFAC,,, | Performed by: INTERNAL MEDICINE

## 2023-02-07 PROCEDURE — 99215 OFFICE O/P EST HI 40 MIN: CPT | Mod: S$GLB,,, | Performed by: INTERNAL MEDICINE

## 2023-02-07 PROCEDURE — 1159F PR MEDICATION LIST DOCUMENTED IN MEDICAL RECORD: ICD-10-PCS | Mod: CPTII,S$GLB,, | Performed by: INTERNAL MEDICINE

## 2023-02-07 PROCEDURE — 3075F SYST BP GE 130 - 139MM HG: CPT | Mod: CPTII,S$GLB,, | Performed by: INTERNAL MEDICINE

## 2023-02-07 RX ORDER — ANASTROZOLE 1 MG/1
1 TABLET ORAL DAILY
Qty: 30 TABLET | Refills: 11 | Status: SHIPPED | OUTPATIENT
Start: 2023-02-07 | End: 2024-02-07

## 2023-02-07 NOTE — TELEPHONE ENCOUNTER
Called pt back in regards to changing appt location from [place]. Pt agreeable with rescheduling appt to [facility] at 3pm -
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
## 2023-02-08 PROCEDURE — 77336 RADIATION PHYSICS CONSULT: CPT | Performed by: RADIOLOGY

## 2023-02-10 ENCOUNTER — TELEPHONE (OUTPATIENT)
Dept: RADIATION ONCOLOGY | Facility: CLINIC | Age: 53
End: 2023-02-10
Payer: MEDICARE

## 2023-02-10 NOTE — TELEPHONE ENCOUNTER
Pt. Doing Well.  Skin care discussed.  Skin intact.  Pt. Encouraged to call back with any questions or concerns. RTC 6 weeks.

## 2023-03-04 ENCOUNTER — PATIENT MESSAGE (OUTPATIENT)
Dept: PLASTIC SURGERY | Facility: CLINIC | Age: 53
End: 2023-03-04
Payer: MEDICARE

## 2023-03-04 ENCOUNTER — PATIENT MESSAGE (OUTPATIENT)
Dept: HEMATOLOGY/ONCOLOGY | Facility: CLINIC | Age: 53
End: 2023-03-04
Payer: MEDICARE

## 2023-03-04 ENCOUNTER — PATIENT MESSAGE (OUTPATIENT)
Dept: RADIATION ONCOLOGY | Facility: CLINIC | Age: 53
End: 2023-03-04
Payer: MEDICARE

## 2023-03-04 ENCOUNTER — PATIENT MESSAGE (OUTPATIENT)
Dept: SURGERY | Facility: CLINIC | Age: 53
End: 2023-03-04
Payer: MEDICARE

## 2023-03-09 ENCOUNTER — OFFICE VISIT (OUTPATIENT)
Dept: PLASTIC SURGERY | Facility: CLINIC | Age: 53
End: 2023-03-09
Payer: MEDICARE

## 2023-03-09 VITALS
BODY MASS INDEX: 35 KG/M2 | DIASTOLIC BLOOD PRESSURE: 82 MMHG | OXYGEN SATURATION: 96 % | HEIGHT: 64 IN | WEIGHT: 205 LBS | SYSTOLIC BLOOD PRESSURE: 158 MMHG | HEART RATE: 83 BPM

## 2023-03-09 DIAGNOSIS — Z17.0 MALIGNANT NEOPLASM OF OVERLAPPING SITES OF RIGHT BREAST IN FEMALE, ESTROGEN RECEPTOR POSITIVE: Primary | ICD-10-CM

## 2023-03-09 DIAGNOSIS — C50.811 MALIGNANT NEOPLASM OF OVERLAPPING SITES OF RIGHT BREAST IN FEMALE, ESTROGEN RECEPTOR POSITIVE: Primary | ICD-10-CM

## 2023-03-09 PROCEDURE — 3008F PR BODY MASS INDEX (BMI) DOCUMENTED: ICD-10-PCS | Mod: CPTII,S$GLB,, | Performed by: SURGERY

## 2023-03-09 PROCEDURE — 3077F SYST BP >= 140 MM HG: CPT | Mod: CPTII,S$GLB,, | Performed by: SURGERY

## 2023-03-09 PROCEDURE — 3008F BODY MASS INDEX DOCD: CPT | Mod: CPTII,S$GLB,, | Performed by: SURGERY

## 2023-03-09 PROCEDURE — 99999 PR PBB SHADOW E&M-EST. PATIENT-LVL III: CPT | Mod: PBBFAC,,, | Performed by: SURGERY

## 2023-03-09 PROCEDURE — 3079F DIAST BP 80-89 MM HG: CPT | Mod: CPTII,S$GLB,, | Performed by: SURGERY

## 2023-03-09 PROCEDURE — 99999 PR PBB SHADOW E&M-EST. PATIENT-LVL III: ICD-10-PCS | Mod: PBBFAC,,, | Performed by: SURGERY

## 2023-03-09 PROCEDURE — 3077F PR MOST RECENT SYSTOLIC BLOOD PRESSURE >= 140 MM HG: ICD-10-PCS | Mod: CPTII,S$GLB,, | Performed by: SURGERY

## 2023-03-09 PROCEDURE — 99212 PR OFFICE/OUTPT VISIT, EST, LEVL II, 10-19 MIN: ICD-10-PCS | Mod: S$GLB,,, | Performed by: SURGERY

## 2023-03-09 PROCEDURE — 99212 OFFICE O/P EST SF 10 MIN: CPT | Mod: S$GLB,,, | Performed by: SURGERY

## 2023-03-09 PROCEDURE — 3079F PR MOST RECENT DIASTOLIC BLOOD PRESSURE 80-89 MM HG: ICD-10-PCS | Mod: CPTII,S$GLB,, | Performed by: SURGERY

## 2023-03-09 PROCEDURE — 1159F MED LIST DOCD IN RCRD: CPT | Mod: CPTII,S$GLB,, | Performed by: SURGERY

## 2023-03-09 PROCEDURE — 1159F PR MEDICATION LIST DOCUMENTED IN MEDICAL RECORD: ICD-10-PCS | Mod: CPTII,S$GLB,, | Performed by: SURGERY

## 2023-03-10 ENCOUNTER — TELEPHONE (OUTPATIENT)
Dept: RADIATION ONCOLOGY | Facility: CLINIC | Age: 53
End: 2023-03-10
Payer: MEDICARE

## 2023-03-10 NOTE — TELEPHONE ENCOUNTER
Call to pt regarding missed appt with Dr Dyer today at [facility]. Call went to . Left message informing pt that Dr Dyer can see her at [facility] on 3/15/23 after Sara Fry's appt. Appt changed to [facility] 3/15/23 at 11:00 AM in the Radiation Oncology dept.

## 2023-03-13 NOTE — PROGRESS NOTES
Ms Jones underwent oncoplastic reduction in November.  She completed radiation and early February.    She does have some skin changes, dark in color and dry skin on the affected side.    She has no concerns or complaints.  Her wounds have  healed well.      Vitals:    03/09/23 1545   BP: (!) 158/82   Pulse: 83       On exam she does have some discoloration and dry skin on the right side.  All incisions are well healed.  She has good symmetry.      Overall she is tolerated radiotherapy fairly well.  We will see her back in 4 months' time to see her final result so plan on getting postoperative photos then.    Lionel Barker, DO  Plastic and Reconstructive Surgery    I spent a total of 15 minutes on the day of the visit.  This includes face to face time and non-face to face time preparing to see the patient (eg, review of tests), obtaining and/or reviewing separately obtained history, documenting clinical information in the electronic or other health record, independently interpreting results and communicating results to the patient/family/caregiver, or care coordinator.

## 2023-03-15 ENCOUNTER — TELEPHONE (OUTPATIENT)
Dept: RADIATION ONCOLOGY | Facility: CLINIC | Age: 53
End: 2023-03-15
Payer: MEDICARE

## 2023-03-15 NOTE — TELEPHONE ENCOUNTER
Call to pt regarding missed appt with Dr Dyer. Pt rescheduled to 3/24/23 at 11:00 Am at [facility].Pt verbalized understanding of appt. Pt transferred to Hematology-Oncology as she missed appt with their dept today also.

## 2023-03-24 ENCOUNTER — OFFICE VISIT (OUTPATIENT)
Dept: RADIATION ONCOLOGY | Facility: CLINIC | Age: 53
End: 2023-03-24
Payer: MEDICARE

## 2023-03-24 VITALS
DIASTOLIC BLOOD PRESSURE: 89 MMHG | HEART RATE: 76 BPM | SYSTOLIC BLOOD PRESSURE: 163 MMHG | OXYGEN SATURATION: 99 % | BODY MASS INDEX: 36.17 KG/M2 | TEMPERATURE: 98 F | WEIGHT: 210.69 LBS

## 2023-03-24 DIAGNOSIS — Z17.0 MALIGNANT NEOPLASM OF OVERLAPPING SITES OF RIGHT BREAST IN FEMALE, ESTROGEN RECEPTOR POSITIVE: Primary | ICD-10-CM

## 2023-03-24 DIAGNOSIS — C50.811 MALIGNANT NEOPLASM OF OVERLAPPING SITES OF RIGHT BREAST IN FEMALE, ESTROGEN RECEPTOR POSITIVE: Primary | ICD-10-CM

## 2023-03-24 PROCEDURE — 99024 PR POST-OP FOLLOW-UP VISIT: ICD-10-PCS | Mod: S$GLB,,, | Performed by: RADIOLOGY

## 2023-03-24 PROCEDURE — 3079F PR MOST RECENT DIASTOLIC BLOOD PRESSURE 80-89 MM HG: ICD-10-PCS | Mod: CPTII,S$GLB,, | Performed by: RADIOLOGY

## 2023-03-24 PROCEDURE — 99024 POSTOP FOLLOW-UP VISIT: CPT | Mod: S$GLB,,, | Performed by: RADIOLOGY

## 2023-03-24 PROCEDURE — 1159F PR MEDICATION LIST DOCUMENTED IN MEDICAL RECORD: ICD-10-PCS | Mod: CPTII,S$GLB,, | Performed by: RADIOLOGY

## 2023-03-24 PROCEDURE — 99999 PR PBB SHADOW E&M-EST. PATIENT-LVL III: ICD-10-PCS | Mod: PBBFAC,,, | Performed by: RADIOLOGY

## 2023-03-24 PROCEDURE — 3079F DIAST BP 80-89 MM HG: CPT | Mod: CPTII,S$GLB,, | Performed by: RADIOLOGY

## 2023-03-24 PROCEDURE — 3008F PR BODY MASS INDEX (BMI) DOCUMENTED: ICD-10-PCS | Mod: CPTII,S$GLB,, | Performed by: RADIOLOGY

## 2023-03-24 PROCEDURE — 99999 PR PBB SHADOW E&M-EST. PATIENT-LVL III: CPT | Mod: PBBFAC,,, | Performed by: RADIOLOGY

## 2023-03-24 PROCEDURE — 3077F PR MOST RECENT SYSTOLIC BLOOD PRESSURE >= 140 MM HG: ICD-10-PCS | Mod: CPTII,S$GLB,, | Performed by: RADIOLOGY

## 2023-03-24 PROCEDURE — 3077F SYST BP >= 140 MM HG: CPT | Mod: CPTII,S$GLB,, | Performed by: RADIOLOGY

## 2023-03-24 PROCEDURE — 1159F MED LIST DOCD IN RCRD: CPT | Mod: CPTII,S$GLB,, | Performed by: RADIOLOGY

## 2023-03-24 PROCEDURE — 3008F BODY MASS INDEX DOCD: CPT | Mod: CPTII,S$GLB,, | Performed by: RADIOLOGY

## 2023-03-24 NOTE — PROGRESS NOTES
DEPARTMENT OF RADIATION ONCOLOGY  FOLLOW-UP NOTE        Patient Name: Brionna Funes  MRN: [id number]  : 1970    DIAGNOSIS:  Cancer Staging   Malignant neoplasm of overlapping sites of right breast in female, estrogen receptor positive  Staging form: Breast, AJCC 8th Edition  - Pathologic: Stage IA (pT1a, pN0(sn), cM0, G2, ER+, MI-, HER2+) - Signed by Crystal Dyer MD on 12/15/2022      PATIENT IDENTIFICATION:  Biopsy of the lesion at the 12 o'clock position revealed high-grade ductal carcinoma in-situ.  On immunohistochemistry, tumor cells were ER low positive, MI negative.  There was no definitive invasive carcinoma seen on the biopsy specimen.     The patient was taken to the operating room on 2022 for right breast partial mastectomy and sentinel lymph node dissection with oncoplastic reduction.  Pathology revealed two foci measuring up to 2 mm of invasive ductal carcinoma with an extensive intraductal component.  Five sentinel lymph nodes were negative for evidence of metastatic carcinoma.  The margins of resection were negative with the closest margin to invasive carcinoma being posterior at 3 mm.  The closest margin to the DCIS was anterior at 3 mm.  On immunohistochemistry, the tumor cells were ER low positive, MI negative, HER2 Steve 3+.  The Ki-67 proliferative index was 25%.     Oncology History   Malignant neoplasm of overlapping sites of right breast in female, estrogen receptor positive   10/12/2022 Initial Diagnosis    Malignant neoplasm of overlapping sites of right breast in female, estrogen receptor positive     12/15/2022 Cancer Staged    Staging form: Breast, AJCC 8th Edition  - Pathologic: Stage IA (pT1a, pN0(sn), cM0, G2, ER+, MI-, HER2+)       2023 - 2/3/2023 Radiation Therapy    Treating physician: Dr. Crystal Dyer  Total Dose: 42.4 Gy  Fractions: 16         RADIATION:  Treatment Summary  Course: C1 Breast   Treatment Site Ref. ID Energy Dose/Fx (Gy) #Fx Dose  "Correction (Gy) Total Dose (Gy) Start Date End Date Elapsed Days   3D Breast_Rt RtBreast 18X/6X 2.65 16 / 16 0 42.4 1/12/2023 2/3/2023 22       HISTORY OF PRESENT ILLNESS: Ms. Funes returns to clinic today for routine post-radiation follow-up.  The patient reports breast hyperpigmentation post radiation treatment.  She states that she is pleased with cosmetic outcome.  She would like to have dog ears removed by plastics.  She reports hot flashes and weight gain since she's been on endocrine therapy.    REVIEW OF SYSTEMS:   Review of Systems   Constitutional:  Negative for fever, malaise/fatigue and weight loss.   HENT:  Positive for sore throat. Negative for ear pain, hearing loss and sinus pain.    Eyes:  Negative for blurred vision, double vision and pain.   Respiratory:  Negative for cough, hemoptysis, shortness of breath and wheezing.    Cardiovascular:  Negative for chest pain, palpitations and leg swelling.   Gastrointestinal:  Positive for constipation. Negative for abdominal pain, blood in stool, diarrhea, heartburn, nausea and vomiting.   Genitourinary:  Negative for dysuria, frequency, hematuria and urgency.   Musculoskeletal:  Positive for back pain. Negative for joint pain.   Skin:  Negative for itching and rash.   Neurological:  Positive for dizziness and headaches. Negative for tingling, focal weakness and seizures.   Psychiatric/Behavioral:  Negative for depression. The patient is not nervous/anxious.        ALLERGIES:   Review of patient's allergies indicates:   Allergen Reactions    Codeine Itching     Unsure if she can take oxycodone, hydrocodone, etc    11/18/22 @ 1448 Pt., stated "I can take dilaudid & oxycodone with benadryl"         MEDICATIONS:  Current Outpatient Medications   Medication Sig    amLODIPine (NORVASC) 10 MG tablet Take 1 tablet (10 mg total) by mouth once daily.    anastrozole (ARIMIDEX) 1 mg Tab Take 1 tablet (1 mg total) by mouth once daily.    aspirin (ECOTRIN) 81 MG EC " tablet Take 81 mg by mouth once daily.    atorvastatin (LIPITOR) 20 MG tablet Take 1 tablet (20 mg total) by mouth once daily.    benzonatate (TESSALON) 200 MG capsule Take 1 capsule (200 mg total) by mouth 3 (three) times daily as needed for Cough.    fluticasone propionate (FLONASE) 50 mcg/actuation nasal spray 1 spray (50 mcg total) by Each Nostril route 2 (two) times daily as needed.    hydrOXYzine (ATARAX) 50 MG tablet Take 1 tablet (50 mg total) by mouth 4 (four) times daily.    ibuprofen (ADVIL,MOTRIN) 800 MG tablet Take 1 tablet (800 mg total) by mouth every 8 (eight) hours.    UNABLE TO FIND Take by mouth as needed. Sleep aid    acetaminophen (TYLENOL) 500 MG tablet Take 2 tablets (1,000 mg total) by mouth every 8 (eight) hours.    cetirizine (ZYRTEC) 10 MG tablet Take 1 tablet (10 mg total) by mouth once daily.    oxyCODONE (ROXICODONE) 5 MG immediate release tablet Take 1 tablet (5 mg total) by mouth every 4 (four) hours as needed for Pain.     No current facility-administered medications for this visit.           PHYSICAL EXAMINATION:  Vitals:    23 1139   BP: (!) 163/89   Pulse: 76   Temp:      ECO  Body mass index is 36.17 kg/m².   Physical Exam  Constitutional:       Appearance: She is well-developed.   HENT:      Head: Normocephalic and atraumatic.   Eyes:      Conjunctiva/sclera: Conjunctivae normal.   Cardiovascular:      Rate and Rhythm: Normal rate.   Pulmonary:      Effort: Pulmonary effort is normal.   Chest:       Abdominal:      Palpations: Abdomen is soft.   Musculoskeletal:         General: Normal range of motion.      Cervical back: Normal range of motion and neck supple.   Skin:     General: Skin is warm and dry.   Neurological:      Mental Status: She is alert and oriented to person, place, and time.   Psychiatric:         Behavior: Behavior normal.         Thought Content: Thought content normal.        ASSESSMENT/PLAN:  Brionna was seen today for follow-up.    Diagnoses and  all orders for this visit:    Malignant neoplasm of overlapping sites of right breast in female, estrogen receptor positive      The patient is recovering well from the acute effects of radiation treatment.  She was advised on continued skin care with a mild moisturizer such as Eucerin or vitamin e oil.  She will continue endocrine therapy per Dr. Ronquillo.  Mammogram as per routine.

## 2023-04-21 ENCOUNTER — PATIENT MESSAGE (OUTPATIENT)
Dept: ADMINISTRATIVE | Facility: HOSPITAL | Age: 53
End: 2023-04-21
Payer: MEDICARE

## 2023-06-23 ENCOUNTER — LAB VISIT (OUTPATIENT)
Dept: LAB | Facility: HOSPITAL | Age: 53
End: 2023-06-23
Attending: FAMILY MEDICINE
Payer: MEDICARE

## 2023-06-23 ENCOUNTER — OFFICE VISIT (OUTPATIENT)
Dept: INTERNAL MEDICINE | Facility: CLINIC | Age: 53
End: 2023-06-23
Attending: FAMILY MEDICINE
Payer: MEDICARE

## 2023-06-23 VITALS
DIASTOLIC BLOOD PRESSURE: 80 MMHG | HEIGHT: 64 IN | HEART RATE: 71 BPM | WEIGHT: 212 LBS | SYSTOLIC BLOOD PRESSURE: 126 MMHG | OXYGEN SATURATION: 99 % | BODY MASS INDEX: 36.19 KG/M2

## 2023-06-23 DIAGNOSIS — Z87.891 PERSONAL HISTORY OF NICOTINE DEPENDENCE: ICD-10-CM

## 2023-06-23 DIAGNOSIS — I10 HYPERTENSION, ESSENTIAL: ICD-10-CM

## 2023-06-23 DIAGNOSIS — Z00.00 ANNUAL PHYSICAL EXAM: Primary | ICD-10-CM

## 2023-06-23 DIAGNOSIS — Z00.00 ANNUAL PHYSICAL EXAM: ICD-10-CM

## 2023-06-23 DIAGNOSIS — R73.9 ELEVATED BLOOD SUGAR: ICD-10-CM

## 2023-06-23 DIAGNOSIS — E66.01 SEVERE OBESITY (BMI 35.0-39.9) WITH COMORBIDITY: ICD-10-CM

## 2023-06-23 DIAGNOSIS — Z17.0 MALIGNANT NEOPLASM OF OVERLAPPING SITES OF RIGHT BREAST IN FEMALE, ESTROGEN RECEPTOR POSITIVE: ICD-10-CM

## 2023-06-23 DIAGNOSIS — R21 RASH: ICD-10-CM

## 2023-06-23 DIAGNOSIS — C50.811 MALIGNANT NEOPLASM OF OVERLAPPING SITES OF RIGHT BREAST IN FEMALE, ESTROGEN RECEPTOR POSITIVE: ICD-10-CM

## 2023-06-23 DIAGNOSIS — R10.9 ABDOMINAL PAIN, UNSPECIFIED ABDOMINAL LOCATION: ICD-10-CM

## 2023-06-23 DIAGNOSIS — E78.5 HYPERLIPIDEMIA, UNSPECIFIED HYPERLIPIDEMIA TYPE: ICD-10-CM

## 2023-06-23 PROBLEM — R53.1 DECREASED STRENGTH, ENDURANCE, AND MOBILITY: Status: RESOLVED | Noted: 2018-04-06 | Resolved: 2023-06-23

## 2023-06-23 PROBLEM — E66.9 OBESITY (BMI 35.0-39.9 WITHOUT COMORBIDITY): Status: RESOLVED | Noted: 2017-05-15 | Resolved: 2023-06-23

## 2023-06-23 PROBLEM — R92.8 ABNORMAL MAMMOGRAM: Status: RESOLVED | Noted: 2022-08-19 | Resolved: 2023-06-23

## 2023-06-23 PROBLEM — R07.9 CHEST PAIN: Status: RESOLVED | Noted: 2022-08-19 | Resolved: 2023-06-23

## 2023-06-23 PROBLEM — R68.89 DECREASED STRENGTH, ENDURANCE, AND MOBILITY: Status: RESOLVED | Noted: 2018-04-06 | Resolved: 2023-06-23

## 2023-06-23 PROBLEM — Z74.09 DECREASED STRENGTH, ENDURANCE, AND MOBILITY: Status: RESOLVED | Noted: 2018-04-06 | Resolved: 2023-06-23

## 2023-06-23 LAB
ALBUMIN SERPL BCP-MCNC: 4.1 G/DL (ref 3.5–5.2)
ALP SERPL-CCNC: 86 U/L (ref 55–135)
ALT SERPL W/O P-5'-P-CCNC: 17 U/L (ref 10–44)
ANION GAP SERPL CALC-SCNC: 9 MMOL/L (ref 8–16)
AST SERPL-CCNC: 23 U/L (ref 10–40)
BILIRUB SERPL-MCNC: 0.4 MG/DL (ref 0.1–1)
BUN SERPL-MCNC: 15 MG/DL (ref 6–20)
CALCIUM SERPL-MCNC: 9.5 MG/DL (ref 8.7–10.5)
CHLORIDE SERPL-SCNC: 108 MMOL/L (ref 95–110)
CHOLEST SERPL-MCNC: 203 MG/DL (ref 120–199)
CHOLEST/HDLC SERPL: 4.8 {RATIO} (ref 2–5)
CO2 SERPL-SCNC: 25 MMOL/L (ref 23–29)
CREAT SERPL-MCNC: 0.7 MG/DL (ref 0.5–1.4)
EST. GFR  (NO RACE VARIABLE): >60 ML/MIN/1.73 M^2
ESTIMATED AVG GLUCOSE: 111 MG/DL (ref 68–131)
GLUCOSE SERPL-MCNC: 96 MG/DL (ref 70–110)
HBA1C MFR BLD: 5.5 % (ref 4–5.6)
HDLC SERPL-MCNC: 42 MG/DL (ref 40–75)
HDLC SERPL: 20.7 % (ref 20–50)
LDLC SERPL CALC-MCNC: 143.8 MG/DL (ref 63–159)
NONHDLC SERPL-MCNC: 161 MG/DL
POTASSIUM SERPL-SCNC: 4.8 MMOL/L (ref 3.5–5.1)
PROT SERPL-MCNC: 7.3 G/DL (ref 6–8.4)
SODIUM SERPL-SCNC: 142 MMOL/L (ref 136–145)
TRIGL SERPL-MCNC: 86 MG/DL (ref 30–150)

## 2023-06-23 PROCEDURE — 99999 PR PBB SHADOW E&M-EST. PATIENT-LVL V: ICD-10-PCS | Mod: PBBFAC,,, | Performed by: FAMILY MEDICINE

## 2023-06-23 PROCEDURE — 3074F SYST BP LT 130 MM HG: CPT | Mod: CPTII,S$GLB,, | Performed by: FAMILY MEDICINE

## 2023-06-23 PROCEDURE — 99396 PREV VISIT EST AGE 40-64: CPT | Mod: GZ,S$GLB,, | Performed by: FAMILY MEDICINE

## 2023-06-23 PROCEDURE — 80061 LIPID PANEL: CPT | Performed by: FAMILY MEDICINE

## 2023-06-23 PROCEDURE — 99396 PR PREVENTIVE VISIT,EST,40-64: ICD-10-PCS | Mod: GZ,S$GLB,, | Performed by: FAMILY MEDICINE

## 2023-06-23 PROCEDURE — 1160F RVW MEDS BY RX/DR IN RCRD: CPT | Mod: CPTII,S$GLB,, | Performed by: FAMILY MEDICINE

## 2023-06-23 PROCEDURE — 3074F PR MOST RECENT SYSTOLIC BLOOD PRESSURE < 130 MM HG: ICD-10-PCS | Mod: CPTII,S$GLB,, | Performed by: FAMILY MEDICINE

## 2023-06-23 PROCEDURE — 3079F PR MOST RECENT DIASTOLIC BLOOD PRESSURE 80-89 MM HG: ICD-10-PCS | Mod: CPTII,S$GLB,, | Performed by: FAMILY MEDICINE

## 2023-06-23 PROCEDURE — 83036 HEMOGLOBIN GLYCOSYLATED A1C: CPT | Performed by: FAMILY MEDICINE

## 2023-06-23 PROCEDURE — 1159F MED LIST DOCD IN RCRD: CPT | Mod: CPTII,S$GLB,, | Performed by: FAMILY MEDICINE

## 2023-06-23 PROCEDURE — 1160F PR REVIEW ALL MEDS BY PRESCRIBER/CLIN PHARMACIST DOCUMENTED: ICD-10-PCS | Mod: CPTII,S$GLB,, | Performed by: FAMILY MEDICINE

## 2023-06-23 PROCEDURE — 3008F PR BODY MASS INDEX (BMI) DOCUMENTED: ICD-10-PCS | Mod: CPTII,S$GLB,, | Performed by: FAMILY MEDICINE

## 2023-06-23 PROCEDURE — 80053 COMPREHEN METABOLIC PANEL: CPT | Performed by: FAMILY MEDICINE

## 2023-06-23 PROCEDURE — 3008F BODY MASS INDEX DOCD: CPT | Mod: CPTII,S$GLB,, | Performed by: FAMILY MEDICINE

## 2023-06-23 PROCEDURE — 3079F DIAST BP 80-89 MM HG: CPT | Mod: CPTII,S$GLB,, | Performed by: FAMILY MEDICINE

## 2023-06-23 PROCEDURE — 99999 PR PBB SHADOW E&M-EST. PATIENT-LVL V: CPT | Mod: PBBFAC,,, | Performed by: FAMILY MEDICINE

## 2023-06-23 PROCEDURE — 1159F PR MEDICATION LIST DOCUMENTED IN MEDICAL RECORD: ICD-10-PCS | Mod: CPTII,S$GLB,, | Performed by: FAMILY MEDICINE

## 2023-06-23 PROCEDURE — 36415 COLL VENOUS BLD VENIPUNCTURE: CPT | Performed by: FAMILY MEDICINE

## 2023-06-23 RX ORDER — TRIAMCINOLONE ACETONIDE 1 MG/G
CREAM TOPICAL 2 TIMES DAILY
Qty: 80 G | Refills: 1 | Status: SHIPPED | OUTPATIENT
Start: 2023-06-23

## 2023-06-23 RX ORDER — AMLODIPINE BESYLATE 10 MG/1
10 TABLET ORAL DAILY
Qty: 90 TABLET | Refills: 3 | Status: SHIPPED | OUTPATIENT
Start: 2023-06-23 | End: 2023-12-07 | Stop reason: SDUPTHER

## 2023-06-23 RX ORDER — ATORVASTATIN CALCIUM 20 MG/1
20 TABLET, FILM COATED ORAL DAILY
Qty: 90 TABLET | Refills: 3 | Status: SHIPPED | OUTPATIENT
Start: 2023-06-23 | End: 2023-12-07 | Stop reason: SDUPTHER

## 2023-06-23 NOTE — PROGRESS NOTES
Subjective:       Patient ID: Brionna Funes is a 53 y.o. female.    Chief Complaint: Annual Exam    Established patient for an annual wellness check/physical exam and also chronic disease management. Specific complaints - see dictation, M*model entries and please see ROS.  Past, Surgical, Family, Social Histories; Medications, Allergies reviewed and reconciled.  Health maintenance file reviewed and addressed items due. Recent applicable lab, imaging and cardiovascular results reviewed.  Problem list items reviewed and modified or added entries (in the overview section) may not be transcribed into this encounter note due to note writer format.    Interval hx R breast ca.    Multiple c/o. See ROS.  Leg cramps at time.  Left side, thigh, nocturnal on occasion.  Sometimes toes hurt or cramps.  Not all the time.    Requests handicap certificate for knees.  Poor sleep, stays up late.  Sleeps during daytime which we discussed avoiding.    Asked about weight loss medications.      Review of Systems   Constitutional:  Positive for fatigue. Negative for appetite change, chills, diaphoresis and fever.   HENT:  Negative for congestion, postnasal drip, rhinorrhea, sore throat and trouble swallowing.    Eyes:  Negative for visual disturbance.   Respiratory:  Negative for cough, choking, chest tightness, shortness of breath and wheezing.    Cardiovascular:  Negative for chest pain and leg swelling.   Gastrointestinal:  Negative for abdominal distention, abdominal pain, diarrhea, nausea and vomiting.   Genitourinary:  Negative for difficulty urinating and hematuria.   Musculoskeletal:  Positive for myalgias. Negative for arthralgias.        L leg cramp occ at night   Skin:  Negative for rash.   Neurological:  Positive for headaches. Negative for weakness and light-headedness.   Hematological:  Does not bruise/bleed easily.   Psychiatric/Behavioral:  Positive for sleep disturbance. Negative for decreased concentration and  dysphoric mood.      Objective:      Physical Exam  Vitals and nursing note reviewed.   Constitutional:       Appearance: She is well-developed. She is obese. She is not diaphoretic.   Eyes:      General: No scleral icterus.  Neck:      Thyroid: No thyromegaly.      Vascular: No JVD.      Trachea: No tracheal deviation.   Cardiovascular:      Rate and Rhythm: Normal rate.      Heart sounds: Normal heart sounds. No murmur heard.    No friction rub. No gallop.   Pulmonary:      Effort: Pulmonary effort is normal. No respiratory distress.      Breath sounds: Normal breath sounds. No wheezing or rales.   Abdominal:      General: There is no distension or abdominal bruit.      Palpations: Abdomen is soft. There is no mass.      Tenderness: There is no abdominal tenderness. There is no guarding or rebound.   Musculoskeletal:      Cervical back: Normal range of motion and neck supple.   Lymphadenopathy:      Cervical: No cervical adenopathy.   Skin:     General: Skin is warm and dry.      Findings: No erythema or rash.   Neurological:      Mental Status: She is alert and oriented to person, place, and time.      Cranial Nerves: No cranial nerve deficit.      Motor: No tremor.      Coordination: Coordination normal.      Gait: Gait normal.   Psychiatric:         Behavior: Behavior normal.         Thought Content: Thought content normal.         Judgment: Judgment normal.       Assessment:       1. Annual physical exam    2. Malignant neoplasm of overlapping sites of right breast in female, estrogen receptor positive    3. Severe obesity (BMI 35.0-39.9) with comorbidity    4. Hypertension, essential    5. Hyperlipidemia, unspecified hyperlipidemia type    6. Personal history of nicotine dependence    7. Elevated blood sugar    8. Rash    9. Abdominal pain, unspecified abdominal location        Plan:     Medication List with Changes/Refills   New Medications    TRIAMCINOLONE ACETONIDE 0.1% (KENALOG) 0.1 % CREAM    Apply  topically 2 (two) times daily.   Current Medications    ACETAMINOPHEN (TYLENOL) 500 MG TABLET    Take 2 tablets (1,000 mg total) by mouth every 8 (eight) hours.    ANASTROZOLE (ARIMIDEX) 1 MG TAB    Take 1 tablet (1 mg total) by mouth once daily.    ASPIRIN (ECOTRIN) 81 MG EC TABLET    Take 81 mg by mouth once daily.    BENZONATATE (TESSALON) 200 MG CAPSULE    Take 1 capsule (200 mg total) by mouth 3 (three) times daily as needed for Cough.    CETIRIZINE (ZYRTEC) 10 MG TABLET    Take 1 tablet (10 mg total) by mouth once daily.    FLUTICASONE PROPIONATE (FLONASE) 50 MCG/ACTUATION NASAL SPRAY    1 spray (50 mcg total) by Each Nostril route 2 (two) times daily as needed.    HYDROXYZINE (ATARAX) 50 MG TABLET    Take 1 tablet (50 mg total) by mouth 4 (four) times daily.   Changed and/or Refilled Medications    Modified Medication Previous Medication    AMLODIPINE (NORVASC) 10 MG TABLET amLODIPine (NORVASC) 10 MG tablet       Take 1 tablet (10 mg total) by mouth once daily.    Take 1 tablet (10 mg total) by mouth once daily.    ATORVASTATIN (LIPITOR) 20 MG TABLET atorvastatin (LIPITOR) 20 MG tablet       Take 1 tablet (20 mg total) by mouth once daily.    Take 1 tablet (20 mg total) by mouth once daily.   Discontinued Medications    IBUPROFEN (ADVIL,MOTRIN) 800 MG TABLET    Take 1 tablet (800 mg total) by mouth every 8 (eight) hours.    OXYCODONE (ROXICODONE) 5 MG IMMEDIATE RELEASE TABLET    Take 1 tablet (5 mg total) by mouth every 4 (four) hours as needed for Pain.    UNABLE TO FIND    Take by mouth as needed. Sleep aid     1. Annual physical exam  -     Comprehensive Metabolic Panel; Standing  -     Lipid Panel; Standing  -     Hemoglobin A1C; Future; Expected date: 06/23/2023    2. Malignant neoplasm of overlapping sites of right breast in female, estrogen receptor positive  Overview:  -followed in [facility] onc IDC of the right breast.  -11/18/2022 for right partial mastectomy and sentinel LN dissection with oncoplastic  reduction.  Pathology two foci up to 2 mm of invasive ductal carcinoma with extensive intraductal component.  Five sentinel lymph nodes were negative for evidence of metastatic carcinoma.  -XRT  -arimidex      3. Severe obesity (BMI 35.0-39.9) with comorbidity  -     Ambulatory referral/consult to Bariatric Medicine; Future; Expected date: 06/30/2023    4. Hypertension, essential  -     Comprehensive Metabolic Panel; Standing  -     amLODIPine (NORVASC) 10 MG tablet; Take 1 tablet (10 mg total) by mouth once daily.  Dispense: 90 tablet; Refill: 3    5. Hyperlipidemia, unspecified hyperlipidemia type  -     Lipid Panel; Standing  -     atorvastatin (LIPITOR) 20 MG tablet; Take 1 tablet (20 mg total) by mouth once daily.  Dispense: 90 tablet; Refill: 3    6. Personal history of nicotine dependence  Overview:  16 - 48, quit cigarettes circa 2018, but still smokes cigars    Orders:  -     CT Chest Lung Screening Low Dose; Standing    7. Elevated blood sugar  -     Hemoglobin A1C; Future; Expected date: 06/23/2023    8. Rash  -     triamcinolone acetonide 0.1% (KENALOG) 0.1 % cream; Apply topically 2 (two) times daily.  Dispense: 80 g; Refill: 1    9. Abdominal pain, unspecified abdominal location  -     Urinalysis; Future; Expected date: 06/23/2023  -     Urinalysis Microscopic; Future; Expected date: 06/23/2023      See meds, orders, follow up, routing and instructions sections of encounter and AVS. Discussed with patient and provided on AVS.    Discussed diet and exercise as therapeutic modalities for metabolic and other conditions. Provided patient information, which are included as links on the AVS for detailed information.    Lab Results   Component Value Date     10/11/2022    K 4.1 10/11/2022     10/11/2022    BUN 13 10/11/2022    CREATININE 0.7 10/11/2022    GLU 95 10/11/2022    MG 1.9 02/15/2018    AST 20 10/11/2022    ALT 18 10/11/2022    ALBUMIN 4.0 10/11/2022    PROT 6.9 10/11/2022    BILITOT 0.4  10/11/2022    CHOL 177 08/19/2022    HDL 48 08/19/2022    LDLCALC 112.4 08/19/2022    TRIG 83 08/19/2022    WBC 4.55 10/11/2022    HGB 13.7 10/11/2022    HCT 44.5 10/11/2022     10/11/2022    TSH 0.56 11/15/2005

## 2023-06-23 NOTE — PATIENT INSTRUCTIONS
Schedule lab orders for today.     Magnesium Oxide 400 mg daily (OTC)       Information about cholesterol, high blood pressure and healthy diet and activity recommendations can be found at the following links on the Internet:    Cholesterol  http://www.nhlbi.nih.gov/health/health-topics/topics/hbc    Weight loss  http://www.nhlbi.nih.gov/health/educational/lose_wt/index.htm    High Blood Pressure  Http://www.nhlbi.nih.gov/files/docs/public/heart/hbp_low.pdf    Cardiovascular General  http://www.heart.org/HEARTORG/    Diabetes General  http://diabetes.org/    CDC  https://www.cdc.gov/    Healthfinder  Https://healthfinder.gov/    Dietary Guide - Comprehensive  https://health.gov/dietaryguidelines/2015/guidelines/    Physical Activity and Exercise  https://health.gov/paguidelines/second-edition/pdf/Physical_Activity_Guidelines_2nd_edition.pdf    Choosing Wisely  https://www.choosingwisely.org/patient-resources/

## 2023-07-12 ENCOUNTER — HOSPITAL ENCOUNTER (OUTPATIENT)
Dept: RADIOLOGY | Facility: HOSPITAL | Age: 53
Discharge: HOME | End: 2023-07-12
Attending: SURGERY
Payer: MEDICARE

## 2023-07-12 DIAGNOSIS — Z12.31 SCREENING MAMMOGRAM, ENCOUNTER FOR: ICD-10-CM

## 2023-07-12 PROCEDURE — 77067 MAMMO DIGITAL SCREENING BILAT WITH TOMO: ICD-10-PCS | Mod: 26,,, | Performed by: RADIOLOGY

## 2023-07-12 PROCEDURE — 77063 MAMMO DIGITAL SCREENING BILAT WITH TOMO: ICD-10-PCS | Mod: 26,,, | Performed by: RADIOLOGY

## 2023-07-12 PROCEDURE — 77067 SCR MAMMO BI INCL CAD: CPT | Mod: 26,,, | Performed by: RADIOLOGY

## 2023-07-12 PROCEDURE — 77067 SCR MAMMO BI INCL CAD: CPT | Mod: TC,PO

## 2023-07-12 PROCEDURE — 77063 BREAST TOMOSYNTHESIS BI: CPT | Mod: 26,,, | Performed by: RADIOLOGY

## 2023-07-12 NOTE — PROGRESS NOTES
[facility]  Department of Surgery    PCP:  Michael Shook MD  CHIEF COMPLAINT:   Chief Complaint   Patient presents with    Follow-up     Follow  up C[facility] & [facility] .       DIAGNOSIS:   This is a 53 y.o. female with a history of stage stage pT1a N0 Mx grade 2 ER + TX - HER2 not assessed IDC (2 small foci up to 2mm within extensive DCIS) of the right breast.    TREATMENT:   1. S/p right partial mastectomy with sentinel node biopsy on 2022. Dr. Andreia M.D. Surgical Oncology. Oncoplastic reduction with Dr. Barker.   2. Final Path: 2 small foci (2 mm and 1 mm) grade 3 with adjacent high grade DCIS. Margins negative. Nodes negative.   3. Radiation therapy completed 2/3/23, Dr. Gomez M.D. Radiation Oncology   4. Adjuvant endocrine therapy with Arimidex started 2023 with Dr. Shima M.D. Medical Oncology     HISTORY OF PRESENT ILLNESS:   Brionna Funes is a 53 y.o. female comes in for oncological follow up.  She denies change in her breast self-exam specifically denying new masses, skin or nipple yajaira  nges, or nipple discharge. Past medical and surgical history is updated without new changes. There have been no changes to family history. The patient denies constitutional symptoms of night sweats, chills, weight loss, new headaches, visual changes, new back or bony pain, chest pain, or shortness of breath.        Review of Systems: See HPI/Interval History for other systems reviewed.     IMAGIN/13/23 Bilateral Screening Mammo:    Findings:  This procedure was performed using tomosynthesis. Computer-aided detection was utilized in the interpretation of this examination.  The breasts have scattered areas of fibroglandular density.      Left  There are post-surgical findings from a previous breast reduction seen in the left breast. There has been no interval development of a suspicious mass, microcalcification, or architectural distortion.      There is no evidence of suspicious masses,  "calcifications, or other abnormal findings in the left breast.     Right  There are post-surgical findings from a previous lumpectomy with radiation seen in the right breast. There has been no interval development of a suspicious mass, microcalcification, or architectural distortion.      There is no evidence of suspicious masses, calcifications, or other abnormal findings in the right breast.     Impression:  Bilateral  There is no mammographic evidence of malignancy.     BI-RADS Category:   Overall: 2 - Benign        Recommendation:  Routine screening mammogram in 1 year is recommended.     MEDICATIONS/ALLERGIES:     Current Outpatient Medications   Medication Sig    acetaminophen (TYLENOL) 500 MG tablet Take 2 tablets (1,000 mg total) by mouth every 8 (eight) hours.    amLODIPine (NORVASC) 10 MG tablet Take 1 tablet (10 mg total) by mouth once daily.    anastrozole (ARIMIDEX) 1 mg Tab Take 1 tablet (1 mg total) by mouth once daily.    aspirin (ECOTRIN) 81 MG EC tablet Take 81 mg by mouth once daily.    atorvastatin (LIPITOR) 20 MG tablet Take 1 tablet (20 mg total) by mouth once daily.    benzonatate (TESSALON) 200 MG capsule Take 1 capsule (200 mg total) by mouth 3 (three) times daily as needed for Cough.    hydrOXYzine (ATARAX) 50 MG tablet Take 1 tablet (50 mg total) by mouth 4 (four) times daily.    triamcinolone acetonide 0.1% (KENALOG) 0.1 % cream Apply topically 2 (two) times daily.    cetirizine (ZYRTEC) 10 MG tablet Take 1 tablet (10 mg total) by mouth once daily.    fluticasone propionate (FLONASE) 50 mcg/actuation nasal spray 1 spray (50 mcg total) by Each Nostril route 2 (two) times daily as needed.     No current facility-administered medications for this visit.      Review of patient's allergies indicates:   Allergen Reactions    Codeine Itching     Unsure if she can take oxycodone, hydrocodone, etc    11/18/22 @ 1448 Pt., stated "I can take dilaudid & oxycodone with benadryl"         PHYSICAL EXAM: " "  BP (!) 149/81 (BP Location: Right arm, Patient Position: Sitting, BP Method: Large (Automatic))   Pulse 70   Ht 5' 4" (1.626 m)   Wt 95.7 kg (211 lb)   LMP 06/20/2017   SpO2 99%   BMI 36.22 kg/m²     Physical Exam   Vitals reviewed.  Constitutional: She is oriented to person, place, and time.   HENT:   Head: Normocephalic and atraumatic.   Nose: Nose normal.   Eyes: Pupils are equal, round, and reactive to light. Right eye exhibits no discharge. Left eye exhibits no discharge.   Pulmonary/Chest: Effort normal and breath sounds normal. No stridor. No respiratory distress. She exhibits no mass, no tenderness and no edema. Right breast exhibits no inverted nipple, no mass, no nipple discharge, no skin change and no tenderness. Left breast exhibits no inverted nipple, no mass, no nipple discharge, no skin change and no tenderness. No breast swelling or bleeding. Breasts are symmetrical.       Abdominal: Normal appearance.   Genitourinary: No breast swelling or bleeding.   Neurological: She is alert and oriented to person, place, and time.   Skin: Skin is warm and dry.     Psychiatric: Her behavior is normal. Mood, judgment and thought content normal.       ASSESSMENT:   This is a 53 y.o. female without evidence of recurrence by exam, history or imaging.       PLAN:   1. Continue to follow up with Dr. Ronquillo in Medical Oncology   2. Continue monthly self breast exams and call the clinic with any changes or problems.  3. Mammogram in 1 year .  4. Return to clinic in 1 year .    The patient is in agreement with the plan. Questions were encouraged and answered to patient's satisfaction. Brionna will call our office with any questions or concerns.      25 minutes were spent on this encounter, 15 of which was face to face counseling and 10 minutes were spent on chart review and coordination of care.       "

## 2023-07-13 ENCOUNTER — OFFICE VISIT (OUTPATIENT)
Dept: SURGERY | Facility: CLINIC | Age: 53
End: 2023-07-13
Payer: MEDICARE

## 2023-07-13 ENCOUNTER — OFFICE VISIT (OUTPATIENT)
Dept: PLASTIC SURGERY | Facility: CLINIC | Age: 53
End: 2023-07-13
Payer: MEDICARE

## 2023-07-13 VITALS
OXYGEN SATURATION: 99 % | HEIGHT: 64 IN | SYSTOLIC BLOOD PRESSURE: 149 MMHG | DIASTOLIC BLOOD PRESSURE: 81 MMHG | BODY MASS INDEX: 36.02 KG/M2 | WEIGHT: 211 LBS | HEART RATE: 70 BPM

## 2023-07-13 DIAGNOSIS — M25.619 LIMITED RANGE OF MOTION (ROM) OF SHOULDER: Primary | ICD-10-CM

## 2023-07-13 DIAGNOSIS — Z17.0 MALIGNANT NEOPLASM OF OVERLAPPING SITES OF RIGHT BREAST IN FEMALE, ESTROGEN RECEPTOR POSITIVE: ICD-10-CM

## 2023-07-13 DIAGNOSIS — C50.811 MALIGNANT NEOPLASM OF OVERLAPPING SITES OF RIGHT BREAST IN FEMALE, ESTROGEN RECEPTOR POSITIVE: ICD-10-CM

## 2023-07-13 DIAGNOSIS — Z17.0 MALIGNANT NEOPLASM OF OVERLAPPING SITES OF RIGHT BREAST IN FEMALE, ESTROGEN RECEPTOR POSITIVE: Primary | ICD-10-CM

## 2023-07-13 DIAGNOSIS — Z90.11 S/P PARTIAL MASTECTOMY, RIGHT: ICD-10-CM

## 2023-07-13 DIAGNOSIS — C50.811 MALIGNANT NEOPLASM OF OVERLAPPING SITES OF RIGHT BREAST IN FEMALE, ESTROGEN RECEPTOR POSITIVE: Primary | ICD-10-CM

## 2023-07-13 DIAGNOSIS — Z09 SURGERY FOLLOW-UP EXAMINATION: Primary | ICD-10-CM

## 2023-07-13 DIAGNOSIS — Z85.3 PERSONAL HISTORY OF BREAST CANCER: ICD-10-CM

## 2023-07-13 DIAGNOSIS — Z12.31 SCREENING MAMMOGRAM FOR BREAST CANCER: ICD-10-CM

## 2023-07-13 PROCEDURE — 3044F PR MOST RECENT HEMOGLOBIN A1C LEVEL <7.0%: ICD-10-PCS | Mod: CPTII,S$GLB,, | Performed by: PHYSICIAN ASSISTANT

## 2023-07-13 PROCEDURE — 99024 POSTOP FOLLOW-UP VISIT: CPT | Mod: S$GLB,,, | Performed by: SURGERY

## 2023-07-13 PROCEDURE — 3008F PR BODY MASS INDEX (BMI) DOCUMENTED: ICD-10-PCS | Mod: CPTII,S$GLB,, | Performed by: SURGERY

## 2023-07-13 PROCEDURE — 3077F PR MOST RECENT SYSTOLIC BLOOD PRESSURE >= 140 MM HG: ICD-10-PCS | Mod: CPTII,S$GLB,, | Performed by: SURGERY

## 2023-07-13 PROCEDURE — 3044F PR MOST RECENT HEMOGLOBIN A1C LEVEL <7.0%: ICD-10-PCS | Mod: CPTII,S$GLB,, | Performed by: SURGERY

## 2023-07-13 PROCEDURE — 3079F PR MOST RECENT DIASTOLIC BLOOD PRESSURE 80-89 MM HG: ICD-10-PCS | Mod: CPTII,S$GLB,, | Performed by: SURGERY

## 2023-07-13 PROCEDURE — 99999 PR PBB SHADOW E&M-EST. PATIENT-LVL V: CPT | Mod: PBBFAC,,, | Performed by: SURGERY

## 2023-07-13 PROCEDURE — 3079F DIAST BP 80-89 MM HG: CPT | Mod: CPTII,S$GLB,, | Performed by: SURGERY

## 2023-07-13 PROCEDURE — 99213 PR OFFICE/OUTPT VISIT, EST, LEVL III, 20-29 MIN: ICD-10-PCS | Mod: S$GLB,,, | Performed by: SURGERY

## 2023-07-13 PROCEDURE — 99213 OFFICE O/P EST LOW 20 MIN: CPT | Mod: S$GLB,,, | Performed by: SURGERY

## 2023-07-13 PROCEDURE — 99999 PR PBB SHADOW E&M-EST. PATIENT-LVL V: ICD-10-PCS | Mod: PBBFAC,,, | Performed by: SURGERY

## 2023-07-13 PROCEDURE — 3077F SYST BP >= 140 MM HG: CPT | Mod: CPTII,S$GLB,, | Performed by: SURGERY

## 2023-07-13 PROCEDURE — 3044F HG A1C LEVEL LT 7.0%: CPT | Mod: CPTII,S$GLB,, | Performed by: PHYSICIAN ASSISTANT

## 2023-07-13 PROCEDURE — 1159F PR MEDICATION LIST DOCUMENTED IN MEDICAL RECORD: ICD-10-PCS | Mod: CPTII,S$GLB,, | Performed by: SURGERY

## 2023-07-13 PROCEDURE — 3044F HG A1C LEVEL LT 7.0%: CPT | Mod: CPTII,S$GLB,, | Performed by: SURGERY

## 2023-07-13 PROCEDURE — 99024 PR POST-OP FOLLOW-UP VISIT: ICD-10-PCS | Mod: S$GLB,,, | Performed by: SURGERY

## 2023-07-13 PROCEDURE — 3008F BODY MASS INDEX DOCD: CPT | Mod: CPTII,S$GLB,, | Performed by: SURGERY

## 2023-07-13 PROCEDURE — 1159F MED LIST DOCD IN RCRD: CPT | Mod: CPTII,S$GLB,, | Performed by: SURGERY

## 2023-07-14 ENCOUNTER — TELEPHONE (OUTPATIENT)
Dept: PLASTIC SURGERY | Facility: CLINIC | Age: 53
End: 2023-07-14
Payer: MEDICARE

## 2023-07-14 NOTE — PROGRESS NOTES
Brionna Funes presents to [facility] for a follow up visit status post right lumpectomy with bilateral oncoplastic breast reductions on 11/18/22. She is doing well today with no issues since her last clinic visit. Overall she is happy with the size and contour of her breasts. She has bilateral lateral dog ears that she is interested in revising but is otherwise without concerns. She denies fever, chills, nausea, vomiting or other systemic signs of infection.       ROS - negative, other than stated above    PHYSICAL EXAMINATION  There were no vitals filed for this visit.  WD WN NAD  VSS  Normal respiratory effort  R breast - well healed incisions, no issues. Nipple viable with normal sensation. Lateral incisional dog ear.  L breast - well healed incisions, no issues. Nipple viable with normal sensation. Lateral incisional dog ear.    ASSESSMENT/PLAN  53 y.o. F s/p oncoplastic reduction  - Doing well, no issues.   - Discussed minor procedure for dog ear revision, she'd like to schedule  - Will have office staff book dog ear excision and see her at that time with 1 week f/u      All questions were answered. The patient was advised to contact the clinic with any questions or concerns prior to their next visit.       Dominique Tobar PA-C  Plastic and Reconstructive Surgery

## 2023-07-14 NOTE — TELEPHONE ENCOUNTER
Reminded pt Dr. Barker would like to have some photos done of the breast post sx - pt aware Will take 10- 15 mins.   Location given and scheduled next week after phy therapy- pt voiced understanding

## 2023-07-20 ENCOUNTER — PATIENT MESSAGE (OUTPATIENT)
Dept: HEMATOLOGY/ONCOLOGY | Facility: CLINIC | Age: 53
End: 2023-07-20
Payer: MEDICARE

## 2023-11-28 ENCOUNTER — TELEPHONE (OUTPATIENT)
Dept: HEMATOLOGY/ONCOLOGY | Facility: CLINIC | Age: 53
End: 2023-11-28
Payer: MEDICARE

## 2023-11-28 NOTE — TELEPHONE ENCOUNTER
Spoke with pt. in regards to confirming upcoming appointment on Thursday, November 30th with Nani Winters PA-C. Pt. requested to be rescheduled because she is still in [place]. MA advised pt. that the provider will no longer be seeing patients at [facility] and will be switching over to [facility] full-time. MA also advised that he will reach out to provider's MA at [facility] to get her rescheduled. Pt. acknowledged and approved plan.     BLD, MA Ext. [phone]

## 2023-12-05 ENCOUNTER — TELEPHONE (OUTPATIENT)
Dept: INTERNAL MEDICINE | Facility: CLINIC | Age: 53
End: 2023-12-05
Payer: MEDICARE

## 2023-12-05 DIAGNOSIS — I10 HYPERTENSION, ESSENTIAL: ICD-10-CM

## 2023-12-05 DIAGNOSIS — Z77.120 MOLD EXPOSURE: Primary | ICD-10-CM

## 2023-12-05 DIAGNOSIS — E78.5 HYPERLIPIDEMIA, UNSPECIFIED HYPERLIPIDEMIA TYPE: ICD-10-CM

## 2023-12-05 NOTE — TELEPHONE ENCOUNTER
I do not know of a lab test from [facility], we could refer to an allergist if necessary, please advise patient and see what she would like us to do.  thank you    What medication refill is being requested?

## 2023-12-05 NOTE — TELEPHONE ENCOUNTER
----- Message from Naylameli Esqueda sent at 12/5/2023  9:36 AM CST -----  Contact: Home  [phone]  Requesting an RX refill or new RX.  Is this a refill or new RX: Refill  RX name and strength amLODIPine (NORVASC) 10 MG tablet  Is this a 30 day or 90 day RX: 90  Pharmacy name and phone #   [facility] [address] [place] [address] EXP AT [address] [address]  Phone: [phone] Fax: [phone]  The doctors have asked that we provide their patients with the following 2 reminders -- prescription refills can take up to 72 hours, and a friendly reminder that in the future you can use your [facility] account to request refills:     Requesting an RX refill or new RX.  Is this a refill or new RX: Refill  RX name and strength  atorvastatin (LIPITOR) 20 MG tablet  Is this a 30 day or 90 day RX: 90  Pharmacy name and phone #   [facility] #[id number] [place] [address] EXP AT [address] [address]  Phone: [phone] Fax: [phone]  The doctors have asked that we provide their patients with the following 2 reminders -- prescription refills can take up to 72 hours, and a friendly reminder that in the future you can use your [facility] account to request refills:

## 2023-12-05 NOTE — TELEPHONE ENCOUNTER
Returned pt call. Explained to pt that she has active rx for both drugs. Pt also asking if PCP can order her a lab for black mold. States she moved into her new house in September, which she says smells like mold. Pt states since, she has experienced a runny nose, HA, and mild cough.

## 2023-12-06 NOTE — TELEPHONE ENCOUNTER
Called pt to review note from pt. Pt states she would like to be referred to an allergist. The medications she was requesting was the amlodipine and atorvastatin. She has available refills on both.

## 2023-12-07 ENCOUNTER — TELEPHONE (OUTPATIENT)
Dept: HEMATOLOGY/ONCOLOGY | Facility: CLINIC | Age: 53
End: 2023-12-07
Payer: MEDICARE

## 2023-12-07 RX ORDER — AMLODIPINE BESYLATE 10 MG/1
10 TABLET ORAL DAILY
Qty: 90 TABLET | Refills: 1 | Status: SHIPPED | OUTPATIENT
Start: 2023-12-07

## 2023-12-07 RX ORDER — ATORVASTATIN CALCIUM 20 MG/1
20 TABLET, FILM COATED ORAL DAILY
Qty: 90 TABLET | Refills: 1 | Status: SHIPPED | OUTPATIENT
Start: 2023-12-07

## 2023-12-07 NOTE — TELEPHONE ENCOUNTER
Spoke to pt. in regards to rescheduling appt. with Nani Winters PA-C. MA advised that NAYELI Winters will no longer be seeing integrative patients and will now be at [facility] full-time. MA also advised that Joanna Dubinsky, PA-C would be able to see patient at [facility]. Pt. acknowledged and approved rescheduling. Pt. advised that the visit will have to be after the first of the year. MA acknowledged and asked if she (pt.) would like the visit to be virtual or in-person. Pt. requested an in-person visit. MA acknowledged and offered an in-person visit on Thursday, January 4th at 11:00 AM. Pt. approved and confirmed.    BLD, MA Ext. [phone]

## 2023-12-07 NOTE — TELEPHONE ENCOUNTER
Please see referral orders and please call patient to schedule a consult with allergy. Thank you.    Requested medication refilled and sent by electronic prescription. Please call and notify the patient. Thank you.

## 2023-12-07 NOTE — TELEPHONE ENCOUNTER
Called and spoke to pt. Scheduled for appt with allergist. Relayed message that Rx have been called in, no further needs.

## 2024-01-03 ENCOUNTER — TELEPHONE (OUTPATIENT)
Dept: HEMATOLOGY/ONCOLOGY | Facility: CLINIC | Age: 54
End: 2024-01-03
Payer: MEDICARE

## 2024-01-03 NOTE — TELEPHONE ENCOUNTER
LVM for pt in regards to confirming appt with Joanna Dubinsky for 1/4/2024 @ 11AM . MA instructed pt. in voicemail to call the office number to confirm.         JOHN DENG Ext [phone]

## 2024-01-04 ENCOUNTER — TELEPHONE (OUTPATIENT)
Dept: HEMATOLOGY/ONCOLOGY | Facility: CLINIC | Age: 54
End: 2024-01-04

## 2024-01-04 NOTE — TELEPHONE ENCOUNTER
Left pt. a voicemail in regards to missed appt today with Joanna Dubinsky, PA-C.  MA instructed pt. in voicemail to call the office number for rescheduling.       JOHN DENG Ext [phone]

## 2024-04-15 DIAGNOSIS — C50.811 MALIGNANT NEOPLASM OF OVERLAPPING SITES OF RIGHT BREAST IN FEMALE, ESTROGEN RECEPTOR POSITIVE: ICD-10-CM

## 2024-04-15 DIAGNOSIS — Z17.0 MALIGNANT NEOPLASM OF OVERLAPPING SITES OF RIGHT BREAST IN FEMALE, ESTROGEN RECEPTOR POSITIVE: ICD-10-CM

## 2024-04-15 RX ORDER — ANASTROZOLE 1 MG/1
1 TABLET ORAL
Qty: 30 TABLET | Refills: 11 | Status: SHIPPED | OUTPATIENT
Start: 2024-04-15

## 2024-06-10 ENCOUNTER — PATIENT MESSAGE (OUTPATIENT)
Dept: INTERNAL MEDICINE | Facility: CLINIC | Age: 54
End: 2024-06-10
Payer: MEDICARE

## 2024-07-15 ENCOUNTER — HOSPITAL ENCOUNTER (OUTPATIENT)
Dept: RADIOLOGY | Facility: HOSPITAL | Age: 54
Discharge: HOME | End: 2024-07-15
Attending: SURGERY
Payer: MEDICARE

## 2024-07-15 ENCOUNTER — OFFICE VISIT (OUTPATIENT)
Dept: SURGERY | Facility: CLINIC | Age: 54
End: 2024-07-15
Payer: MEDICARE

## 2024-07-15 VITALS
DIASTOLIC BLOOD PRESSURE: 75 MMHG | BODY MASS INDEX: 36.02 KG/M2 | HEIGHT: 64 IN | SYSTOLIC BLOOD PRESSURE: 119 MMHG | HEART RATE: 98 BPM | WEIGHT: 211 LBS

## 2024-07-15 DIAGNOSIS — Z12.31 SCREENING MAMMOGRAM FOR BREAST CANCER: ICD-10-CM

## 2024-07-15 DIAGNOSIS — Z12.39 SCREENING BREAST EXAMINATION: ICD-10-CM

## 2024-07-15 DIAGNOSIS — Z85.3 PERSONAL HISTORY OF BREAST CANCER: Primary | ICD-10-CM

## 2024-07-15 PROCEDURE — 77067 SCR MAMMO BI INCL CAD: CPT | Mod: 26,,, | Performed by: RADIOLOGY

## 2024-07-15 PROCEDURE — 99213 OFFICE O/P EST LOW 20 MIN: CPT | Mod: S$GLB,,, | Performed by: PHYSICIAN ASSISTANT

## 2024-07-15 PROCEDURE — 1159F MED LIST DOCD IN RCRD: CPT | Mod: CPTII,S$GLB,, | Performed by: PHYSICIAN ASSISTANT

## 2024-07-15 PROCEDURE — 77063 BREAST TOMOSYNTHESIS BI: CPT | Mod: 26,,, | Performed by: RADIOLOGY

## 2024-07-15 PROCEDURE — 77063 BREAST TOMOSYNTHESIS BI: CPT | Mod: TC

## 2024-07-15 PROCEDURE — 99999 PR PBB SHADOW E&M-EST. PATIENT-LVL III: CPT | Mod: PBBFAC,,, | Performed by: PHYSICIAN ASSISTANT

## 2024-07-15 PROCEDURE — 1160F RVW MEDS BY RX/DR IN RCRD: CPT | Mod: CPTII,S$GLB,, | Performed by: PHYSICIAN ASSISTANT

## 2024-07-15 PROCEDURE — 3074F SYST BP LT 130 MM HG: CPT | Mod: CPTII,S$GLB,, | Performed by: PHYSICIAN ASSISTANT

## 2024-07-15 PROCEDURE — 3008F BODY MASS INDEX DOCD: CPT | Mod: CPTII,S$GLB,, | Performed by: PHYSICIAN ASSISTANT

## 2024-07-15 PROCEDURE — 3078F DIAST BP <80 MM HG: CPT | Mod: CPTII,S$GLB,, | Performed by: PHYSICIAN ASSISTANT

## 2024-07-17 NOTE — PROGRESS NOTES
[facility]  Department of Surgery    PCP:  Michael Shook MD  CHIEF COMPLAINT:   Chief Complaint   Patient presents with    1 Year CBE and Mammogram       DIAGNOSIS:   This is a 54 y.o. 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    TREATMENT:   1. S/p right partial mastectomy with sentinel node biopsy on 2022. Dr. Andreia M.D. Surgical Oncology. Oncoplastic reduction with Dr. Barker.   2. Final Path: 2 small foci (2 mm and 1 mm) grade 3 with adjacent high grade DCIS. Margins negative. Nodes negative.   3. Radiation therapy completed 2/3/23, Dr. Gomez M.D. Radiation Oncology   4. Adjuvant endocrine therapy with Arimidex started 2023 with Dr. Shima M.D. Medical Oncology     HISTORY OF PRESENT ILLNESS:   Brionna Funes is a 54 y.o. female comes in for oncological follow up. Patient states she continues to do well. She denies change in her breast self-exam specifically denying new masses, skin or nipple yajaira  nges, or nipple discharge. Past medical and surgical history is updated without new changes. There have been no changes to family history. The patient denies constitutional symptoms of night sweats, chills, weight loss, new headaches, visual changes, new back or bony pain, chest pain, or shortness of breath.        Review of Systems: See HPI/Interval History for other systems reviewed.     IMAGIN/15/24 Bilateral Screening Mammo:    Findings:  This procedure was performed using tomosynthesis. Computer-aided detection was utilized in the interpretation of this examination.     There are scattered areas of fibroglandular density.      Right  There are post-surgical findings from a previous lumpectomy seen in the right breast. There has been no interval development of a suspicious mass, microcalcification, or architectural distortion.      There is no evidence of  "suspicious masses, calcifications, or other abnormal findings in the right breast.     Left  There is no evidence of suspicious masses, calcifications, or other abnormal findings in the left breast.     Bilateral breast reduction.     Impression:  Bilateral  There is no mammographic evidence of malignancy.     BI-RADS Category:   Overall: 2 - Benign        Recommendation:  Routine screening mammogram in 1 year is recommended.     MEDICATIONS/ALLERGIES:     Current Outpatient Medications   Medication Sig    acetaminophen (TYLENOL) 500 MG tablet Take 2 tablets (1,000 mg total) by mouth every 8 (eight) hours.    amLODIPine (NORVASC) 10 MG tablet Take 1 tablet (10 mg total) by mouth once daily.    anastrozole (ARIMIDEX) 1 mg Tab TAKE 1 TABLET(1 MG) BY MOUTH EVERY DAY    aspirin (ECOTRIN) 81 MG EC tablet Take 81 mg by mouth once daily.    atorvastatin (LIPITOR) 20 MG tablet Take 1 tablet (20 mg total) by mouth once daily.    fluticasone propionate (FLONASE) 50 mcg/actuation nasal spray 1 spray (50 mcg total) by Each Nostril route 2 (two) times daily as needed.    hydrOXYzine (ATARAX) 50 MG tablet Take 1 tablet (50 mg total) by mouth 4 (four) times daily.    triamcinolone acetonide 0.1% (KENALOG) 0.1 % cream Apply topically 2 (two) times daily.    cetirizine (ZYRTEC) 10 MG tablet Take 1 tablet (10 mg total) by mouth once daily.     No current facility-administered medications for this visit.      Review of patient's allergies indicates:   Allergen Reactions    Codeine Itching     Unsure if she can take oxycodone, hydrocodone, etc    11/18/22 @ 1448 Pt., stated "I can take dilaudid & oxycodone with benadryl"         PHYSICAL EXAM:   /75   Pulse 98   Ht 5' 4" (1.626 m)   Wt 95.7 kg (211 lb)   LMP 06/20/2017   BMI 36.22 kg/m²     Physical Exam   Vitals reviewed.  Constitutional: She is oriented to person, place, and time.   HENT:   Head: Normocephalic and atraumatic.   Nose: Nose normal.   Eyes: Pupils are equal, " round, and reactive to light. Right eye exhibits no discharge. Left eye exhibits no discharge.   Pulmonary/Chest: Effort normal and breath sounds normal. No stridor. No respiratory distress. She exhibits no mass, no tenderness and no edema. Right breast exhibits no inverted nipple, no mass, no nipple discharge, no skin change and no tenderness. Left breast exhibits no inverted nipple, no mass, no nipple discharge, no skin change and no tenderness. No breast swelling or bleeding. Breasts are symmetrical.       Abdominal: Normal appearance.   Genitourinary: No breast swelling or bleeding.   Neurological: She is alert and oriented to person, place, and time.   Skin: Skin is warm and dry.     Psychiatric: Her behavior is normal. Mood, judgment and thought content normal.       ASSESSMENT:   This is a 54 y.o. female without evidence of recurrence by exam, history or imaging.       PLAN:   1. Continue to follow up with Dr. Ronquillo in Medical Oncology   2. Continue monthly self breast exams and call the clinic with any changes or problems.  3. Mammogram in 1 year .  4. Return to clinic in 1 year .    The patient is in agreement with the plan. Questions were encouraged and answered to patient's satisfaction. Brionna will call our office with any questions or concerns.

## 2024-08-30 DIAGNOSIS — I10 HYPERTENSION, ESSENTIAL: ICD-10-CM

## 2024-08-30 NOTE — TELEPHONE ENCOUNTER
Care Due:                  Date            Visit Type   Department     Provider  --------------------------------------------------------------------------------                                EP -                              PRIMARY      [facility] INTERNAL  Last Visit: 06-      CARE ([facility])   MEDICINE       ERNESTO PAEZ  Next Visit: None Scheduled  None         None Found                                                            Last  Test          Frequency    Reason                     Performed    Due Date  --------------------------------------------------------------------------------    Office Visit  15 months..  amLODIPine, atorvastatin.  06-   09-    CMP.........  12 months..  atorvastatin.............  06- 06-    Lipid Panel.  12 months..  atorvastatin.............  06- 06-    Health [facility] Embedded Care Due Messages. Reference number: [id number].   8/30/2024 3:47:16 PM CDT

## 2024-08-31 RX ORDER — AMLODIPINE BESYLATE 10 MG/1
10 TABLET ORAL
Qty: 90 TABLET | Refills: 0 | Status: SHIPPED | OUTPATIENT
Start: 2024-08-31

## 2024-08-31 NOTE — TELEPHONE ENCOUNTER
Refill Routing Note   Medication(s) are not appropriate for processing by [facility] for the following reason(s):        Patient not seen by provider within 15 months    [facility] action(s):  Defer   Requires appointment : Yes     Requires labs : Yes      Medication Therapy Plan: Noted on last order need appt; no OV has been completed or scheduled at this time      Appointments  past 12m or future 3m with PCP    Date Provider   Last Visit   6/23/2023 Michael Shook MD   Next Visit   Visit date not found Michael Shook MD   ED visits in past 90 days: 0        Note composed:10:31 AM 08/31/2024

## 2024-09-05 DIAGNOSIS — I10 HYPERTENSION, ESSENTIAL: ICD-10-CM

## 2024-09-05 RX ORDER — AMLODIPINE BESYLATE 10 MG/1
10 TABLET ORAL DAILY
Qty: 90 TABLET | Refills: 0 | Status: SHIPPED | OUTPATIENT
Start: 2024-09-05

## 2024-09-05 NOTE — TELEPHONE ENCOUNTER
Refill Decision Note   Brionna Funes  is requesting a refill authorization.  Brief Assessment and Rationale for Refill:  Approve     Medication Therapy Plan:  PT LOST MEDICATION REPENDING TO SEND TO PHARMACY      Comments:     Note composed:4:32 PM 09/05/2024

## 2024-09-05 NOTE — TELEPHONE ENCOUNTER
No care due was identified.  [facility] Embedded Care Due Messages. Reference number: [id number].   9/05/2024 2:09:39 PM CDT

## 2024-09-05 NOTE — TELEPHONE ENCOUNTER
----- Message from Chelsie Cooper sent at 9/5/2024  1:51 PM CDT -----  Contact: [phone]  [email or web address]     Patient is calling for Medical Advice regarding:pt lost her medication     How long has patient had these symptoms:    Pharmacy name and phone#:  [facility] #[id number] AT [address] [address]  Phone: [phone] Fax: [phone]       Patient wants a call back or thru [facility]:call back     Comments:  Pt states she lost her medication amLODIPine (NORVASC) 10 MG tablet and she states she is needing to know what she needs to do she states she has been without her medication   Please advise patient replies from provider may take up to 48 hours.

## 2025-03-18 DIAGNOSIS — C50.811 MALIGNANT NEOPLASM OF OVERLAPPING SITES OF RIGHT BREAST IN FEMALE, ESTROGEN RECEPTOR POSITIVE: ICD-10-CM

## 2025-03-18 DIAGNOSIS — I10 HYPERTENSION, ESSENTIAL: ICD-10-CM

## 2025-03-18 DIAGNOSIS — Z17.0 MALIGNANT NEOPLASM OF OVERLAPPING SITES OF RIGHT BREAST IN FEMALE, ESTROGEN RECEPTOR POSITIVE: ICD-10-CM

## 2025-03-18 RX ORDER — ANASTROZOLE 1 MG/1
1 TABLET ORAL DAILY
Qty: 90 TABLET | Refills: 3 | Status: SHIPPED | OUTPATIENT
Start: 2025-03-18

## 2025-03-18 NOTE — TELEPHONE ENCOUNTER
Care Due:                  Date            Visit Type   Department     Provider  --------------------------------------------------------------------------------                                EP -                              PRIMARY      [facility] INTERNAL  Last Visit: 06-      CARE ([facility])   MEDICINE       ERNESTO PAEZ                              [facility]                              PRIMARY      [facility] INTERNAL  Next Visit: 04-      CARE ([facility])   MEDICINE       ERNESTO PAEZ                                                            Last  Test          Frequency    Reason                     Performed    Due Date  --------------------------------------------------------------------------------    CMP.........  12 months..  atorvastatin.............  06- 06-    Lipid Panel.  12 months..  atorvastatin.............  06- 06-    Health [facility] Embedded Care Due Messages. Reference number: [id number].   3/18/2025 1:50:22 PM CDT

## 2025-03-18 NOTE — TELEPHONE ENCOUNTER
Returned patients call and she answered. I let her know we received her refill request and I sent it to Dr. Ronquillo for her to refill.

## 2025-03-18 NOTE — TELEPHONE ENCOUNTER
----- Message from Usha sent at 3/18/2025 11:08 AM CDT -----  Contact: REFILL [phone]  Requesting an RX refill or new RX.Is this a refill or new RX: RefillRX name and strength (copy/paste from chart): anastrozole (ARIMIDEX) 1 mg Tab Is this a 30 day or 90 day RX: Pharmacy name and phone # (copy/paste from chart):  [facility] [id number] [place] [address] EXP AT [address] [address]Ymagh: [phone] Fax: [phone] The doctors have asked that we provide their patients with the following 2 reminders -- prescription refills can take up to 72 hours, and a friendly reminder that in the future you can use your [facility] account to request refills:

## 2025-03-18 NOTE — TELEPHONE ENCOUNTER
----- Message from Usha sent at 3/18/2025 11:04 AM CDT -----  Contact: REFILL  [phone]  Requesting an RX refill or new RX.Is this a refill or new RX: REFILLRX name and strength (copy/paste from chart):  Start Date End DateamLODIPine (NORVASC) 10 MG tablet Is this a 30 day or 90 day RX: Pharmacy name and phone # (copy/paste from chart):  [facility] #[id number] AT [address] [address]Zv[place]oy: [phone] Fax: [phone] doctors have asked that we provide their patients with the following 2 reminders -- prescription refills can take up to 72 hours, and a friendly reminder that in the future you can use your [facility] account to request refills: ________________________

## 2025-03-19 NOTE — TELEPHONE ENCOUNTER
Refill Routing Note   Medication(s) are not appropriate for processing by [facility] for the following reason(s):        Patient not seen by provider within 15 months    [facility] action(s):  Route   Requires labs : Yes             Appointments  past 12m or future 3m with PCP    Date Provider   Last Visit   6/23/2023 Michael Shook MD   Next Visit   4/10/2025 Michael Shook MD   ED visits in past 90 days: 0        Note composed:11:06 AM 03/19/2025

## 2025-03-20 RX ORDER — AMLODIPINE BESYLATE 10 MG/1
10 TABLET ORAL DAILY
Qty: 90 TABLET | Refills: 0 | Status: SHIPPED | OUTPATIENT
Start: 2025-03-20

## 2025-04-10 ENCOUNTER — OFFICE VISIT (OUTPATIENT)
Dept: INTERNAL MEDICINE | Facility: CLINIC | Age: 55
End: 2025-04-10
Attending: FAMILY MEDICINE
Payer: MEDICARE

## 2025-04-10 ENCOUNTER — LAB VISIT (OUTPATIENT)
Dept: LAB | Facility: HOSPITAL | Age: 55
End: 2025-04-10
Attending: FAMILY MEDICINE
Payer: MEDICARE

## 2025-04-10 VITALS
BODY MASS INDEX: 36.84 KG/M2 | OXYGEN SATURATION: 98 % | WEIGHT: 215.81 LBS | SYSTOLIC BLOOD PRESSURE: 138 MMHG | HEIGHT: 64 IN | DIASTOLIC BLOOD PRESSURE: 80 MMHG | HEART RATE: 81 BPM

## 2025-04-10 DIAGNOSIS — L30.9 DERMATITIS: ICD-10-CM

## 2025-04-10 DIAGNOSIS — E66.01 SEVERE OBESITY (BMI 35.0-39.9) WITH COMORBIDITY: ICD-10-CM

## 2025-04-10 DIAGNOSIS — Z98.890 S/P KNEE SURGERY: ICD-10-CM

## 2025-04-10 DIAGNOSIS — F10.20 ALCOHOL DEPENDENCE, UNCOMPLICATED: ICD-10-CM

## 2025-04-10 DIAGNOSIS — I10 HYPERTENSION, ESSENTIAL: ICD-10-CM

## 2025-04-10 DIAGNOSIS — E78.5 HYPERLIPIDEMIA, UNSPECIFIED HYPERLIPIDEMIA TYPE: ICD-10-CM

## 2025-04-10 DIAGNOSIS — Z00.00 ANNUAL PHYSICAL EXAM: ICD-10-CM

## 2025-04-10 DIAGNOSIS — Z12.11 COLON CANCER SCREENING: ICD-10-CM

## 2025-04-10 DIAGNOSIS — C50.811 MALIGNANT NEOPLASM OF OVERLAPPING SITES OF RIGHT BREAST IN FEMALE, ESTROGEN RECEPTOR POSITIVE: ICD-10-CM

## 2025-04-10 DIAGNOSIS — R14.0 BLOATING: ICD-10-CM

## 2025-04-10 DIAGNOSIS — Z00.00 ANNUAL PHYSICAL EXAM: Primary | ICD-10-CM

## 2025-04-10 DIAGNOSIS — Z87.891 PERSONAL HISTORY OF NICOTINE DEPENDENCE: ICD-10-CM

## 2025-04-10 DIAGNOSIS — Z96.651 H/O TOTAL KNEE REPLACEMENT, RIGHT: ICD-10-CM

## 2025-04-10 DIAGNOSIS — Z17.0 MALIGNANT NEOPLASM OF OVERLAPPING SITES OF RIGHT BREAST IN FEMALE, ESTROGEN RECEPTOR POSITIVE: ICD-10-CM

## 2025-04-10 PROBLEM — G89.29 CHRONIC KNEE PAIN AFTER TOTAL REPLACEMENT OF RIGHT KNEE JOINT: Status: RESOLVED | Noted: 2018-04-06 | Resolved: 2025-04-10

## 2025-04-10 PROBLEM — M25.669 DECREASED RANGE OF MOTION (ROM) OF KNEE: Status: RESOLVED | Noted: 2018-04-06 | Resolved: 2025-04-10

## 2025-04-10 PROBLEM — M25.561 CHRONIC KNEE PAIN AFTER TOTAL REPLACEMENT OF RIGHT KNEE JOINT: Status: RESOLVED | Noted: 2018-04-06 | Resolved: 2025-04-10

## 2025-04-10 PROBLEM — Z86.16 HISTORY OF 2019 NOVEL CORONAVIRUS DISEASE (COVID-19): Status: RESOLVED | Noted: 2022-06-07 | Resolved: 2025-04-10

## 2025-04-10 PROBLEM — R05.9 COUGH: Status: RESOLVED | Noted: 2022-08-19 | Resolved: 2025-04-10

## 2025-04-10 PROBLEM — M25.60 JOINT STIFFNESS: Status: RESOLVED | Noted: 2017-12-15 | Resolved: 2025-04-10

## 2025-04-10 LAB
ABSOLUTE EOSINOPHIL (OHS): 0.21 K/UL
ABSOLUTE MONOCYTE (OHS): 0.43 K/UL (ref 0.3–1)
ABSOLUTE NEUTROPHIL COUNT (OHS): 2.74 K/UL (ref 1.8–7.7)
ALBUMIN SERPL BCP-MCNC: 4.2 G/DL (ref 3.5–5.2)
ALP SERPL-CCNC: 113 UNIT/L (ref 40–150)
ALT SERPL W/O P-5'-P-CCNC: 17 UNIT/L (ref 10–44)
ANION GAP (OHS): 6 MMOL/L (ref 8–16)
AST SERPL-CCNC: 20 UNIT/L (ref 11–45)
BASOPHILS # BLD AUTO: 0.04 K/UL
BASOPHILS NFR BLD AUTO: 0.9 %
BILIRUB SERPL-MCNC: 0.6 MG/DL (ref 0.1–1)
BUN SERPL-MCNC: 14 MG/DL (ref 6–20)
CALCIUM SERPL-MCNC: 9.4 MG/DL (ref 8.7–10.5)
CHLORIDE SERPL-SCNC: 107 MMOL/L (ref 95–110)
CHOLEST SERPL-MCNC: 263 MG/DL (ref 120–199)
CHOLEST/HDLC SERPL: 6.1 {RATIO} (ref 2–5)
CO2 SERPL-SCNC: 28 MMOL/L (ref 23–29)
CREAT SERPL-MCNC: 0.8 MG/DL (ref 0.5–1.4)
ERYTHROCYTE [DISTWIDTH] IN BLOOD BY AUTOMATED COUNT: 13.9 % (ref 11.5–14.5)
GFR SERPLBLD CREATININE-BSD FMLA CKD-EPI: >60 ML/MIN/1.73/M2
GLUCOSE SERPL-MCNC: 104 MG/DL (ref 70–110)
HCT VFR BLD AUTO: 44.1 % (ref 37–48.5)
HDLC SERPL-MCNC: 43 MG/DL (ref 40–75)
HDLC SERPL: 16.3 % (ref 20–50)
HGB BLD-MCNC: 13.5 GM/DL (ref 12–16)
IMM GRANULOCYTES # BLD AUTO: 0.02 K/UL (ref 0–0.04)
IMM GRANULOCYTES NFR BLD AUTO: 0.4 % (ref 0–0.5)
LDLC SERPL CALC-MCNC: 197 MG/DL (ref 63–159)
LIPASE SERPL-CCNC: 23 U/L (ref 4–60)
LYMPHOCYTES # BLD AUTO: 1.16 K/UL (ref 1–4.8)
MCH RBC QN AUTO: 25.9 PG (ref 27–31)
MCHC RBC AUTO-ENTMCNC: 30.6 G/DL (ref 32–36)
MCV RBC AUTO: 85 FL (ref 82–98)
NONHDLC SERPL-MCNC: 220 MG/DL
NUCLEATED RBC (/100WBC) (OHS): 0 /100 WBC
PLATELET # BLD AUTO: 250 K/UL (ref 150–450)
PMV BLD AUTO: 13.1 FL (ref 9.2–12.9)
POTASSIUM SERPL-SCNC: 4.8 MMOL/L (ref 3.5–5.1)
PROT SERPL-MCNC: 7.5 GM/DL (ref 6–8.4)
RBC # BLD AUTO: 5.22 M/UL (ref 4–5.4)
RELATIVE EOSINOPHIL (OHS): 4.6 %
RELATIVE LYMPHOCYTE (OHS): 25.2 % (ref 18–48)
RELATIVE MONOCYTE (OHS): 9.3 % (ref 4–15)
RELATIVE NEUTROPHIL (OHS): 59.6 % (ref 38–73)
SODIUM SERPL-SCNC: 141 MMOL/L (ref 136–145)
TRIGL SERPL-MCNC: 115 MG/DL (ref 30–150)
WBC # BLD AUTO: 4.6 K/UL (ref 3.9–12.7)

## 2025-04-10 PROCEDURE — 85025 COMPLETE CBC W/AUTO DIFF WBC: CPT

## 2025-04-10 PROCEDURE — 1160F RVW MEDS BY RX/DR IN RCRD: CPT | Mod: CPTII,S$GLB,, | Performed by: FAMILY MEDICINE

## 2025-04-10 PROCEDURE — 80053 COMPREHEN METABOLIC PANEL: CPT

## 2025-04-10 PROCEDURE — 36415 COLL VENOUS BLD VENIPUNCTURE: CPT

## 2025-04-10 PROCEDURE — 83690 ASSAY OF LIPASE: CPT

## 2025-04-10 PROCEDURE — 3008F BODY MASS INDEX DOCD: CPT | Mod: CPTII,S$GLB,, | Performed by: FAMILY MEDICINE

## 2025-04-10 PROCEDURE — 99999 PR PBB SHADOW E&M-EST. PATIENT-LVL V: CPT | Mod: PBBFAC,,, | Performed by: FAMILY MEDICINE

## 2025-04-10 PROCEDURE — 80061 LIPID PANEL: CPT

## 2025-04-10 PROCEDURE — 3075F SYST BP GE 130 - 139MM HG: CPT | Mod: CPTII,S$GLB,, | Performed by: FAMILY MEDICINE

## 2025-04-10 PROCEDURE — 99396 PREV VISIT EST AGE 40-64: CPT | Mod: S$GLB,,, | Performed by: FAMILY MEDICINE

## 2025-04-10 PROCEDURE — 3079F DIAST BP 80-89 MM HG: CPT | Mod: CPTII,S$GLB,, | Performed by: FAMILY MEDICINE

## 2025-04-10 PROCEDURE — 1159F MED LIST DOCD IN RCRD: CPT | Mod: CPTII,S$GLB,, | Performed by: FAMILY MEDICINE

## 2025-04-10 RX ORDER — AMLODIPINE BESYLATE 10 MG/1
10 TABLET ORAL DAILY
Qty: 90 TABLET | Refills: 3 | Status: SHIPPED | OUTPATIENT
Start: 2025-04-10

## 2025-04-10 RX ORDER — ATORVASTATIN CALCIUM 20 MG/1
20 TABLET, FILM COATED ORAL DAILY
Qty: 90 TABLET | Refills: 3 | Status: SHIPPED | OUTPATIENT
Start: 2025-04-10

## 2025-04-10 RX ORDER — TRIAMCINOLONE ACETONIDE 1 MG/G
CREAM TOPICAL 2 TIMES DAILY
Qty: 80 G | Refills: 1 | Status: SHIPPED | OUTPATIENT
Start: 2025-04-10

## 2025-04-10 NOTE — PROGRESS NOTES
Subjective:       Patient ID: Brionna Funes is a 55 y.o. female.    Chief Complaint: Annual Exam    Established patient follows up for management of chronic medical illnesses with complaints today. Please see dictation and ROS for interval problems, specific complaints and disease management discussion.    Past, Surgical, Family, Social, Histories; Medications, allergies reviewed and reconciled.  Health maintenance file reviewed and addressed items due. Recent applicable lab, imaging and cardiovascular results reviewed.  Problem list items reviewed and modified or added entries (in the overview section) may not be transcribed into this encounter note due to note writer format.      Last seen 2 years ago.  States having bloating and?  GI provider or other on [employer], I do not see any notes.  Due for colonoscopy.  Tells me her  is at [facility]?  Pancreatic issue.  Insomnia.    No abdominal pain or elimination changes.    Request weight loss medication.    Right knee stiffness at times.  History of knee replacement.  Had postop infection.  Nothing to suggest recurrent infection at this time.    Reports some rash to back, itches.    Pain to back which seems to migrate.  No distal pain radiation.  No constitutional complaints.      Review of Systems   Constitutional:  Negative for appetite change, chills, diaphoresis, fatigue and fever.   HENT:  Negative for congestion, postnasal drip, rhinorrhea, sore throat and trouble swallowing.    Eyes:  Negative for visual disturbance.   Respiratory:  Negative for cough, choking, chest tightness, shortness of breath and wheezing.    Cardiovascular:  Negative for chest pain and leg swelling.   Gastrointestinal:  Positive for abdominal distention. Negative for abdominal pain, diarrhea, nausea and vomiting.   Genitourinary:  Positive for flank pain. Negative for difficulty urinating and hematuria.   Musculoskeletal:  Positive for arthralgias, back pain and gait  problem. Negative for myalgias.   Skin:  Positive for rash.   Neurological:  Negative for weakness, light-headedness and headaches.   Hematological:  Does not bruise/bleed easily.   Psychiatric/Behavioral:  Positive for sleep disturbance. Negative for decreased concentration and dysphoric mood.        Objective:      Physical Exam  Vitals and nursing note reviewed.   Constitutional:       Appearance: She is well-developed. She is obese. She is not diaphoretic.   Eyes:      General: No scleral icterus.  Neck:      Thyroid: No thyromegaly.      Vascular: No JVD.      Trachea: No tracheal deviation.   Cardiovascular:      Rate and Rhythm: Normal rate.      Heart sounds: Normal heart sounds. No murmur heard.     No friction rub. No gallop.   Pulmonary:      Effort: Pulmonary effort is normal. No respiratory distress.      Breath sounds: Normal breath sounds. No wheezing or rales.   Abdominal:      General: There is no distension or abdominal bruit.      Palpations: Abdomen is soft. There is no mass.      Tenderness: There is no abdominal tenderness. There is no guarding or rebound.   Musculoskeletal:      Cervical back: Normal range of motion and neck supple.   Lymphadenopathy:      Cervical: No cervical adenopathy.   Skin:     General: Skin is warm and dry.      Findings: No erythema or rash.          Neurological:      Mental Status: She is alert and oriented to person, place, and time.      Cranial Nerves: No cranial nerve deficit.      Motor: No tremor.      Coordination: Coordination normal.      Gait: Gait normal.   Psychiatric:         Behavior: Behavior normal.         Thought Content: Thought content normal.         Judgment: Judgment normal.       Assessment:       1. Annual physical exam    2. Personal history of nicotine dependence    3. Colon cancer screening    4. Bloating    5. Malignant neoplasm of overlapping sites of right breast in female, estrogen receptor positive    6. H/O total knee replacement,  right    7. S/P knee surgery    8. Hypertension, essential    9. Hyperlipidemia, unspecified hyperlipidemia type    10. Alcohol dependence, uncomplicated    11. Severe obesity (BMI 35.0-39.9) with comorbidity    12. Dermatitis        Plan:     Medication List with Changes/Refills   New Medications    TRIAMCINOLONE ACETONIDE 0.1% (KENALOG) 0.1 % CREAM    Apply topically 2 (two) times daily.   Current Medications    ACETAMINOPHEN (TYLENOL) 500 MG TABLET    Take 2 tablets (1,000 mg total) by mouth every 8 (eight) hours.    ANASTROZOLE (ARIMIDEX) 1 MG TAB    Take 1 tablet (1 mg total) by mouth once daily.    ASPIRIN (ECOTRIN) 81 MG EC TABLET    Take 81 mg by mouth once daily.    CETIRIZINE (ZYRTEC) 10 MG TABLET    Take 1 tablet (10 mg total) by mouth once daily.    FLUTICASONE PROPIONATE (FLONASE) 50 MCG/ACTUATION NASAL SPRAY    1 spray (50 mcg total) by Each Nostril route 2 (two) times daily as needed.   Changed and/or Refilled Medications    Modified Medication Previous Medication    AMLODIPINE (NORVASC) 10 MG TABLET amLODIPine (NORVASC) 10 MG tablet       Take 1 tablet (10 mg total) by mouth once daily.    Take 1 tablet (10 mg total) by mouth once daily.    ATORVASTATIN (LIPITOR) 20 MG TABLET atorvastatin (LIPITOR) 20 MG tablet       Take 1 tablet (20 mg total) by mouth once daily.    Take 1 tablet (20 mg total) by mouth once daily.   Discontinued Medications    HYDROXYZINE (ATARAX) 50 MG TABLET    Take 1 tablet (50 mg total) by mouth 4 (four) times daily.    TRIAMCINOLONE ACETONIDE 0.1% (KENALOG) 0.1 % CREAM    Apply topically 2 (two) times daily.     1. Annual physical exam  -     CBC Auto Differential; Future; Expected date: 04/10/2025  -     Comprehensive Metabolic Panel; Future; Expected date: 04/10/2025  -     Lipase; Future; Expected date: 04/10/2025  -     Lipid Panel; Future; Expected date: 04/10/2025  -     Urinalysis, Reflex to Urine Culture; Future  -     Ambulatory referral/consult to Obstetrics /  Gynecology    2. Personal history of nicotine dependence  Overview:  16 - 48, quit cigarettes circa 2018, but still smokes cigars    Orders:  -     CT Chest Lung Screening Low Dose; Future; Expected date: 04/10/2025    3. Colon cancer screening  -     Ambulatory referral/consult to Endo Procedure ; Future; Expected date: 04/11/2025    4. Bloating  -     Ambulatory referral/consult to Gastroenterology; Future; Expected date: 04/17/2025  -     CBC Auto Differential; Future; Expected date: 04/10/2025  -     Comprehensive Metabolic Panel; Future; Expected date: 04/10/2025  -     Lipase; Future; Expected date: 04/10/2025  -     Urinalysis, Reflex to Urine Culture; Future    5. Malignant neoplasm of overlapping sites of right breast in female, estrogen receptor positive  Overview:  -followed in [facility] onc IDC of the right breast.  -11/18/2022 for right partial mastectomy and sentinel LN dissection with oncoplastic reduction.  Pathology two foci up to 2 mm of invasive ductal carcinoma with extensive intraductal component.  Five sentinel lymph nodes were negative for evidence of metastatic carcinoma.  -XRT  -arimidex      6. H/O total knee replacement, right  Overview:  -circa 2018    >>OVERVIEW FOR S/P KNEE SURGERY WRITTEN ON 4/10/2025  3:19 PM BY ERNESTO PAEZ MD    -R TKA circa 2018    Orders:  -     Ambulatory referral/consult to Sports Medicine; Future; Expected date: 04/17/2025    7. S/P knee surgery    8. Hypertension, essential  -     amLODIPine (NORVASC) 10 MG tablet; Take 1 tablet (10 mg total) by mouth once daily.  Dispense: 90 tablet; Refill: 3    9. Hyperlipidemia, unspecified hyperlipidemia type  -     atorvastatin (LIPITOR) 20 MG tablet; Take 1 tablet (20 mg total) by mouth once daily.  Dispense: 90 tablet; Refill: 3  -     Lipid Panel; Future; Expected date: 04/10/2025    10. Alcohol dependence, uncomplicated  Assessment & Plan:  -cut back to 'once a month'      11. Severe obesity (BMI  35.0-39.9) with comorbidity  -     Ambulatory referral/consult to Bariatric/Obesity Medicine; Future; Expected date: 04/17/2025    12. Dermatitis  Comments:  if not better, call and will refer to derm  Orders:  -     triamcinolone acetonide 0.1% (KENALOG) 0.1 % cream; Apply topically 2 (two) times daily.  Dispense: 80 g; Refill: 1      See meds, orders, follow up, routing and instructions sections of encounter and AVS. Discussed with patient and provided on AVS.    Discussed diet and exercise as therapeutic modalities for metabolic and other conditions. Provided patient information, which are included as links on the AVS for detailed information.    Lab Results   Component Value Date     06/23/2023    K 4.8 06/23/2023     06/23/2023    BUN 15 06/23/2023    CREATININE 0.7 06/23/2023    GLU 96 06/23/2023    HGBA1C 5.5 06/23/2023    MG 1.9 02/15/2018    AST 23 06/23/2023    ALT 17 06/23/2023    ALBUMIN 4.1 06/23/2023    PROT 7.3 06/23/2023    BILITOT 0.4 06/23/2023    CHOL 203 (H) 06/23/2023    HDL 42 06/23/2023    LDLCALC 143.8 06/23/2023    TRIG 86 06/23/2023    WBC 4.55 10/11/2022    HGB 13.7 10/11/2022    HCT 44.5 10/11/2022     10/11/2022    TSH 0.56 11/15/2005

## 2025-04-10 NOTE — PATIENT INSTRUCTIONS
Schedule lab orders for today.     If not contacted in a couple weeks by colonoscopy scheduling department - call Colonoscopy Scheduling Number - [phone].

## 2025-04-11 ENCOUNTER — TELEPHONE (OUTPATIENT)
Dept: SPORTS MEDICINE | Facility: CLINIC | Age: 55
End: 2025-04-11
Payer: MEDICARE

## 2025-04-13 ENCOUNTER — RESULTS FOLLOW-UP (OUTPATIENT)
Dept: INTERNAL MEDICINE | Facility: CLINIC | Age: 55
End: 2025-04-13
Payer: MEDICARE

## 2025-04-13 ENCOUNTER — TELEPHONE (OUTPATIENT)
Dept: INTERNAL MEDICINE | Facility: CLINIC | Age: 55
End: 2025-04-13
Payer: MEDICARE

## 2025-04-13 DIAGNOSIS — R82.90 ABNORMAL URINALYSIS: Primary | ICD-10-CM

## 2025-04-13 DIAGNOSIS — R91.1 LUNG NODULE: ICD-10-CM

## 2025-04-13 DIAGNOSIS — Z87.891 PERSONAL HISTORY OF NICOTINE DEPENDENCE: Primary | ICD-10-CM

## 2025-04-13 PROCEDURE — 99999 PR PBB SHADOW E&M-EST. PATIENT-LVL I: CPT | Mod: PBBFAC,,, | Performed by: FAMILY MEDICINE

## 2025-04-13 NOTE — TELEPHONE ENCOUNTER
Please call patient and explain that tests show mild protein in urine  .    I recommend follow up testing. Please see orders for repeat and please schedule soon.    Advise patient to hydrate well     Thank you.

## 2025-04-14 ENCOUNTER — PATIENT MESSAGE (OUTPATIENT)
Dept: INTERNAL MEDICINE | Facility: CLINIC | Age: 55
End: 2025-04-14
Payer: MEDICARE

## 2025-04-19 ENCOUNTER — CLINICAL SUPPORT (OUTPATIENT)
Dept: ENDOSCOPY | Facility: HOSPITAL | Age: 55
End: 2025-04-19
Attending: FAMILY MEDICINE
Payer: MEDICARE

## 2025-04-19 ENCOUNTER — TELEPHONE (OUTPATIENT)
Dept: ENDOSCOPY | Facility: HOSPITAL | Age: 55
End: 2025-04-19

## 2025-04-19 VITALS — HEIGHT: 64 IN | BODY MASS INDEX: 36.7 KG/M2 | WEIGHT: 215 LBS

## 2025-04-19 DIAGNOSIS — Z12.11 ENCOUNTER FOR SCREENING COLONOSCOPY: Primary | ICD-10-CM

## 2025-04-19 DIAGNOSIS — Z12.11 COLON CANCER SCREENING: Primary | ICD-10-CM

## 2025-04-19 NOTE — TELEPHONE ENCOUNTER
Referral for procedure from PAT appointment      Spoke to patient to schedule procedure(s) Colonoscopy       Physician to perform procedure(s) Dr. GEOVANY Barker  Date of Procedure (s) 05/14/2025  Arrival Time 1:15 pm   Time of Procedure(s) 2:15 PM    Location of Procedure(s) [facility] [place] [address] [facility]   Type of Rx Prep sent to patient: PEG  Instructions provided to patient via [facility]    Patient was informed on the following information and verbalized understanding. Screening questionnaire reviewed with patient and complete. If procedure requires anesthesia, a responsible adult needs to be present to accompany the patient home, patient cannot drive after receiving anesthesia. Appointment details are tentative, especially check-in time. Patient will receive a prep-op call 7 days prior to confirm check-in time for procedure. If applicable the patient should contact their pharmacy to verify Rx for procedure prep is ready for pick-up. Patient was advised to call the scheduling department at [phone] if pharmacy states no Rx is available. Patient was advised to call the endoscopy scheduling department if any questions or concerns arise.      [facility] Scheduling Department

## 2025-04-19 NOTE — PLAN OF CARE
Referral for procedure from PAT appointment      Spoke to patient to schedule procedure(s) Colonoscopy       Physician to perform procedure(s) Dr. GEOVANY Barker  Date of Procedure (s) 05/14/2025  Arrival Time 1:15 pm   Time of Procedure(s) 2:15 PM    [place] [facility] [place] [facility]   Type of Rx Prep sent to patient: PEG  Instructions provided to patient via [facility]    Patient was informed on the following information and verbalized understanding. Screening questionnaire reviewed with patient and complete. If procedure requires anesthesia, a responsible adult needs to be present to accompany the patient home, patient cannot drive after receiving anesthesia. Appointment details are tentative, especially check-in time. Patient will receive a prep-op call 7 days prior to confirm check-in time for procedure. If applicable the patient should contact their pharmacy to verify Rx for procedure prep is ready for pick-up. Patient was advised to call the scheduling department at [phone] if pharmacy states no Rx is available. Patient was advised to call the endoscopy scheduling department if any questions or concerns arise.      [facility] Scheduling Department

## 2025-04-22 ENCOUNTER — HOSPITAL ENCOUNTER (OUTPATIENT)
Dept: RADIOLOGY | Facility: HOSPITAL | Age: 55
Discharge: HOME | End: 2025-04-22
Attending: FAMILY MEDICINE
Payer: MEDICARE

## 2025-04-22 DIAGNOSIS — R82.90 ABNORMAL URINALYSIS: ICD-10-CM

## 2025-04-22 DIAGNOSIS — Z87.891 PERSONAL HISTORY OF NICOTINE DEPENDENCE: ICD-10-CM

## 2025-04-22 LAB
BACTERIA #/AREA URNS AUTO: NORMAL /HPF
BILIRUB UR QL STRIP.AUTO: NEGATIVE
CLARITY UR: ABNORMAL
COLOR UR AUTO: YELLOW
GLUCOSE UR QL STRIP: NEGATIVE
HGB UR QL STRIP: NEGATIVE
KETONES UR QL STRIP: NEGATIVE
LEUKOCYTE ESTERASE UR QL STRIP: NEGATIVE
MICROSCOPIC COMMENT: NORMAL
NITRITE UR QL STRIP: POSITIVE
PH UR STRIP: 6 [PH]
PROT UR QL STRIP: ABNORMAL
RBC #/AREA URNS AUTO: 2 /HPF (ref 0–4)
SP GR UR STRIP: 1.03
SQUAMOUS #/AREA URNS AUTO: 2 /HPF
UROBILINOGEN UR STRIP-ACNC: NEGATIVE EU/DL
WBC #/AREA URNS AUTO: 3 /HPF (ref 0–5)

## 2025-04-22 PROCEDURE — 71271 CT THORAX LUNG CANCER SCR C-: CPT | Mod: 26,,, | Performed by: STUDENT IN AN ORGANIZED HEALTH CARE EDUCATION/TRAINING PROGRAM

## 2025-04-22 PROCEDURE — 71271 CT THORAX LUNG CANCER SCR C-: CPT | Mod: TC

## 2025-04-22 PROCEDURE — 81001 URINALYSIS AUTO W/SCOPE: CPT

## 2025-04-25 ENCOUNTER — TELEPHONE (OUTPATIENT)
Dept: INTERNAL MEDICINE | Facility: CLINIC | Age: 55
End: 2025-04-25
Payer: MEDICARE

## 2025-04-25 ENCOUNTER — RESULTS FOLLOW-UP (OUTPATIENT)
Dept: INTERNAL MEDICINE | Facility: CLINIC | Age: 55
End: 2025-04-25

## 2025-04-25 DIAGNOSIS — R82.90 ABNORMAL URINALYSIS: Primary | ICD-10-CM

## 2025-04-25 NOTE — TELEPHONE ENCOUNTER
Please call patient and explain that tests show inconclusive urine test.    I recommend follow up testing. Please see orders for repeat and please schedule soon.     Please make sure culture is processed. Hydrate well before test.    Thank you.

## 2025-05-14 ENCOUNTER — TELEPHONE (OUTPATIENT)
Dept: ENDOSCOPY | Facility: HOSPITAL | Age: 55
End: 2025-05-14
Payer: MEDICARE

## 2025-05-26 ENCOUNTER — TELEPHONE (OUTPATIENT)
Dept: BARIATRICS | Facility: CLINIC | Age: 55
End: 2025-05-26
Payer: MEDICARE

## 2025-06-01 ENCOUNTER — TELEPHONE (OUTPATIENT)
Dept: INTERNAL MEDICINE | Facility: CLINIC | Age: 55
End: 2025-06-01
Payer: MEDICARE

## 2025-06-01 DIAGNOSIS — E78.5 HYPERLIPIDEMIA, UNSPECIFIED HYPERLIPIDEMIA TYPE: Primary | ICD-10-CM

## 2025-06-01 PROBLEM — R91.1 LUNG NODULE: Status: ACTIVE | Noted: 2025-06-01

## 2025-06-01 NOTE — PROGRESS NOTES
Please call patient and schedule patient for an appointment with me. This is for high cholesterol.    She has not reveiwed her portal messages.    Thank you.

## 2025-06-02 ENCOUNTER — TELEPHONE (OUTPATIENT)
Dept: INTERNAL MEDICINE | Facility: CLINIC | Age: 55
End: 2025-06-02
Payer: MEDICARE

## 2025-06-03 ENCOUNTER — OFFICE VISIT (OUTPATIENT)
Dept: INTERNAL MEDICINE | Facility: CLINIC | Age: 55
End: 2025-06-03
Attending: FAMILY MEDICINE
Payer: MEDICARE

## 2025-06-03 ENCOUNTER — LAB VISIT (OUTPATIENT)
Dept: LAB | Facility: HOSPITAL | Age: 55
End: 2025-06-03
Attending: FAMILY MEDICINE
Payer: MEDICARE

## 2025-06-03 VITALS
HEIGHT: 64 IN | BODY MASS INDEX: 36.1 KG/M2 | HEART RATE: 94 BPM | OXYGEN SATURATION: 97 % | DIASTOLIC BLOOD PRESSURE: 78 MMHG | WEIGHT: 211.44 LBS | SYSTOLIC BLOOD PRESSURE: 118 MMHG

## 2025-06-03 DIAGNOSIS — R80.9 PROTEINURIA, UNSPECIFIED TYPE: ICD-10-CM

## 2025-06-03 DIAGNOSIS — R82.90 ABNORMAL URINALYSIS: ICD-10-CM

## 2025-06-03 DIAGNOSIS — R91.1 LUNG NODULE: ICD-10-CM

## 2025-06-03 DIAGNOSIS — I10 HYPERTENSION, ESSENTIAL: ICD-10-CM

## 2025-06-03 DIAGNOSIS — J20.8 ACUTE BACTERIAL BRONCHITIS: ICD-10-CM

## 2025-06-03 DIAGNOSIS — B96.89 ACUTE BACTERIAL BRONCHITIS: ICD-10-CM

## 2025-06-03 DIAGNOSIS — C50.811 MALIGNANT NEOPLASM OF OVERLAPPING SITES OF RIGHT BREAST IN FEMALE, ESTROGEN RECEPTOR POSITIVE: ICD-10-CM

## 2025-06-03 DIAGNOSIS — E78.5 HYPERLIPIDEMIA, UNSPECIFIED HYPERLIPIDEMIA TYPE: Primary | ICD-10-CM

## 2025-06-03 DIAGNOSIS — Z17.0 MALIGNANT NEOPLASM OF OVERLAPPING SITES OF RIGHT BREAST IN FEMALE, ESTROGEN RECEPTOR POSITIVE: ICD-10-CM

## 2025-06-03 DIAGNOSIS — Z87.891 PERSONAL HISTORY OF NICOTINE DEPENDENCE: ICD-10-CM

## 2025-06-03 LAB
BACTERIA #/AREA URNS AUTO: NORMAL /HPF
BILIRUB UR QL STRIP.AUTO: NEGATIVE
CLARITY UR: CLEAR
COLOR UR AUTO: YELLOW
CREAT UR-MCNC: 369 MG/DL (ref 15–325)
GLUCOSE UR QL STRIP: NEGATIVE
HGB UR QL STRIP: ABNORMAL
KETONES UR QL STRIP: NEGATIVE
LEUKOCYTE ESTERASE UR QL STRIP: NEGATIVE
MICROSCOPIC COMMENT: NORMAL
NITRITE UR QL STRIP: POSITIVE
PH UR STRIP: 6 [PH]
PROT UR QL STRIP: ABNORMAL
PROT UR-MCNC: 22 MG/DL
PROT/CREAT UR: 0.06 MG/G{CREAT}
RBC #/AREA URNS AUTO: 3 /HPF (ref 0–4)
SP GR UR STRIP: >=1.03
SQUAMOUS #/AREA URNS AUTO: 14 /HPF
UROBILINOGEN UR STRIP-ACNC: NEGATIVE EU/DL
WBC #/AREA URNS AUTO: 4 /HPF (ref 0–5)

## 2025-06-03 PROCEDURE — 99214 OFFICE O/P EST MOD 30 MIN: CPT | Mod: S$GLB,,, | Performed by: FAMILY MEDICINE

## 2025-06-03 PROCEDURE — 3078F DIAST BP <80 MM HG: CPT | Mod: CPTII,S$GLB,, | Performed by: FAMILY MEDICINE

## 2025-06-03 PROCEDURE — 3074F SYST BP LT 130 MM HG: CPT | Mod: CPTII,S$GLB,, | Performed by: FAMILY MEDICINE

## 2025-06-03 PROCEDURE — 84156 ASSAY OF PROTEIN URINE: CPT

## 2025-06-03 PROCEDURE — 1159F MED LIST DOCD IN RCRD: CPT | Mod: CPTII,S$GLB,, | Performed by: FAMILY MEDICINE

## 2025-06-03 PROCEDURE — 99999 PR PBB SHADOW E&M-EST. PATIENT-LVL IV: CPT | Mod: PBBFAC,,, | Performed by: FAMILY MEDICINE

## 2025-06-03 PROCEDURE — G2211 COMPLEX E/M VISIT ADD ON: HCPCS | Mod: S$GLB,,, | Performed by: FAMILY MEDICINE

## 2025-06-03 PROCEDURE — 81001 URINALYSIS AUTO W/SCOPE: CPT

## 2025-06-03 PROCEDURE — 3008F BODY MASS INDEX DOCD: CPT | Mod: CPTII,S$GLB,, | Performed by: FAMILY MEDICINE

## 2025-06-03 PROCEDURE — 87186 SC STD MICRODIL/AGAR DIL: CPT

## 2025-06-03 PROCEDURE — 1160F RVW MEDS BY RX/DR IN RCRD: CPT | Mod: CPTII,S$GLB,, | Performed by: FAMILY MEDICINE

## 2025-06-03 RX ORDER — DOXYCYCLINE HYCLATE 100 MG
100 TABLET ORAL 2 TIMES DAILY
Qty: 14 TABLET | Refills: 0 | Status: SHIPPED | OUTPATIENT
Start: 2025-06-03 | End: 2025-06-10

## 2025-06-03 RX ORDER — ATORVASTATIN CALCIUM 40 MG/1
40 TABLET, FILM COATED ORAL DAILY
Qty: 90 TABLET | Refills: 3 | Status: SHIPPED | OUTPATIENT
Start: 2025-06-03 | End: 2026-06-03

## 2025-06-03 RX ORDER — PROMETHAZINE HYDROCHLORIDE AND DEXTROMETHORPHAN HYDROBROMIDE 6.25; 15 MG/5ML; MG/5ML
5-10 SYRUP ORAL 3 TIMES DAILY PRN
Qty: 118 ML | Refills: 0 | Status: SHIPPED | OUTPATIENT
Start: 2025-06-03

## 2025-06-05 ENCOUNTER — PATIENT MESSAGE (OUTPATIENT)
Dept: INTERNAL MEDICINE | Facility: CLINIC | Age: 55
End: 2025-06-05
Payer: MEDICARE

## 2025-06-05 ENCOUNTER — TELEPHONE (OUTPATIENT)
Dept: INTERNAL MEDICINE | Facility: CLINIC | Age: 55
End: 2025-06-05
Payer: MEDICARE

## 2025-06-05 ENCOUNTER — TELEPHONE (OUTPATIENT)
Dept: GENETICS | Facility: CLINIC | Age: 55
End: 2025-06-05
Payer: MEDICARE

## 2025-06-05 ENCOUNTER — RESULTS FOLLOW-UP (OUTPATIENT)
Dept: INTERNAL MEDICINE | Facility: CLINIC | Age: 55
End: 2025-06-05

## 2025-06-05 DIAGNOSIS — N39.0 URINARY TRACT INFECTION WITHOUT HEMATURIA, SITE UNSPECIFIED: Primary | ICD-10-CM

## 2025-06-05 RX ORDER — NITROFURANTOIN 25; 75 MG/1; MG/1
100 CAPSULE ORAL 2 TIMES DAILY
Qty: 10 CAPSULE | Refills: 0 | Status: SHIPPED | OUTPATIENT
Start: 2025-06-05 | End: 2025-06-10

## 2025-06-06 LAB — BACTERIA UR CULT: ABNORMAL

## 2025-06-08 ENCOUNTER — TELEPHONE (OUTPATIENT)
Dept: INTERNAL MEDICINE | Facility: CLINIC | Age: 55
End: 2025-06-08
Payer: MEDICARE

## 2025-06-08 NOTE — TELEPHONE ENCOUNTER
Called patient and discussed labs and or test results. Patient expressed understanding and had the opportunity to ask questions. Any questions were answered. See meds, orders, follow up and instructions sections of encounter.    Specific issues include:  Advised of culture results. Patient had not checked results  messages or forwarded nurse message. She has picked up the prescription, but not started.    Advised to start antibiotics and report any symptoms of worsening or persistence.

## 2025-07-09 ENCOUNTER — TELEPHONE (OUTPATIENT)
Dept: GENETICS | Facility: CLINIC | Age: 55
End: 2025-07-09
Payer: MEDICARE

## 2025-08-28 ENCOUNTER — TELEPHONE (OUTPATIENT)
Dept: INTERNAL MEDICINE | Facility: CLINIC | Age: 55
End: 2025-08-28
Payer: MEDICARE

## 2025-08-28 ENCOUNTER — OFFICE VISIT (OUTPATIENT)
Dept: PULMONOLOGY | Facility: CLINIC | Age: 55
End: 2025-08-28
Attending: FAMILY MEDICINE
Payer: MEDICARE

## 2025-08-28 VITALS
HEART RATE: 96 BPM | BODY MASS INDEX: 36.17 KG/M2 | DIASTOLIC BLOOD PRESSURE: 84 MMHG | SYSTOLIC BLOOD PRESSURE: 136 MMHG | HEIGHT: 64 IN | WEIGHT: 211.88 LBS | OXYGEN SATURATION: 99 %

## 2025-08-28 DIAGNOSIS — R91.1 SOLITARY PULMONARY NODULE: ICD-10-CM

## 2025-08-28 DIAGNOSIS — R91.1 LUNG NODULE: Primary | ICD-10-CM

## 2025-08-28 DIAGNOSIS — J30.9 ALLERGIC RHINITIS, UNSPECIFIED SEASONALITY, UNSPECIFIED TRIGGER: Primary | ICD-10-CM

## 2025-08-28 PROCEDURE — 3008F BODY MASS INDEX DOCD: CPT | Mod: CPTII,S$GLB,, | Performed by: INTERNAL MEDICINE

## 2025-08-28 PROCEDURE — 3075F SYST BP GE 130 - 139MM HG: CPT | Mod: CPTII,S$GLB,, | Performed by: INTERNAL MEDICINE

## 2025-08-28 PROCEDURE — 1159F MED LIST DOCD IN RCRD: CPT | Mod: CPTII,S$GLB,, | Performed by: INTERNAL MEDICINE

## 2025-08-28 PROCEDURE — 99999 PR PBB SHADOW E&M-EST. PATIENT-LVL III: CPT | Mod: PBBFAC,,, | Performed by: INTERNAL MEDICINE

## 2025-08-28 PROCEDURE — 3079F DIAST BP 80-89 MM HG: CPT | Mod: CPTII,S$GLB,, | Performed by: INTERNAL MEDICINE

## 2025-08-28 PROCEDURE — 1160F RVW MEDS BY RX/DR IN RCRD: CPT | Mod: CPTII,S$GLB,, | Performed by: INTERNAL MEDICINE

## 2025-08-28 PROCEDURE — 99204 OFFICE O/P NEW MOD 45 MIN: CPT | Mod: S$GLB,,, | Performed by: INTERNAL MEDICINE

## 2025-08-28 RX ORDER — LEVOCETIRIZINE DIHYDROCHLORIDE 5 MG/1
5 TABLET, FILM COATED ORAL NIGHTLY
Qty: 30 TABLET | Refills: 11 | Status: SHIPPED | OUTPATIENT
Start: 2025-08-28 | End: 2026-08-28

## 2025-09-04 ENCOUNTER — HOSPITAL ENCOUNTER (OUTPATIENT)
Dept: RADIOLOGY | Facility: HOSPITAL | Age: 55
Discharge: HOME | End: 2025-09-04
Attending: INTERNAL MEDICINE
Payer: MEDICARE

## 2025-09-04 ENCOUNTER — OFFICE VISIT (OUTPATIENT)
Dept: INTERNAL MEDICINE | Facility: CLINIC | Age: 55
End: 2025-09-04
Attending: FAMILY MEDICINE
Payer: MEDICARE

## 2025-09-04 VITALS
DIASTOLIC BLOOD PRESSURE: 80 MMHG | HEIGHT: 64 IN | OXYGEN SATURATION: 98 % | HEART RATE: 83 BPM | SYSTOLIC BLOOD PRESSURE: 128 MMHG | WEIGHT: 209.19 LBS | BODY MASS INDEX: 35.71 KG/M2

## 2025-09-04 DIAGNOSIS — R94.31 LONG QT INTERVAL: ICD-10-CM

## 2025-09-04 DIAGNOSIS — Z17.0 MALIGNANT NEOPLASM OF OVERLAPPING SITES OF RIGHT BREAST IN FEMALE, ESTROGEN RECEPTOR POSITIVE: ICD-10-CM

## 2025-09-04 DIAGNOSIS — R91.1 SOLITARY PULMONARY NODULE: ICD-10-CM

## 2025-09-04 DIAGNOSIS — I10 HYPERTENSION, ESSENTIAL: ICD-10-CM

## 2025-09-04 DIAGNOSIS — R91.1 LUNG NODULE: ICD-10-CM

## 2025-09-04 DIAGNOSIS — C50.811 MALIGNANT NEOPLASM OF OVERLAPPING SITES OF RIGHT BREAST IN FEMALE, ESTROGEN RECEPTOR POSITIVE: ICD-10-CM

## 2025-09-04 DIAGNOSIS — Z12.31 ENCOUNTER FOR SCREENING MAMMOGRAM FOR MALIGNANT NEOPLASM OF BREAST: ICD-10-CM

## 2025-09-04 DIAGNOSIS — Z87.891 PERSONAL HISTORY OF NICOTINE DEPENDENCE: ICD-10-CM

## 2025-09-04 DIAGNOSIS — E78.5 HYPERLIPIDEMIA, UNSPECIFIED HYPERLIPIDEMIA TYPE: Primary | ICD-10-CM

## 2025-09-04 DIAGNOSIS — Z12.11 COLON CANCER SCREENING: ICD-10-CM

## 2025-09-04 DIAGNOSIS — R80.9 PROTEINURIA, UNSPECIFIED TYPE: ICD-10-CM

## 2025-09-04 PROBLEM — T84.53XA INFECTION AND INFLAMMATORY REACTION DUE TO INTERNAL RIGHT KNEE PROSTHESIS, INITIAL ENCOUNTER: Status: RESOLVED | Noted: 2018-02-12 | Resolved: 2025-09-04

## 2025-09-04 PROCEDURE — G2211 COMPLEX E/M VISIT ADD ON: HCPCS | Mod: S$GLB,,, | Performed by: FAMILY MEDICINE

## 2025-09-04 PROCEDURE — 71250 CT THORAX DX C-: CPT | Mod: TC

## 2025-09-04 PROCEDURE — 1160F RVW MEDS BY RX/DR IN RCRD: CPT | Mod: CPTII,S$GLB,, | Performed by: FAMILY MEDICINE

## 2025-09-04 PROCEDURE — 99999 PR PBB SHADOW E&M-EST. PATIENT-LVL V: CPT | Mod: PBBFAC,,, | Performed by: FAMILY MEDICINE

## 2025-09-04 PROCEDURE — 99214 OFFICE O/P EST MOD 30 MIN: CPT | Mod: S$GLB,,, | Performed by: FAMILY MEDICINE

## 2025-09-04 PROCEDURE — 3008F BODY MASS INDEX DOCD: CPT | Mod: CPTII,S$GLB,, | Performed by: FAMILY MEDICINE

## 2025-09-04 PROCEDURE — 3074F SYST BP LT 130 MM HG: CPT | Mod: CPTII,S$GLB,, | Performed by: FAMILY MEDICINE

## 2025-09-04 PROCEDURE — 1159F MED LIST DOCD IN RCRD: CPT | Mod: CPTII,S$GLB,, | Performed by: FAMILY MEDICINE

## 2025-09-04 PROCEDURE — 3079F DIAST BP 80-89 MM HG: CPT | Mod: CPTII,S$GLB,, | Performed by: FAMILY MEDICINE

## 2025-09-05 ENCOUNTER — TELEPHONE (OUTPATIENT)
Dept: ENDOSCOPY | Facility: HOSPITAL | Age: 55
End: 2025-09-05
Payer: MEDICARE

## (undated) DEVICE — GAUZE SPONGE 4X4 12PLY

## (undated) DEVICE — NDL 18GA X1 1/2 REG BEVEL

## (undated) DEVICE — GOWN NONREINF SET-IN SLV XL

## (undated) DEVICE — PADDING CAST SPECIALIST 6X4YD

## (undated) DEVICE — NDL MAYO CAT 1/2 CIR #6

## (undated) DEVICE — SEE MEDLINE ITEM 146298

## (undated) DEVICE — SUT MCRYL PLUS 4-0 PS2 27IN

## (undated) DEVICE — SHEATH GUIDE SCOUT SURG RADAR

## (undated) DEVICE — PAD ELECTRODE STER 1.5X3

## (undated) DEVICE — MARKER SKIN STND TIP BLUE BARR

## (undated) DEVICE — DRAPE INCISE IOBAN 2 23X17IN

## (undated) DEVICE — DRESSING GAUZE 6PLY 4X4

## (undated) DEVICE — PAD CAST SPECIALIST STRL 6

## (undated) DEVICE — PAD ABD 8X10 STERILE

## (undated) DEVICE — SUT STRATAFIX PDO 2-0 MH

## (undated) DEVICE — SUT VICRYL PLUS 2-0 CT1 18

## (undated) DEVICE — DRAPE THREE-QTR REINF 53X77IN

## (undated) DEVICE — Device

## (undated) DEVICE — ELECTRODE REM PLYHSV RETURN 9

## (undated) DEVICE — GLOVE BIOGEL SKINSENSE PI 6.5

## (undated) DEVICE — DRAPE STERI U-SHAPED 47X51IN

## (undated) DEVICE — KIT IRR SUCTION HND PIECE

## (undated) DEVICE — ELECTRODE BLD EXT INSUL 1

## (undated) DEVICE — BLADE RECIP SINGLE SIDED

## (undated) DEVICE — SEE MEDLINE ITEM 157150

## (undated) DEVICE — BLADE SAW SAG 22.13MM 0.92MM

## (undated) DEVICE — GOWN B1 X-LG X-LONG

## (undated) DEVICE — SYS PRINEO SKIN CLOSURE

## (undated) DEVICE — APPLICATOR CHLORAPREP ORN 26ML

## (undated) DEVICE — TOURNIQUET SB QC SP 34X4IN

## (undated) DEVICE — UNDERGLOVES BIOGEL PI SIZE 7.5

## (undated) DEVICE — SEE MEDLINE ITEM 146347

## (undated) DEVICE — KIT EVACUATOR 3-SPRING 1/8 DRN

## (undated) DEVICE — ELECTRODE BLADE INSULATED 1 IN

## (undated) DEVICE — SPONGE DERMACEA GAUZE 4X4

## (undated) DEVICE — SET CYSTO IRR DRP CHMBR 84IN

## (undated) DEVICE — DRAIN FLAT JP 7X4MM FUL

## (undated) DEVICE — KIT PREVENA PLUS

## (undated) DEVICE — SPONGE LAP 18X18 PREWASHED

## (undated) DEVICE — ADHESIVE DERMABOND ADVANCED

## (undated) DEVICE — SUT VICRYL PLUS 4-0 PS2 27

## (undated) DEVICE — SUT 2/0 30IN SILK BLK BRAI

## (undated) DEVICE — GLOVE BIOGEL SKINSENSE PI 7.0

## (undated) DEVICE — MARGIN MARKER STANDARD 6 COLOR

## (undated) DEVICE — SEE MEDLINE ITEM 157216

## (undated) DEVICE — SOL 9P NACL IRR PIC IL

## (undated) DEVICE — SYRINGE 0.9% NACL 10MIL PREFIL

## (undated) DEVICE — SOL IRR NACL .9% 3000ML

## (undated) DEVICE — BRA CLASSIC COMFORT 46 BEIGE

## (undated) DEVICE — SEE MEDLINE ITEM 152530

## (undated) DEVICE — CONTAINER SPECIMEN OR STER 4OZ

## (undated) DEVICE — GLOVE BIOGEL SKINSENSE PI 8.5

## (undated) DEVICE — DRESSING TELFA N ADH 3X8

## (undated) DEVICE — UNDERGLOVES BIOGEL PI SIZE 8.5

## (undated) DEVICE — SEE MEDLINE ITEM 146231

## (undated) DEVICE — DRESSING TRANS 2X2 TEGADERM

## (undated) DEVICE — SUT STRATAFIX PGAPCL 3 FS-1

## (undated) DEVICE — SUT VICRYL 3-0 27 SH

## (undated) DEVICE — DRAPE PLASTIC U 60X72

## (undated) DEVICE — COVER BACK TABLE 72X21

## (undated) DEVICE — UNDERGLOVE BIOGEL PI SZ 6.5 LF

## (undated) DEVICE — PAD ELECTRODE STERILE LG 2X3.5

## (undated) DEVICE — STAPLER SKIN ROTATING HEAD

## (undated) DEVICE — SUT VICRYL PLUS 3-0 SH 18IN

## (undated) DEVICE — SEE MEDLINE ITEM 146271

## (undated) DEVICE — SYR ONLY LUER LOCK 20CC

## (undated) DEVICE — BRACE KNEE T SCOPE PREMIER

## (undated) DEVICE — DRESSING XEROFORM FOIL PK 1X8

## (undated) DEVICE — POSITIONER IV ARMBOARD FOAM

## (undated) DEVICE — SYR 10CC LUER LOCK

## (undated) DEVICE — UNDERGLOVES BIOGEL PI SZ 7 LF

## (undated) DEVICE — DRAIN WND 15FRX3/16X4.7MM TRCR

## (undated) DEVICE — POSITIONER HEEL FOAM CONVOLTD

## (undated) DEVICE — GAUZE SPONGE 4'X4 12 PLY

## (undated) DEVICE — CLOSURE SKIN STERI STRIP 1/2X4

## (undated) DEVICE — MIXER BONE CEMENT

## (undated) DEVICE — GOWN SMART IMP BREATHABLE XXLG

## (undated) DEVICE — PENCIL ELECTROSURG HOLST W/BLD

## (undated) DEVICE — SUT 2-0 MONOCRYL PLUS SH UD

## (undated) DEVICE — BLADE SAW SAG 6.30X12.70X.64

## (undated) DEVICE — SEE MEDLINE ITEM 152622

## (undated) DEVICE — SUT VICRYL+ 1 CT1 18IN

## (undated) DEVICE — SEE MEDLINE ITEM 152515

## (undated) DEVICE — DRAPE STERI INSTRUMENT 1018

## (undated) DEVICE — PAD COLD THERAPY KNEE WRAP ON

## (undated) DEVICE — SUT PROLENE 4-0 MONO 18IN

## (undated) DEVICE — SKIN MARKER DEVON 160

## (undated) DEVICE — CONTAINER SPECIMEN STRL 4OZ

## (undated) DEVICE — SOL NS 1000CC

## (undated) DEVICE — NDL HYPO REG 25G X 1 1/2

## (undated) DEVICE — HOOD T-5 TEAR AWAY STERILE

## (undated) DEVICE — TOWEL OR DISP STRL BLUE 4/PK

## (undated) DEVICE — SYR 30CC LUER LOCK

## (undated) DEVICE — KIT DRAIN HEMOVAC 3/16IN 400ML

## (undated) DEVICE — TAPE SILK 3IN

## (undated) DEVICE — TOWEL BATH WHITE

## (undated) DEVICE — HOLDER DRAIN POUCH PINK

## (undated) DEVICE — CLOSURE SKIN 1X5 STERI-STRIP

## (undated) DEVICE — SET BASIN 48X48IN 6000ML RING

## (undated) DEVICE — KIT DRESSING PREVENA PLUS

## (undated) DEVICE — SUT MONOCRYL PLUS UD 3-0 27

## (undated) DEVICE — SUT PDS II 1 CT VIL MONO 36

## (undated) DEVICE — NDL SURGEON MAYO #4

## (undated) DEVICE — SOL NACL 0.9% INJ 250ML BG

## (undated) DEVICE — SEE MEDLINE ITEM 153151

## (undated) DEVICE — SOL WATER STRL IRR 1000ML

## (undated) DEVICE — SUT STRATAFIX 1 PDS CT-1

## (undated) DEVICE — GOWN SMARTGOWN 3XL XLONG

## (undated) DEVICE — SUT STRATAFIX PGAPCL 4 FS-2

## (undated) DEVICE — SEE MEDLINE ITEM 152523

## (undated) DEVICE — TRAY FOLEY 16FR INFECTION CONT

## (undated) DEVICE — PAD KNEE POLAR XL

## (undated) DEVICE — EVACUATOR WOUND BULB 100CC

## (undated) DEVICE — SPONGE SUPER KERLIX 6X6.75IN